# Patient Record
Sex: MALE | Race: ASIAN | HISPANIC OR LATINO | Employment: UNEMPLOYED | URBAN - METROPOLITAN AREA
[De-identification: names, ages, dates, MRNs, and addresses within clinical notes are randomized per-mention and may not be internally consistent; named-entity substitution may affect disease eponyms.]

---

## 2024-01-01 ENCOUNTER — HOSPITAL ENCOUNTER (INPATIENT)
Facility: HOSPITAL | Age: 49
LOS: 24 days | Discharge: LEFT BEFORE DISCHARGE | DRG: 853 | End: 2024-01-25
Attending: EMERGENCY MEDICINE | Admitting: INTERNAL MEDICINE

## 2024-01-01 DIAGNOSIS — D68.9 COAGULOPATHY: ICD-10-CM

## 2024-01-01 DIAGNOSIS — R09.02 HYPOXEMIA: ICD-10-CM

## 2024-01-01 DIAGNOSIS — R53.1 WEAKNESS: ICD-10-CM

## 2024-01-01 DIAGNOSIS — F10.931 ALCOHOL WITHDRAWAL SYNDROME, WITH DELIRIUM: Primary | ICD-10-CM

## 2024-01-01 DIAGNOSIS — E87.20 METABOLIC ACIDOSIS: ICD-10-CM

## 2024-01-01 DIAGNOSIS — J10.1 INFLUENZA A: ICD-10-CM

## 2024-01-01 DIAGNOSIS — E16.2 HYPOGLYCEMIA: ICD-10-CM

## 2024-01-01 DIAGNOSIS — R78.81 BACTEREMIA: ICD-10-CM

## 2024-01-01 DIAGNOSIS — F10.931 DELIRIUM TREMENS: ICD-10-CM

## 2024-01-01 DIAGNOSIS — K70.30 ALCOHOLIC CIRRHOSIS, UNSPECIFIED WHETHER ASCITES PRESENT: ICD-10-CM

## 2024-01-01 DIAGNOSIS — D64.9 ANEMIA, UNSPECIFIED TYPE: ICD-10-CM

## 2024-01-01 DIAGNOSIS — R05.9 COUGH: ICD-10-CM

## 2024-01-01 DIAGNOSIS — D69.6 THROMBOCYTOPENIA: ICD-10-CM

## 2024-01-01 DIAGNOSIS — C22.0 HCC (HEPATOCELLULAR CARCINOMA): ICD-10-CM

## 2024-01-01 LAB
A-ADO2 BLOOD GAS (OHS): 244 MMHG
A-ADO2 BLOOD GAS (OHS): 53 MMHG
ABS NEUT CALC (OHS): 12.28 X10(3)/MCL (ref 2.1–9.2)
ALBUMIN SERPL-MCNC: 2 G/DL (ref 3.5–5)
ALBUMIN/GLOB SERPL: 0.3 RATIO (ref 1.1–2)
ALLENS TEST BLOOD GAS (OHS): YES
ALLENS TEST BLOOD GAS (OHS): YES
ALP SERPL-CCNC: 154 UNIT/L (ref 40–150)
ALT SERPL-CCNC: 65 UNIT/L (ref 0–55)
AMMONIA PLAS-MSCNC: 56.3 UMOL/L (ref 18–72)
AMPHET UR QL SCN: NEGATIVE
ANION GAP SERPL CALC-SCNC: 11 MEQ/L
ANION GAP SERPL CALC-SCNC: 9 MEQ/L
ANISOCYTOSIS BLD QL SMEAR: ABNORMAL
APPEARANCE UR: ABNORMAL
APTT PPP: 38.6 SECONDS (ref 23.2–33.7)
AST SERPL-CCNC: 199 UNIT/L (ref 5–34)
BACTERIA #/AREA URNS AUTO: ABNORMAL /HPF
BARBITURATE SCN PRESENT UR: NEGATIVE
BASE EXCESS BLD CALC-SCNC: -7.7 MMOL/L (ref -2–2)
BASE EXCESS BLD CALC-SCNC: -8.4 MMOL/L (ref -2–2)
BENZODIAZ UR QL SCN: NEGATIVE
BILIRUB SERPL-MCNC: 9.2 MG/DL
BILIRUB UR QL STRIP.AUTO: ABNORMAL
BILIRUBIN DIRECT+TOT PNL SERPL-MCNC: 6.5 MG/DL (ref 0–?)
BLOOD GAS SAMPLE TYPE (OHS): ABNORMAL
BLOOD GAS SAMPLE TYPE (OHS): ABNORMAL
BNP BLD-MCNC: 89.3 PG/ML
BUN SERPL-MCNC: 9.1 MG/DL (ref 8.9–20.6)
BUN SERPL-MCNC: 9.1 MG/DL (ref 8.9–20.6)
BUN SERPL-MCNC: 9.6 MG/DL (ref 8.9–20.6)
BURR CELLS (OLG): ABNORMAL
CALCIUM SERPL-MCNC: 6.8 MG/DL (ref 8.4–10.2)
CALCIUM SERPL-MCNC: 6.8 MG/DL (ref 8.4–10.2)
CALCIUM SERPL-MCNC: 7.9 MG/DL (ref 8.4–10.2)
CANNABINOIDS UR QL SCN: NEGATIVE
CHLORIDE SERPL-SCNC: 101 MMOL/L (ref 98–107)
CHLORIDE SERPL-SCNC: 105 MMOL/L (ref 98–107)
CHLORIDE SERPL-SCNC: 106 MMOL/L (ref 98–107)
CK MB SERPL-MCNC: 2.9 NG/ML
CK SERPL-CCNC: 840 U/L (ref 30–200)
CO2 BLDA-SCNC: 15.3 MMOL/L (ref 22–26)
CO2 BLDA-SCNC: 18.3 MMOL/L (ref 22–26)
CO2 SERPL-SCNC: 12 MMOL/L (ref 22–29)
CO2 SERPL-SCNC: 17 MMOL/L (ref 22–29)
CO2 SERPL-SCNC: 19 MMOL/L (ref 22–29)
COCAINE UR QL SCN: NEGATIVE
COHGB MFR BLDA: 2.8 % (ref 0.5–1.5)
COHGB MFR BLDA: 3 % (ref 0.5–1.5)
COLOR UR AUTO: ABNORMAL
CREAT SERPL-MCNC: 0.79 MG/DL (ref 0.73–1.18)
CREAT SERPL-MCNC: 0.84 MG/DL (ref 0.73–1.18)
CREAT SERPL-MCNC: 0.92 MG/DL (ref 0.73–1.18)
CREAT/UREA NIT SERPL: 11
CREAT/UREA NIT SERPL: 12
DRAWN BY BLOOD GAS (OHS): ABNORMAL
DRAWN BY BLOOD GAS (OHS): ABNORMAL
ERYTHROCYTE [DISTWIDTH] IN BLOOD BY AUTOMATED COUNT: 20.4 % (ref 11.5–17)
ETHANOL SERPL-MCNC: 59 MG/DL
FENTANYL UR QL SCN: NEGATIVE
FERRITIN SERPL-MCNC: 116 NG/ML (ref 21.81–274.66)
FLUAV AG UPPER RESP QL IA.RAPID: DETECTED
FLUBV AG UPPER RESP QL IA.RAPID: NOT DETECTED
FOLATE SERPL-MCNC: 18.3 NG/ML (ref 7–31.4)
GAS PNL BLD: 122 MMHG
GAS PNL BLD: 317 MMHG
GFR SERPLBLD CREATININE-BSD FMLA CKD-EPI: >60 MLS/MIN/1.73/M2
GLOBULIN SER-MCNC: 6.7 GM/DL (ref 2.4–3.5)
GLUCOSE SERPL-MCNC: 117 MG/DL (ref 74–100)
GLUCOSE SERPL-MCNC: 141 MG/DL (ref 74–100)
GLUCOSE SERPL-MCNC: 66 MG/DL (ref 74–100)
GLUCOSE UR QL STRIP.AUTO: NORMAL
HAV IGM SERPL QL IA: NONREACTIVE
HBV CORE IGM SERPL QL IA: NONREACTIVE
HBV SURFACE AG SERPL QL IA: NONREACTIVE
HCO3 BLDA-SCNC: 14.6 MMOL/L (ref 22–26)
HCO3 BLDA-SCNC: 17.3 MMOL/L (ref 22–26)
HCT VFR BLD AUTO: 27.3 % (ref 42–52)
HCV AB SERPL QL IA: REACTIVE
HGB BLD-MCNC: 8.5 G/DL (ref 14–18)
HOLD SPECIMEN: NORMAL
HYALINE CASTS #/AREA URNS LPF: ABNORMAL /LPF
HYPOCHROMIA BLD QL SMEAR: ABNORMAL
INHALED O2 CONCENTRATION: 21 %
INHALED O2 CONCENTRATION: 50 %
INR PPP: 2.6
IRON SATN MFR SERPL: 19 % (ref 20–50)
IRON SERPL-MCNC: 39 UG/DL (ref 65–175)
KETONES UR QL STRIP.AUTO: ABNORMAL
LACTATE SERPL-SCNC: 5.4 MMOL/L (ref 0.5–2.2)
LACTATE SERPL-SCNC: 7.5 MMOL/L (ref 0.5–2.2)
LEUKOCYTE ESTERASE UR QL STRIP.AUTO: NEGATIVE
LIPASE SERPL-CCNC: 56 U/L
LYMPHOCYTES NFR BLD MANUAL: 13 % (ref 13–40)
LYMPHOCYTES NFR BLD MANUAL: 2.02 X10(3)/MCL
MAGNESIUM SERPL-MCNC: 1.8 MG/DL (ref 1.6–2.6)
MAGNESIUM SERPL-MCNC: 2 MG/DL (ref 1.6–2.6)
MAGNESIUM SERPL-MCNC: 2.2 MG/DL (ref 1.6–2.6)
MCH RBC QN AUTO: 29.7 PG (ref 27–31)
MCHC RBC AUTO-ENTMCNC: 31.1 G/DL (ref 33–36)
MCV RBC AUTO: 95.5 FL (ref 80–94)
MDMA UR QL SCN: NEGATIVE
METAMYELOCYTES NFR BLD MANUAL: 2 %
METHGB MFR BLDA: 0.3 % (ref 0–1.5)
METHGB MFR BLDA: 0.7 % (ref 0–1.5)
MONOCYTES NFR BLD MANUAL: 0.93 X10(3)/MCL (ref 0.1–1.3)
MONOCYTES NFR BLD MANUAL: 6 % (ref 2–11)
NEUTROPHILS NFR BLD MANUAL: 70 % (ref 47–80)
NEUTS BAND NFR BLD MANUAL: 9 % (ref 0–11)
NITRITE UR QL STRIP.AUTO: NEGATIVE
NRBC BLD AUTO-RTO: 3.8 %
NRBC BLD MANUAL-RTO: 2 %
O2 HB BLOOD GAS (OHS): 92.8 % (ref 94–100)
O2 HB BLOOD GAS (OHS): 93.2 % (ref 94–100)
OPIATES UR QL SCN: NEGATIVE
OXYHGB MFR BLDA: 7.5 G/DL (ref 12–18)
OXYHGB MFR BLDA: 8.6 G/DL (ref 12–18)
PCO2 BLDA: 22 MMHG (ref 35–45)
PCO2 BLDA: 32 MMHG (ref 35–45)
PCP UR QL: NEGATIVE
PH BLDA: 7.34 [PH] (ref 7.35–7.45)
PH BLDA: 7.43 [PH] (ref 7.35–7.45)
PH UR STRIP.AUTO: 6 [PH]
PH UR: 6 [PH] (ref 3–11)
PHOSPHATE SERPL-MCNC: 2.1 MG/DL (ref 2.3–4.7)
PHOSPHATE SERPL-MCNC: 2.5 MG/DL (ref 2.3–4.7)
PHOSPHATE SERPL-MCNC: 2.8 MG/DL (ref 2.3–4.7)
PLATELET # BLD AUTO: 118 X10(3)/MCL (ref 130–400)
PLATELET # BLD EST: ABNORMAL 10*3/UL
PLATELETS.RETICULATED NFR BLD AUTO: 8 % (ref 0.9–11.2)
PMV BLD AUTO: 11.5 FL (ref 7.4–10.4)
PO2 BLDA: 69 MMHG (ref 75–100)
PO2 BLDA: 73 MMHG (ref 75–100)
POCT GLUCOSE: 123 MG/DL (ref 70–110)
POCT GLUCOSE: 65 MG/DL (ref 70–110)
POLYCHROMASIA BLD QL SMEAR: ABNORMAL
POTASSIUM SERPL-SCNC: 3.4 MMOL/L (ref 3.5–5.1)
POTASSIUM SERPL-SCNC: 3.5 MMOL/L (ref 3.5–5.1)
POTASSIUM SERPL-SCNC: 4 MMOL/L (ref 3.5–5.1)
PROT SERPL-MCNC: 8.7 GM/DL (ref 6.4–8.3)
PROT UR QL STRIP.AUTO: ABNORMAL
PROTHROMBIN TIME: 26.9 SECONDS (ref 11.4–14)
RBC # BLD AUTO: 2.86 X10(6)/MCL (ref 4.7–6.1)
RBC #/AREA URNS AUTO: ABNORMAL /HPF
RBC MORPH BLD: ABNORMAL
RBC UR QL AUTO: NEGATIVE
RSV A 5' UTR RNA NPH QL NAA+PROBE: NOT DETECTED
SAMPLE SITE BLOOD GAS (OHS): ABNORMAL
SAMPLE SITE BLOOD GAS (OHS): ABNORMAL
SAO2 % BLDA: 96.2 %
SAO2 % BLDA: 96.4 %
SARS-COV-2 RNA RESP QL NAA+PROBE: NOT DETECTED
SODIUM SERPL-SCNC: 132 MMOL/L (ref 136–145)
SODIUM SERPL-SCNC: 133 MMOL/L (ref 136–145)
SODIUM SERPL-SCNC: 134 MMOL/L (ref 136–145)
SP GR UR STRIP.AUTO: 1.02 (ref 1–1.03)
SPECIFIC GRAVITY, URINE AUTO (.000) (OHS): 1.02 (ref 1–1.03)
TARGETS BLD QL SMEAR: ABNORMAL
TIBC SERPL-MCNC: 164 UG/DL (ref 69–240)
TIBC SERPL-MCNC: 203 UG/DL (ref 250–450)
TRANSFERRIN SERPL-MCNC: 179 MG/DL (ref 174–364)
TROPONIN I SERPL-MCNC: 0.03 NG/ML (ref 0–0.04)
UROBILINOGEN UR STRIP-ACNC: ABNORMAL
VIT B12 SERPL-MCNC: >2000 PG/ML (ref 213–816)
WBC # SPEC AUTO: 15.54 X10(3)/MCL (ref 4.5–11.5)
WBC #/AREA URNS AUTO: ABNORMAL /HPF

## 2024-01-01 PROCEDURE — 93005 ELECTROCARDIOGRAM TRACING: CPT

## 2024-01-01 PROCEDURE — 82248 BILIRUBIN DIRECT: CPT | Performed by: PHYSICIAN ASSISTANT

## 2024-01-01 PROCEDURE — 5A09457 ASSISTANCE WITH RESPIRATORY VENTILATION, 24-96 CONSECUTIVE HOURS, CONTINUOUS POSITIVE AIRWAY PRESSURE: ICD-10-PCS | Performed by: INTERNAL MEDICINE

## 2024-01-01 PROCEDURE — 96374 THER/PROPH/DIAG INJ IV PUSH: CPT

## 2024-01-01 PROCEDURE — 84484 ASSAY OF TROPONIN QUANT: CPT | Performed by: PHYSICIAN ASSISTANT

## 2024-01-01 PROCEDURE — 82803 BLOOD GASES ANY COMBINATION: CPT

## 2024-01-01 PROCEDURE — 36600 WITHDRAWAL OF ARTERIAL BLOOD: CPT

## 2024-01-01 PROCEDURE — 82077 ASSAY SPEC XCP UR&BREATH IA: CPT | Performed by: PHYSICIAN ASSISTANT

## 2024-01-01 PROCEDURE — 25000003 PHARM REV CODE 250: Performed by: EMERGENCY MEDICINE

## 2024-01-01 PROCEDURE — 87154 CUL TYP ID BLD PTHGN 6+ TRGT: CPT | Performed by: STUDENT IN AN ORGANIZED HEALTH CARE EDUCATION/TRAINING PROGRAM

## 2024-01-01 PROCEDURE — 80074 ACUTE HEPATITIS PANEL: CPT | Performed by: PHYSICIAN ASSISTANT

## 2024-01-01 PROCEDURE — 85027 COMPLETE CBC AUTOMATED: CPT | Performed by: PHYSICIAN ASSISTANT

## 2024-01-01 PROCEDURE — 87077 CULTURE AEROBIC IDENTIFY: CPT | Performed by: STUDENT IN AN ORGANIZED HEALTH CARE EDUCATION/TRAINING PROGRAM

## 2024-01-01 PROCEDURE — 83880 ASSAY OF NATRIURETIC PEPTIDE: CPT | Performed by: PHYSICIAN ASSISTANT

## 2024-01-01 PROCEDURE — 96375 TX/PRO/DX INJ NEW DRUG ADDON: CPT

## 2024-01-01 PROCEDURE — 99900035 HC TECH TIME PER 15 MIN (STAT)

## 2024-01-01 PROCEDURE — 85730 THROMBOPLASTIN TIME PARTIAL: CPT | Performed by: PHYSICIAN ASSISTANT

## 2024-01-01 PROCEDURE — 25000003 PHARM REV CODE 250: Performed by: STUDENT IN AN ORGANIZED HEALTH CARE EDUCATION/TRAINING PROGRAM

## 2024-01-01 PROCEDURE — 87522 HEPATITIS C REVRS TRNSCRPJ: CPT | Performed by: STUDENT IN AN ORGANIZED HEALTH CARE EDUCATION/TRAINING PROGRAM

## 2024-01-01 PROCEDURE — S5010 5% DEXTROSE AND 0.45% SALINE: HCPCS | Performed by: STUDENT IN AN ORGANIZED HEALTH CARE EDUCATION/TRAINING PROGRAM

## 2024-01-01 PROCEDURE — 85610 PROTHROMBIN TIME: CPT | Performed by: PHYSICIAN ASSISTANT

## 2024-01-01 PROCEDURE — 27000190 HC CPAP FULL FACE MASK W/VALVE

## 2024-01-01 PROCEDURE — 83735 ASSAY OF MAGNESIUM: CPT | Performed by: STUDENT IN AN ORGANIZED HEALTH CARE EDUCATION/TRAINING PROGRAM

## 2024-01-01 PROCEDURE — 63600175 PHARM REV CODE 636 W HCPCS: Performed by: EMERGENCY MEDICINE

## 2024-01-01 PROCEDURE — 93005 ELECTROCARDIOGRAM TRACING: CPT | Performed by: INTERNAL MEDICINE

## 2024-01-01 PROCEDURE — 80307 DRUG TEST PRSMV CHEM ANLYZR: CPT | Performed by: STUDENT IN AN ORGANIZED HEALTH CARE EDUCATION/TRAINING PROGRAM

## 2024-01-01 PROCEDURE — 20000000 HC ICU ROOM

## 2024-01-01 PROCEDURE — 63600175 PHARM REV CODE 636 W HCPCS: Performed by: PHYSICIAN ASSISTANT

## 2024-01-01 PROCEDURE — 82607 VITAMIN B-12: CPT | Performed by: STUDENT IN AN ORGANIZED HEALTH CARE EDUCATION/TRAINING PROGRAM

## 2024-01-01 PROCEDURE — 83605 ASSAY OF LACTIC ACID: CPT | Performed by: STUDENT IN AN ORGANIZED HEALTH CARE EDUCATION/TRAINING PROGRAM

## 2024-01-01 PROCEDURE — 82140 ASSAY OF AMMONIA: CPT | Performed by: PHYSICIAN ASSISTANT

## 2024-01-01 PROCEDURE — 82728 ASSAY OF FERRITIN: CPT | Performed by: STUDENT IN AN ORGANIZED HEALTH CARE EDUCATION/TRAINING PROGRAM

## 2024-01-01 PROCEDURE — 84100 ASSAY OF PHOSPHORUS: CPT | Performed by: STUDENT IN AN ORGANIZED HEALTH CARE EDUCATION/TRAINING PROGRAM

## 2024-01-01 PROCEDURE — 94660 CPAP INITIATION&MGMT: CPT

## 2024-01-01 PROCEDURE — 96361 HYDRATE IV INFUSION ADD-ON: CPT

## 2024-01-01 PROCEDURE — 82553 CREATINE MB FRACTION: CPT | Performed by: PHYSICIAN ASSISTANT

## 2024-01-01 PROCEDURE — 0241U COVID/RSV/FLU A&B PCR: CPT | Performed by: PHYSICIAN ASSISTANT

## 2024-01-01 PROCEDURE — C9113 INJ PANTOPRAZOLE SODIUM, VIA: HCPCS | Performed by: STUDENT IN AN ORGANIZED HEALTH CARE EDUCATION/TRAINING PROGRAM

## 2024-01-01 PROCEDURE — 82550 ASSAY OF CK (CPK): CPT | Performed by: PHYSICIAN ASSISTANT

## 2024-01-01 PROCEDURE — 83735 ASSAY OF MAGNESIUM: CPT | Performed by: PHYSICIAN ASSISTANT

## 2024-01-01 PROCEDURE — 82962 GLUCOSE BLOOD TEST: CPT

## 2024-01-01 PROCEDURE — 82746 ASSAY OF FOLIC ACID SERUM: CPT | Performed by: STUDENT IN AN ORGANIZED HEALTH CARE EDUCATION/TRAINING PROGRAM

## 2024-01-01 PROCEDURE — 63600175 PHARM REV CODE 636 W HCPCS: Performed by: STUDENT IN AN ORGANIZED HEALTH CARE EDUCATION/TRAINING PROGRAM

## 2024-01-01 PROCEDURE — 83540 ASSAY OF IRON: CPT | Performed by: STUDENT IN AN ORGANIZED HEALTH CARE EDUCATION/TRAINING PROGRAM

## 2024-01-01 PROCEDURE — 81001 URINALYSIS AUTO W/SCOPE: CPT | Performed by: STUDENT IN AN ORGANIZED HEALTH CARE EDUCATION/TRAINING PROGRAM

## 2024-01-01 PROCEDURE — 87040 BLOOD CULTURE FOR BACTERIA: CPT | Performed by: STUDENT IN AN ORGANIZED HEALTH CARE EDUCATION/TRAINING PROGRAM

## 2024-01-01 PROCEDURE — 83690 ASSAY OF LIPASE: CPT | Performed by: PHYSICIAN ASSISTANT

## 2024-01-01 PROCEDURE — 80053 COMPREHEN METABOLIC PANEL: CPT | Performed by: PHYSICIAN ASSISTANT

## 2024-01-01 PROCEDURE — 99285 EMERGENCY DEPT VISIT HI MDM: CPT | Mod: 25

## 2024-01-01 RX ORDER — MAGNESIUM SULFATE HEPTAHYDRATE 40 MG/ML
2 INJECTION, SOLUTION INTRAVENOUS ONCE
Status: COMPLETED | OUTPATIENT
Start: 2024-01-01 | End: 2024-01-01

## 2024-01-01 RX ORDER — CALCIUM GLUCONATE 20 MG/ML
1 INJECTION, SOLUTION INTRAVENOUS ONCE
Status: COMPLETED | OUTPATIENT
Start: 2024-01-02 | End: 2024-01-02

## 2024-01-01 RX ORDER — LORAZEPAM 2 MG/ML
1 INJECTION INTRAMUSCULAR
Status: COMPLETED | OUTPATIENT
Start: 2024-01-01 | End: 2024-01-01

## 2024-01-01 RX ORDER — ENOXAPARIN SODIUM 100 MG/ML
40 INJECTION SUBCUTANEOUS EVERY 24 HOURS
Status: DISCONTINUED | OUTPATIENT
Start: 2024-01-01 | End: 2024-01-01

## 2024-01-01 RX ORDER — GLUCAGON 1 MG
1 KIT INJECTION
Status: DISCONTINUED | OUTPATIENT
Start: 2024-01-01 | End: 2024-01-25 | Stop reason: HOSPADM

## 2024-01-01 RX ORDER — IBUPROFEN 200 MG
24 TABLET ORAL
Status: DISCONTINUED | OUTPATIENT
Start: 2024-01-01 | End: 2024-01-25 | Stop reason: HOSPADM

## 2024-01-01 RX ORDER — IBUPROFEN 200 MG
16 TABLET ORAL
Status: DISCONTINUED | OUTPATIENT
Start: 2024-01-01 | End: 2024-01-25 | Stop reason: HOSPADM

## 2024-01-01 RX ORDER — SODIUM CHLORIDE 9 MG/ML
INJECTION, SOLUTION INTRAVENOUS CONTINUOUS
Status: DISCONTINUED | OUTPATIENT
Start: 2024-01-01 | End: 2024-01-02

## 2024-01-01 RX ORDER — SODIUM CHLORIDE 0.9 % (FLUSH) 0.9 %
10 SYRINGE (ML) INJECTION
Status: DISCONTINUED | OUTPATIENT
Start: 2024-01-01 | End: 2024-01-25 | Stop reason: HOSPADM

## 2024-01-01 RX ORDER — LORAZEPAM 2 MG/ML
2 INJECTION INTRAMUSCULAR
Status: DISCONTINUED | OUTPATIENT
Start: 2024-01-01 | End: 2024-01-09

## 2024-01-01 RX ORDER — OSELTAMIVIR PHOSPHATE 75 MG/1
75 CAPSULE ORAL
Status: ACTIVE | OUTPATIENT
Start: 2024-01-01 | End: 2024-01-02

## 2024-01-01 RX ORDER — LORAZEPAM 1 MG/1
2 TABLET ORAL EVERY 4 HOURS
Status: DISCONTINUED | OUTPATIENT
Start: 2024-01-01 | End: 2024-01-01

## 2024-01-01 RX ORDER — DEXTROSE MONOHYDRATE AND SODIUM CHLORIDE 5; .45 G/100ML; G/100ML
INJECTION, SOLUTION INTRAVENOUS CONTINUOUS
Status: DISCONTINUED | OUTPATIENT
Start: 2024-01-01 | End: 2024-01-04

## 2024-01-01 RX ORDER — SODIUM BICARBONATE 1 MEQ/ML
50 SYRINGE (ML) INTRAVENOUS ONCE
Status: COMPLETED | OUTPATIENT
Start: 2024-01-01 | End: 2024-01-01

## 2024-01-01 RX ORDER — PANTOPRAZOLE SODIUM 40 MG/10ML
40 INJECTION, POWDER, LYOPHILIZED, FOR SOLUTION INTRAVENOUS DAILY
Status: DISCONTINUED | OUTPATIENT
Start: 2024-01-01 | End: 2024-01-10

## 2024-01-01 RX ADMIN — POTASSIUM PHOSPHATE, MONOBASIC POTASSIUM PHOSPHATE, DIBASIC 20 MMOL: 224; 236 INJECTION, SOLUTION, CONCENTRATE INTRAVENOUS at 11:01

## 2024-01-01 RX ADMIN — DEXTROSE AND SODIUM CHLORIDE: 5; 450 INJECTION, SOLUTION INTRAVENOUS at 05:01

## 2024-01-01 RX ADMIN — LORAZEPAM 1 MG: 2 INJECTION INTRAMUSCULAR; INTRAVENOUS at 01:01

## 2024-01-01 RX ADMIN — LORAZEPAM 2 MG: 2 INJECTION INTRAMUSCULAR; INTRAVENOUS at 05:01

## 2024-01-01 RX ADMIN — PIPERACILLIN AND TAZOBACTAM 4.5 G: 4; .5 INJECTION, POWDER, LYOPHILIZED, FOR SOLUTION INTRAVENOUS; PARENTERAL at 04:01

## 2024-01-01 RX ADMIN — ACETAMINOPHEN 325 MG: 325 SUPPOSITORY RECTAL at 03:01

## 2024-01-01 RX ADMIN — CALCIUM GLUCONATE 1 G: 20 INJECTION, SOLUTION INTRAVENOUS at 11:01

## 2024-01-01 RX ADMIN — SODIUM CHLORIDE: 9 INJECTION, SOLUTION INTRAVENOUS at 11:01

## 2024-01-01 RX ADMIN — AZITHROMYCIN MONOHYDRATE 500 MG: 500 INJECTION, POWDER, LYOPHILIZED, FOR SOLUTION INTRAVENOUS at 03:01

## 2024-01-01 RX ADMIN — MAGNESIUM SULFATE HEPTAHYDRATE 2 G: 40 INJECTION, SOLUTION INTRAVENOUS at 04:01

## 2024-01-01 RX ADMIN — PANTOPRAZOLE SODIUM 40 MG: 40 INJECTION, POWDER, FOR SOLUTION INTRAVENOUS at 04:01

## 2024-01-01 RX ADMIN — DEXTROSE AND SODIUM CHLORIDE: 5; 450 INJECTION, SOLUTION INTRAVENOUS at 10:01

## 2024-01-01 RX ADMIN — VANCOMYCIN HYDROCHLORIDE 1000 MG: 1 INJECTION, POWDER, LYOPHILIZED, FOR SOLUTION INTRAVENOUS at 04:01

## 2024-01-01 RX ADMIN — SODIUM CHLORIDE, POTASSIUM CHLORIDE, SODIUM LACTATE AND CALCIUM CHLORIDE 500 ML: 600; 310; 30; 20 INJECTION, SOLUTION INTRAVENOUS at 01:01

## 2024-01-01 RX ADMIN — ASCORBIC ACID, VITAMIN A PALMITATE, CHOLECALCIFEROL, THIAMINE HYDROCHLORIDE, RIBOFLAVIN-5 PHOSPHATE SODIUM, PYRIDOXINE HYDROCHLORIDE, NIACINAMIDE, DEXPANTHENOL, ALPHA-TOCOPHEROL ACETATE, VITAMIN K1, FOLIC ACID, BIOTIN, CYANOCOBALAMIN: 200; 3300; 200; 6; 3.6; 6; 40; 15; 10; 150; 600; 60; 5 INJECTION, SOLUTION INTRAVENOUS at 02:01

## 2024-01-01 RX ADMIN — SODIUM BICARBONATE 50 MEQ: 84 INJECTION INTRAVENOUS at 05:01

## 2024-01-01 RX ADMIN — SODIUM CHLORIDE 1632 ML: 9 INJECTION, SOLUTION INTRAVENOUS at 04:01

## 2024-01-01 NOTE — Clinical Note
Attempted to contact patient again and still unable to leave voice message. Will retry again tomorrow.    The catheter was inserted into the  ostial right common femoral artery.

## 2024-01-01 NOTE — H&P
[facility] - Emergency Dept  Pulmonary Critical Care Note    Patient Name: Franco Hein  MRN: [id number]  Admission Date: 1/1/2024  Hospital Length of Stay: 0 days  Code Status: Full Code  Attending Provider: MARIAH Nieto MD  Primary Care Provider: Caitie, Primary Doctor     Subjective:     HPI:   Franco Hein is  48 y.o. male with an unknown past medical history who presented to [facility] ED on 1/1/2024 with complaint of jaundice and weakness for 3 weeks. Patient was brought in by a friend who states patient is a daily drinker who has not been able to go to a store for the last 2 days, but patient is noted to have a positive ethanol on admission. Patient unable to provide history given his mental status and was only able to say his name and confirm he drinks alcohol but does not do drugs. Otherwise, no additional information obtained. Patient was tachypneic, tachycardic, and tremulous, on presentation with jaundice. Initial workup significant for AGMA with respiratory compensation, positive ethanol, influenza A positive, leukocytosis, and macrocytic anemia. No signs of bleeding on examination. Internal Medicine consulted for delirium tremens. Admitted to ICU given altered mental status and concern for decompensation in the setting of alcohol withdrawal and influenza.    Hospital Course/Significant events:  1/1/2024: Admitted to ICU, on 2L NC    24 Hour Interval History:      History reviewed. No pertinent past medical history.    No past surgical history on file.    Social History     Socioeconomic History    Marital status: Single           No current outpatient medications    Current Inpatient Medications   [START ON 1/2/2024] folic acid (FOLVITE) IVPB  1 mg Intravenous Daily    oseltamivir  75 mg Oral ED 1 Time    [START ON 1/2/2024] thiamine (B-1) 100 mg in dextrose 5 % (D5W) 100 mL IVPB  100 mg Intravenous Daily       Current Intravenous Infusions   dextrose 5 % and 0.45 % NaCl            Review of Systems   Unable to perform ROS: Mental acuity          Objective:     No intake or output data in the 24 hours ending 01/01/24 1511      Vital Signs (Most Recent):  Temp: 97.7 °F (36.5 °C) (01/01/24 1255)  Pulse: (!) 151 (01/01/24 1400)  Resp: (!) 35 (01/01/24 1400)  BP: (!) 153/81 (01/01/24 1400)  SpO2: 95 % (01/01/24 1400)  Body mass index is 20.6 kg/m².  Weight: 54.4 kg (120 lb) Vital Signs (24h Range):  Temp:  [97.7 °F (36.5 °C)] 97.7 °F (36.5 °C)  Pulse:  [144-151] 151  Resp:  [20-35] 35  SpO2:  [93 %-99 %] 95 %  BP: (130-153)/(81-93) 153/81     Physical Exam  Constitutional:       Appearance: He is toxic-appearing and diaphoretic.   HENT:      Head: Normocephalic and atraumatic.   Eyes:      General: Scleral icterus present.         Right eye: No discharge.         Left eye: No discharge.   Cardiovascular:      Rate and Rhythm: Tachycardia present.      Heart sounds: No murmur heard.  Pulmonary:      Effort: Respiratory distress present.      Breath sounds: Normal breath sounds.      Comments: Tachypneic  Abdominal:      General: Bowel sounds are normal. There is no distension.      Palpations: Abdomen is soft.      Tenderness: There is no abdominal tenderness.   Musculoskeletal:      Right lower leg: No edema.      Left lower leg: No edema.   Skin:     General: Skin is warm.      Coloration: Skin is jaundiced.           Lines/Drains/Airways       Peripheral Intravenous Line  Duration                  Peripheral IV - Single Lumen 01/01/24 1310 20 G Right Antecubital <1 day                    Significant Labs:    Lab Results   Component Value Date    WBC 15.54 (H) 01/01/2024    HGB 8.5 (L) 01/01/2024    HCT 27.3 (L) 01/01/2024    MCV 95.5 (H) 01/01/2024     (L) 01/01/2024           BMP  Lab Results   Component Value Date     (L) 01/01/2024    K 4.0 01/01/2024    CHLORIDE 101 01/01/2024    CO2 12 (L) 01/01/2024    BUN 9.6 01/01/2024    CREATININE 0.92 01/01/2024    CALCIUM 7.9 (L)  01/01/2024         ABG  Recent Labs   Lab 01/01/24  1338   PH 7.430   PO2 69.0*   PCO2 22.0*   HCO3 14.6*   POCBASEDEF -8.40*       Mechanical Ventilation Support:         Significant Imaging:  X-Ray Chest 1 View    Result Date: 1/1/2024  Bilateral patchy airspace opacities greatest on the left.  Findings concerning for atypical infectious process. Electronically signed by: Josef Sow Date:    01/01/2024 Time:    14:23          Assessment/Plan:     Assessment  Alcohol withdrawal, delirium tremens  Sepsis, SIRS 3/4, lower respiratory infection  AGMA  Lactic acidosis  Influenza A  Atypical pneumonia  Possible new cirrhosis diagnosis  Alcohol abuse  Positive hepatitis C antibody  Hyperbilirubinemia  Elevated transaminases  Elevated INR  Macrocytic anemia      Plan  Admitted to ICU for close monitoring  Initiate sepsis workup: NS 30 cc/hr, blood cultures, UA, XR Chest, respiratory culture with gram stain, trend lactate  Start Vanc, Zosyn, Azithro  NPO for now given AMS  Tamiflu when able to tolerate PO  CT Chest Abdomen Pelvis to assess for biliary obstruction and lower respiratory infectious process  Order B12, folate  CIWA protocol with lorazepam prn, thiamine IV, folic acid IV  Monitor for refeeding syndrome with BMP, Mg, Ph q4h    DVT Prophylaxis: Lovenox  GI Prophylaxis: PPI     32 minutes of critical care was time spent personally by me on the following activities: development of treatment plan with patient or surrogate and bedside caregivers, discussions with consultants, evaluation of patient's response to treatment, examination of patient, ordering and performing treatments and interventions, ordering and review of laboratory studies, ordering and review of radiographic studies, pulse oximetry, re-evaluation of patient's condition.  This critical care time did not overlap with that of any other provider or involve time for any procedures.     Bridgette Tanner MD  Pulmonary Critical Care Medicine  [facility] - Emergency Dept  DOS: 01/01/2024

## 2024-01-01 NOTE — PROGRESS NOTES
"Pharmacokinetic Initial Assessment: IV Vancomycin    Assessment/Plan:    Initiate intravenous vancomycin with loading dose of 1000 mg once followed by a maintenance dose of vancomycin 750mg IV every 12 hours  Desired empiric serum trough concentration is 10 to 20 mcg/mL  Draw vancomycin trough level 60 min prior to fourth dose on 1/3/24 at approximately 0400  Pharmacy will continue to follow and monitor vancomycin.      Please contact pharmacy at extension [phone] with any questions regarding this assessment.     Thank you for the consult,   Jonna Yoder       Patient brief summary:  Franco Hein is a 48 y.o. male initiated on antimicrobial therapy with IV Vancomycin for treatment of suspected lower respiratory infection    Drug Allergies:   Review of patient's allergies indicates:  No Known Allergies    Actual Body Weight:   54.4kg    Renal Function:   Estimated Creatinine Clearance: 76.4 mL/min (based on SCr of 0.92 mg/dL).,     CBC (last 72 hours):  Recent Labs   Lab Result Units 01/01/24  1307   WBC x10(3)/mcL 15.54*   Hgb g/dL 8.5*   Hct % 27.3*   Platelet x10(3)/mcL 118*   Monocytes % % 6       Metabolic Panel (last 72 hours):  Recent Labs   Lab Result Units 01/01/24  1307   Sodium Level mmol/L 132*   Potassium Level mmol/L 4.0   Chloride mmol/L 101   Carbon Dioxide mmol/L 12*   Glucose Level mg/dL 66*   Blood Urea Nitrogen mg/dL 9.6   Creatinine mg/dL 0.92   Albumin Level g/dL 2.0*   Bilirubin Total mg/dL 9.2*   Alkaline Phosphatase unit/L 154*   Aspartate Aminotransferase unit/L 199*   Alanine Aminotransferase unit/L 65*   Magnesium Level mg/dL 1.80       Drug levels (last 3 results):  No results for input(s): "VANCOMYCINRA", "VANCORANDOM", "VANCOMYCINPE", "VANCOPEAK", "VANCOMYCINTR", "VANCOTROUGH" in the last 72 hours.    Microbiologic Results:  Microbiology Results (last 7 days)       Procedure Component Value Units Date/Time    Blood Culture [[id number]]     Order Status: Sent Specimen: Blood  "    Blood Culture [[id number]]     Order Status: Sent Specimen: Blood     Respiratory Culture [[id number]]     Order Status: Sent Specimen: Sputum     Gram Stain [[id number]]     Order Status: Sent Specimen: Sputum

## 2024-01-01 NOTE — ED PROVIDER NOTES
Encounter Date: 1/1/2024       History     Chief Complaint   Patient presents with    Delirium Tremens (DTS)     C/o tremors, juandice and weakness x3 weeks. Friend reports pt is a daily drinker. .       Little history available.  Predominantly Arabic speaking, does speak some English.  Brought to the ER by a friend, known daily drinker, reportedly has not been able to get out to the store in the last 2 days or so which has interfered with his drinking.  He presents jaundiced, tremulous, tachypneic, tachycardic, with obvious signs of liver failure and alcohol withdrawal, probable delirium tremens.  Even with Arabic language video , unable to get much additional history from him.  He is alert but somnolent at times, answer some questions in simple one-word answers but not with reliable accuracy.  No apparent pain.    The history is provided by the patient and a friend. No  was used.     Review of patient's allergies indicates:  No Known Allergies  History reviewed. No pertinent past medical history.  No past surgical history on file.  History reviewed. No pertinent family history.     Review of Systems   Unable to perform ROS: Mental status change       Physical Exam     Initial Vitals [01/01/24 1255]   BP Pulse Resp Temp SpO2   (!) 144/93 (!) 144 20 97.7 °F (36.5 °C) 99 %      MAP       --         Physical Exam    Nursing note and vitals reviewed.  Constitutional: He appears well-developed. He is not diaphoretic. He appears distressed.   HENT:   Head: Normocephalic and atraumatic.   Mouth/Throat: No oropharyngeal exudate.    Dry oropharynx.  No traumatic findings.   Eyes: Conjunctivae and EOM are normal. Pupils are equal, round, and reactive to light. Right eye exhibits no discharge. Left eye exhibits no discharge. Scleral icterus is present.   Neck: Neck supple. No thyromegaly present. No tracheal deviation present. No JVD present.   Normal range of motion.  Cardiovascular:   Regular rhythm and normal heart sounds.     Exam reveals no gallop and no friction rub.       No murmur heard.    Sinus tachycardia in the 140s to 150s   Pulmonary/Chest: Breath sounds normal. He is in respiratory distress. He has no wheezes. He has no rhonchi. He has no rales. He exhibits no tenderness.    Deep regular tachypnea suggestive of metabolic acidosis   Abdominal: Abdomen is soft. Bowel sounds are normal. He exhibits no distension and no mass. There is no abdominal tenderness. There is no rebound and no guarding.   Musculoskeletal:         General: No tenderness or edema. Normal range of motion.      Cervical back: Normal range of motion and neck supple.     Lymphadenopathy:     He has no cervical adenopathy.   Neurological: He is alert. No cranial nerve deficit.    Tremulous, appears delirious, unable to give answers to most questions   Skin: Skin is warm and dry. No rash noted. No erythema.    Jaundiced, multiple telangiectasias         ED Course   Critical Care    Date/Time: 1/1/2024 1:21 PM    Performed by: Aamir Mittal MD  Authorized by: Aamir Mittal MD  Direct patient critical care time: 10 minutes  Additional history critical care time: 10 minutes  Ordering / reviewing critical care time: 10 minutes  Documentation critical care time: 10 minutes  Consulting other physicians critical care time: 10 minutes  Total critical care time (exclusive of procedural time) : 50 minutes  Critical care time was exclusive of separately billable procedures and treating other patients and teaching time.  Critical care was necessary to treat or prevent imminent or life-threatening deterioration of the following conditions: hepatic failure.  Critical care was time spent personally by me on the following activities: development of treatment plan with patient or surrogate, evaluation of patient's response to treatment, examination of patient, obtaining history from patient or surrogate, ordering and  performing treatments and interventions, ordering and review of laboratory studies, ordering and review of radiographic studies, pulse oximetry and re-evaluation of patient's condition.        Labs Reviewed   COMPREHENSIVE METABOLIC PANEL - Abnormal; Notable for the following components:       Result Value    Sodium Level 132 (*)     Carbon Dioxide 12 (*)     Glucose Level 66 (*)     Calcium Level Total 7.9 (*)     Protein Total 8.7 (*)     Albumin Level 2.0 (*)     Globulin 6.7 (*)     Albumin/Globulin Ratio 0.3 (*)     Bilirubin Total 9.2 (*)     Alkaline Phosphatase 154 (*)     Alanine Aminotransferase 65 (*)     Aspartate Aminotransferase 199 (*)     All other components within normal limits   COVID/RSV/FLU A&B PCR - Abnormal; Notable for the following components:    Influenza A PCR Detected (*)     All other components within normal limits    Narrative:     The Xpert Xpress SARS-CoV-2/FLU/RSV plus is a rapid, multiplexed real-time PCR test intended for the simultaneous qualitative detection and differentiation of SARS-CoV-2, Influenza A, Influenza B, and respiratory syncytial virus (RSV) viral RNA in either nasopharyngeal swab or nasal swab specimens.         CK - Abnormal; Notable for the following components:    Creatine Kinase 840 (*)     All other components within normal limits   ALCOHOL,MEDICAL (ETHANOL) - Abnormal; Notable for the following components:    Ethanol Level 59.0 (*)     All other components within normal limits   CBC WITH DIFFERENTIAL - Abnormal; Notable for the following components:    WBC 15.54 (*)     RBC 2.86 (*)     Hgb 8.5 (*)     Hct 27.3 (*)     MCV 95.5 (*)     MCHC 31.1 (*)     RDW 20.4 (*)     Platelet 118 (*)     MPV 11.5 (*)     All other components within normal limits   APTT - Abnormal; Notable for the following components:    PTT 38.6 (*)     All other components within normal limits   PROTIME-INR - Abnormal; Notable for the following components:    PT 26.9 (*)     INR 2.6 (*)      All other components within normal limits   BLOOD GAS - Abnormal; Notable for the following components:    pCO2, Blood gas 22.0 (*)     pO2, Blood gas 69.0 (*)     TOC2, Blood gas 15.3 (*)     Base Excess, Blood gas -8.40 (*)     HCO3, Blood gas 14.6 (*)     THb, Blood gas 8.6 (*)     O2 Hb, Blood Gas 92.8 (*)     CO Hgb 2.8 (*)     All other components within normal limits   MANUAL DIFFERENTIAL - Abnormal; Notable for the following components:    Neutrophils Abs Calc 12.2766 (*)     Platelets Decreased (*)     RBC Morph Abnormal (*)     Anisocytosis 3+ (*)     Polychromasia 2+ (*)     Hypochromasia 2+ (*)     Target Cells 1+ (*)     Howard Cells 2+ (*)     All other components within normal limits   POCT GLUCOSE - Abnormal; Notable for the following components:    POCT Glucose 65 (*)     All other components within normal limits   LIPASE - Normal   B-TYPE NATRIURETIC PEPTIDE - Normal   TROPONIN I - Normal   MAGNESIUM - Normal   AMMONIA - Normal   CBC W/ AUTO DIFFERENTIAL    Narrative:     The following orders were created for panel order CBC Auto Differential.  Procedure                               Abnormality         Status                     ---------                               -----------         ------                     CBC with Differential[[id number]]       Abnormal            Final result               Manual Differential[[id number]]         Abnormal            Final result                 Please view results for these tests on the individual orders.   CK-MB   DRUG SCREEN, URINE (BEAKER)   HEPATITIS PANEL, ACUTE   URINALYSIS, REFLEX TO URINE CULTURE   EXTRA TUBES    Narrative:     The following orders were created for panel order EXTRA TUBES.  Procedure                               Abnormality         Status                     ---------                               -----------         ------                     Gold Top Hold[[id number]]                                   In process                  Pink Top Hold[[id number]]                                   In process                   Please view results for these tests on the individual orders.   GOLD TOP HOLD   PINK TOP HOLD   BILIRUBIN, DIRECT   OCCULT BLOOD,STOOL,SCREEN (1-3)    Narrative:     The following orders were created for panel order Occult Blood, Stool Screening (1 -3).  Procedure                               Abnormality         Status                     ---------                               -----------         ------                     Occult Blood Stool, CA ...[[id number]]                                                   Please view results for these tests on the individual orders.   OCCULT BLOOD STOOL, CA SCREEN   POCT GLUCOSE, HAND-HELD DEVICE   POCT GLUCOSE, HAND-HELD DEVICE   POCT GLUCOSE, HAND-HELD DEVICE   POCT GLUCOSE, HAND-HELD DEVICE     EKG Readings: (Independently Interpreted)   Initial Reading: No STEMI. Rhythm: Sinus Tachycardia. Heart Rate: 149. Ectopy: No Ectopy. ST Segments: Normal ST Segments. T Waves: Normal. Axis: Normal.       Imaging Results    None          Medications   LORazepam tablet 2 mg (has no administration in time range)   lactated ringers bolus 500 mL (500 mLs Intravenous New Bag 1/1/24 1317)   LORazepam injection 1 mg (1 mg Intravenous Given 1/1/24 1310)   LORazepam injection 1 mg (1 mg Intravenous Given 1/1/24 1312)   sodium chloride 0.9% 1,000 mL with mvi, (ADULT) no.4 with vit K 3,300 unit- 150 mcg/10 mL 10 mL, thiamine 100 mg, folic acid 1 mg infusion ( Intravenous New Bag 1/1/24 1400)         2:08 PM   Multiple re-evaluations.  Little change.  Noted to have mild hypoxemia, influenza a, multiple abnormalities and sequelae alcoholic liver disease.  Consult Internal Medicine.    Medical Decision Making  Problems Addressed:  Alcohol withdrawal syndrome, with delirium: acute illness or injury    Amount and/or Complexity of Data Reviewed  Labs: ordered. Decision-making details documented in ED  Course.  Radiology: ordered. Decision-making details documented in ED Course.  ECG/medicine tests: ordered and independent interpretation performed. Decision-making details documented in ED Course.    Risk  Prescription drug management.  Decision regarding hospitalization.      Additional MDM:   Differential Diagnosis:     Alcoholic cirrhosis, alcoholic hepatitis, delirium tremens, alcohol withdrawal syndrome, pneumonia, influenza, multiple sequelae of alcoholic liver disease.                                    Clinical Impression:  Final diagnoses:  [R53.1] Weakness  [R05.9] Cough  [F10.931] Alcohol withdrawal syndrome, with delirium (Primary)  [K70.30] Alcoholic cirrhosis, unspecified whether ascites present  [F10.931] Delirium tremens  [D64.9] Anemia, unspecified type  [E87.20] Metabolic acidosis  [E16.2] Hypoglycemia  [D69.6] Thrombocytopenia  [D68.9] Coagulopathy  [J10.1] Influenza A  [R09.02] Hypoxemia          ED Disposition Condition    Admit Stable                Aamir Mittal MD  01/01/24 1410       Aamir Mittal MD  01/01/24 1410

## 2024-01-02 LAB
A-ADO2 BLOOD GAS (OHS): 159 MMHG
ABO + RH BLD: NORMAL
ABO + RH BLD: NORMAL
ABORH RETYPE: NORMAL
ACINETOBACTER CALCOACETICUS-BAUMANNII COMPLEX (OHS): NOT DETECTED
ALBUMIN SERPL-MCNC: 1.5 G/DL (ref 3.5–5)
ALBUMIN/GLOB SERPL: 0.3 RATIO (ref 1.1–2)
ALLENS TEST BLOOD GAS (OHS): YES
ALP SERPL-CCNC: 111 UNIT/L (ref 40–150)
ALT SERPL-CCNC: 54 UNIT/L (ref 0–55)
ANION GAP SERPL CALC-SCNC: 2 MEQ/L
ANION GAP SERPL CALC-SCNC: 4 MEQ/L
ANION GAP SERPL CALC-SCNC: 8 MEQ/L
AST SERPL-CCNC: 149 UNIT/L (ref 5–34)
B PERT.PT PRMT NPH QL NAA+NON-PROBE: NOT DETECTED
BACTEROIDES FRAGILIS (OHS): NOT DETECTED
BASE EXCESS BLD CALC-SCNC: 0.9 MMOL/L (ref -2–2)
BASOPHILS # BLD AUTO: 0.11 X10(3)/MCL
BASOPHILS NFR BLD AUTO: 0.5 %
BILIRUB SERPL-MCNC: 8.5 MG/DL
BIPAP(E) BLOOD GAS (OHS): 5 CM H2O
BIPAP(I) BLOOD GAS (OHS): 10 CM H2O
BLD PROD TYP BPU: NORMAL
BLD PROD TYP BPU: NORMAL
BLOOD GAS SAMPLE TYPE (OHS): ABNORMAL
BLOOD UNIT EXPIRATION DATE: NORMAL
BLOOD UNIT EXPIRATION DATE: NORMAL
BLOOD UNIT TYPE CODE: 5100
BLOOD UNIT TYPE CODE: 5100
BUN SERPL-MCNC: 6.4 MG/DL (ref 8.9–20.6)
BUN SERPL-MCNC: 7.2 MG/DL (ref 8.9–20.6)
BUN SERPL-MCNC: 7.8 MG/DL (ref 8.9–20.6)
BUN SERPL-MCNC: 8.8 MG/DL (ref 8.9–20.6)
C AURIS DNA BLD POS QL NAA+NON-PROBE: NOT DETECTED
C GATTII+NEOFOR DNA CSF QL NAA+NON-PROBE: NOT DETECTED
C PNEUM DNA NPH QL NAA+NON-PROBE: NOT DETECTED
CALCIUM SERPL-MCNC: 6.7 MG/DL (ref 8.4–10.2)
CALCIUM SERPL-MCNC: 6.9 MG/DL (ref 8.4–10.2)
CALCIUM SERPL-MCNC: 7 MG/DL (ref 8.4–10.2)
CALCIUM SERPL-MCNC: 7.1 MG/DL (ref 8.4–10.2)
CANDIDA ALBICANS (OHS): NOT DETECTED
CANDIDA GLABRATA (OHS): NOT DETECTED
CANDIDA KRUSEI (OHS): NOT DETECTED
CANDIDA PARAPSILOSIS (OHS): NOT DETECTED
CANDIDA TROPICALIS (OHS): NOT DETECTED
CHLORIDE SERPL-SCNC: 104 MMOL/L (ref 98–107)
CHLORIDE SERPL-SCNC: 106 MMOL/L (ref 98–107)
CHLORIDE SERPL-SCNC: 106 MMOL/L (ref 98–107)
CHLORIDE SERPL-SCNC: 107 MMOL/L (ref 98–107)
CO2 BLDA-SCNC: 26.2 MMOL/L (ref 22–26)
CO2 SERPL-SCNC: 20 MMOL/L (ref 22–29)
CO2 SERPL-SCNC: 21 MMOL/L (ref 22–29)
CO2 SERPL-SCNC: 23 MMOL/L (ref 22–29)
CO2 SERPL-SCNC: 24 MMOL/L (ref 22–29)
COHGB MFR BLDA: 3.1 % (ref 0.5–1.5)
CREAT SERPL-MCNC: 0.62 MG/DL (ref 0.73–1.18)
CREAT SERPL-MCNC: 0.65 MG/DL (ref 0.73–1.18)
CREAT SERPL-MCNC: 0.66 MG/DL (ref 0.73–1.18)
CREAT SERPL-MCNC: 0.75 MG/DL (ref 0.73–1.18)
CREAT/UREA NIT SERPL: 10
CREAT/UREA NIT SERPL: 11
CREAT/UREA NIT SERPL: 12
CROSSMATCH INTERPRETATION: NORMAL
CROSSMATCH INTERPRETATION: NORMAL
CTX-M (OHS): ABNORMAL
DISPENSE STATUS: NORMAL
DISPENSE STATUS: NORMAL
DRAWN BY BLOOD GAS (OHS): ABNORMAL
ENTEROBACTER CLOACAE COMPLEX (OHS): NOT DETECTED
ENTEROBACTERALES (OHS): NOT DETECTED
ENTEROCOCCUS FAECALIS (OHS): NOT DETECTED
ENTEROCOCCUS FAECIUM (OHS): NOT DETECTED
EOSINOPHIL # BLD AUTO: 0.01 X10(3)/MCL (ref 0–0.9)
EOSINOPHIL NFR BLD AUTO: 0 %
ERYTHROCYTE [DISTWIDTH] IN BLOOD BY AUTOMATED COUNT: 20 % (ref 11.5–17)
ESCHERICHIA COLI (OHS): NOT DETECTED
GAS PNL BLD: 239 MMHG
GFR SERPLBLD CREATININE-BSD FMLA CKD-EPI: >60 MLS/MIN/1.73/M2
GLOBULIN SER-MCNC: 5.1 GM/DL (ref 2.4–3.5)
GLUCOSE SERPL-MCNC: 130 MG/DL (ref 74–100)
GLUCOSE SERPL-MCNC: 153 MG/DL (ref 74–100)
GLUCOSE SERPL-MCNC: 156 MG/DL (ref 74–100)
GLUCOSE SERPL-MCNC: 158 MG/DL (ref 74–100)
GP B STREP DNA CSF QL NAA+NON-PROBE: NOT DETECTED
GROUP & RH: NORMAL
HADV DNA NPH QL NAA+NON-PROBE: NOT DETECTED
HAEM INFLU DNA CSF QL NAA+NON-PROBE: NOT DETECTED
HCO3 BLDA-SCNC: 25.1 MMOL/L (ref 22–26)
HCOV 229E RNA NPH QL NAA+NON-PROBE: NOT DETECTED
HCOV HKU1 RNA NPH QL NAA+NON-PROBE: NOT DETECTED
HCOV NL63 RNA NPH QL NAA+NON-PROBE: NOT DETECTED
HCOV OC43 RNA NPH QL NAA+NON-PROBE: NOT DETECTED
HCT VFR BLD AUTO: 22.7 % (ref 42–52)
HGB BLD-MCNC: 7 G/DL (ref 14–18)
HMPV RNA NPH QL NAA+NON-PROBE: NOT DETECTED
HOLD SPECIMEN: NORMAL
HPIV1 RNA NPH QL NAA+NON-PROBE: NOT DETECTED
HPIV2 RNA NPH QL NAA+NON-PROBE: NOT DETECTED
HPIV3 RNA NPH QL NAA+NON-PROBE: NOT DETECTED
HPIV4 RNA NPH QL NAA+NON-PROBE: NOT DETECTED
IMM GRANULOCYTES # BLD AUTO: 0.16 X10(3)/MCL (ref 0–0.04)
IMM GRANULOCYTES NFR BLD AUTO: 0.8 %
IMP (OHS): ABNORMAL
INDIRECT COOMBS: NORMAL
INHALED O2 CONCENTRATION: 40 %
INR PPP: 3.4
KLEBSIELLA AEROGENES (OHS): NOT DETECTED
KLEBSIELLA OXYTOCA (OHS): NOT DETECTED
KLEBSIELLA PNEUMONIAE GROUP (OHS): NOT DETECTED
KPC (OHS): ABNORMAL
L MONOCYTOG DNA CSF QL NAA+NON-PROBE: NOT DETECTED
LACTATE SERPL-SCNC: 1.5 MMOL/L (ref 0.5–2.2)
LACTATE SERPL-SCNC: 1.9 MMOL/L (ref 0.5–2.2)
LACTATE SERPL-SCNC: 3 MMOL/L (ref 0.5–2.2)
LACTATE SERPL-SCNC: 3.1 MMOL/L (ref 0.5–2.2)
LYMPHOCYTES # BLD AUTO: 1.37 X10(3)/MCL (ref 0.6–4.6)
LYMPHOCYTES NFR BLD AUTO: 6.8 %
M PNEUMO DNA NPH QL NAA+NON-PROBE: NOT DETECTED
MAGNESIUM SERPL-MCNC: 1.8 MG/DL (ref 1.6–2.6)
MAGNESIUM SERPL-MCNC: 2 MG/DL (ref 1.6–2.6)
MAGNESIUM SERPL-MCNC: 2.1 MG/DL (ref 1.6–2.6)
MAGNESIUM SERPL-MCNC: 2.1 MG/DL (ref 1.6–2.6)
MCH RBC QN AUTO: 29.2 PG (ref 27–31)
MCHC RBC AUTO-ENTMCNC: 30.8 G/DL (ref 33–36)
MCR-1 (OHS): ABNORMAL
MCV RBC AUTO: 94.6 FL (ref 80–94)
MECA/C (OHS): ABNORMAL
MECA/C AND MREJ (MRSA)(OHS): ABNORMAL
METHGB MFR BLDA: 1.1 % (ref 0–1.5)
MODE (OHS): ABNORMAL
MONOCYTES # BLD AUTO: 1.29 X10(3)/MCL (ref 0.1–1.3)
MONOCYTES NFR BLD AUTO: 6.4 %
MRSA PCR SCRN (OHS): NOT DETECTED
N MEN DNA CSF QL NAA+NON-PROBE: NOT DETECTED
NDM (OHS): ABNORMAL
NEUTROPHILS # BLD AUTO: 17.31 X10(3)/MCL (ref 2.1–9.2)
NEUTROPHILS NFR BLD AUTO: 85.5 %
NRBC BLD AUTO-RTO: 1.3 %
O2 HB BLOOD GAS (OHS): 95.5 % (ref 94–100)
OXA-48-LIKE (OHS): ABNORMAL
OXYHGB MFR BLDA: 7.1 G/DL (ref 12–18)
PCO2 BLDA: 37 MMHG (ref 35–45)
PH BLDA: 7.44 [PH] (ref 7.35–7.45)
PHOSPHATE SERPL-MCNC: 1.4 MG/DL (ref 2.3–4.7)
PHOSPHATE SERPL-MCNC: 1.6 MG/DL (ref 2.3–4.7)
PHOSPHATE SERPL-MCNC: 1.8 MG/DL (ref 2.3–4.7)
PHOSPHATE SERPL-MCNC: 2 MG/DL (ref 2.3–4.7)
PLATELET # BLD AUTO: 92 X10(3)/MCL (ref 130–400)
PLATELETS.RETICULATED NFR BLD AUTO: 8.2 % (ref 0.9–11.2)
PMV BLD AUTO: 12.3 FL (ref 7.4–10.4)
PO2 BLDA: 80 MMHG (ref 75–100)
POTASSIUM SERPL-SCNC: 3.5 MMOL/L (ref 3.5–5.1)
POTASSIUM SERPL-SCNC: 3.6 MMOL/L (ref 3.5–5.1)
POTASSIUM SERPL-SCNC: 3.7 MMOL/L (ref 3.5–5.1)
POTASSIUM SERPL-SCNC: 3.8 MMOL/L (ref 3.5–5.1)
PROT SERPL-MCNC: 6.6 GM/DL (ref 6.4–8.3)
PROTEUS SPP. (OHS): NOT DETECTED
PROTHROMBIN TIME: 33.3 SECONDS (ref 11.4–14)
PSEUDOMONAS AERUGINOSA (OHS): NOT DETECTED
RBC # BLD AUTO: 2.4 X10(6)/MCL (ref 4.7–6.1)
RSV RNA NPH QL NAA+NON-PROBE: NOT DETECTED
RV+EV RNA NPH QL NAA+NON-PROBE: NOT DETECTED
S ENT+BONG DNA STL QL NAA+NON-PROBE: NOT DETECTED
S PNEUM DNA CSF QL NAA+NON-PROBE: DETECTED
SAMPLE SITE BLOOD GAS (OHS): ABNORMAL
SAO2 % BLDA: 99.6 %
SERRATIA MARCESCENS (OHS): NOT DETECTED
SODIUM SERPL-SCNC: 131 MMOL/L (ref 136–145)
SODIUM SERPL-SCNC: 132 MMOL/L (ref 136–145)
SODIUM SERPL-SCNC: 133 MMOL/L (ref 136–145)
SODIUM SERPL-SCNC: 135 MMOL/L (ref 136–145)
SPECIMEN OUTDATE: NORMAL
STAPHYLOCOCCUS AUREUS (OHS): NOT DETECTED
STAPHYLOCOCCUS EPIDERMIDIS (OHS): NOT DETECTED
STAPHYLOCOCCUS LUGDUNENSIS (OHS): NOT DETECTED
STAPHYLOCOCCUS SPP. (OHS): NOT DETECTED
STENOTROPHOMONAS MALTOPHILIA (OHS): NOT DETECTED
STREPTOCOCCUS PYOGENES (GROUP A)(OHS): NOT DETECTED
STREPTOCOCCUS SPP. (OHS): DETECTED
UNIT NUMBER: NORMAL
UNIT NUMBER: NORMAL
VANA/B (OHS): ABNORMAL
VIM (OHS): ABNORMAL
WBC # SPEC AUTO: 20.25 X10(3)/MCL (ref 4.5–11.5)

## 2024-01-02 PROCEDURE — 84100 ASSAY OF PHOSPHORUS: CPT | Performed by: STUDENT IN AN ORGANIZED HEALTH CARE EDUCATION/TRAINING PROGRAM

## 2024-01-02 PROCEDURE — 85610 PROTHROMBIN TIME: CPT | Performed by: STUDENT IN AN ORGANIZED HEALTH CARE EDUCATION/TRAINING PROGRAM

## 2024-01-02 PROCEDURE — 63600175 PHARM REV CODE 636 W HCPCS: Performed by: STUDENT IN AN ORGANIZED HEALTH CARE EDUCATION/TRAINING PROGRAM

## 2024-01-02 PROCEDURE — 87641 MR-STAPH DNA AMP PROBE: CPT | Performed by: STUDENT IN AN ORGANIZED HEALTH CARE EDUCATION/TRAINING PROGRAM

## 2024-01-02 PROCEDURE — 27100171 HC OXYGEN HIGH FLOW UP TO 24 HOURS

## 2024-01-02 PROCEDURE — 36430 TRANSFUSION BLD/BLD COMPNT: CPT

## 2024-01-02 PROCEDURE — 87522 HEPATITIS C REVRS TRNSCRPJ: CPT | Performed by: STUDENT IN AN ORGANIZED HEALTH CARE EDUCATION/TRAINING PROGRAM

## 2024-01-02 PROCEDURE — 20000000 HC ICU ROOM

## 2024-01-02 PROCEDURE — 25000003 PHARM REV CODE 250: Performed by: STUDENT IN AN ORGANIZED HEALTH CARE EDUCATION/TRAINING PROGRAM

## 2024-01-02 PROCEDURE — 83735 ASSAY OF MAGNESIUM: CPT | Performed by: STUDENT IN AN ORGANIZED HEALTH CARE EDUCATION/TRAINING PROGRAM

## 2024-01-02 PROCEDURE — 83605 ASSAY OF LACTIC ACID: CPT | Performed by: STUDENT IN AN ORGANIZED HEALTH CARE EDUCATION/TRAINING PROGRAM

## 2024-01-02 PROCEDURE — 51702 INSERT TEMP BLADDER CATH: CPT

## 2024-01-02 PROCEDURE — 25000003 PHARM REV CODE 250: Mod: JZ,JG | Performed by: STUDENT IN AN ORGANIZED HEALTH CARE EDUCATION/TRAINING PROGRAM

## 2024-01-02 PROCEDURE — 86923 COMPATIBILITY TEST ELECTRIC: CPT | Performed by: STUDENT IN AN ORGANIZED HEALTH CARE EDUCATION/TRAINING PROGRAM

## 2024-01-02 PROCEDURE — P9016 RBC LEUKOCYTES REDUCED: HCPCS | Performed by: STUDENT IN AN ORGANIZED HEALTH CARE EDUCATION/TRAINING PROGRAM

## 2024-01-02 PROCEDURE — 30233N1 TRANSFUSION OF NONAUTOLOGOUS RED BLOOD CELLS INTO PERIPHERAL VEIN, PERCUTANEOUS APPROACH: ICD-10-PCS | Performed by: INTERNAL MEDICINE

## 2024-01-02 PROCEDURE — C9113 INJ PANTOPRAZOLE SODIUM, VIA: HCPCS | Performed by: STUDENT IN AN ORGANIZED HEALTH CARE EDUCATION/TRAINING PROGRAM

## 2024-01-02 PROCEDURE — 94660 CPAP INITIATION&MGMT: CPT

## 2024-01-02 PROCEDURE — 27000207 HC ISOLATION

## 2024-01-02 PROCEDURE — 80048 BASIC METABOLIC PNL TOTAL CA: CPT | Performed by: STUDENT IN AN ORGANIZED HEALTH CARE EDUCATION/TRAINING PROGRAM

## 2024-01-02 PROCEDURE — 99291 CRITICAL CARE FIRST HOUR: CPT | Mod: ,,, | Performed by: INTERNAL MEDICINE

## 2024-01-02 PROCEDURE — 99900035 HC TECH TIME PER 15 MIN (STAT)

## 2024-01-02 PROCEDURE — 82803 BLOOD GASES ANY COMBINATION: CPT

## 2024-01-02 PROCEDURE — 85025 COMPLETE CBC W/AUTO DIFF WBC: CPT | Performed by: STUDENT IN AN ORGANIZED HEALTH CARE EDUCATION/TRAINING PROGRAM

## 2024-01-02 PROCEDURE — 25000003 PHARM REV CODE 250: Performed by: INTERNAL MEDICINE

## 2024-01-02 PROCEDURE — S5010 5% DEXTROSE AND 0.45% SALINE: HCPCS | Performed by: STUDENT IN AN ORGANIZED HEALTH CARE EDUCATION/TRAINING PROGRAM

## 2024-01-02 PROCEDURE — 87633 RESP VIRUS 12-25 TARGETS: CPT | Performed by: STUDENT IN AN ORGANIZED HEALTH CARE EDUCATION/TRAINING PROGRAM

## 2024-01-02 PROCEDURE — 86901 BLOOD TYPING SEROLOGIC RH(D): CPT | Performed by: STUDENT IN AN ORGANIZED HEALTH CARE EDUCATION/TRAINING PROGRAM

## 2024-01-02 PROCEDURE — 36600 WITHDRAWAL OF ARTERIAL BLOOD: CPT

## 2024-01-02 PROCEDURE — 80053 COMPREHEN METABOLIC PANEL: CPT | Performed by: STUDENT IN AN ORGANIZED HEALTH CARE EDUCATION/TRAINING PROGRAM

## 2024-01-02 RX ORDER — MUPIROCIN 20 MG/G
OINTMENT TOPICAL 2 TIMES DAILY
Status: COMPLETED | OUTPATIENT
Start: 2024-01-02 | End: 2024-01-07

## 2024-01-02 RX ORDER — HYDROCODONE BITARTRATE AND ACETAMINOPHEN 500; 5 MG/1; MG/1
TABLET ORAL
Status: DISCONTINUED | OUTPATIENT
Start: 2024-01-02 | End: 2024-01-25 | Stop reason: HOSPADM

## 2024-01-02 RX ORDER — FUROSEMIDE 10 MG/ML
40 INJECTION INTRAMUSCULAR; INTRAVENOUS ONCE
Status: DISCONTINUED | OUTPATIENT
Start: 2024-01-02 | End: 2024-01-02

## 2024-01-02 RX ORDER — FUROSEMIDE 10 MG/ML
20 INJECTION INTRAMUSCULAR; INTRAVENOUS ONCE
Status: COMPLETED | OUTPATIENT
Start: 2024-01-02 | End: 2024-01-02

## 2024-01-02 RX ORDER — PREDNISONE 20 MG/1
40 TABLET ORAL DAILY
Status: DISCONTINUED | OUTPATIENT
Start: 2024-01-02 | End: 2024-01-02

## 2024-01-02 RX ADMIN — METHYLPREDNISOLONE SODIUM SUCCINATE 40 MG: 40 INJECTION, POWDER, FOR SOLUTION INTRAMUSCULAR; INTRAVENOUS at 10:01

## 2024-01-02 RX ADMIN — POTASSIUM PHOSPHATE, MONOBASIC POTASSIUM PHOSPHATE, DIBASIC 20 MMOL: 224; 236 INJECTION, SOLUTION, CONCENTRATE INTRAVENOUS at 08:01

## 2024-01-02 RX ADMIN — LORAZEPAM 2 MG: 2 INJECTION INTRAMUSCULAR; INTRAVENOUS at 12:01

## 2024-01-02 RX ADMIN — FOLIC ACID 1 MG: 5 INJECTION, SOLUTION INTRAMUSCULAR; INTRAVENOUS; SUBCUTANEOUS at 08:01

## 2024-01-02 RX ADMIN — PIPERACILLIN AND TAZOBACTAM 4.5 G: 4; .5 INJECTION, POWDER, LYOPHILIZED, FOR SOLUTION INTRAVENOUS; PARENTERAL at 09:01

## 2024-01-02 RX ADMIN — LORAZEPAM 2 MG: 2 INJECTION INTRAMUSCULAR; INTRAVENOUS at 03:01

## 2024-01-02 RX ADMIN — VANCOMYCIN HYDROCHLORIDE 750 MG: 750 INJECTION, POWDER, LYOPHILIZED, FOR SOLUTION INTRAVENOUS at 05:01

## 2024-01-02 RX ADMIN — MUPIROCIN: 20 OINTMENT TOPICAL at 09:01

## 2024-01-02 RX ADMIN — AZITHROMYCIN MONOHYDRATE 500 MG: 500 INJECTION, POWDER, LYOPHILIZED, FOR SOLUTION INTRAVENOUS at 04:01

## 2024-01-02 RX ADMIN — DEXTROSE AND SODIUM CHLORIDE: 5; 450 INJECTION, SOLUTION INTRAVENOUS at 06:01

## 2024-01-02 RX ADMIN — LORAZEPAM 2 MG: 2 INJECTION INTRAMUSCULAR; INTRAVENOUS at 07:01

## 2024-01-02 RX ADMIN — THIAMINE HYDROCHLORIDE 100 MG: 100 INJECTION, SOLUTION INTRAMUSCULAR; INTRAVENOUS at 09:01

## 2024-01-02 RX ADMIN — PIPERACILLIN AND TAZOBACTAM 4.5 G: 4; .5 INJECTION, POWDER, LYOPHILIZED, FOR SOLUTION INTRAVENOUS; PARENTERAL at 12:01

## 2024-01-02 RX ADMIN — DEXTROSE AND SODIUM CHLORIDE: 5; 450 INJECTION, SOLUTION INTRAVENOUS at 08:01

## 2024-01-02 RX ADMIN — PANTOPRAZOLE SODIUM 40 MG: 40 INJECTION, POWDER, FOR SOLUTION INTRAVENOUS at 09:01

## 2024-01-02 RX ADMIN — FUROSEMIDE 20 MG: 10 INJECTION, SOLUTION INTRAMUSCULAR; INTRAVENOUS at 09:01

## 2024-01-02 RX ADMIN — LORAZEPAM 2 MG: 2 INJECTION INTRAMUSCULAR; INTRAVENOUS at 11:01

## 2024-01-02 RX ADMIN — PIPERACILLIN AND TAZOBACTAM 4.5 G: 4; .5 INJECTION, POWDER, LYOPHILIZED, FOR SOLUTION INTRAVENOUS; PARENTERAL at 04:01

## 2024-01-02 NOTE — PROGRESS NOTES
Inpatient Nutrition Assessment    Admit Date: 1/1/2024   Total duration of encounter: 1 day   Patient Age: 48 y.o.    Nutrition Recommendation/Prescription     When pt alert--suggest ST swallow evaluation--for diet appropriateness--ADAT to regular with boost tid; Boost (provides 240 kcal, 10 g protein per serving)   If pt remains NPO--> 1-2 days --suggest supplemental PPN --Clinimix 4.25/5 @ 70ml/hr with lipids 20% 250 ml daily to provide 1071 calories, 71 gm protein.   Suggest MVI, continue folic acid/thiamine 2 ETOH abuse.   Biweekly wt  Will monitor nutrition status     Communication of Recommendations: reviewed with nurse    Nutrition Assessment     Malnutrition Assessment/Nutrition-Focused Physical Exam    Malnutrition Context: acute illness or injury (01/02/24 1052)  Malnutrition Level: other (see comments) (unable to obtain diet/wt hx at this time; defer malnutrition assessment) (01/02/24 1052)                                                        A minimum of two characteristics is recommended for diagnosis of either severe or non-severe malnutrition.    Chart Review    Reason Seen: continuous nutrition monitoring and malnutrition screening tool (MST)    Malnutrition Screening Tool Results   Have you recently lost weight without trying?: Unsure  Have you been eating poorly because of a decreased appetite?: Yes   MST Score: 3   Diagnosis:  ETOH WD, DT, sepsis, AGMA, influenza A, PNA, cirrhosis, Hep C+, anemia     Relevant Medical History: ETOH misuse     Nutrition-Related Medications: IVF @ 100ml/hr, azithromycin, folic acid, pantoprazole, zosyn, prednisone, thiamine, vancomycin   Calorie Containing IV Medications: no significant kcals from medications at this time    Nutrition-Related Labs:  (1-2) H/H 7.0/22.7(L) Gluc 130 Bun 7.8 Cr 0.6 K 3.6 Na 133(L) P 1.6(L) Tbili 8.5(H)   (1-1) NH4 56.3(H)     Nutrition Orders:   Diet NPO      Appetite/Oral Intake: NPO/NPO    Factors Affecting Nutritional Intake:  "impaired cognitive status/motor control and NPO    Food/Temple/Cultural Preferences: unable to obtain    Food Allergies: unable to obtain    Wound(s):   none reported     Last Bowel Movement: 23    Comments    (/2) Received MST for unsure wt loss/ appetite. Pt not alert during rounds; AMS; unable to obtain diet/wt hx at this time; PPN and diet recs provided. Suggest ST swallow eval prior to diet progression. Abnormal labs noted: Tbili (H)--hx ETOH abuse. Suggest continue folic acid/thiamine tx.     Anthropometrics    Height: 5' 4" (162.6 cm) Height Method: Stated  Last Weight: 54.4 kg (120 lb) (24 0156) Weight Method: Bed Scale  BMI (Calculated): 20.6  BMI Classification: normal (BMI 18.5-24.9)        Ideal Body Weight (IBW), Male: 130 lb     % Ideal Body Weight, Male (lb): 92.31 %                 Usual Body Weight (UBW), kg:  (pt unable to give UBW; no wt hx EMR)        Usual Weight Provided By: EMR weight history    Wt Readings from Last 5 Encounters:   24 54.4 kg (120 lb)     Weight Change(s) Since Admission: no wt hx   Wt Readings from Last 1 Encounters:   24 0156 54.4 kg (120 lb)   24 1255 54.4 kg (120 lb)   Admit Weight: 54.4 kg (120 lb) (24 1255), Weight Method: Bed Scale    Estimated Needs    Weight Used For Calorie Calculations: 54.4 kg (119 lb 14.9 oz)  Energy Calorie Requirements (kcal): 1904 kcal/d; 35 federico/kg /wt gain  Energy Need Method: Kcal/kg  Weight Used For Protein Calculations: 54.4 kg (119 lb 14.9 oz)  Protein Requirements: 70 gm protein/d; 1.3 gm/kg  Fluid Requirements (mL): 1904 ml/d; 1ml/federico  Temp (24hrs), Av.9 °F (37.2 °C), Min:97.3 °F (36.3 °C), Max:102.1 °F (38.9 °C)       Enteral Nutrition    Patient not receiving enteral nutrition at this time.    Parenteral Nutrition    Patient not receiving parenteral nutrition support at this time.    Evaluation of Received Nutrient Intake    Calories: not meeting estimated needs  Protein: not meeting " estimated needs    Patient Education    Not applicable.    Nutrition Diagnosis     PES: Inadequate oral intake related to acute illness as evidenced by npo. (new)    Interventions/Goals     Intervention(s): general/healthful diet, modified composition of parenteral nutrition, modified rate of parenteral nutrition, commercial beverage, multivitamin/mineral supplement therapy, and collaboration with other providers    Goal: Meet greater than 80% of nutritional needs by follow-up. (new)    Monitoring & Evaluation     Dietitian will monitor food and beverage intake, parenteral nutrition intake, weight, and glucose/endocrine profile.    Nutrition Risk/Follow-Up: moderate (follow-up in 3-5 days)   Please consult if re-assessment needed sooner.

## 2024-01-02 NOTE — CARE UPDATE
Patient desatted to 91% on max ventimask in ED and was placed on BIPAP. Given one dose bicarb 25 mEq. Lactic acidosis improving. Tachycardia and tachypnea significantly improved. Repleted calcium and phosphorus overnight.     Would recommend GI consult in the morning.    Bridgette Tanner MD  \[facility] [place] [facility]\"" Internal Medicine, -2  1/2/2024

## 2024-01-02 NOTE — PROGRESS NOTES
[facility] - ICU  Pulmonary Critical Care Note    Patient Name: Franco Hein  MRN: [id number]  Admission Date: 1/1/2024  Hospital Length of Stay: 1 days  Code Status: Full Code  Attending Provider: MARIAH Nieto MD  Primary Care Provider: Caitie, Primary Doctor     Subjective:     HPI:   Franco Hein is  48 y.o. male with a PMH of ETOH misuse disorder who presented to [facility] ED on 1/1/2024 with complaint of jaundice, weakness for 3 weeks, and AMS. Patient was brought in by a friend who states patient is a daily drinker who has not been able to go to a store for the last 2 days, but patient is noted to have a positive ethanol on admission. Patient unable to provide history given his mental status and was only able to say his name and confirm he drinks alcohol but does not do drugs. Patient was tachypneic, tachycardic, and tremulous, on presentation with jaundice. Initial workup significant for lactic acidosis 7.5, AGMA with respiratory compensation, positive ethanol, influenza A positive, leukocytosis, and macrocytic anemia. Admitted to ICU given altered mental status and concern for decompensation in the setting of alcohol withdrawal and influenza A, also HCV Ab+.    Hospital Course/Significant events:  1/1/2024: Admitted to ICU, on 2L NC    24 Hour Interval History:  Patient desatted overnight, placed on BIPAP 60% 10/5, weaned to 40% tolerating well sats 98% RR 18. Unable to answer questions this morning with  due to incoherent speech, appears to be attempting to follow commands but very weak. H&H down to 7/22.7 from 8.5/27.3, likely hemodilutional after 2.9L positive on I&O's; leukocytosis worsening 15.5-> 20.3, platelets 92.  Lactic acid 3 this morning, gap now 21. AST/ALT improving as well as T. Bili now 8.5, still slightly hyponatremic at 133. Required 2 doses in last 24h of PRN lorazepam for CIWA.    History reviewed. No pertinent past medical history.    No past surgical  history on file.    Social History     Socioeconomic History    Marital status: Single           No current outpatient medications    Current Inpatient Medications   azithromycin  500 mg Intravenous Q24H    folic acid (FOLVITE) IVPB  1 mg Intravenous Daily    pantoprazole  40 mg Intravenous Daily    piperacillin-tazobactam (Zosyn) IV (PEDS and ADULTS) (extended infusion is not appropriate)  4.5 g Intravenous Q8H    thiamine (B-1) 100 mg in dextrose 5 % (D5W) 100 mL IVPB  100 mg Intravenous Daily    vancomycin (VANCOCIN) IV (PEDS and ADULTS)  750 mg Intravenous Q12H       Current Intravenous Infusions   dextrose 5 % and 0.45 % NaCl 100 mL/hr at 01/02/24 0623         Review of Systems   Unable to perform ROS: Mental acuity          Objective:       Intake/Output Summary (Last 24 hours) at 1/2/2024 0720  Last data filed at 1/2/2024 0623  Gross per 24 hour   Intake 3514.16 ml   Output 625 ml   Net 2889.16 ml         Vital Signs (Most Recent):  Temp: 97.4 °F (36.3 °C) (01/02/24 0715)  Pulse: 94 (01/02/24 0708)  Resp: (!) 28 (01/02/24 0708)  BP: 127/76 (01/02/24 0708)  SpO2: 100 % (01/02/24 0708)  Body mass index is 20.6 kg/m².  Weight: 54.4 kg (120 lb) Vital Signs (24h Range):  Temp:  [97.3 °F (36.3 °C)-102.1 °F (38.9 °C)] 97.4 °F (36.3 °C)  Pulse:  [] 94  Resp:  [20-41] 28  SpO2:  [92 %-100 %] 100 %  BP: ()/(59-93) 127/76     Physical Exam  Constitutional:       Appearance: He is normal weight. He is ill-appearing. He is not toxic-appearing or diaphoretic.   HENT:      Head: Normocephalic and atraumatic.      Mouth/Throat:      Mouth: Mucous membranes are dry.   Eyes:      General: Scleral icterus present.         Right eye: No discharge.         Left eye: No discharge.      Extraocular Movements: Extraocular movements intact.      Pupils: Pupils are equal, round, and reactive to light.   Cardiovascular:      Rate and Rhythm: Regular rhythm. Tachycardia present.      Pulses: Normal pulses.      Heart sounds:  Normal heart sounds. No murmur heard.  Pulmonary:      Effort: No respiratory distress.      Breath sounds: Wheezing, rhonchi and rales present.      Comments: On BIPAP  Abdominal:      General: Bowel sounds are normal. There is no distension.      Palpations: Abdomen is soft.      Tenderness: There is no abdominal tenderness.   Genitourinary:     Comments: Hillman catheter in place  Dark yellow urine noted with adequate output  Musculoskeletal:      Right lower leg: No edema.      Left lower leg: No edema.   Skin:     General: Skin is warm.      Capillary Refill: Capillary refill takes 2 to 3 seconds.      Coloration: Skin is jaundiced.   Neurological:      Comments: Pt awake, responds to verbal stimuili  Incoherent speech, appears to be attempting to follow commands  Moving all extremities           Lines/Drains/Airways       Drain  Duration                  Urethral Catheter 01/02/24 0413 Non-latex 16 Fr. <1 day              Peripheral Intravenous Line  Duration                  Peripheral IV - Single Lumen 01/01/24 20 G Left Antecubital 1 day         Peripheral IV - Single Lumen 01/01/24 1310 20 G Right Antecubital <1 day         Peripheral IV - Single Lumen 01/01/24 1551 18 G Anterior;Distal;Right Forearm <1 day                    Significant Labs:    Lab Results   Component Value Date    WBC 20.25 (H) 01/02/2024    HGB 7.0 (L) 01/02/2024    HCT 22.7 (L) 01/02/2024    MCV 94.6 (H) 01/02/2024    PLT 92 (L) 01/02/2024           BMP  Lab Results   Component Value Date     (L) 01/02/2024    K 3.6 01/02/2024    CHLORIDE 106 01/02/2024    CO2 21 (L) 01/02/2024    BUN 7.8 (L) 01/02/2024    CREATININE 0.66 (L) 01/02/2024    CALCIUM 7.1 (L) 01/02/2024    AGAP 8.0 01/02/2024         ABG  Recent Labs   Lab 01/02/24  0512   PH 7.440   PO2 80.0   PCO2 37.0   HCO3 25.1   POCBASEDEF 0.90         Mechanical Ventilation Support:  Oxygen Concentration (%): 40 (01/02/24 0600)      Significant Imaging:  X-Ray Chest 1  View    Result Date: 1/1/2024  Bilateral patchy airspace opacities greatest on the left.  Findings concerning for atypical infectious process. Electronically signed by: Josef Sow Date:    01/01/2024 Time:    14:23          Assessment/Plan:     Assessment  Alcohol withdrawal, delirium tremens  Sepsis, SIRS 3/4, lower respiratory infection  AGMA-resolved  Lactic acidosis-improving  Influenza A  Atypical pneumonia  Possible new cirrhosis diagnosis  Alcohol abuse  Positive hepatitis C antibody  Hyperbilirubinemia  Elevated transaminases  Elevated INR  MELD-Na: 28  Child Hoang: 13, class C  Maddrey's: 204.7  Macrocytic anemia, thrombocytopenia      Plan  Admitted to ICU for close monitoring  Pt started on sepsis protocol, received 30 cc/kg bolus and mIVF; stopped mIVF this morning as pt appears mildly hypervolemic, good urine output, rales on lung auscultation; repeat CXR pending as well  CT Chest Abdomen Pelvis showed multilobar PNA, cirrhotic appearing liver  Blood cultures pending x2, resp culture pending  Continue Vanc, Zosyn, Azithro for now; MRSA PCR pending will d/c vanc if negative  Anion gap resolved now 6; CO2 now 21. Lactic acid 3, stable. Will stop lactic acid draws   NPO for now given AMS; will attempt swallow study when appropriate  Will attempt oseltamivir when able to tolerate PO, peramivir not on formulary  HCV Ab+, VL pending  H&H 7/22.7 today, likely dilutional given + 2.8L since admission; will discuss transfusion with staff given poor oxygenation otherwise will transfuse for Hgb < 7  Folate 18.3; B12 > 2000; continue folate supplementation  Given Sandra 204, will start prednisone 40 mg daily once able to tolerate PO  CIWA protocol with lorazepam prn, thiamine IV, folic acid IV  Monitor for refeeding syndrome with BMP, Mg, Ph q4h; replete electrolytes as needed    DVT Prophylaxis: SCDs  GI Prophylaxis: PPI     45 minutes of critical care was time spent personally by me on the following  activities: development of treatment plan with patient or surrogate and bedside caregivers, discussions with consultants, evaluation of patient's response to treatment, examination of patient, ordering and performing treatments and interventions, ordering and review of laboratory studies, ordering and review of radiographic studies, pulse oximetry, re-evaluation of patient's condition.  This critical care time did not overlap with that of any other provider or involve time for any procedures.     Sagrario Rosenthal MD  [employer] IM PGY III  Pulmonary Critical Care Medicine  [facility] - ICU  DOS: 01/02/2024

## 2024-01-03 LAB
A-ADO2 BLOOD GAS (OHS): 169 MMHG
ABS NEUT CALC (OHS): 10.55 X10(3)/MCL (ref 2.1–9.2)
ALBUMIN SERPL-MCNC: 1.4 G/DL (ref 3.5–5)
ALBUMIN/GLOB SERPL: 0.3 RATIO (ref 1.1–2)
ALLENS TEST BLOOD GAS (OHS): YES
ALP SERPL-CCNC: 139 UNIT/L (ref 40–150)
ALT SERPL-CCNC: 53 UNIT/L (ref 0–55)
ANION GAP SERPL CALC-SCNC: 4 MEQ/L
ANION GAP SERPL CALC-SCNC: 5 MEQ/L
ANION GAP SERPL CALC-SCNC: 6 MEQ/L
ANISOCYTOSIS BLD QL SMEAR: ABNORMAL
AST SERPL-CCNC: 125 UNIT/L (ref 5–34)
BASE EXCESS BLD CALC-SCNC: 2.9 MMOL/L (ref -2–2)
BASOPHILS NFR BLD MANUAL: 0.11 X10(3)/MCL (ref 0–0.2)
BASOPHILS NFR BLD MANUAL: 1 % (ref 0–2)
BILIRUB SERPL-MCNC: 7.8 MG/DL
BLOOD GAS SAMPLE TYPE (OHS): ABNORMAL
BUN SERPL-MCNC: 10.4 MG/DL (ref 8.9–20.6)
BUN SERPL-MCNC: 7 MG/DL (ref 8.9–20.6)
BUN SERPL-MCNC: 9.2 MG/DL (ref 8.9–20.6)
BUN SERPL-MCNC: 9.6 MG/DL (ref 8.9–20.6)
CALCIUM SERPL-MCNC: 6.9 MG/DL (ref 8.4–10.2)
CALCIUM SERPL-MCNC: 7 MG/DL (ref 8.4–10.2)
CHLORIDE SERPL-SCNC: 103 MMOL/L (ref 98–107)
CHLORIDE SERPL-SCNC: 103 MMOL/L (ref 98–107)
CHLORIDE SERPL-SCNC: 104 MMOL/L (ref 98–107)
CHLORIDE SERPL-SCNC: 105 MMOL/L (ref 98–107)
CO2 BLDA-SCNC: 28.3 MMOL/L (ref 22–26)
CO2 SERPL-SCNC: 24 MMOL/L (ref 22–29)
CO2 SERPL-SCNC: 25 MMOL/L (ref 22–29)
COHGB MFR BLDA: 2.7 % (ref 0.5–1.5)
CREAT SERPL-MCNC: 0.6 MG/DL (ref 0.73–1.18)
CREAT SERPL-MCNC: 0.62 MG/DL (ref 0.73–1.18)
CREAT SERPL-MCNC: 0.64 MG/DL (ref 0.73–1.18)
CREAT SERPL-MCNC: 0.66 MG/DL (ref 0.73–1.18)
CREAT/UREA NIT SERPL: 11
CREAT/UREA NIT SERPL: 15
CREAT/UREA NIT SERPL: 17
DRAWN BY BLOOD GAS (OHS): ABNORMAL
ERYTHROCYTE [DISTWIDTH] IN BLOOD BY AUTOMATED COUNT: 19.1 % (ref 11.5–17)
GAS PNL BLD: 236 MMHG
GFR SERPLBLD CREATININE-BSD FMLA CKD-EPI: >60 MLS/MIN/1.73/M2
GLOBULIN SER-MCNC: 5.3 GM/DL (ref 2.4–3.5)
GLUCOSE SERPL-MCNC: 131 MG/DL (ref 74–100)
GLUCOSE SERPL-MCNC: 132 MG/DL (ref 74–100)
GLUCOSE SERPL-MCNC: 147 MG/DL (ref 74–100)
GLUCOSE SERPL-MCNC: 174 MG/DL (ref 74–100)
HCO3 BLDA-SCNC: 27.1 MMOL/L (ref 22–26)
HCT VFR BLD AUTO: 31.1 % (ref 42–52)
HCV RNA SERPL NAA+PROBE-ACNC: NORMAL IU/ML
HCV RNA SERPL NAA+PROBE-ACNC: NORMAL IU/ML
HGB BLD-MCNC: 9.9 G/DL (ref 14–18)
HOLD SPECIMEN: NORMAL
INHALED O2 CONCENTRATION: 40 %
MAGNESIUM SERPL-MCNC: 1.8 MG/DL (ref 1.6–2.6)
MAGNESIUM SERPL-MCNC: 1.8 MG/DL (ref 1.6–2.6)
MAGNESIUM SERPL-MCNC: 1.9 MG/DL (ref 1.6–2.6)
MAGNESIUM SERPL-MCNC: 1.9 MG/DL (ref 1.6–2.6)
MCH RBC QN AUTO: 28.7 PG (ref 27–31)
MCHC RBC AUTO-ENTMCNC: 31.8 G/DL (ref 33–36)
MCV RBC AUTO: 90.1 FL (ref 80–94)
METHGB MFR BLDA: 1 % (ref 0–1.5)
MONOCYTES NFR BLD MANUAL: 0.33 X10(3)/MCL (ref 0.1–1.3)
MONOCYTES NFR BLD MANUAL: 3 % (ref 2–11)
NEUTROPHILS NFR BLD MANUAL: 94 % (ref 47–80)
NEUTS BAND NFR BLD MANUAL: 2 % (ref 0–11)
NRBC BLD AUTO-RTO: 2.1 %
NRBC BLD MANUAL-RTO: 4 %
O2 HB BLOOD GAS (OHS): 93.2 % (ref 94–100)
OXYGEN DEVICE BLOOD GAS (OHS): ABNORMAL
OXYHGB MFR BLDA: 10.2 G/DL (ref 12–18)
PCO2 BLDA: 39 MMHG (ref 35–45)
PH BLDA: 7.45 [PH] (ref 7.35–7.45)
PHOSPHATE SERPL-MCNC: 1.4 MG/DL (ref 2.3–4.7)
PHOSPHATE SERPL-MCNC: 1.7 MG/DL (ref 2.3–4.7)
PHOSPHATE SERPL-MCNC: 1.9 MG/DL (ref 2.3–4.7)
PHOSPHATE SERPL-MCNC: 2.9 MG/DL (ref 2.3–4.7)
PLATELET # BLD AUTO: 92 X10(3)/MCL (ref 130–400)
PLATELET # BLD EST: ABNORMAL 10*3/UL
PLATELETS.RETICULATED NFR BLD AUTO: 8.9 % (ref 0.9–11.2)
PMV BLD AUTO: 12 FL (ref 7.4–10.4)
PO2 BLDA: 67 MMHG (ref 75–100)
POLYCHROMASIA BLD QL SMEAR: ABNORMAL
POTASSIUM SERPL-SCNC: 3.6 MMOL/L (ref 3.5–5.1)
POTASSIUM SERPL-SCNC: 3.7 MMOL/L (ref 3.5–5.1)
POTASSIUM SERPL-SCNC: 3.9 MMOL/L (ref 3.5–5.1)
POTASSIUM SERPL-SCNC: 4 MMOL/L (ref 3.5–5.1)
PROT SERPL-MCNC: 6.7 GM/DL (ref 6.4–8.3)
RBC # BLD AUTO: 3.45 X10(6)/MCL (ref 4.7–6.1)
RBC MORPH BLD: ABNORMAL
SAMPLE SITE BLOOD GAS (OHS): ABNORMAL
SAO2 % BLDA: 96.8 %
SODIUM SERPL-SCNC: 132 MMOL/L (ref 136–145)
SODIUM SERPL-SCNC: 132 MMOL/L (ref 136–145)
SODIUM SERPL-SCNC: 133 MMOL/L (ref 136–145)
SODIUM SERPL-SCNC: 134 MMOL/L (ref 136–145)
VANCOMYCIN TROUGH SERPL-MCNC: 6.1 UG/ML (ref 15–20)
WBC # SPEC AUTO: 10.99 X10(3)/MCL (ref 4.5–11.5)

## 2024-01-03 PROCEDURE — 20000000 HC ICU ROOM

## 2024-01-03 PROCEDURE — 82803 BLOOD GASES ANY COMBINATION: CPT

## 2024-01-03 PROCEDURE — 25000003 PHARM REV CODE 250: Performed by: INTERNAL MEDICINE

## 2024-01-03 PROCEDURE — 25000242 PHARM REV CODE 250 ALT 637 W/ HCPCS

## 2024-01-03 PROCEDURE — 27100171 HC OXYGEN HIGH FLOW UP TO 24 HOURS

## 2024-01-03 PROCEDURE — 94640 AIRWAY INHALATION TREATMENT: CPT

## 2024-01-03 PROCEDURE — 84100 ASSAY OF PHOSPHORUS: CPT | Performed by: STUDENT IN AN ORGANIZED HEALTH CARE EDUCATION/TRAINING PROGRAM

## 2024-01-03 PROCEDURE — 92610 EVALUATE SWALLOWING FUNCTION: CPT

## 2024-01-03 PROCEDURE — 63600175 PHARM REV CODE 636 W HCPCS: Performed by: STUDENT IN AN ORGANIZED HEALTH CARE EDUCATION/TRAINING PROGRAM

## 2024-01-03 PROCEDURE — 99900035 HC TECH TIME PER 15 MIN (STAT)

## 2024-01-03 PROCEDURE — 83735 ASSAY OF MAGNESIUM: CPT | Performed by: STUDENT IN AN ORGANIZED HEALTH CARE EDUCATION/TRAINING PROGRAM

## 2024-01-03 PROCEDURE — 85027 COMPLETE CBC AUTOMATED: CPT | Performed by: STUDENT IN AN ORGANIZED HEALTH CARE EDUCATION/TRAINING PROGRAM

## 2024-01-03 PROCEDURE — 80202 ASSAY OF VANCOMYCIN: CPT | Performed by: STUDENT IN AN ORGANIZED HEALTH CARE EDUCATION/TRAINING PROGRAM

## 2024-01-03 PROCEDURE — 25000003 PHARM REV CODE 250

## 2024-01-03 PROCEDURE — 27000207 HC ISOLATION

## 2024-01-03 PROCEDURE — 25000003 PHARM REV CODE 250: Performed by: STUDENT IN AN ORGANIZED HEALTH CARE EDUCATION/TRAINING PROGRAM

## 2024-01-03 PROCEDURE — 99291 CRITICAL CARE FIRST HOUR: CPT | Mod: ,,, | Performed by: INTERNAL MEDICINE

## 2024-01-03 PROCEDURE — 80048 BASIC METABOLIC PNL TOTAL CA: CPT | Performed by: STUDENT IN AN ORGANIZED HEALTH CARE EDUCATION/TRAINING PROGRAM

## 2024-01-03 PROCEDURE — 27000221 HC OXYGEN, UP TO 24 HOURS

## 2024-01-03 PROCEDURE — 80053 COMPREHEN METABOLIC PANEL: CPT | Performed by: STUDENT IN AN ORGANIZED HEALTH CARE EDUCATION/TRAINING PROGRAM

## 2024-01-03 PROCEDURE — C9113 INJ PANTOPRAZOLE SODIUM, VIA: HCPCS | Performed by: STUDENT IN AN ORGANIZED HEALTH CARE EDUCATION/TRAINING PROGRAM

## 2024-01-03 PROCEDURE — 36600 WITHDRAWAL OF ARTERIAL BLOOD: CPT

## 2024-01-03 PROCEDURE — 94761 N-INVAS EAR/PLS OXIMETRY MLT: CPT | Mod: XB

## 2024-01-03 RX ORDER — FUROSEMIDE 10 MG/ML
20 INJECTION INTRAMUSCULAR; INTRAVENOUS ONCE
Status: COMPLETED | OUTPATIENT
Start: 2024-01-03 | End: 2024-01-03

## 2024-01-03 RX ORDER — BISACODYL 10 MG/1
10 SUPPOSITORY RECTAL ONCE
Status: COMPLETED | OUTPATIENT
Start: 2024-01-03 | End: 2024-01-03

## 2024-01-03 RX ORDER — IPRATROPIUM BROMIDE AND ALBUTEROL SULFATE 2.5; .5 MG/3ML; MG/3ML
3 SOLUTION RESPIRATORY (INHALATION) EVERY 4 HOURS
Status: COMPLETED | OUTPATIENT
Start: 2024-01-03 | End: 2024-01-03

## 2024-01-03 RX ADMIN — THIAMINE HYDROCHLORIDE 100 MG: 100 INJECTION, SOLUTION INTRAMUSCULAR; INTRAVENOUS at 09:01

## 2024-01-03 RX ADMIN — IPRATROPIUM BROMIDE AND ALBUTEROL SULFATE 3 ML: .5; 3 SOLUTION RESPIRATORY (INHALATION) at 02:01

## 2024-01-03 RX ADMIN — LORAZEPAM 2 MG: 2 INJECTION INTRAMUSCULAR; INTRAVENOUS at 05:01

## 2024-01-03 RX ADMIN — PANTOPRAZOLE SODIUM 40 MG: 40 INJECTION, POWDER, FOR SOLUTION INTRAVENOUS at 08:01

## 2024-01-03 RX ADMIN — BISACODYL 10 MG: 10 SUPPOSITORY RECTAL at 09:01

## 2024-01-03 RX ADMIN — FOLIC ACID 1 MG: 5 INJECTION, SOLUTION INTRAMUSCULAR; INTRAVENOUS; SUBCUTANEOUS at 09:01

## 2024-01-03 RX ADMIN — LORAZEPAM 2 MG: 2 INJECTION INTRAMUSCULAR; INTRAVENOUS at 12:01

## 2024-01-03 RX ADMIN — LORAZEPAM 2 MG: 2 INJECTION INTRAMUSCULAR; INTRAVENOUS at 03:01

## 2024-01-03 RX ADMIN — PIPERACILLIN AND TAZOBACTAM 4.5 G: 4; .5 INJECTION, POWDER, LYOPHILIZED, FOR SOLUTION INTRAVENOUS; PARENTERAL at 08:01

## 2024-01-03 RX ADMIN — LORAZEPAM 2 MG: 2 INJECTION INTRAMUSCULAR; INTRAVENOUS at 09:01

## 2024-01-03 RX ADMIN — MUPIROCIN: 20 OINTMENT TOPICAL at 08:01

## 2024-01-03 RX ADMIN — IPRATROPIUM BROMIDE AND ALBUTEROL SULFATE 3 ML: .5; 3 SOLUTION RESPIRATORY (INHALATION) at 11:01

## 2024-01-03 RX ADMIN — PIPERACILLIN AND TAZOBACTAM 4.5 G: 4; .5 INJECTION, POWDER, LYOPHILIZED, FOR SOLUTION INTRAVENOUS; PARENTERAL at 04:01

## 2024-01-03 RX ADMIN — IPRATROPIUM BROMIDE AND ALBUTEROL SULFATE 3 ML: .5; 3 SOLUTION RESPIRATORY (INHALATION) at 08:01

## 2024-01-03 RX ADMIN — AZITHROMYCIN MONOHYDRATE 500 MG: 500 INJECTION, POWDER, LYOPHILIZED, FOR SOLUTION INTRAVENOUS at 04:01

## 2024-01-03 RX ADMIN — PIPERACILLIN AND TAZOBACTAM 4.5 G: 4; .5 INJECTION, POWDER, LYOPHILIZED, FOR SOLUTION INTRAVENOUS; PARENTERAL at 12:01

## 2024-01-03 RX ADMIN — METHYLPREDNISOLONE SODIUM SUCCINATE 40 MG: 40 INJECTION, POWDER, FOR SOLUTION INTRAMUSCULAR; INTRAVENOUS at 08:01

## 2024-01-03 RX ADMIN — FUROSEMIDE 20 MG: 10 INJECTION, SOLUTION INTRAMUSCULAR; INTRAVENOUS at 05:01

## 2024-01-03 RX ADMIN — LORAZEPAM 2 MG: 2 INJECTION INTRAMUSCULAR; INTRAVENOUS at 02:01

## 2024-01-03 RX ADMIN — VANCOMYCIN HYDROCHLORIDE 750 MG: 750 INJECTION, POWDER, LYOPHILIZED, FOR SOLUTION INTRAVENOUS at 07:01

## 2024-01-03 RX ADMIN — SODIUM PHOSPHATE, MONOBASIC, MONOHYDRATE AND SODIUM PHOSPHATE, DIBASIC, ANHYDROUS 39.99 MMOL: 142; 276 INJECTION, SOLUTION INTRAVENOUS at 08:01

## 2024-01-03 NOTE — PROGRESS NOTES
Pharmacokinetic Assessment Follow Up: IV Vancomycin    Vancomycin serum concentration assessment(s):    The trough level was drawn correctly and can be used to guide therapy at this time. The measurement is below the desired definitive target range of 15 to 20 mcg/mL.    Vancomycin Regimen Plan:    Change regimen to Vancomycin 750 mg IV every q8h hours with next serum trough concentration measured at 2200 prior to 2300 dose on 01/03/24    Drug levels (last 3 results):  Recent Labs   Lab Result Units 01/03/24  0418   Vancomycin Trough ug/ml 6.1*       Pharmacy will continue to follow and monitor vancomycin.    Please contact pharmacy at extension [phone] for questions regarding this assessment.    Thank you for the consult,   Magdalena Bates       Patient brief summary:  Franco Hein is a 48 y.o. male initiated on antimicrobial therapy with IV Vancomycin for treatment of  Pneumonia        Drug Allergies:   Review of patient's allergies indicates:  No Known Allergies    Actual Body Weight:   54.4 kg    Renal Function:   Estimated Creatinine Clearance: 113.3 mL/min (A) (based on SCr of 0.62 mg/dL (L)).,     Dialysis Method (if applicable):  N/A    CBC (last 72 hours):  Recent Labs   Lab Result Units 01/01/24  1307 01/02/24  0432   WBC x10(3)/mcL 15.54* 20.25*   Hgb g/dL 8.5* 7.0*   Hct % 27.3* 22.7*   Platelet x10(3)/mcL 118* 92*   Mono % %  --  6.4   Monocytes % % 6  --    Eos % %  --  0.0   Basophil % %  --  0.5       Metabolic Panel (last 72 hours):  Recent Labs   Lab Result Units 01/01/24  1307 01/01/24  1514 01/01/24  1818 01/01/24  2048 01/02/24  0007 01/02/24  0432 01/02/24  0859 01/02/24  1245 01/03/24  0009 01/03/24  0418   Sodium Level mmol/L 132*  --  133* 134* 135* 133* 132* 131* 132* 132*   Potassium Level mmol/L 4.0  --  3.4* 3.5 3.8 3.6 3.5 3.7 3.9 4.0   Chloride mmol/L 101  --  105 106 107 106 106 104 103 104   Carbon Dioxide mmol/L 12*  --  17* 19* 20* 21* 24 23 24 24   Glucose Level  mg/dL 66*  --  117* 141* 156* 130* 153* 158* 131* 132*   Glucose, UA   --  Normal  --   --   --   --   --   --   --   --    Blood Urea Nitrogen mg/dL 9.6  --  9.1 9.1 8.8* 7.8* 7.2* 6.4* 7.0* 9.2   Creatinine mg/dL 0.92  --  0.79 0.84 0.75 0.66* 0.65* 0.62* 0.64* 0.62*   Albumin Level g/dL 2.0*  --   --   --   --  1.5*  --   --   --  1.4*   Bilirubin Total mg/dL 9.2*  --   --   --   --  8.5*  --   --   --  7.8*   Alkaline Phosphatase unit/L 154*  --   --   --   --  111  --   --   --  139   Aspartate Aminotransferase unit/L 199*  --   --   --   --  149*  --   --   --  125*   Alanine Aminotransferase unit/L 65*  --   --   --   --  54  --   --   --  53   Magnesium Level mg/dL 1.80  --  2.00 2.20 2.10 2.10 2.00 1.80 1.80 1.90   Phosphorus Level mg/dL 2.8  --  2.5 2.1* 2.0* 1.6* 1.4* 1.8* 1.4* 1.7*       Vancomycin Administrations:  vancomycin given in the last 96 hours                     vancomycin 750 mg in dextrose 5 % (D5W) 250 mL IVPB (mg) 750 mg New Bag 01/02/24 1739     750 mg New Bag  0535    vancomycin (VANCOCIN) 1,000 mg in dextrose 5 % (D5W) 250 mL IVPB (mg) 1,000 mg New Bag 01/01/24 1602                    Microbiologic Results:  Microbiology Results (last 7 days)       Procedure Component Value Units Date/Time    Blood Culture [[id number]]  (Abnormal) Collected: 01/01/24 1550    Order Status: Completed Specimen: Blood from Hand, Left Updated: 01/03/24 0642     CULTURE, BLOOD ([facility]) Identification and Susceptibility To Follow      Gram-positive coccus probable strep     GRAM STAIN Gram Positive Cocci, probable Streptococcus      Seen in gram stain of broth only      1 of 1 Aerobic bottle positive    Blood Culture [[id number]]  (Normal) Collected: 01/01/24 1550    Order Status: Completed Specimen: Blood from Hand, Right Updated: 01/02/24 1800     CULTURE, BLOOD ([facility]) No Growth At 24 Hours    BCID2 Panel [[id number]]  (Abnormal) Collected: 01/01/24 1550    Order Status: Completed Specimen: Blood from Hand, Left  Updated: 01/02/24 0923     CTX-M (ESBL ) N/A     IMP (Cabapenemase ) N/A     KPC resistance gene (Carbapenemase ) N/A     mcr-1 N/A     mecA ID N/A     Comment: Note: Antimicrobial resistance can occur via multiple mechanisms. A Not Detected result for antimicrobial resistance gene(s) does not indicate antimicrobial susceptibility. Subculturing is required for species identification and susceptibility testing of   isolates.        mecA/C and MREJ (MRSA) gene N/A     NDM (Carbapenemase ) N/A     OXA-48-like (Carbapenemase ) N/A     Roseanna/B (VRE gene) N/A     VIM (Carbapenemase ) N/A     Enterococcus faecalis Not Detected     Enterococcus faecium Not Detected     Listeria monocytogenes Not Detected     Staphylococcus spp. Not Detected     Staphylococcus aureus Not Detected     Staphylococcus epidermidis Not Detected     Staphylococcus lugdunensis Not Detected     Streptococcus spp. Detected     Streptococcus agalactiae (Group B) Not Detected     Streptococcus pneumoniae Detected     Streptococcus pyogenes (Group A) Not Detected     Acinetobacter calcoaceticus/baumannii complex Not Detected     Bacteroides fragilis Not Detected     Enterobacterales Not Detected     Enterobacter cloacae complex Not Detected     Escherichia coli Not Detected     Klebsiella aerogenes Not Detected     Klebsiella oxytoca Not Detected     Klebsiella pneumoniae group Not Detected     Proteus spp. Not Detected     Salmonella spp. Not Detected     Serratia marcescens Not Detected     Haemophilus influenzae Not Detected     Neisseria meningitidis Not Detected     Pseudomonas aeruginosa Not Detected     Stenotrophomonas maltophilia Not Detected     Candida albicans Not Detected     Candida auris Not Detected     Candida glabrata Not Detected     Candida krusei Not Detected     Candida parapsilosis Not Detected     Candida tropicalis Not Detected     Cryptococcus neoformans/gattii Not Detected     Narrative:      The [employer] BCID2 Panel is a multiplexed nucleic acid test intended for the use with RidePal® 2.0 or RidePal® [employer] for the simultaneous qualitative detection and identification of multiple bacterial and yeast nucleic acids and select genetic determinants associated with antimicrobial resistance.  The [employer] BCID2 Panel test is performed directly on blood culture samples identified as positive by a continuous monitoring blood culture system.  Results are intended to be interpreted in conjunction with Gram stain results.    Respiratory Culture [[id number]]     Order Status: Sent Specimen: Sputum     Gram Stain [[id number]]     Order Status: Sent Specimen: Sputum

## 2024-01-03 NOTE — PT/OT/SLP EVAL
"[facility] AND [facility]  Cranial Nerve Exam and Clinical Swallow Exam  Initial          Name: Franco Hein   MRN: [id number]    Therapy Diagnosis: suspected oropharyngeal dysphagia 2/2 AMS and lethargy    Physician: Nirmal Licea MD     Date of Evaluation:  01/03/2024    Speech Start Time:  1030  Speech Stop Time:  1045     Speech Total Time (min):  15 min    Billable Minutes: Eval Swallow and Oral Function 15    Procedure Min.   Swallow and Oral Function Evaluation   15     Recommendations:     Consistency Recommendations: Continue NPO at this time.  Precautions:frequent oral care  Risk Management: use good oral hygiene  and increase physical mobility as tolerated  Specialist Referrals:   Ancillary Tests:   Therapy: Dysphagia therapy is recommended including TBD.  Frequency:  2 days a week  Follow-up exam: Monitor for objective assessment if warranted (MBSS)  Discharge disposition: TBD    Subjective:       Chief Complaint: "Delirium tremens"    Active Ambulatory Problems     Diagnosis Date Noted    No Active Ambulatory Problems     Resolved Ambulatory Problems     Diagnosis Date Noted    No Resolved Ambulatory Problems     No Additional Past Medical History          HISTORY & PHYSICAL:  Per medical record: "Franco Hein is  48 y.o. male with a PMH of ETOH misuse disorder who presented to [facility] ED on 1/1/2024 with complaint of jaundice, weakness for 3 weeks, and AMS. Patient was brought in by a friend who states patient is a daily drinker who has not been able to go to a store for the last 2 days, but patient is noted to have a positive ethanol on admission. Patient unable to provide history given his mental status and was only able to say his name and confirm he drinks alcohol but does not do drugs. Patient was tachypneic, tachycardic, and tremulous, on presentation with jaundice. Initial workup significant for lactic acidosis 7.5, AGMA with respiratory compensation, positive " "ethanol, influenza A positive, leukocytosis, and macrocytic anemia. Admitted to ICU given altered mental status and concern for decompensation in the setting of alcohol withdrawal and influenza A, also HCV Ab+." Speech pathology consulted to assess swallow via clinical swallow evaluation.      Imaging:  CXR: 1/3/24     Impression:     No adverse interval change.  Improved aeration of the left upper lobe.        PREVIOUS SPEECH THERAPY:    N/a      General Information:  Affect: Confused  Lethargic  Oxygen: Venti mask-  % FiO2  Hearing: Impaired  Orientation:Ox1        Objective:       Cranial Nerve 5: Trigeminal Nerve    Cranial Nerve 7: Facial Nerve        Other information:  Volitional Swallow: Able to palpate laryngeal rise  Mucosal Quality: Xerostomia  Secretion Management: no drooling appreciated  Dentition: Scattered Dentition     Predisposing dysphagia risk factors: lethargy  Clinical signs of possible chronic dysphagia: intermittent coughing, lethargy  Precipitating dysphagia risk factors: mentation    Pain:   0/10  Pain Location / Description: no pain reported    Assessment :     PO Trials:   Patient presented with:   ICE CHIPS: SLP fed  THIN:  DNT  PUREE: DNT  MINCED AND MOIST: DNT  SOFT AND BITE SIZED: DNT  SOLID: DNT        Limitations: poor endurance    IMPRESSION:   Patient presents with suspected oropharyngeal dysphagia 2/2 AMS and lethargy. Pt currently on venti-mask for this assessment. Oral phase remarkable for reduced bolus acceptance from utensils. Patient required max verbal and tactile cues to accept bolus. Oral hold with ice chips appreciated. Pharyngeal phase characterized by suspected delay with swallow timing. Cough post swallow appreciated. Suspect airway invasion. Given patient's lethargy and AMS, he appears at risk for aspiration at this time. SLP to follow for dysphagia management.       Goals:     LONG TERM GOAL    1.Franco Hein will tolerate ice chips without overt s/sx of " aspiration to maintain appropriate nutrition and hydration.  Initial   2. Franco Hein will participate in and MBSS to assess swallow effectiveness, ID/rule out penetration and aspiration, make appropriate recommendations regarding safest diet consistency, effective compensatory strategies and safe eating environment. Initial     SHORT TERM GOAL    Franco Hein  will participate in dysphagia program to restore and maintain current labial, lingual and laryngeal strength and ROM.  Initial   2. Franco Hein will consume ice chips to maintain/improve adequate swallow  function.   Initial   3. Franco Hein and/or family will be educated on the recommended aspiration precautions and swallowing strategies.  Initial         The Functional Oral Intake Scale (FOIS) is an ordinal scale that is used to assess the current status and meaningful change in the oral intake. FOIS levels include:        TUBE DEPENDENT   (Levels 1-3) 1. No oral intake    2. Tube dependent with minimal/inconsistent oral intake    3. Tube supplements with consistent oral intake          TOTAL ORAL INTAKE (Levels 4-7) 4. Total oral intake of a single consistency    5. Total oral intake of multiple consistencies requiring special preparation    6. Total oral intake with no special preparation, but must avoid specific foods or liquid items    7. Total oral intake with no restrictions   Patient is currently judged to be at FOIS Level 1     Education:  Aspiration precautions and Recommendations     Learners: Patient/ Nurse (Zuleyma), were educated on the results and recommendations of this evaluation. All expressed understanding. Patient will benefit from ongoing education.    Barriers to Learning: participation level    Teaching Method: Verbal    *If this is the last documented evaluation/treatment note, then it will signify discharge from acute care prior to discharge from speech services and will serve as the discharge summary.*    Reference:    American Speech-Language-Hearing Association. (n.d.b). Adult dysphagia. (Practice Portal). https://www.genny.org/practice-portal/clinical-topics/adult-dysphagia/  BERKLEY Denton (2018). Use of modified diets to prevent aspiration in oropharyngeal dysphagia: Is current practice justified?. BMC Geriatrics, 18(1), 1-10.  BERKLEY Orona., LEONIDES Hall, RAAD Staley, & JOVANI Live (2002). Predictors of aspiration pneumonia in nursing home residents.  Dysphagia, 17(4), 298-307.  LEONIDES Weiss (2016). Best practices for dehydration prevention. Perspectives of the GENNY Special Interest Groups - SIG 13, 1(2), 72- 80.    [facility] Swallow Protocol Validation Information   1. Three-ounce water swallow test validation first reported on 44 stroke patients by Jack et al. (1992). Failure required referral for objective (VFSS) dysphagia test.   2. A revised 3-ounce water swallow challenge administered to 3,000 hospitalized patients with 14 distinct diagnoses and referenced with FEES as the standard correctly predicted aspiration 96.5% of the time, with a negative predictive value of 97.9%, and a false negative rate of ?2.0%. (RONAN Martinez. & Tammy S.GERMAIN. [2008]. Clinical utility of the 3-ounce water swallow test. Dysphagia, 23, 244-250.)   3. Validation study of [facility]ner Swallow Protocol was reported using 25 subjects with categorical diagnoses of esophageal surgery, head & neck cancer, neurosurgery, medical issues, or neurological (CAV, MS, TBI) and using VFSS as the standard reference. Seven participants passed and 18 failed the 3-ounce swallow challenge. Of the 18 who failed, 14 aspirated on VFSS (true positives) and 4 did not aspirate on VFSS (false positives). Sensitivity for the protocol = 100%, specificity = 64%, positive predictive value = 78%, and negative predictive value = 100%. All participants who passed the protocol, i.e., deemed to have no aspiration risk, also did not aspirate during VFSS. (ROBERTO Martinez, DALTON Fry, & Tammy S.GERMAIN.  [2014). Validation of the [place] Swallow Protocol: A prospective double-blinded videofluoroscopic study. Dysphagia, 29, 199-203.          Kain Barlow M.S. [facility]-SLP  [facility]

## 2024-01-03 NOTE — PT/OT/SLP PROGRESS
[facility]  SPEECH LANGUAGE PATHOLOGY  MISSED VISIT NOTE      PATIENT:  Franco Hein    : 1975    MRN: [id number]    DATE: 2024      HISTORY & PHYSICAL:    Active Ambulatory Problems     Diagnosis Date Noted    No Active Ambulatory Problems     Resolved Ambulatory Problems     Diagnosis Date Noted    No Resolved Ambulatory Problems     No Additional Past Medical History       SLP attempted swallow follow-up. Unable to complete as patient is currently on bi-pap. Will attempt at next opportunity.           Kain Barlow M.S. [facility]-SLP  [facility]

## 2024-01-04 LAB
A-ADO2 BLOOD GAS (OHS): 234 MMHG
ALBUMIN SERPL-MCNC: 1.4 G/DL (ref 3.5–5)
ALBUMIN/GLOB SERPL: 0.3 RATIO (ref 1.1–2)
ALLENS TEST BLOOD GAS (OHS): YES
ALP SERPL-CCNC: 121 UNIT/L (ref 40–150)
ALT SERPL-CCNC: 49 UNIT/L (ref 0–55)
AMMONIA PLAS-MSCNC: 90.1 UMOL/L (ref 18–72)
ANISOCYTOSIS BLD QL SMEAR: ABNORMAL
AST SERPL-CCNC: 99 UNIT/L (ref 5–34)
AV INDEX (PROSTH): 1
AV MEAN GRADIENT: 3 MMHG
AV PEAK GRADIENT: 6 MMHG
AV VELOCITY RATIO: 0.84
BACTERIA BLD CULT: ABNORMAL
BASE EXCESS BLD CALC-SCNC: 7.1 MMOL/L (ref -2–2)
BASOPHILS # BLD AUTO: 0.03 X10(3)/MCL
BASOPHILS NFR BLD AUTO: 0.3 %
BILIRUB SERPL-MCNC: 6.9 MG/DL
BIPAP(E) BLOOD GAS (OHS): 5 CM H2O
BIPAP(I) BLOOD GAS (OHS): 10 CM H2O
BLOOD GAS SAMPLE TYPE (OHS): ABNORMAL
BSA FOR ECHO PROCEDURE: 1.57 M2
BUN SERPL-MCNC: 9.9 MG/DL (ref 8.9–20.6)
CALCIUM SERPL-MCNC: 7.2 MG/DL (ref 8.4–10.2)
CHLORIDE SERPL-SCNC: 104 MMOL/L (ref 98–107)
CO2 BLDA-SCNC: 33.4 MMOL/L (ref 22–26)
CO2 SERPL-SCNC: 28 MMOL/L (ref 22–29)
COHGB MFR BLDA: 2.6 % (ref 0.5–1.5)
CREAT SERPL-MCNC: 0.64 MG/DL (ref 0.73–1.18)
CV ECHO LV RWT: 0.61 CM
DOP CALC AO PEAK VEL: 1.27 M/S
DOP CALC AO VTI: 24.3 CM
DOP CALC LVOT PEAK VEL: 1.07 M/S
DOP CALC MV VTI: 20.5 CM
DOP CALCLVOT PEAK VEL VTI: 24.4 CM
DRAWN BY BLOOD GAS (OHS): ABNORMAL
E WAVE DECELERATION TIME: 192.18 MSEC
E/A RATIO: 0.86
E/E' RATIO: 9.5 M/S
ECHO LV POSTERIOR WALL: 1.07 CM (ref 0.6–1.1)
EOSINOPHIL # BLD AUTO: 0 X10(3)/MCL (ref 0–0.9)
EOSINOPHIL NFR BLD AUTO: 0 %
ERYTHROCYTE [DISTWIDTH] IN BLOOD BY AUTOMATED COUNT: 18.6 % (ref 11.5–17)
FRACTIONAL SHORTENING: 30 % (ref 28–44)
GAS PNL BLD: 299 MMHG
GFR SERPLBLD CREATININE-BSD FMLA CKD-EPI: >60 MLS/MIN/1.73/M2
GLOBULIN SER-MCNC: 5.4 GM/DL (ref 2.4–3.5)
GLUCOSE SERPL-MCNC: 134 MG/DL (ref 74–100)
GRAM STN SPEC: ABNORMAL
HCO3 BLDA-SCNC: 32 MMOL/L (ref 22–26)
HCT VFR BLD AUTO: 30.8 % (ref 42–52)
HGB BLD-MCNC: 10 G/DL (ref 14–18)
HYPOCHROMIA BLD QL SMEAR: ABNORMAL
IMM GRANULOCYTES # BLD AUTO: 0.28 X10(3)/MCL (ref 0–0.04)
IMM GRANULOCYTES NFR BLD AUTO: 2.9 %
INHALED O2 CONCENTRATION: 50 %
INR PPP: 2.8
INTERVENTRICULAR SEPTUM: 1.15 CM (ref 0.6–1.1)
LEFT ATRIUM SIZE: 3.49 CM
LEFT ATRIUM VOLUME INDEX MOD: 39.5 ML/M2
LEFT ATRIUM VOLUME MOD: 62 CM3
LEFT INTERNAL DIMENSION IN SYSTOLE: 2.47 CM (ref 2.1–4)
LEFT VENTRICLE DIASTOLIC VOLUME INDEX: 32.78 ML/M2
LEFT VENTRICLE DIASTOLIC VOLUME: 51.46 ML
LEFT VENTRICLE MASS INDEX: 77 G/M2
LEFT VENTRICLE SYSTOLIC VOLUME INDEX: 13.9 ML/M2
LEFT VENTRICLE SYSTOLIC VOLUME: 21.76 ML
LEFT VENTRICULAR INTERNAL DIMENSION IN DIASTOLE: 3.52 CM (ref 3.5–6)
LEFT VENTRICULAR MASS: 121.66 G
LV LATERAL E/E' RATIO: 6.91 M/S
LV SEPTAL E/E' RATIO: 15.2 M/S
LVOT MG: 2.38 MMHG
LVOT MV: 0.71 CM/S
LYMPHOCYTES # BLD AUTO: 0.79 X10(3)/MCL (ref 0.6–4.6)
LYMPHOCYTES NFR BLD AUTO: 8.1 %
MAGNESIUM SERPL-MCNC: 2 MG/DL (ref 1.6–2.6)
MCH RBC QN AUTO: 28.7 PG (ref 27–31)
MCHC RBC AUTO-ENTMCNC: 32.5 G/DL (ref 33–36)
MCV RBC AUTO: 88.3 FL (ref 80–94)
METHGB MFR BLDA: 1 % (ref 0–1.5)
MODE (OHS): ABNORMAL
MONOCYTES # BLD AUTO: 1.15 X10(3)/MCL (ref 0.1–1.3)
MONOCYTES NFR BLD AUTO: 11.8 %
MV MEAN GRADIENT: 1 MMHG
MV PEAK A VEL: 0.88 M/S
MV PEAK E VEL: 0.76 M/S
MV PEAK GRADIENT: 2 MMHG
MV STENOSIS PRESSURE HALF TIME: 55.73 MS
MV VALVE AREA P 1/2 METHOD: 3.95 CM2
NEUTROPHILS # BLD AUTO: 7.5 X10(3)/MCL (ref 2.1–9.2)
NEUTROPHILS NFR BLD AUTO: 76.9 %
NRBC BLD AUTO-RTO: 1.2 %
O2 HB BLOOD GAS (OHS): 90.2 % (ref 94–100)
OHS LV EJECTION FRACTION SIMPSONS BIPLANE MOD: 62 %
OXYHGB MFR BLDA: 10 G/DL (ref 12–18)
PCO2 BLDA: 46 MMHG (ref 35–45)
PH BLDA: 7.45 [PH] (ref 7.35–7.45)
PHOSPHATE SERPL-MCNC: 2.1 MG/DL (ref 2.3–4.7)
PLATELET # BLD AUTO: 87 X10(3)/MCL (ref 130–400)
PLATELET # BLD EST: ABNORMAL 10*3/UL
PLATELETS.RETICULATED NFR BLD AUTO: 7.4 % (ref 0.9–11.2)
PMV BLD AUTO: 10.9 FL (ref 7.4–10.4)
PO2 BLDA: 65 MMHG (ref 75–100)
POIKILOCYTOSIS BLD QL SMEAR: ABNORMAL
POTASSIUM SERPL-SCNC: 3.4 MMOL/L (ref 3.5–5.1)
PROT SERPL-MCNC: 6.8 GM/DL (ref 6.4–8.3)
PROTHROMBIN TIME: 28.8 SECONDS (ref 11.4–14)
RA PRESSURE ESTIMATED: 8 MMHG
RBC # BLD AUTO: 3.49 X10(6)/MCL (ref 4.7–6.1)
RBC MORPH BLD: ABNORMAL
SAMPLE SITE BLOOD GAS (OHS): ABNORMAL
SAO2 % BLDA: 93.6 %
SODIUM SERPL-SCNC: 136 MMOL/L (ref 136–145)
TDI LATERAL: 0.11 M/S
TDI SEPTAL: 0.05 M/S
TDI: 0.08 M/S
WBC # SPEC AUTO: 9.75 X10(3)/MCL (ref 4.5–11.5)
Z-SCORE OF LEFT VENTRICULAR DIMENSION IN END DIASTOLE: -2.48
Z-SCORE OF LEFT VENTRICULAR DIMENSION IN END SYSTOLE: -0.99

## 2024-01-04 PROCEDURE — 82140 ASSAY OF AMMONIA: CPT

## 2024-01-04 PROCEDURE — 25000003 PHARM REV CODE 250: Performed by: STUDENT IN AN ORGANIZED HEALTH CARE EDUCATION/TRAINING PROGRAM

## 2024-01-04 PROCEDURE — 27000207 HC ISOLATION

## 2024-01-04 PROCEDURE — 99233 SBSQ HOSP IP/OBS HIGH 50: CPT | Mod: ,,, | Performed by: INTERNAL MEDICINE

## 2024-01-04 PROCEDURE — 63600175 PHARM REV CODE 636 W HCPCS: Performed by: STUDENT IN AN ORGANIZED HEALTH CARE EDUCATION/TRAINING PROGRAM

## 2024-01-04 PROCEDURE — 94660 CPAP INITIATION&MGMT: CPT

## 2024-01-04 PROCEDURE — 82803 BLOOD GASES ANY COMBINATION: CPT

## 2024-01-04 PROCEDURE — 99900035 HC TECH TIME PER 15 MIN (STAT)

## 2024-01-04 PROCEDURE — C9113 INJ PANTOPRAZOLE SODIUM, VIA: HCPCS | Performed by: STUDENT IN AN ORGANIZED HEALTH CARE EDUCATION/TRAINING PROGRAM

## 2024-01-04 PROCEDURE — 36600 WITHDRAWAL OF ARTERIAL BLOOD: CPT

## 2024-01-04 PROCEDURE — 20000000 HC ICU ROOM

## 2024-01-04 PROCEDURE — 83735 ASSAY OF MAGNESIUM: CPT | Performed by: STUDENT IN AN ORGANIZED HEALTH CARE EDUCATION/TRAINING PROGRAM

## 2024-01-04 PROCEDURE — 84100 ASSAY OF PHOSPHORUS: CPT | Performed by: STUDENT IN AN ORGANIZED HEALTH CARE EDUCATION/TRAINING PROGRAM

## 2024-01-04 PROCEDURE — 80053 COMPREHEN METABOLIC PANEL: CPT | Performed by: STUDENT IN AN ORGANIZED HEALTH CARE EDUCATION/TRAINING PROGRAM

## 2024-01-04 PROCEDURE — 27000221 HC OXYGEN, UP TO 24 HOURS

## 2024-01-04 PROCEDURE — 94761 N-INVAS EAR/PLS OXIMETRY MLT: CPT | Mod: XB

## 2024-01-04 PROCEDURE — 87040 BLOOD CULTURE FOR BACTERIA: CPT | Performed by: STUDENT IN AN ORGANIZED HEALTH CARE EDUCATION/TRAINING PROGRAM

## 2024-01-04 PROCEDURE — 85610 PROTHROMBIN TIME: CPT

## 2024-01-04 PROCEDURE — 85025 COMPLETE CBC W/AUTO DIFF WBC: CPT | Performed by: STUDENT IN AN ORGANIZED HEALTH CARE EDUCATION/TRAINING PROGRAM

## 2024-01-04 RX ORDER — FUROSEMIDE 10 MG/ML
40 INJECTION INTRAMUSCULAR; INTRAVENOUS ONCE
Status: COMPLETED | OUTPATIENT
Start: 2024-01-04 | End: 2024-01-04

## 2024-01-04 RX ORDER — PREDNISONE 20 MG/1
40 TABLET ORAL DAILY
Status: DISCONTINUED | OUTPATIENT
Start: 2024-01-05 | End: 2024-01-09

## 2024-01-04 RX ORDER — OSELTAMIVIR PHOSPHATE 75 MG/1
75 CAPSULE ORAL 2 TIMES DAILY
Status: DISCONTINUED | OUTPATIENT
Start: 2024-01-04 | End: 2024-01-09

## 2024-01-04 RX ADMIN — PIPERACILLIN AND TAZOBACTAM 4.5 G: 4; .5 INJECTION, POWDER, LYOPHILIZED, FOR SOLUTION INTRAVENOUS; PARENTERAL at 08:01

## 2024-01-04 RX ADMIN — LORAZEPAM 2 MG: 2 INJECTION INTRAMUSCULAR; INTRAVENOUS at 03:01

## 2024-01-04 RX ADMIN — PANTOPRAZOLE SODIUM 40 MG: 40 INJECTION, POWDER, FOR SOLUTION INTRAVENOUS at 08:01

## 2024-01-04 RX ADMIN — POTASSIUM PHOSPHATE, MONOBASIC POTASSIUM PHOSPHATE, DIBASIC 15 MMOL: 224; 236 INJECTION, SOLUTION, CONCENTRATE INTRAVENOUS at 07:01

## 2024-01-04 RX ADMIN — FUROSEMIDE 40 MG: 10 INJECTION, SOLUTION INTRAMUSCULAR; INTRAVENOUS at 08:01

## 2024-01-04 RX ADMIN — THIAMINE HYDROCHLORIDE 100 MG: 100 INJECTION, SOLUTION INTRAMUSCULAR; INTRAVENOUS at 08:01

## 2024-01-04 RX ADMIN — CEFTRIAXONE SODIUM 2 G: 2 INJECTION, POWDER, FOR SOLUTION INTRAMUSCULAR; INTRAVENOUS at 08:01

## 2024-01-04 RX ADMIN — LORAZEPAM 2 MG: 2 INJECTION INTRAMUSCULAR; INTRAVENOUS at 10:01

## 2024-01-04 RX ADMIN — METHYLPREDNISOLONE SODIUM SUCCINATE 40 MG: 40 INJECTION, POWDER, FOR SOLUTION INTRAMUSCULAR; INTRAVENOUS at 08:01

## 2024-01-04 RX ADMIN — LORAZEPAM 2 MG: 2 INJECTION INTRAMUSCULAR; INTRAVENOUS at 09:01

## 2024-01-04 RX ADMIN — OSELTAMIVIR PHOSPHATE 75 MG: 75 CAPSULE ORAL at 09:01

## 2024-01-04 RX ADMIN — MUPIROCIN: 20 OINTMENT TOPICAL at 08:01

## 2024-01-04 RX ADMIN — LORAZEPAM 2 MG: 2 INJECTION INTRAMUSCULAR; INTRAVENOUS at 01:01

## 2024-01-04 RX ADMIN — FOLIC ACID 1 MG: 5 INJECTION, SOLUTION INTRAMUSCULAR; INTRAVENOUS; SUBCUTANEOUS at 09:01

## 2024-01-04 RX ADMIN — PIPERACILLIN AND TAZOBACTAM 4.5 G: 4; .5 INJECTION, POWDER, LYOPHILIZED, FOR SOLUTION INTRAVENOUS; PARENTERAL at 12:01

## 2024-01-04 RX ADMIN — MUPIROCIN: 20 OINTMENT TOPICAL at 09:01

## 2024-01-04 NOTE — PROGRESS NOTES
[facility] - ICU  Pulmonary Critical Care Note    Patient Name: Franco Hein  MRN: [id number]  Admission Date: 1/1/2024  Hospital Length of Stay: 3 days  Code Status: Full Code  Attending Provider: MARIAH Nieto MD  Primary Care Provider: Caitie, Primary Doctor     Subjective:     HPI:   Franco Hein is  48 y.o. male with a PMH of ETOH misuse disorder who presented to [facility] ED on 1/1/2024 with complaint of jaundice, weakness for 3 weeks, and AMS. Patient was brought in by a friend who states patient is a daily drinker who has not been able to go to a store for the last 2 days, but patient is noted to have a positive ethanol on admission. Patient unable to provide history given his mental status and was only able to say his name and confirm he drinks alcohol but does not do drugs. Patient was tachypneic, tachycardic, and tremulous, on presentation with jaundice. Admitted to ICU given altered mental status and concern for decompensation in the setting of alcohol withdrawal and influenza A, also found to have strep pneumonia bacteremia, on appropriate IV antibiotics.     Hospital Course/Significant events:  1/1/2024: Admitted to ICU, on 2L NC  1/2/23: Transfused 2 units pRBC for Hgb 7    24 Hour Interval History:  NAEO. Pt was more awake last night and followed simple commands with the nursing staff, able to answer yes/no questions, tolerated BIPAP well until earlier this morning started becoming more tachypneic with RR 30-40 on BIPAP but oxygenating well 97%, not tachycardic and hemodynamically stable. Pt given 1 dose ativan this morning, able to arouse briefly but unable to communicate with me this morning. Labs continue to improve, INR back down to 2.8, H&H stable, mild hyponatremia and hypophosphatemia repleted this morning. CXR this morning appears to have worsening bilateral consolidations in L > R, likely infiltrates; consistent with lung exam. Net -269 ml last 24h despite diuresis with  20 mg lasix. Patients remains critically ill.       History reviewed. No pertinent past medical history.    No past surgical history on file.    Social History     Socioeconomic History    Marital status: Single           No current outpatient medications    Current Inpatient Medications   azithromycin  500 mg Intravenous Q24H    folic acid (FOLVITE) IVPB  1 mg Intravenous Daily    methylPREDNISolone sodium succinate injection  40 mg Intravenous Daily    mupirocin   Nasal BID    pantoprazole  40 mg Intravenous Daily    piperacillin-tazobactam (Zosyn) IV (PEDS and ADULTS) (extended infusion is not appropriate)  4.5 g Intravenous Q8H    thiamine (B-1) 100 mg in dextrose 5 % (D5W) 100 mL IVPB  100 mg Intravenous Daily       Current Intravenous Infusions   dextrose 5 % and 0.45 % NaCl Stopped (01/02/24 1930)         Review of Systems   Unable to perform ROS: Mental acuity          Objective:       Intake/Output Summary (Last 24 hours) at 1/4/2024 0604  Last data filed at 1/4/2024 0532  Gross per 24 hour   Intake 3641.55 ml   Output 1475 ml   Net 2166.55 ml           Vital Signs (Most Recent):  Temp: 96.7 °F (35.9 °C) (01/04/24 0301)  Pulse: 88 (01/04/24 0515)  Resp: (!) 37 (01/04/24 0515)  BP: 117/82 (01/04/24 0500)  SpO2: 97 % (01/04/24 0515)  Body mass index is 20.6 kg/m².  Weight: 54.4 kg (120 lb) Vital Signs (24h Range):  Temp:  [96.4 °F (35.8 °C)-97.5 °F (36.4 °C)] 96.7 °F (35.9 °C)  Pulse:  [75-92] 88  Resp:  [23-44] 37  SpO2:  [94 %-100 %] 97 %  BP: (101-124)/(62-90) 117/82     Physical Exam  Constitutional:       Appearance: He is normal weight. He is ill-appearing. He is not toxic-appearing or diaphoretic.   HENT:      Head: Normocephalic and atraumatic.      Mouth/Throat:      Mouth: Mucous membranes are moist.   Eyes:      General: Scleral icterus present.         Right eye: No discharge.         Left eye: No discharge.      Extraocular Movements: Extraocular movements intact.      Pupils: Pupils are equal,  round, and reactive to light.   Cardiovascular:      Rate and Rhythm: Regular rhythm. Tachycardia present.      Pulses: Normal pulses.      Heart sounds: Normal heart sounds. No murmur heard.  Pulmonary:      Effort: No respiratory distress.      Breath sounds: Wheezing, rhonchi and rales present.      Comments: On BIPAP  Abdominal:      General: Bowel sounds are normal. There is no distension.      Palpations: Abdomen is soft.      Tenderness: There is no abdominal tenderness.   Genitourinary:     Comments: Hillman catheter in place  Dark yellow urine noted with adequate output  Musculoskeletal:      Right lower leg: No edema.      Left lower leg: No edema.   Skin:     General: Skin is warm.      Capillary Refill: Capillary refill takes 2 to 3 seconds.      Coloration: Skin is jaundiced.   Neurological:      Comments: Pt awake, responds to verbal stimuili  Incoherent speech, appears to be attempting to follow commands  Moving all extremities           Lines/Drains/Airways       Drain  Duration                  Urethral Catheter 01/02/24 0413 Non-latex 16 Fr. 2 days              Peripheral Intravenous Line  Duration                  Peripheral IV - Single Lumen 01/01/24 20 G Left Antecubital 3 days         Peripheral IV - Single Lumen 01/01/24 1310 20 G Right Antecubital 2 days         Peripheral IV - Single Lumen 01/01/24 1551 18 G Anterior;Distal;Right Forearm 2 days                    Significant Labs:    Lab Results   Component Value Date    WBC 9.75 01/04/2024    HGB 10.0 (L) 01/04/2024    HCT 30.8 (L) 01/04/2024    MCV 88.3 01/04/2024    PLT 87 (L) 01/04/2024           BMP  Lab Results   Component Value Date     01/04/2024    K 3.4 (L) 01/04/2024    CHLORIDE 104 01/04/2024    CO2 28 01/04/2024    BUN 9.9 01/04/2024    CREATININE 0.64 (L) 01/04/2024    CALCIUM 7.2 (L) 01/04/2024    AGAP 6.0 01/03/2024         ABG  Recent Labs   Lab 01/03/24  0018   PH 7.450   PO2 67.0*   PCO2 39.0   HCO3 27.1*   POCBASEDEF  2.90*         Mechanical Ventilation Support:  Oxygen Concentration (%): 50 (01/04/24 0350)  BiPAP 10/5, 50%    Significant Imaging:  X-Ray Chest 1 View    Result Date: 1/1/2024  Bilateral patchy airspace opacities greatest on the left.  Findings concerning for atypical infectious process. Electronically signed by: Josef Sow Date:    01/01/2024 Time:    14:23      CXR this morning (1/4/24) shows worsening consolidations in bilateral lung fields, more so in left than right likely representing infiltrates as patient has been tolerating BIPAP overnight and lungs appear to be reexpanded from previous films. No evidence of pleural effusions or pneumothorax.       Assessment/Plan:     Assessment  Alcohol withdrawal, delirium tremens  Strep pneumonia bacteremia/SEPSIS, SIRS 3/4, lower respiratory infection  AGMA-resolved  Lactic acidosis-resolved  Influenza A  Atypical pneumonia  Possible new cirrhosis diagnosis  Alcohol abuse  Positive hepatitis C antibody; VL negative, no active infection  Hyperbilirubinemia  Elevated transaminases  Elevated INR  MELD-Na: 28->25 today  Child Hoang: 13, class C-> 13  Maddrey's: 204.7  Macrocytic anemia, thrombocytopenia      Plan  Admitted to ICU for close monitoring  I&O cumulative negative 269 mL; 1475 cc urine output last 24h. Net +6.25L since admission; given worsening consolidations on CXR and tachypnea, net+ 6 L will give dose of lasix 40 mg this morning  CT Chest Abdomen Pelvis showed multilobar PNA, cirrhotic appearing liver  Blood culture positive x1/2 Streptococcus; BioFire confirmation strep pneumonia bacteremia, resp culture /Gram stain collection pending; sensitivity pending. Will repeat blood cultures today and obtain TTE to rule out endocarditis given strep bacteremia.   Zosyn and Zithromax both D4/5,; MRSA PCR negative, discontinued vancomycin after D3. Will deescalate to Rocephin 2g daily, consider consult to ID for   CXR this morning shows worsening consolidations,  no evidence of pleural effusions. ABG this morning shows compensated respiratory acidosis, mild hypercapnea and moderate hypoxia; will discuss increasing BIPAP from 10/5 to 15/5 and keeping FIO2 at 50% with staff to improve ventilation  NPO for now given AMS; SLPT consulted for swallow eval, unable to perform yesterday will try again today. NGT with enteral nutrition if unable to be cleared  Will attempt oseltamivir when able to tolerate PO or via NGT if placed today, peramivir not on formulary  HCV Ab+, RCA quant undetected, no active infection  H&H 7/22.7 on admission --> 10/30.8 s/p 2 units pRBC, stable x2 days  Folate 18.3; B12 > 2000; continue folate supplementation  Given Maddrey 204, continue solumedrol 40 mg daily once able to tolerate PO can switch to prednisone 40 mg daily  CIWA protocol with lorazepam prn, thiamine IV, folic acid IV  Monitor for refeeding syndrome with CMP, Mg, Ph daily; replete electrolytes as needed    DVT Prophylaxis: SCDs  GI Prophylaxis: PPI    Will attempt to get in touch with family/friend for more information and to give update later this morning.      45 minutes of critical care was time spent personally by me on the following activities: development of treatment plan with patient or surrogate and bedside caregivers, discussions with consultants, evaluation of patient's response to treatment, examination of patient, ordering and performing treatments and interventions, ordering and review of laboratory studies, ordering and review of radiographic studies, pulse oximetry, re-evaluation of patient's condition.  This critical care time did not overlap with that of any other provider or involve time for any procedures.     Sagrario Rosenthal MD  [employer] IM PGY III  Pulmonary Critical Care Medicine  [facility]  DOS: 01/04/2024

## 2024-01-04 NOTE — PT/OT/SLP PROGRESS
[facility]  SPEECH LANGUAGE PATHOLOGY  MISSED VISIT NOTE      PATIENT:  Franco Hein    : 1975    MRN: [id number]    DATE: 2024          HISTORY & PHYSICAL:    Active Ambulatory Problems     Diagnosis Date Noted    No Active Ambulatory Problems     Resolved Ambulatory Problems     Diagnosis Date Noted    No Resolved Ambulatory Problems     No Additional Past Medical History         SLP attempted swallow follow-up. Unable to complete as patient is currently on bi-pap. Will attempt at next opportunity.              Kain Barlow M.S. [facility]-SLP  [facility]

## 2024-01-04 NOTE — PROGRESS NOTES
Inpatient Nutrition Assessment    Admit Date: 1/1/2024   Total duration of encounter: 3 days   Patient Age: 48 y.o.    Nutrition Recommendation/Prescription     When pt alert--suggest ST swallow evaluation--for diet appropriateness--ADAT to 60 gm protein /low Na diet (adjust protein level as NH level decreases) with suplena 1 can tid; Suplena (provides 420 kcal, 11 g protein per serving)   If pt remains NPO--> suggest supplemental PPN --Clinimix 4.25/5 @ 60ml/hr with lipids 20% 250 ml daily to provide 990calories, 61 gm protein.    ---if able to place NGT/consider TF--Suplena @ 20ml/hr; increase as tolerated to goal rte 45 ml/hr (23 hr cycle) to provide 2070 calories, 47 gm protein, 763 ml free water. Flush tube with 125 ml water every 4hrs.     Suggest MVI, continue folic acid/thiamine 2 ETOH abuse.   Biweekly wt  Will monitor nutrition status     Communication of Recommendations: reviewed with nurse    Nutrition Assessment     Malnutrition Assessment/Nutrition-Focused Physical Exam    Malnutrition Context: acute illness or injury (01/04/24 1119)  Malnutrition Level: moderate (01/04/24 1119)  Energy Intake (Malnutrition): other (see comments) (NPO x3 day; unsure oral intake PTA) (01/04/24 1119)  Weight Loss (Malnutrition): other (see comments) (unable to obtain wt hx; pt with AMS) (01/04/24 1119)  Subcutaneous Fat (Malnutrition): mild depletion (01/04/24 1119)  Orbital Region (Subcutaneous Fat Loss): mild depletion  Upper Arm Region (Subcutaneous Fat Loss): mild depletion     Muscle Mass (Malnutrition): mild depletion (01/04/24 1119)     Clavicle Bone Region (Muscle Loss): mild depletion         A minimum of two characteristics is recommended for diagnosis of either severe or non-severe malnutrition.    Chart Review    Reason Seen: continuous nutrition monitoring, malnutrition screening tool (MST), and follow-up    Malnutrition Screening Tool Results   Have you recently lost weight without trying?: Unsure  Have you  "been eating poorly because of a decreased appetite?: Yes   MST Score: 3   Diagnosis:  ETOH WD, DT, sepsis, AGMA, influenza A, PNA, cirrhosis, Hep C+, anemia     Relevant Medical History: ETOH misuse     Nutrition-Related Medications: azithromycin, folic acid, pantoprazole,  thiamine, rocephin, methylprednisolone   Calorie Containing IV Medications: no significant kcals from medications at this time    Nutrition-Related Labs:  (1-1) NH4 56.3(H)   (1-4) H/H 10.0/30.8(L) gluc 134 Bun 9.9 Cr 0.6 K3.4(L) P 2.1(L) Tbili 6.9(H) NH 90.1(H)   Nutrition Orders:   Diet NPO      Appetite/Oral Intake: NPO/NPO    Factors Affecting Nutritional Intake: impaired cognitive status/motor control and NPO    Food/Temple/Cultural Preferences: unable to obtain    Food Allergies: unable to obtain    Wound(s):   none reported     Last Bowel Movement: 01/04/24    Comments  (1/4) Pt remains NPO x3 days ; AMS/on bipap; on 1/3--ST unable to complete swallow eval 2 pt on Bipap; no new wt. Both TF/PPN recs provided for nutrition support; discussed with RN caring for pt. Noted NH4--elevated to 90; no lactulose ; lowered protein recs until level decreases.     (1/2) Received MST for unsure wt loss/ appetite. Pt not alert during rounds; AMS; unable to obtain diet/wt hx at this time; PPN and diet recs provided. Suggest ST swallow eval prior to diet progression. Abnormal labs noted: Tbili (H)--hx ETOH abuse. Suggest continue folic acid/thiamine tx.     Anthropometrics    Height: 5' 4" (162.6 cm) Height Method: Stated  Last Weight: 54.4 kg (120 lb) (01/04/24 1000) Weight Method: Bed Scale  BMI (Calculated): 20.6  BMI Classification: normal (BMI 18.5-24.9)        Ideal Body Weight (IBW), Male: 130 lb     % Ideal Body Weight, Male (lb): 92.31 %      Usual Body Weight (UBW), kg:  (pt unable to give UBW; no wt hx EMR)        Usual Weight Provided By: EMR weight history    Wt Readings from Last 5 Encounters:   01/04/24 54.4 kg (120 lb)     Weight " Change(s) Since Admission: no wt hx   Wt Readings from Last 1 Encounters:   24 1000 54.4 kg (120 lb)   24 0156 54.4 kg (120 lb)   24 1255 54.4 kg (120 lb)   Admit Weight: 54.4 kg (120 lb) (24 1255), Weight Method: Bed Scale    Estimated Needs    Weight Used For Calorie Calculations: 54.4 kg (119 lb 14.9 oz)  Energy Calorie Requirements (kcal): 1904 kcal/d; 35 federico/kg/wt gain  Energy Need Method: Kcal/kg  Weight Used For Protein Calculations: 54.4 kg (119 lb 14.9 oz)  Protein Requirements: 54 gm protein/d; 1.0 gm /kg --limit protein--NH4 level elevated  Fluid Requirements (mL): 1904 ml/d; 1ml/federico  Temp (24hrs), Av °F (36.1 °C), Min:96.4 °F (35.8 °C), Max:98.6 °F (37 °C)       Enteral Nutrition    Patient not receiving enteral nutrition at this time.    Parenteral Nutrition    Patient not receiving parenteral nutrition support at this time.    Evaluation of Received Nutrient Intake    Calories: not meeting estimated needs  Protein: not meeting estimated needs    Patient Education    Not applicable.    Nutrition Diagnosis     PES: Inadequate oral intake related to acute illness as evidenced by npo. (active)    Interventions/Goals     Intervention(s): general/healthful diet, modified composition of enteral nutrition, modified rate of enteral nutrition, modified composition of parenteral nutrition, modified rate of parenteral nutrition, commercial beverage, multivitamin/mineral supplement therapy, and collaboration with other providers    Goal: Meet greater than 80% of nutritional needs by follow-up. (goal not met)    Monitoring & Evaluation     Dietitian will monitor food and beverage intake, enteral nutrition intake, parenteral nutrition intake, weight, and glucose/endocrine profile.    Nutrition Risk/Follow-Up: moderate (follow-up in 3-5 days)   Please consult if re-assessment needed sooner.

## 2024-01-05 LAB
A-ADO2 BLOOD GAS (OHS): 229 MMHG
ALBUMIN SERPL-MCNC: 1.4 G/DL (ref 3.5–5)
ALBUMIN/GLOB SERPL: 0.2 RATIO (ref 1.1–2)
ALLENS TEST BLOOD GAS (OHS): YES
ALP SERPL-CCNC: 171 UNIT/L (ref 40–150)
ALT SERPL-CCNC: 59 UNIT/L (ref 0–55)
AST SERPL-CCNC: 115 UNIT/L (ref 5–34)
BASE EXCESS BLD CALC-SCNC: 9.7 MMOL/L (ref -2–2)
BASOPHILS # BLD AUTO: 0.03 X10(3)/MCL
BASOPHILS NFR BLD AUTO: 0.3 %
BILIRUB SERPL-MCNC: 6.6 MG/DL
BIPAP(E) BLOOD GAS (OHS): 5 CM H2O
BIPAP(I) BLOOD GAS (OHS): 10 CM H2O
BLOOD GAS SAMPLE TYPE (OHS): ABNORMAL
BUN SERPL-MCNC: 15.1 MG/DL (ref 8.9–20.6)
CALCIUM SERPL-MCNC: 7.5 MG/DL (ref 8.4–10.2)
CHLORIDE SERPL-SCNC: 104 MMOL/L (ref 98–107)
CO2 BLDA-SCNC: 34.8 MMOL/L (ref 22–26)
CO2 SERPL-SCNC: 30 MMOL/L (ref 22–29)
COHGB MFR BLDA: 2.1 % (ref 0.5–1.5)
CREAT SERPL-MCNC: 0.7 MG/DL (ref 0.73–1.18)
DRAWN BY BLOOD GAS (OHS): ABNORMAL
EOSINOPHIL # BLD AUTO: 0 X10(3)/MCL (ref 0–0.9)
EOSINOPHIL NFR BLD AUTO: 0 %
ERYTHROCYTE [DISTWIDTH] IN BLOOD BY AUTOMATED COUNT: 18.6 % (ref 11.5–17)
GAS PNL BLD: 305 MMHG
GFR SERPLBLD CREATININE-BSD FMLA CKD-EPI: >60 MLS/MIN/1.73/M2
GLOBULIN SER-MCNC: 5.8 GM/DL (ref 2.4–3.5)
GLUCOSE SERPL-MCNC: 171 MG/DL (ref 74–100)
HCO3 BLDA-SCNC: 33.5 MMOL/L (ref 22–26)
HCT VFR BLD AUTO: 33.2 % (ref 42–52)
HGB BLD-MCNC: 11 G/DL (ref 14–18)
HOLD SPECIMEN: NORMAL
IMM GRANULOCYTES # BLD AUTO: 0.21 X10(3)/MCL (ref 0–0.04)
IMM GRANULOCYTES NFR BLD AUTO: 1.8 %
INHALED O2 CONCENTRATION: 50 %
INR PPP: 3
LYMPHOCYTES # BLD AUTO: 0.57 X10(3)/MCL (ref 0.6–4.6)
LYMPHOCYTES NFR BLD AUTO: 4.9 %
MAGNESIUM SERPL-MCNC: 2.1 MG/DL (ref 1.6–2.6)
MCH RBC QN AUTO: 29.5 PG (ref 27–31)
MCHC RBC AUTO-ENTMCNC: 33.1 G/DL (ref 33–36)
MCV RBC AUTO: 89 FL (ref 80–94)
METHGB MFR BLDA: 1.1 % (ref 0–1.5)
MODE (OHS): ABNORMAL
MONOCYTES # BLD AUTO: 1.55 X10(3)/MCL (ref 0.1–1.3)
MONOCYTES NFR BLD AUTO: 13.4 %
NEUTROPHILS # BLD AUTO: 9.2 X10(3)/MCL (ref 2.1–9.2)
NEUTROPHILS NFR BLD AUTO: 79.6 %
NRBC BLD AUTO-RTO: 0.8 %
O2 HB BLOOD GAS (OHS): 94.7 % (ref 94–100)
OXYHGB MFR BLDA: 11.3 G/DL (ref 12–18)
PCO2 BLDA: 41 MMHG (ref 35–45)
PH BLDA: 7.52 [PH] (ref 7.35–7.45)
PHOSPHATE SERPL-MCNC: 1.9 MG/DL (ref 2.3–4.7)
PLATELET # BLD AUTO: 86 X10(3)/MCL (ref 130–400)
PLATELETS.RETICULATED NFR BLD AUTO: 7 % (ref 0.9–11.2)
PMV BLD AUTO: 11 FL (ref 7.4–10.4)
PO2 BLDA: 76 MMHG (ref 75–100)
POTASSIUM SERPL-SCNC: 3.5 MMOL/L (ref 3.5–5.1)
PROT SERPL-MCNC: 7.2 GM/DL (ref 6.4–8.3)
PROTHROMBIN TIME: 30.1 SECONDS (ref 11.4–14)
RBC # BLD AUTO: 3.73 X10(6)/MCL (ref 4.7–6.1)
SAMPLE SITE BLOOD GAS (OHS): ABNORMAL
SAO2 % BLDA: 97.8 %
SODIUM SERPL-SCNC: 138 MMOL/L (ref 136–145)
WBC # SPEC AUTO: 11.56 X10(3)/MCL (ref 4.5–11.5)

## 2024-01-05 PROCEDURE — 94660 CPAP INITIATION&MGMT: CPT

## 2024-01-05 PROCEDURE — 27000221 HC OXYGEN, UP TO 24 HOURS

## 2024-01-05 PROCEDURE — 85025 COMPLETE CBC W/AUTO DIFF WBC: CPT | Performed by: STUDENT IN AN ORGANIZED HEALTH CARE EDUCATION/TRAINING PROGRAM

## 2024-01-05 PROCEDURE — 36600 WITHDRAWAL OF ARTERIAL BLOOD: CPT

## 2024-01-05 PROCEDURE — 63600175 PHARM REV CODE 636 W HCPCS: Performed by: STUDENT IN AN ORGANIZED HEALTH CARE EDUCATION/TRAINING PROGRAM

## 2024-01-05 PROCEDURE — 83735 ASSAY OF MAGNESIUM: CPT | Performed by: STUDENT IN AN ORGANIZED HEALTH CARE EDUCATION/TRAINING PROGRAM

## 2024-01-05 PROCEDURE — 85610 PROTHROMBIN TIME: CPT | Performed by: STUDENT IN AN ORGANIZED HEALTH CARE EDUCATION/TRAINING PROGRAM

## 2024-01-05 PROCEDURE — 99900035 HC TECH TIME PER 15 MIN (STAT)

## 2024-01-05 PROCEDURE — C9113 INJ PANTOPRAZOLE SODIUM, VIA: HCPCS | Performed by: STUDENT IN AN ORGANIZED HEALTH CARE EDUCATION/TRAINING PROGRAM

## 2024-01-05 PROCEDURE — 25000003 PHARM REV CODE 250: Performed by: STUDENT IN AN ORGANIZED HEALTH CARE EDUCATION/TRAINING PROGRAM

## 2024-01-05 PROCEDURE — 84100 ASSAY OF PHOSPHORUS: CPT | Performed by: STUDENT IN AN ORGANIZED HEALTH CARE EDUCATION/TRAINING PROGRAM

## 2024-01-05 PROCEDURE — 27100171 HC OXYGEN HIGH FLOW UP TO 24 HOURS

## 2024-01-05 PROCEDURE — 94761 N-INVAS EAR/PLS OXIMETRY MLT: CPT | Mod: XB

## 2024-01-05 PROCEDURE — 27000207 HC ISOLATION

## 2024-01-05 PROCEDURE — 82803 BLOOD GASES ANY COMBINATION: CPT

## 2024-01-05 PROCEDURE — 20000000 HC ICU ROOM

## 2024-01-05 PROCEDURE — 80053 COMPREHEN METABOLIC PANEL: CPT | Performed by: STUDENT IN AN ORGANIZED HEALTH CARE EDUCATION/TRAINING PROGRAM

## 2024-01-05 PROCEDURE — 99291 CRITICAL CARE FIRST HOUR: CPT | Mod: ,,, | Performed by: INTERNAL MEDICINE

## 2024-01-05 RX ORDER — ENOXAPARIN SODIUM 100 MG/ML
40 INJECTION SUBCUTANEOUS EVERY 24 HOURS
Status: DISCONTINUED | OUTPATIENT
Start: 2024-01-05 | End: 2024-01-16

## 2024-01-05 RX ORDER — LACTULOSE 10 G/15ML
30 SOLUTION ORAL 3 TIMES DAILY
Status: DISCONTINUED | OUTPATIENT
Start: 2024-01-05 | End: 2024-01-07

## 2024-01-05 RX ADMIN — ENOXAPARIN SODIUM 40 MG: 40 INJECTION SUBCUTANEOUS at 04:01

## 2024-01-05 RX ADMIN — LACTULOSE 30 G: 20 SOLUTION ORAL at 08:01

## 2024-01-05 RX ADMIN — MUPIROCIN: 20 OINTMENT TOPICAL at 09:01

## 2024-01-05 RX ADMIN — LACTULOSE 30 G: 20 SOLUTION ORAL at 02:01

## 2024-01-05 RX ADMIN — PREDNISONE 40 MG: 20 TABLET ORAL at 08:01

## 2024-01-05 RX ADMIN — MUPIROCIN: 20 OINTMENT TOPICAL at 08:01

## 2024-01-05 RX ADMIN — OSELTAMIVIR PHOSPHATE 75 MG: 75 CAPSULE ORAL at 08:01

## 2024-01-05 RX ADMIN — PANTOPRAZOLE SODIUM 40 MG: 40 INJECTION, POWDER, FOR SOLUTION INTRAVENOUS at 08:01

## 2024-01-05 RX ADMIN — LORAZEPAM 2 MG: 2 INJECTION INTRAMUSCULAR; INTRAVENOUS at 08:01

## 2024-01-05 RX ADMIN — THIAMINE HYDROCHLORIDE 100 MG: 100 INJECTION, SOLUTION INTRAMUSCULAR; INTRAVENOUS at 09:01

## 2024-01-05 RX ADMIN — POTASSIUM PHOSPHATE, MONOBASIC POTASSIUM PHOSPHATE, DIBASIC 15 MMOL: 224; 236 INJECTION, SOLUTION, CONCENTRATE INTRAVENOUS at 08:01

## 2024-01-05 RX ADMIN — CEFTRIAXONE SODIUM 2 G: 2 INJECTION, POWDER, FOR SOLUTION INTRAMUSCULAR; INTRAVENOUS at 08:01

## 2024-01-05 RX ADMIN — LORAZEPAM 2 MG: 2 INJECTION INTRAMUSCULAR; INTRAVENOUS at 04:01

## 2024-01-05 RX ADMIN — FOLIC ACID 1 MG: 5 INJECTION, SOLUTION INTRAMUSCULAR; INTRAVENOUS; SUBCUTANEOUS at 08:01

## 2024-01-05 NOTE — PROGRESS NOTES
[facility] - ICU  Pulmonary Critical Care Note    Patient Name: Franco Hein  MRN: [id number]  Admission Date: 1/1/2024  Hospital Length of Stay: 4 days  Code Status: Full Code  Attending Provider: MARIAH Nieto MD  Primary Care Provider: Caitie, Primary Doctor     Subjective:     HPI:   Franco Hein is  48 y.o. male with a PMH of ETOH misuse disorder who presented to [facility] ED on 1/1/2024 with complaint of jaundice, weakness for 3 weeks, and AMS. Patient was brought in by a friend who states patient is a daily drinker who has not been able to go to a store for the last 2 days, but patient is noted to have a positive ethanol on admission. Patient unable to provide history given his mental status and was only able to say his name and confirm he drinks alcohol but does not do drugs. Patient was tachypneic, tachycardic, and tremulous, on presentation with jaundice. Admitted to ICU given altered mental status and concern for decompensation in the setting of alcohol withdrawal and influenza A, also found to have strep pneumonia bacteremia, on appropriate IV antibiotics.     Hospital Course/Significant events:  1/1/2024: Admitted to ICU, on 2L NC  1/2/23: Transfused 2 units pRBC for Hgb 7  1/4/23: repeat blood cultures ordered; TTE performed, no evidence of endocarditis. NGT obtained and enteral nutrition started    24 Hour Interval History:  NAEO. Pt remains altered today, groans and open eyes to pain, ammonia was elevated yesterday 90; NGT obtained and started on TF overnight, tolerating well with BM x1 but would benefit from lactulose for hepatic encephalopathy. Labs show gradual improvement in bili down to 6.6 today but liver enzymes bumped slightly. H&H stable. Sensitivity back from initial + blood culture pansensitive, remaining on Ceftriaxone 2g daily, TTE did not show evidence of endocarditis and pt seems to be responding well to antibiotic regimen, remains afebrile. CXR, coags, ABG  forthcoming.       History reviewed. No pertinent past medical history.    No past surgical history on file.    Social History     Socioeconomic History    Marital status: Single           No current outpatient medications    Current Inpatient Medications   cefTRIAXone (ROCEPHIN) IVPB  2 g Intravenous Q24H    folic acid (FOLVITE) IVPB  1 mg Intravenous Daily    lactulose  30 g Per NG tube TID    mupirocin   Nasal BID    oseltamivir  75 mg Per NG tube BID    pantoprazole  40 mg Intravenous Daily    potassium phosphate IVPB  15 mmol Intravenous Once    predniSONE  40 mg Per NG tube Daily    thiamine (B-1) 100 mg in dextrose 5 % (D5W) 100 mL IVPB  100 mg Intravenous Daily       Current Intravenous Infusions          Review of Systems   Unable to perform ROS: Mental acuity          Objective:       Intake/Output Summary (Last 24 hours) at 1/5/2024 0735  Last data filed at 1/5/2024 0600  Gross per 24 hour   Intake 1155.77 ml   Output 2201 ml   Net -1045.23 ml           Vital Signs (Most Recent):  Temp: 96.8 °F (36 °C) (01/05/24 0715)  Pulse: 85 (01/05/24 0700)  Resp: (!) 29 (01/05/24 0700)  BP: 123/87 (01/05/24 0700)  SpO2: 96 % (01/05/24 0700)  Body mass index is 20.6 kg/m².  Weight: 54.4 kg (120 lb) Vital Signs (24h Range):  Temp:  [96.8 °F (36 °C)-98.6 °F (37 °C)] 96.8 °F (36 °C)  Pulse:  [69-97] 85  Resp:  [22-36] 29  SpO2:  [95 %-100 %] 96 %  BP: (105-129)/(63-95) 123/87     Physical Exam  Constitutional:       Appearance: He is normal weight. He is ill-appearing. He is not toxic-appearing or diaphoretic.   HENT:      Head: Normocephalic and atraumatic.      Mouth/Throat:      Mouth: Mucous membranes are moist.   Eyes:      General: Scleral icterus present.         Right eye: No discharge.         Left eye: No discharge.      Extraocular Movements: Extraocular movements intact.      Pupils: Pupils are equal, round, and reactive to light.   Cardiovascular:      Rate and Rhythm: Regular rhythm. Tachycardia present.       Pulses: Normal pulses.      Heart sounds: Normal heart sounds. No murmur heard.  Pulmonary:      Effort: No respiratory distress.      Breath sounds: Wheezing and rhonchi present. No rales.      Comments: On BIPAP  Abdominal:      General: Bowel sounds are normal. There is no distension.      Palpations: Abdomen is soft.      Tenderness: There is no abdominal tenderness.   Genitourinary:     Comments: Hillman catheter in place  Dark yellow urine noted with adequate output  Musculoskeletal:      Right lower leg: No edema.      Left lower leg: No edema.   Skin:     General: Skin is warm.      Capillary Refill: Capillary refill takes 2 to 3 seconds.      Coloration: Skin is jaundiced.   Neurological:      Comments: Pt awake, responds to verbal stimuili  Incoherent speech, appears to be attempting to follow commands  Moving all extremities purposefully           Lines/Drains/Airways       Drain  Duration                  Urethral Catheter 01/02/24 0413 Non-latex 16 Fr. 3 days         NG/OG Tube 01/04/24 1405 nasogastric 16 Fr. Right nostril <1 day              Peripheral Intravenous Line  Duration                  Peripheral IV - Single Lumen 01/01/24 20 G Left Antecubital 4 days         Peripheral IV - Single Lumen 01/01/24 1310 20 G Right Antecubital 3 days         Peripheral IV - Single Lumen 01/01/24 1551 18 G Anterior;Distal;Right Forearm 3 days                    Significant Labs:    Lab Results   Component Value Date    WBC 11.56 (H) 01/05/2024    HGB 11.0 (L) 01/05/2024    HCT 33.2 (L) 01/05/2024    MCV 89.0 01/05/2024    PLT 86 (L) 01/05/2024           BMP  Lab Results   Component Value Date     01/05/2024    K 3.5 01/05/2024    CHLORIDE 104 01/05/2024    CO2 30 (H) 01/05/2024    BUN 15.1 01/05/2024    CREATININE 0.70 (L) 01/05/2024    CALCIUM 7.5 (L) 01/05/2024    AGAP 6.0 01/03/2024         ABG  Recent Labs   Lab 01/04/24  0809   PH 7.450   PO2 65.0*   PCO2 46.0*   HCO3 32.0*   POCBASEDEF 7.10*          Mechanical Ventilation Support:  Oxygen Concentration (%): 50 (01/05/24 0730)  BiPAP 10/5, 50%    Significant Imaging:  X-Ray Chest 1 View    Result Date: 1/1/2024  Bilateral patchy airspace opacities greatest on the left.  Findings concerning for atypical infectious process. Electronically signed by: Josef Sow Date:    01/01/2024 Time:    14:23      CXR pending this morning.      Assessment/Plan:     Assessment  Alcohol withdrawal, delirium tremens  Strep pneumonia bacteremia/SEPSIS, SIRS 3/4, lower respiratory infection  AGMA-resolved  Lactic acidosis-resolved  Influenza A  Atypical pneumonia  Possible new cirrhosis diagnosis  Alcohol abuse  Positive hepatitis C antibody; VL negative, no active infection  Hyperbilirubinemia  Elevated transaminases  Elevated INR  MELD-Na: 28->25 today  Child Hoang: 13, class C-> 13  Maddrey's: 204.7  Macrocytic anemia, thrombocytopenia      Plan  Admitted to ICU for close monitoring  I&O cumulative negative -1045 mL; 2200 cc urine output last 24h and BM x1. Net +5.2L since admission; appears euvolemic, awaiting CXR to determine if we will diurese today  CT Chest Abdomen Pelvis showed multilobar PNA, cirrhotic appearing liver. Will discuss possible follow up liver protocol CT with staff this morning.  Blood culture positive x1/2 Streptococcus; Bio[employer] confirmation strep pneumonia bacteremia which is pan-sensitive, resp culture /Gram stain collection pending. Repeat blood cultures 1/4  pending; TTE did not have any findings consistent with endocarditis.  Zosyn and Zithromax x4 days; MRSA PCR negative, discontinued vancomycin after D3.   Continue Rocephin 2g daily (pan-sensitive strep pneumo), consider consult to ID consult today given bacteremia  CXR this morning shows worsening consolidations, no evidence of pleural effusions. ABG pending  NGT obtained yesterday; on Supplena TF at goal per dietary recs. SLPT consulted for swallow eval, unable to perform yesterday will  try again today.   Ammonia elevated yesterday and patient remains encephalopathic; will start lactulose 30 mg TID. Will also discuss possible CT head, though likely can wait if lactulose ineffective.   Continue oseltamivir 75 mg BID for now, D2  HCV Ab+, RCA quant undetected, no active infection  H&H 7/22.7 on admission --> 10/30.8 s/p 2 units pRBC, stable x2 days  Folate 18.3; B12 > 2000; continue folate supplementation  Given Sandra 204, Pt on day 3 of steroids, now on prednisone 40 mg daily  CIWA protocol with lorazepam prn, thiamine IV, folic acid IV  Monitor for refeeding syndrome with CMP, Mg, Ph daily; replete electrolytes as needed    DVT Prophylaxis: SCDs  GI Prophylaxis: PPI    Attempts to contact friends/family unsuccessful thus far, will try again today.     45 minutes of critical care was time spent personally by me on the following activities: development of treatment plan with patient or surrogate and bedside caregivers, discussions with consultants, evaluation of patient's response to treatment, examination of patient, ordering and performing treatments and interventions, ordering and review of laboratory studies, ordering and review of radiographic studies, pulse oximetry, re-evaluation of patient's condition.  This critical care time did not overlap with that of any other provider or involve time for any procedures.     Sagrario Rosenthal MD  [employer] IM PGY III  Pulmonary Critical Care Medicine  [facility] - ICU  DOS: 01/05/2024

## 2024-01-05 NOTE — PT/OT/SLP PROGRESS
[facility]  SPEECH LANGUAGE PATHOLOGY  MISSED VISIT NOTE        PATIENT:  Franco Hein     : 1975     MRN: [id number]     DATE: 2024             HISTORY & PHYSICAL:          Active Ambulatory Problems     Diagnosis Date Noted    No Active Ambulatory Problems           Resolved Ambulatory Problems     Diagnosis Date Noted    No Resolved Ambulatory Problems      No Additional Past Medical History           SLP attempted swallow follow-up. Unable to complete as patient is currently on bi-pap. Will discontinue ST order at this time. Please re-consult when appropriate.                   Kain Barlow M.S. CCC-SLP  [facility]

## 2024-01-05 NOTE — PROGRESS NOTES
Spoke with emergency contact/friend, Mukul Negron ([phone]) who provided contact #s for family: Niece, Rosa ([phone]) and Brother, Kenji ([phone]) who both reside in [place]. Relayed same to Dr. Sagrario Rosenthal, who will reach out to fly.  Friend reported pt has known hx of alcohol abuse and had been working in concrete construction prior to illness.

## 2024-01-05 NOTE — PROGRESS NOTES
Inpatient Nutrition Assessment    Admit Date: 1/1/2024   Total duration of encounter: 4 days   Patient Age: 48 y.o.    Nutrition Recommendation/Prescription     Pt remains NPO; NGT placed yesterday; TF initiated; TF--Suplena @ 40ml/hr; increase as tolerated to goal rte 45 ml/hr (23 hr cycle) to provide 2070 calories, 47 gm protein, 763 ml free water. Flush tube with 125 ml water every 4hrs.   When pt alert --suggest SP swallow evlaution for diet appropriateness--goal diet 60 gm protein/Low Na ( can increase protein when NH4 level WNL) with suplena 1 can tid; Suplena (provides 420 kcal, 11 g protein per serving)     Suggest MVI, continue folic acid/thiamine 2 ETOH abuse.   Biweekly wt  Will monitor nutrition status     Communication of Recommendations: reviewed with nurse    Nutrition Assessment     Malnutrition Assessment/Nutrition-Focused Physical Exam    Malnutrition Context: acute illness or injury (01/04/24 1119)  Malnutrition Level: moderate (01/04/24 1119)  Energy Intake (Malnutrition): other (see comments) (NPO x3 day; unsure oral intake PTA) (01/04/24 1119)  Weight Loss (Malnutrition): other (see comments) (unable to obtain wt hx; pt with AMS) (01/04/24 1119)  Subcutaneous Fat (Malnutrition): mild depletion (01/04/24 1119)  Orbital Region (Subcutaneous Fat Loss): mild depletion  Upper Arm Region (Subcutaneous Fat Loss): mild depletion     Muscle Mass (Malnutrition): mild depletion (01/04/24 1119)     Clavicle Bone Region (Muscle Loss): mild depletion         A minimum of two characteristics is recommended for diagnosis of either severe or non-severe malnutrition.    Chart Review    Reason Seen: continuous nutrition monitoring, malnutrition screening tool (MST), and follow-up    Malnutrition Screening Tool Results   Have you recently lost weight without trying?: Unsure  Have you been eating poorly because of a decreased appetite?: Yes   MST Score: 3   Diagnosis:  ETOH WD, DT, sepsis, AGMA, influenza A, PNA,  "cirrhosis, Hep C+, anemia     Relevant Medical History: ETOH misuse     Nutrition-Related Medications:  folic acid, pantoprazole,  thiamine, rocephin, lactulose   Calorie Containing IV Medications: no significant kcals from medications at this time    Nutrition-Related Labs:  (1-1) NH4 56.3(H)   (1-4) H/H 10.0/30.8(L) gluc 134 Bun 9.9 Cr 0.6 K3.4(L) P 2.1(L) Tbili 6.9(H) NH 90.1(H)   (1-5) H/H  11.0/33.2(L) Gluc 171 Bun 15.1 Cr 0.7 Tbili 6.6(H)   Nutrition Orders:   Diet NPO      Appetite/Oral Intake: NPO/NPO    Factors Affecting Nutritional Intake: impaired cognitive status/motor control and NPO    Food/[employer]/Cultural Preferences: unable to obtain    Food Allergies: unable to obtain    Wound(s):   none reported     Last Bowel Movement: 01/05/24    Comments  (1/5) Pt remains NPO; started on TF yesterday; currently TF infusing @ 40ml/hr; RN reported to achieved goal rate later today; goal rate should meet estimated needs. Labs acknolwedged.     (1/4) Pt remains NPO x3 days ; AMS/on bipap; on 1/3--ST unable to complete swallow eval 2 pt on Bipap; no new wt. Both TF/PPN recs provided for nutrition support; discussed with RN caring for pt. Noted NH4--elevated to 90; no lactulose ; lowered protein recs until level decreases.     (1/2) Received MST for unsure wt loss/ appetite. Pt not alert during rounds; AMS; unable to obtain diet/wt hx at this time; PPN and diet recs provided. Suggest ST swallow eval prior to diet progression. Abnormal labs noted: Tbili (H)--hx ETOH abuse. Suggest continue folic acid/thiamine tx.     Anthropometrics    Height: 5' 4" (162.6 cm) Height Method: Stated  Last Weight: 54.4 kg (120 lb) (01/04/24 1000) Weight Method: Bed Scale  BMI (Calculated): 20.6  BMI Classification: normal (BMI 18.5-24.9)        Ideal Body Weight (IBW), Male: 130 lb     % Ideal Body Weight, Male (lb): 92.31 %      Usual Body Weight (UBW), kg:  (pt unable to give UBW; no wt hx EMR)        Usual Weight Provided By: EMR " weight history    Wt Readings from Last 5 Encounters:   24 54.4 kg (120 lb)     Weight Change(s) Since Admission: no wt hx   Wt Readings from Last 1 Encounters:   24 1000 54.4 kg (120 lb)   24 0156 54.4 kg (120 lb)   24 1255 54.4 kg (120 lb)   Admit Weight: 54.4 kg (120 lb) (24 1255), Weight Method: Bed Scale    Estimated Needs    Weight Used For Calorie Calculations: 54.4 kg (119 lb 14.9 oz)  Energy Calorie Requirements (kcal): 1904 kcal/d; 35 federico/kg/wt gain  Energy Need Method: Kcal/kg  Weight Used For Protein Calculations: 54.4 kg (119 lb 14.9 oz)  Protein Requirements: 54 gm protein/d; 1.0 gm /kg --limit protein--NH4 level elevated  Fluid Requirements (mL): 1904 ml/d; 1ml/federico  Temp (24hrs), Av.6 °F (36.4 °C), Min:96.8 °F (36 °C), Max:98.5 °F (36.9 °C)       Enteral Nutrition    Formula: Suplena  Rate/Volume: goal rate 45ml/hr  Water Flushes: 125 ml every 4 hrs  Additives/Modulars: none at this time  Route: nasogastric tube  Method: continuous  Total Nutrition Provided by Tube Feeding, Additives, and Flushes:  Calories Provided  2070 kcal/d, 108% needs   Protein Provided  47 g/d, 87% needs   Fluid Provided  1513 ml/d, 79% needs   Continuous feeding calculations based on estimated 23 hr/d run time unless otherwise stated.    Parenteral Nutrition    Patient not receiving parenteral nutrition support at this time.    Evaluation of Received Nutrient Intake    Calories: meeting estimated needs  Protein: meeting estimated needs    Patient Education    Not applicable.    Nutrition Diagnosis     PES: Inadequate oral intake related to acute illness as evidenced by npo. (active)    Interventions/Goals     Intervention(s): general/healthful diet, modified composition of enteral nutrition, modified rate of enteral nutrition, commercial beverage, multivitamin/mineral supplement therapy, and collaboration with other providers    Goal: Meet greater than 80% of nutritional needs by follow-up.  (goal progressing)    Monitoring & Evaluation     Dietitian will monitor food and beverage intake, enteral nutrition intake, weight, and glucose/endocrine profile.    Nutrition Risk/Follow-Up: high (follow-up in 1-4 days)   Please consult if re-assessment needed sooner.

## 2024-01-05 NOTE — CARE UPDATE
Spoke with ID faculty about patient's strep bacteremia, recommendations are to continue IV ceftriaxone given sensitivities. Once repeat cultures negative x3 days okay to place PICC for long term IV abx x2 weeks from negative culture as TTE showed no evidence of endocarditis, pt afebrile, VSS, and mild leukocytosis showing clinical improvement.     Sagrario Rosenthal MD  [employer] IM PGY III

## 2024-01-06 LAB
A-ADO2 BLOOD GAS (OHS): 228 MMHG
ALBUMIN SERPL-MCNC: 1.4 G/DL (ref 3.5–5)
ALBUMIN/GLOB SERPL: 0.3 RATIO (ref 1.1–2)
ALLENS TEST BLOOD GAS (OHS): YES
ALP SERPL-CCNC: 302 UNIT/L (ref 40–150)
ALT SERPL-CCNC: 70 UNIT/L (ref 0–55)
AST SERPL-CCNC: 110 UNIT/L (ref 5–34)
BACTERIA BLD CULT: NORMAL
BASE EXCESS BLD CALC-SCNC: 10.7 MMOL/L (ref -2–2)
BASOPHILS # BLD AUTO: 0.02 X10(3)/MCL
BASOPHILS NFR BLD AUTO: 0.2 %
BILIRUB SERPL-MCNC: 6.2 MG/DL
BLOOD GAS SAMPLE TYPE (OHS): ABNORMAL
BUN SERPL-MCNC: 13.7 MG/DL (ref 8.9–20.6)
CALCIUM SERPL-MCNC: 7.7 MG/DL (ref 8.4–10.2)
CHLORIDE SERPL-SCNC: 108 MMOL/L (ref 98–107)
CO2 BLDA-SCNC: 35.4 MMOL/L (ref 22–26)
CO2 SERPL-SCNC: 30 MMOL/L (ref 22–29)
COHGB MFR BLDA: 2.5 % (ref 0.5–1.5)
CREAT SERPL-MCNC: 0.63 MG/DL (ref 0.73–1.18)
DRAWN BY BLOOD GAS (OHS): ABNORMAL
EOSINOPHIL # BLD AUTO: 0.01 X10(3)/MCL (ref 0–0.9)
EOSINOPHIL NFR BLD AUTO: 0.1 %
EPAP (OHS): 5 CMH2O
ERYTHROCYTE [DISTWIDTH] IN BLOOD BY AUTOMATED COUNT: 18.6 % (ref 11.5–17)
GAS PNL BLD: 307 MMHG
GFR SERPLBLD CREATININE-BSD FMLA CKD-EPI: >60 MLS/MIN/1.73/M2
GLOBULIN SER-MCNC: 5.6 GM/DL (ref 2.4–3.5)
GLUCOSE SERPL-MCNC: 138 MG/DL (ref 74–100)
HCO3 BLDA-SCNC: 34.2 MMOL/L (ref 22–26)
HCT VFR BLD AUTO: 33.1 % (ref 42–52)
HGB BLD-MCNC: 10.6 G/DL (ref 14–18)
IMM GRANULOCYTES # BLD AUTO: 0.19 X10(3)/MCL (ref 0–0.04)
IMM GRANULOCYTES NFR BLD AUTO: 1.8 %
INHALED O2 CONCENTRATION: 50 %
INR PPP: 2.9
IPAP (OHS): 10 CMH2O
LYMPHOCYTES # BLD AUTO: 0.64 X10(3)/MCL (ref 0.6–4.6)
LYMPHOCYTES NFR BLD AUTO: 5.9 %
MAGNESIUM SERPL-MCNC: 2.2 MG/DL (ref 1.6–2.6)
MCH RBC QN AUTO: 28.6 PG (ref 27–31)
MCHC RBC AUTO-ENTMCNC: 32 G/DL (ref 33–36)
MCV RBC AUTO: 89.5 FL (ref 80–94)
METHGB MFR BLDA: 1 % (ref 0–1.5)
MODE (OHS): ABNORMAL
MONOCYTES # BLD AUTO: 1.64 X10(3)/MCL (ref 0.1–1.3)
MONOCYTES NFR BLD AUTO: 15.1 %
NEUTROPHILS # BLD AUTO: 8.34 X10(3)/MCL (ref 2.1–9.2)
NEUTROPHILS NFR BLD AUTO: 76.9 %
NRBC BLD AUTO-RTO: 0.3 %
O2 HB BLOOD GAS (OHS): 95.4 % (ref 94–100)
OXYHGB MFR BLDA: 10.5 G/DL (ref 12–18)
PCO2 BLDA: 40 MMHG (ref 35–45)
PH BLDA: 7.54 [PH] (ref 7.35–7.45)
PHOSPHATE SERPL-MCNC: 2.1 MG/DL (ref 2.3–4.7)
PLATELET # BLD AUTO: 72 X10(3)/MCL (ref 130–400)
PLATELETS.RETICULATED NFR BLD AUTO: 7.7 % (ref 0.9–11.2)
PMV BLD AUTO: 10.8 FL (ref 7.4–10.4)
PO2 BLDA: 79 MMHG (ref 75–100)
POTASSIUM SERPL-SCNC: 3.5 MMOL/L (ref 3.5–5.1)
PROT SERPL-MCNC: 7 GM/DL (ref 6.4–8.3)
PROTHROMBIN TIME: 29.8 SECONDS (ref 11.4–14)
RBC # BLD AUTO: 3.7 X10(6)/MCL (ref 4.7–6.1)
SAMPLE SITE BLOOD GAS (OHS): ABNORMAL
SAO2 % BLDA: 98.9 %
SODIUM SERPL-SCNC: 141 MMOL/L (ref 136–145)
WBC # SPEC AUTO: 10.84 X10(3)/MCL (ref 4.5–11.5)

## 2024-01-06 PROCEDURE — 63600175 PHARM REV CODE 636 W HCPCS: Performed by: STUDENT IN AN ORGANIZED HEALTH CARE EDUCATION/TRAINING PROGRAM

## 2024-01-06 PROCEDURE — C9113 INJ PANTOPRAZOLE SODIUM, VIA: HCPCS | Performed by: STUDENT IN AN ORGANIZED HEALTH CARE EDUCATION/TRAINING PROGRAM

## 2024-01-06 PROCEDURE — 27000207 HC ISOLATION

## 2024-01-06 PROCEDURE — 94660 CPAP INITIATION&MGMT: CPT

## 2024-01-06 PROCEDURE — 25000003 PHARM REV CODE 250: Performed by: STUDENT IN AN ORGANIZED HEALTH CARE EDUCATION/TRAINING PROGRAM

## 2024-01-06 PROCEDURE — 27000221 HC OXYGEN, UP TO 24 HOURS

## 2024-01-06 PROCEDURE — 85025 COMPLETE CBC W/AUTO DIFF WBC: CPT | Performed by: STUDENT IN AN ORGANIZED HEALTH CARE EDUCATION/TRAINING PROGRAM

## 2024-01-06 PROCEDURE — 84100 ASSAY OF PHOSPHORUS: CPT | Performed by: STUDENT IN AN ORGANIZED HEALTH CARE EDUCATION/TRAINING PROGRAM

## 2024-01-06 PROCEDURE — 99900035 HC TECH TIME PER 15 MIN (STAT)

## 2024-01-06 PROCEDURE — 25000003 PHARM REV CODE 250: Performed by: INTERNAL MEDICINE

## 2024-01-06 PROCEDURE — 83735 ASSAY OF MAGNESIUM: CPT | Performed by: STUDENT IN AN ORGANIZED HEALTH CARE EDUCATION/TRAINING PROGRAM

## 2024-01-06 PROCEDURE — 80053 COMPREHEN METABOLIC PANEL: CPT | Performed by: STUDENT IN AN ORGANIZED HEALTH CARE EDUCATION/TRAINING PROGRAM

## 2024-01-06 PROCEDURE — 36600 WITHDRAWAL OF ARTERIAL BLOOD: CPT

## 2024-01-06 PROCEDURE — 82803 BLOOD GASES ANY COMBINATION: CPT

## 2024-01-06 PROCEDURE — 20000000 HC ICU ROOM

## 2024-01-06 PROCEDURE — 94761 N-INVAS EAR/PLS OXIMETRY MLT: CPT | Mod: XB

## 2024-01-06 PROCEDURE — 85610 PROTHROMBIN TIME: CPT | Performed by: STUDENT IN AN ORGANIZED HEALTH CARE EDUCATION/TRAINING PROGRAM

## 2024-01-06 PROCEDURE — 27100171 HC OXYGEN HIGH FLOW UP TO 24 HOURS

## 2024-01-06 RX ADMIN — PREDNISONE 40 MG: 20 TABLET ORAL at 09:01

## 2024-01-06 RX ADMIN — FOLIC ACID 1 MG: 5 INJECTION, SOLUTION INTRAMUSCULAR; INTRAVENOUS; SUBCUTANEOUS at 10:01

## 2024-01-06 RX ADMIN — PANTOPRAZOLE SODIUM 40 MG: 40 INJECTION, POWDER, FOR SOLUTION INTRAVENOUS at 09:01

## 2024-01-06 RX ADMIN — LACTULOSE 30 G: 20 SOLUTION ORAL at 04:01

## 2024-01-06 RX ADMIN — OSELTAMIVIR PHOSPHATE 75 MG: 75 CAPSULE ORAL at 09:01

## 2024-01-06 RX ADMIN — LACTULOSE 30 G: 20 SOLUTION ORAL at 08:01

## 2024-01-06 RX ADMIN — MUPIROCIN: 20 OINTMENT TOPICAL at 08:01

## 2024-01-06 RX ADMIN — LORAZEPAM 2 MG: 2 INJECTION INTRAMUSCULAR; INTRAVENOUS at 03:01

## 2024-01-06 RX ADMIN — LACTULOSE 30 G: 20 SOLUTION ORAL at 09:01

## 2024-01-06 RX ADMIN — RIFAXIMIN 550 MG: 550 TABLET ORAL at 09:01

## 2024-01-06 RX ADMIN — CEFTRIAXONE SODIUM 2 G: 2 INJECTION, POWDER, FOR SOLUTION INTRAMUSCULAR; INTRAVENOUS at 09:01

## 2024-01-06 RX ADMIN — ENOXAPARIN SODIUM 40 MG: 40 INJECTION SUBCUTANEOUS at 04:01

## 2024-01-06 RX ADMIN — THIAMINE HYDROCHLORIDE 100 MG: 100 INJECTION, SOLUTION INTRAMUSCULAR; INTRAVENOUS at 09:01

## 2024-01-06 RX ADMIN — OSELTAMIVIR PHOSPHATE 75 MG: 75 CAPSULE ORAL at 08:01

## 2024-01-06 RX ADMIN — MUPIROCIN: 20 OINTMENT TOPICAL at 09:01

## 2024-01-06 RX ADMIN — LORAZEPAM 2 MG: 2 INJECTION INTRAMUSCULAR; INTRAVENOUS at 10:01

## 2024-01-06 RX ADMIN — LORAZEPAM 2 MG: 2 INJECTION INTRAMUSCULAR; INTRAVENOUS at 01:01

## 2024-01-06 NOTE — PROGRESS NOTES
[facility] - ICU  Pulmonary Critical Care Note    Patient Name: Franco Hein  MRN: [id number]  Admission Date: 1/1/2024  Hospital Length of Stay: 5 days  Code Status: Full Code  Attending Provider: MARIAH Nieto MD  Primary Care Provider: Caitie, Primary Doctor     Subjective:     HPI:   Franco Hein is  48 y.o. male with a PMH of ETOH misuse disorder who presented to [facility] ED on 1/1/2024 with complaint of jaundice, weakness for 3 weeks, and AMS. Patient was brought in by a friend who states patient is a daily drinker who has not been able to go to a store for the last 2 days, but patient is noted to have a positive ethanol on admission. Patient unable to provide history given his mental status and was only able to say his name and confirm he drinks alcohol but does not do drugs. Patient was tachypneic, tachycardic, and tremulous, on presentation with jaundice. Admitted to ICU given altered mental status and concern for decompensation in the setting of alcohol withdrawal and influenza A, also found to have strep pneumonia bacteremia, on appropriate IV antibiotics.     Hospital Course/Significant events:  1/1/2024: Admitted to ICU, on 2L NC  1/2/23: Transfused 2 units pRBC for Hgb 7  1/4/23: repeat blood cultures (negative thus far); TTE performed, no evidence of endocarditis. NGT obtained and enteral nutrition started    24 Hour Interval History:  NAEO. Pt more awake now after starting lactulose and TF. Had large, loose BM and noted to have stage II breakdown to left buttocks so rectal tube placed overnight. Remains too weak to communicate, moving all extremities purposefully but not able to follow commands. VS remain stable, BIPAP overnight but will try HFNC this morning and while awake. Labs still slowly improving, repeat blood cultures cultures negative x24h. Phone numbers obtained for family in [place], will try to contact today. Pt remains high risk for decompensation, will remain in  ICU over weekend.       History reviewed. No pertinent past medical history.    No past surgical history on file.    Social History     Socioeconomic History    Marital status: Single           No current outpatient medications    Current Inpatient Medications   cefTRIAXone (ROCEPHIN) IVPB  2 g Intravenous Q24H    enoxparin  40 mg Subcutaneous Q24H (prophylaxis, 1700)    folic acid (FOLVITE) IVPB  1 mg Intravenous Daily    lactulose  30 g Per NG tube TID    mupirocin   Nasal BID    oseltamivir  75 mg Per NG tube BID    pantoprazole  40 mg Intravenous Daily    predniSONE  40 mg Per NG tube Daily    rifAXIMin  550 mg Per NG tube BID    thiamine (B-1) 100 mg in dextrose 5 % (D5W) 100 mL IVPB  100 mg Intravenous Daily       Current Intravenous Infusions          Review of Systems   Unable to perform ROS: Mental acuity          Objective:       Intake/Output Summary (Last 24 hours) at 1/6/2024 0831  Last data filed at 1/6/2024 0705  Gross per 24 hour   Intake 1221.1 ml   Output 1850 ml   Net -628.9 ml           Vital Signs (Most Recent):  Temp: 99.6 °F (37.6 °C) (01/06/24 0715)  Pulse: 95 (01/06/24 0716)  Resp: (!) 93 (01/06/24 0716)  BP: (!) 106/59 (01/06/24 0700)  SpO2: (!) 93 % (01/06/24 0716)  Body mass index is 20.6 kg/m².  Weight: 54.4 kg (120 lb) Vital Signs (24h Range):  Temp:  [96.9 °F (36.1 °C)-99.6 °F (37.6 °C)] 99.6 °F (37.6 °C)  Pulse:  [75-95] 95  Resp:  [23-93] 93  SpO2:  [93 %-99 %] 93 %  BP: (106-137)/(59-99) 106/59     Physical Exam  Constitutional:       Appearance: He is normal weight. He is ill-appearing. He is not toxic-appearing or diaphoretic.   HENT:      Head: Normocephalic and atraumatic.      Mouth/Throat:      Mouth: Mucous membranes are moist.   Eyes:      General: Scleral icterus present.         Right eye: No discharge.         Left eye: No discharge.      Extraocular Movements: Extraocular movements intact.      Pupils: Pupils are equal, round, and reactive to light.   Cardiovascular:       Rate and Rhythm: Regular rhythm. Tachycardia present.      Pulses: Normal pulses.      Heart sounds: Normal heart sounds. No murmur heard.  Pulmonary:      Effort: No respiratory distress.      Breath sounds: Wheezing and rhonchi present. No rales.      Comments: On oxymizer, satting well in NAD  Abdominal:      General: Bowel sounds are normal. There is no distension.      Palpations: Abdomen is soft.      Tenderness: There is no abdominal tenderness.   Genitourinary:     Comments: Hillman catheter in place  Dark yellow urine noted with adequate output  Musculoskeletal:      Right lower leg: No edema.      Left lower leg: No edema.   Skin:     General: Skin is warm.      Capillary Refill: Capillary refill takes 2 to 3 seconds.      Coloration: Skin is jaundiced.   Neurological:      Comments: Pt awake, responds to verbal stimuili  Incoherent speech, appears to be attempting to follow commands  Moving all extremities purposefully           Lines/Drains/Airways       Drain  Duration                  Urethral Catheter 01/02/24 0413 Non-latex 16 Fr. 4 days         NG/OG Tube 01/04/24 1405 nasogastric 16 Fr. Right nostril 1 day         Rectal Tube 01/05/24 2130 rectal tube w/ balloon (indicate number of mLs) <1 day              Peripheral Intravenous Line  Duration                  Peripheral IV - Single Lumen 01/01/24 20 G Left Antecubital 5 days         Peripheral IV - Single Lumen 01/01/24 1310 20 G Right Antecubital 4 days         Peripheral IV - Single Lumen 01/01/24 1551 18 G Anterior;Distal;Right Forearm 4 days                    Significant Labs:    Lab Results   Component Value Date    WBC 10.84 01/06/2024    HGB 10.6 (L) 01/06/2024    HCT 33.1 (L) 01/06/2024    MCV 89.5 01/06/2024    PLT 72 (L) 01/06/2024           BMP  Lab Results   Component Value Date     01/06/2024    K 3.5 01/06/2024    CHLORIDE 108 (H) 01/06/2024    CO2 30 (H) 01/06/2024    BUN 13.7 01/06/2024    CREATININE 0.63 (L) 01/06/2024     CALCIUM 7.7 (L) 01/06/2024    AGAP 6.0 01/03/2024         ABG  Recent Labs   Lab 01/06/24  0419   PH 7.540*   PO2 79.0   PCO2 40.0   HCO3 34.2*   POCBASEDEF 10.70*         Mechanical Ventilation Support:  Oxygen Concentration (%): 50 (01/06/24 0705)  Oxymizer 15L    Significant Imaging:  X-Ray Chest 1 View    Result Date: 1/1/2024  Bilateral patchy airspace opacities greatest on the left.  Findings concerning for atypical infectious process. Electronically signed by: Josef Sow Date:    01/01/2024 Time:    14:23      CXR 1/6/24 shows improved aeration of left lung, improving consolidations bilaterally. Official read pending.      Assessment/Plan:     Assessment  Alcohol withdrawal, delirium tremens  Strep pneumonia bacteremia/SEPSIS, SIRS 3/4, lower respiratory infection  AGMA-resolved  Lactic acidosis-resolved  Influenza A  Atypical pneumonia  Possible new cirrhosis diagnosis  Alcohol abuse  Positive hepatitis C antibody; VL negative, no active infection  Hyperbilirubinemia  Elevated transaminases  Elevated INR  MELD-Na: 28->25   Child Hoang: 13, class C-> 13  Maddrey's: 204.7  Macrocytic anemia, thrombocytopenia      Plan  Admitted to ICU for close monitoring  I&O cumulative negative -1045 mL; 2200 cc urine output last 24h and BM x1. Net +5.2L since admission; appears euvolemic, awaiting CXR to determine if we will diurese today  CT Chest Abdomen Pelvis showed multilobar PNA, cirrhotic appearing liver. Will discuss possible follow up liver protocol CT with staff this morning.  Blood culture positive x1/2 Streptococcus; BioFire confirmation strep pneumonia bacteremia which is pan-sensitive, resp culture /Gram stain collection pending. Repeat blood cultures 1/4/24  NGTD x24h; TTE did not have any findings consistent with endocarditis.  Zosyn and Zithromax x4 days; MRSA PCR negative, discontinued vancomycin after D3.   Continue Rocephin 2g daily (pan-sensitive strep pneumo), will need 14 days from negative repeat  blood cultures; D2/14 currently as repeat blood culture from 1/4/24 NGTD x24h. Can place PICC line after 3D of negative blood cultures.  CXR showing slow improvement, continue to wean BIPAP as tolerated at night and HFNC during the day  NGT obtained 1/4/24; on Supplena TF at goal per dietary recs. SLPT consulted for swallow eval, unable to perform yesterday will try again Monday.   Ammonia elevated 1/4/24 and patient remains encephalopathic; lactulose 30 mg TID will decrease to BID as pt having diarrhea now, showing some improvement in LOC  Continue oseltamivir 75 mg BID for now, D3  HCV Ab+, RCA quant undetected, no active infection  H&H 7/22.7 on admission --> s/p 2 units pRBC, remains stable  Folate 18.3; B12 > 2000; continue folate supplementation  Given Antonidrey 204, Pt on day 4/28 day prednisone taper; Prednisone 40 mg will decrease weekly by half, stop after 5 mg dose x1 week  CIWA protocol with lorazepam prn, thiamine IV, folic acid IV  Monitor for refeeding syndrome with CMP, Mg, Ph daily; replete electrolytes as needed    DVT Prophylaxis: SCDs, started ppx Lovenox 1/5/24  GI Prophylaxis: PPI    Pt's family from [place] called yesterday, will attempt to call via  services for update and obtaining other medical information if available.     45 minutes of critical care was time spent personally by me on the following activities: development of treatment plan with patient or surrogate and bedside caregivers, discussions with consultants, evaluation of patient's response to treatment, examination of patient, ordering and performing treatments and interventions, ordering and review of laboratory studies, ordering and review of radiographic studies, pulse oximetry, re-evaluation of patient's condition.  This critical care time did not overlap with that of any other provider or involve time for any procedures.     Sagrario Rosenthal MD  [employer] IM PGY III  Pulmonary Critical Care Medicine  [facility]  - ICU  DOS: 01/06/2024

## 2024-01-07 LAB
ALBUMIN SERPL-MCNC: 1.5 G/DL (ref 3.5–5)
ALBUMIN/GLOB SERPL: 0.3 RATIO (ref 1.1–2)
ALP SERPL-CCNC: 311 UNIT/L (ref 40–150)
ALT SERPL-CCNC: 71 UNIT/L (ref 0–55)
AST SERPL-CCNC: 104 UNIT/L (ref 5–34)
BASOPHILS # BLD AUTO: 0.02 X10(3)/MCL
BASOPHILS NFR BLD AUTO: 0.2 %
BILIRUB SERPL-MCNC: 6.3 MG/DL
BUN SERPL-MCNC: 15.5 MG/DL (ref 8.9–20.6)
CALCIUM SERPL-MCNC: 7.9 MG/DL (ref 8.4–10.2)
CHLORIDE SERPL-SCNC: 112 MMOL/L (ref 98–107)
CO2 SERPL-SCNC: 30 MMOL/L (ref 22–29)
CREAT SERPL-MCNC: 0.78 MG/DL (ref 0.73–1.18)
EOSINOPHIL # BLD AUTO: 0 X10(3)/MCL (ref 0–0.9)
EOSINOPHIL NFR BLD AUTO: 0 %
ERYTHROCYTE [DISTWIDTH] IN BLOOD BY AUTOMATED COUNT: 19 % (ref 11.5–17)
GFR SERPLBLD CREATININE-BSD FMLA CKD-EPI: >60 MLS/MIN/1.73/M2
GLOBULIN SER-MCNC: 5.6 GM/DL (ref 2.4–3.5)
GLUCOSE SERPL-MCNC: 120 MG/DL (ref 74–100)
HCT VFR BLD AUTO: 33.7 % (ref 42–52)
HGB BLD-MCNC: 10.6 G/DL (ref 14–18)
HOLD SPECIMEN: NORMAL
IMM GRANULOCYTES # BLD AUTO: 0.21 X10(3)/MCL (ref 0–0.04)
IMM GRANULOCYTES NFR BLD AUTO: 2.4 %
INR PPP: 2.8
LYMPHOCYTES # BLD AUTO: 0.66 X10(3)/MCL (ref 0.6–4.6)
LYMPHOCYTES NFR BLD AUTO: 7.6 %
MAGNESIUM SERPL-MCNC: 2.2 MG/DL (ref 1.6–2.6)
MCH RBC QN AUTO: 28.6 PG (ref 27–31)
MCHC RBC AUTO-ENTMCNC: 31.5 G/DL (ref 33–36)
MCV RBC AUTO: 91.1 FL (ref 80–94)
MONOCYTES # BLD AUTO: 1.15 X10(3)/MCL (ref 0.1–1.3)
MONOCYTES NFR BLD AUTO: 13.2 %
NEUTROPHILS # BLD AUTO: 6.67 X10(3)/MCL (ref 2.1–9.2)
NEUTROPHILS NFR BLD AUTO: 76.6 %
NRBC BLD AUTO-RTO: 0.2 %
PHOSPHATE SERPL-MCNC: 3.5 MG/DL (ref 2.3–4.7)
PLATELET # BLD AUTO: 72 X10(3)/MCL (ref 130–400)
PLATELETS.RETICULATED NFR BLD AUTO: 6.6 % (ref 0.9–11.2)
PMV BLD AUTO: 11 FL (ref 7.4–10.4)
POTASSIUM SERPL-SCNC: 4 MMOL/L (ref 3.5–5.1)
PROT SERPL-MCNC: 7.1 GM/DL (ref 6.4–8.3)
PROTHROMBIN TIME: 28.7 SECONDS (ref 11.4–14)
RBC # BLD AUTO: 3.7 X10(6)/MCL (ref 4.7–6.1)
SODIUM SERPL-SCNC: 146 MMOL/L (ref 136–145)
WBC # SPEC AUTO: 8.71 X10(3)/MCL (ref 4.5–11.5)

## 2024-01-07 PROCEDURE — 85025 COMPLETE CBC W/AUTO DIFF WBC: CPT | Performed by: STUDENT IN AN ORGANIZED HEALTH CARE EDUCATION/TRAINING PROGRAM

## 2024-01-07 PROCEDURE — C9113 INJ PANTOPRAZOLE SODIUM, VIA: HCPCS | Performed by: STUDENT IN AN ORGANIZED HEALTH CARE EDUCATION/TRAINING PROGRAM

## 2024-01-07 PROCEDURE — 25000003 PHARM REV CODE 250: Performed by: STUDENT IN AN ORGANIZED HEALTH CARE EDUCATION/TRAINING PROGRAM

## 2024-01-07 PROCEDURE — 63600175 PHARM REV CODE 636 W HCPCS: Performed by: STUDENT IN AN ORGANIZED HEALTH CARE EDUCATION/TRAINING PROGRAM

## 2024-01-07 PROCEDURE — 27100171 HC OXYGEN HIGH FLOW UP TO 24 HOURS

## 2024-01-07 PROCEDURE — 85610 PROTHROMBIN TIME: CPT | Performed by: STUDENT IN AN ORGANIZED HEALTH CARE EDUCATION/TRAINING PROGRAM

## 2024-01-07 PROCEDURE — 84100 ASSAY OF PHOSPHORUS: CPT | Performed by: STUDENT IN AN ORGANIZED HEALTH CARE EDUCATION/TRAINING PROGRAM

## 2024-01-07 PROCEDURE — 80053 COMPREHEN METABOLIC PANEL: CPT | Performed by: STUDENT IN AN ORGANIZED HEALTH CARE EDUCATION/TRAINING PROGRAM

## 2024-01-07 PROCEDURE — 83735 ASSAY OF MAGNESIUM: CPT | Performed by: STUDENT IN AN ORGANIZED HEALTH CARE EDUCATION/TRAINING PROGRAM

## 2024-01-07 PROCEDURE — 11000001 HC ACUTE MED/SURG PRIVATE ROOM

## 2024-01-07 PROCEDURE — 21400001 HC TELEMETRY ROOM

## 2024-01-07 PROCEDURE — 99900035 HC TECH TIME PER 15 MIN (STAT)

## 2024-01-07 PROCEDURE — 94660 CPAP INITIATION&MGMT: CPT

## 2024-01-07 PROCEDURE — 25000003 PHARM REV CODE 250: Performed by: INTERNAL MEDICINE

## 2024-01-07 PROCEDURE — 94761 N-INVAS EAR/PLS OXIMETRY MLT: CPT

## 2024-01-07 PROCEDURE — 27000207 HC ISOLATION

## 2024-01-07 RX ORDER — LACTULOSE 10 G/15ML
30 SOLUTION ORAL DAILY
Status: DISCONTINUED | OUTPATIENT
Start: 2024-01-07 | End: 2024-01-08

## 2024-01-07 RX ADMIN — THIAMINE HYDROCHLORIDE 100 MG: 100 INJECTION, SOLUTION INTRAMUSCULAR; INTRAVENOUS at 08:01

## 2024-01-07 RX ADMIN — RIFAXIMIN 550 MG: 550 TABLET ORAL at 08:01

## 2024-01-07 RX ADMIN — OSELTAMIVIR PHOSPHATE 75 MG: 75 CAPSULE ORAL at 08:01

## 2024-01-07 RX ADMIN — MUPIROCIN: 20 OINTMENT TOPICAL at 08:01

## 2024-01-07 RX ADMIN — FOLIC ACID 1 MG: 5 INJECTION, SOLUTION INTRAMUSCULAR; INTRAVENOUS; SUBCUTANEOUS at 09:01

## 2024-01-07 RX ADMIN — PREDNISONE 40 MG: 20 TABLET ORAL at 08:01

## 2024-01-07 RX ADMIN — PANTOPRAZOLE SODIUM 40 MG: 40 INJECTION, POWDER, FOR SOLUTION INTRAVENOUS at 08:01

## 2024-01-07 RX ADMIN — CEFTRIAXONE SODIUM 2 G: 2 INJECTION, POWDER, FOR SOLUTION INTRAMUSCULAR; INTRAVENOUS at 08:01

## 2024-01-07 RX ADMIN — LACTULOSE 30 G: 20 SOLUTION ORAL at 08:01

## 2024-01-07 RX ADMIN — ENOXAPARIN SODIUM 40 MG: 40 INJECTION SUBCUTANEOUS at 05:01

## 2024-01-07 NOTE — PROGRESS NOTES
[facility] - ICU  Pulmonary Critical Care Note    Patient Name: Franco Hein  MRN: [id number]  Admission Date: 1/1/2024  Hospital Length of Stay: 6 days  Code Status: Full Code  Attending Provider: MARIAH Nieto MD  Primary Care Provider: Caitie, Primary Doctor     Subjective:     HPI:   Franco Hein is  48 y.o. male with a PMH of ETOH misuse disorder who presented to [facility] ED on 1/1/2024 with complaint of jaundice, weakness for 3 weeks, and AMS. Patient was brought in by a friend who states patient is a daily drinker who has not been able to go to a store for the last 2 days, but patient is noted to have a positive ethanol on admission. Patient unable to provide history given his mental status and was only able to say his name and confirm he drinks alcohol but does not do drugs. Patient was tachypneic, tachycardic, and tremulous, on presentation with jaundice. Admitted to ICU given altered mental status and concern for decompensation in the setting of alcohol withdrawal and influenza A, also found to have strep pneumonia bacteremia, on appropriate IV antibiotics.     Hospital Course/Significant events:  1/1/2024: Admitted to ICU, on 2L NC  1/2/23: Transfused 2 units pRBC for Hgb 7  1/4/23: repeat blood cultures (negative thus far); TTE performed, no evidence of endocarditis. NGT obtained and enteral nutrition started    24 Hour Interval History:  NAEO. Pt continues to become more awake, able to tell me his name today but too weak to converse. Attempting to follow commands. Labs are stable as well as VS but remains tachypneic. Tolerated HFNC 4 lpm overnight with sats 94% this morning. Patient having high output of loose stool via rectal tube, will titrate down on lactulose.       History reviewed. No pertinent past medical history.    No past surgical history on file.    Social History     Socioeconomic History    Marital status: Single           No current outpatient medications    Current  Inpatient Medications   cefTRIAXone (ROCEPHIN) IVPB  2 g Intravenous Q24H    enoxparin  40 mg Subcutaneous Q24H (prophylaxis, 1700)    folic acid (FOLVITE) IVPB  1 mg Intravenous Daily    lactulose  30 g Per NG tube Daily    oseltamivir  75 mg Per NG tube BID    pantoprazole  40 mg Intravenous Daily    predniSONE  40 mg Per NG tube Daily    rifAXIMin  550 mg Per NG tube BID    thiamine (B-1) 100 mg in dextrose 5 % (D5W) 100 mL IVPB  100 mg Intravenous Daily       Current Intravenous Infusions          Review of Systems   Unable to perform ROS: Mental acuity          Objective:       Intake/Output Summary (Last 24 hours) at 1/7/2024 1001  Last data filed at 1/7/2024 0520  Gross per 24 hour   Intake 1207.36 ml   Output 2600 ml   Net -1392.64 ml           Vital Signs (Most Recent):  Temp: 97.6 °F (36.4 °C) (01/07/24 0715)  Pulse: 102 (01/07/24 0800)  Resp: (!) 33 (01/07/24 0800)  BP: 128/81 (01/07/24 0800)  SpO2: (!) 94 % (01/07/24 0800)  Body mass index is 20.6 kg/m².  Weight: 54.4 kg (120 lb) Vital Signs (24h Range):  Temp:  [96.6 °F (35.9 °C)-98.1 °F (36.7 °C)] 97.6 °F (36.4 °C)  Pulse:  [] 102  Resp:  [22-38] 33  SpO2:  [94 %-100 %] 94 %  BP: (111-155)/() 128/81     Physical Exam  Constitutional:       Appearance: He is normal weight. He is ill-appearing. He is not toxic-appearing or diaphoretic.   HENT:      Head: Normocephalic and atraumatic.      Mouth/Throat:      Mouth: Mucous membranes are moist.   Eyes:      General: Scleral icterus present.         Right eye: No discharge.         Left eye: No discharge.      Extraocular Movements: Extraocular movements intact.      Pupils: Pupils are equal, round, and reactive to light.   Cardiovascular:      Rate and Rhythm: Regular rhythm. Tachycardia present.      Pulses: Normal pulses.      Heart sounds: Normal heart sounds. No murmur heard.  Pulmonary:      Effort: No respiratory distress.      Breath sounds: Wheezing and rhonchi present. No rales.       Comments: On oxymizer, satting well in NAD  Abdominal:      General: Bowel sounds are normal. There is no distension.      Palpations: Abdomen is soft.      Tenderness: There is no abdominal tenderness.   Genitourinary:     Comments: Hillman catheter in place  Dark yellow urine noted with adequate output  Musculoskeletal:      Right lower leg: No edema.      Left lower leg: No edema.   Skin:     General: Skin is warm.      Capillary Refill: Capillary refill takes 2 to 3 seconds.      Coloration: Skin is jaundiced.   Neurological:      Comments: Pt awake, responds to verbal stimuili; able to voice his name but unable to converse due to generalized weakness  Appears to be attempting to follow commands  Moving all extremities purposefully           Lines/Drains/Airways       Drain  Duration                  Urethral Catheter 01/02/24 0413 Non-latex 16 Fr. 5 days         NG/OG Tube 01/04/24 1405 nasogastric 16 Fr. Right nostril 2 days         Rectal Tube 01/05/24 2130 rectal tube w/ balloon (indicate number of mLs) 1 day              Peripheral Intravenous Line  Duration                  Peripheral IV - Single Lumen 01/01/24 20 G Left Antecubital 6 days         Peripheral IV - Single Lumen 01/01/24 1310 20 G Right Antecubital 5 days         Peripheral IV - Single Lumen 01/01/24 1551 18 G Anterior;Distal;Right Forearm 5 days                    Significant Labs:    Lab Results   Component Value Date    WBC 8.71 01/07/2024    HGB 10.6 (L) 01/07/2024    HCT 33.7 (L) 01/07/2024    MCV 91.1 01/07/2024    PLT 72 (L) 01/07/2024           BMP  Lab Results   Component Value Date     (H) 01/07/2024    K 4.0 01/07/2024    CHLORIDE 112 (H) 01/07/2024    CO2 30 (H) 01/07/2024    BUN 15.5 01/07/2024    CREATININE 0.78 01/07/2024    CALCIUM 7.9 (L) 01/07/2024    AGAP 6.0 01/03/2024         ABG  Recent Labs   Lab 01/06/24 0419   PH 7.540*   PO2 79.0   PCO2 40.0   HCO3 34.2*   POCBASEDEF 10.70*         Mechanical Ventilation  Support:  Oxygen Concentration (%): 50 (01/06/24 0705)  Oxymizer 4L    Significant Imaging:  X-Ray Chest 1 View    Result Date: 1/1/2024  Bilateral patchy airspace opacities greatest on the left.  Findings concerning for atypical infectious process. Electronically signed by: Josef Sow Date:    01/01/2024 Time:    14:23      CXR 1/6/24 shows improved aeration of left lung, improving consolidations bilaterally. Official read pending.      Assessment/Plan:     Assessment  Alcohol withdrawal, delirium tremens-resolved  Strep pneumonia bacteremia/SEPSIS, SIRS 3/4, lower respiratory infection  AGMA-resolved  Lactic acidosis-resolved  Influenza A  Atypical pneumonia  Possible new cirrhosis diagnosis  Alcohol abuse  Positive hepatitis C antibody; VL negative, no active infection  Hyperbilirubinemia  Elevated transaminases  Elevated INR  MELD-Na: 28->25   Child Hoang: 13, class C-> 13  Maddrey's: 204.7  Macrocytic anemia, thrombocytopenia      Plan  Admitted to ICU for close monitoring  I&O cumulative negative -1045 mL; 2200 cc urine output last 24h and BM x1. Net +5.2L since admission; appears euvolemic, awaiting CXR to determine if we will diurese today  CT Chest Abdomen Pelvis showed multilobar PNA, cirrhotic appearing liver. Will discuss possible follow up liver protocol CT with staff this morning.  Blood culture positive x1/2 Streptococcus; BioFire confirmation strep pneumonia bacteremia which is pan-sensitive, resp culture /Gram stain collection pending. Repeat blood cultures 1/4/24  NGTD x24h; TTE did not have any findings consistent with endocarditis.  Zosyn and Zithromax x4 days; MRSA PCR negative, discontinued vancomycin after D3.   Continue Rocephin 2g daily (pan-sensitive strep pneumo), will need 14 days from negative repeat blood cultures; D3/14 currently as repeat blood culture from 1/4/24 NGTD x48h. Can place PICC line after 3D of negative blood cultures.  CXR showing slow improvement, continue to wean  BIPAP as tolerated at night and HFNC during the day  NGT obtained 1/4/24; on Supplena TF at goal 45cc/hr per dietary recs; will increase FWF to 200 cc q4h as patient mildly hypernatremic today. SLPT consulted for swallow eval, unable to perform Friday will try again Monday.   Ammonia elevated 1/4/24 and patient remains encephalopathic; lactulose 30 mg TID will decrease to daily as pt having diarrhea now, showing some improvement in LOC  Continue oseltamivir 75 mg BID for now, D4  HCV Ab+, RCA quant undetected, no active infection  H&H 7/22.7 on admission --> s/p 2 units pRBC, remains stable  Folate 18.3; B12 > 2000; continue folate supplementation  Given Sandra 204, Pt on day 4/28 day prednisone taper; Prednisone 40 mg will decrease weekly by half, stop after 5 mg dose x1 week  CIWA protocol with lorazepam prn, thiamine IV, folic acid IV  Monitor for refeeding syndrome with CMP, Mg, Ph daily; replete electrolytes as needed  Will step down patient to telemetry with pulse ox today as he has been stable for 3 days now    DVT Prophylaxis: SCDs, started ppx Lovenox 1/5/24  GI Prophylaxis: PPI    Pt's family from [place] called yesterday, will attempt to call via  services for update and obtaining other medical information if available.     45 minutes of critical care was time spent personally by me on the following activities: development of treatment plan with patient or surrogate and bedside caregivers, discussions with consultants, evaluation of patient's response to treatment, examination of patient, ordering and performing treatments and interventions, ordering and review of laboratory studies, ordering and review of radiographic studies, pulse oximetry, re-evaluation of patient's condition.  This critical care time did not overlap with that of any other provider or involve time for any procedures.     Sagrario Rosenthal MD  [employer] IM PGY III  Pulmonary Critical Care Medicine  [facility] - ICU  DOS:  01/07/2024

## 2024-01-07 NOTE — CARE UPDATE
Attempted to contact pt's niece, Rosa ([phone]) and Brother, Kenji ([phone]) via  service without success. Will try again tomorrow.    Sagrario Rosenthal MD  [employer] IM PGY III

## 2024-01-08 LAB
ALBUMIN SERPL-MCNC: 1.5 G/DL (ref 3.5–5)
ALBUMIN/GLOB SERPL: 0.3 RATIO (ref 1.1–2)
ALP SERPL-CCNC: 330 UNIT/L (ref 40–150)
ALT SERPL-CCNC: 72 UNIT/L (ref 0–55)
AST SERPL-CCNC: 95 UNIT/L (ref 5–34)
BASOPHILS # BLD AUTO: 0.03 X10(3)/MCL
BASOPHILS NFR BLD AUTO: 0.2 %
BILIRUB SERPL-MCNC: 6.2 MG/DL
BUN SERPL-MCNC: 17.9 MG/DL (ref 8.9–20.6)
CALCIUM SERPL-MCNC: 8.1 MG/DL (ref 8.4–10.2)
CHLORIDE SERPL-SCNC: 112 MMOL/L (ref 98–107)
CO2 SERPL-SCNC: 29 MMOL/L (ref 22–29)
CREAT SERPL-MCNC: 0.71 MG/DL (ref 0.73–1.18)
EOSINOPHIL # BLD AUTO: 0.14 X10(3)/MCL (ref 0–0.9)
EOSINOPHIL NFR BLD AUTO: 1.1 %
ERYTHROCYTE [DISTWIDTH] IN BLOOD BY AUTOMATED COUNT: 19.5 % (ref 11.5–17)
GFR SERPLBLD CREATININE-BSD FMLA CKD-EPI: >60 MLS/MIN/1.73/M2
GLOBULIN SER-MCNC: 5.6 GM/DL (ref 2.4–3.5)
GLUCOSE SERPL-MCNC: 133 MG/DL (ref 74–100)
HCT VFR BLD AUTO: 33.1 % (ref 42–52)
HGB BLD-MCNC: 10.6 G/DL (ref 14–18)
IMM GRANULOCYTES # BLD AUTO: 0.38 X10(3)/MCL (ref 0–0.04)
IMM GRANULOCYTES NFR BLD AUTO: 3.1 %
INR PPP: 2.4
LYMPHOCYTES # BLD AUTO: 0.94 X10(3)/MCL (ref 0.6–4.6)
LYMPHOCYTES NFR BLD AUTO: 7.6 %
MAGNESIUM SERPL-MCNC: 2.2 MG/DL (ref 1.6–2.6)
MCH RBC QN AUTO: 29.7 PG (ref 27–31)
MCHC RBC AUTO-ENTMCNC: 32 G/DL (ref 33–36)
MCV RBC AUTO: 92.7 FL (ref 80–94)
MONOCYTES # BLD AUTO: 1.37 X10(3)/MCL (ref 0.1–1.3)
MONOCYTES NFR BLD AUTO: 11 %
NEUTROPHILS # BLD AUTO: 9.59 X10(3)/MCL (ref 2.1–9.2)
NEUTROPHILS NFR BLD AUTO: 77 %
NRBC BLD AUTO-RTO: 0.2 %
PHOSPHATE SERPL-MCNC: 3.6 MG/DL (ref 2.3–4.7)
PLATELET # BLD AUTO: 71 X10(3)/MCL (ref 130–400)
PLATELETS.RETICULATED NFR BLD AUTO: 9.6 % (ref 0.9–11.2)
PMV BLD AUTO: ABNORMAL FL
POTASSIUM SERPL-SCNC: 3.4 MMOL/L (ref 3.5–5.1)
PROT SERPL-MCNC: 7.1 GM/DL (ref 6.4–8.3)
PROTHROMBIN TIME: 26.3 SECONDS (ref 11.4–14)
RBC # BLD AUTO: 3.57 X10(6)/MCL (ref 4.7–6.1)
SODIUM SERPL-SCNC: 147 MMOL/L (ref 136–145)
WBC # SPEC AUTO: 12.45 X10(3)/MCL (ref 4.5–11.5)

## 2024-01-08 PROCEDURE — 63600175 PHARM REV CODE 636 W HCPCS: Performed by: STUDENT IN AN ORGANIZED HEALTH CARE EDUCATION/TRAINING PROGRAM

## 2024-01-08 PROCEDURE — 94761 N-INVAS EAR/PLS OXIMETRY MLT: CPT

## 2024-01-08 PROCEDURE — 21400001 HC TELEMETRY ROOM

## 2024-01-08 PROCEDURE — 27100171 HC OXYGEN HIGH FLOW UP TO 24 HOURS

## 2024-01-08 PROCEDURE — 25000003 PHARM REV CODE 250: Performed by: STUDENT IN AN ORGANIZED HEALTH CARE EDUCATION/TRAINING PROGRAM

## 2024-01-08 PROCEDURE — 83735 ASSAY OF MAGNESIUM: CPT | Performed by: STUDENT IN AN ORGANIZED HEALTH CARE EDUCATION/TRAINING PROGRAM

## 2024-01-08 PROCEDURE — 94760 N-INVAS EAR/PLS OXIMETRY 1: CPT

## 2024-01-08 PROCEDURE — 85025 COMPLETE CBC W/AUTO DIFF WBC: CPT | Performed by: STUDENT IN AN ORGANIZED HEALTH CARE EDUCATION/TRAINING PROGRAM

## 2024-01-08 PROCEDURE — 27000207 HC ISOLATION

## 2024-01-08 PROCEDURE — C9113 INJ PANTOPRAZOLE SODIUM, VIA: HCPCS | Performed by: STUDENT IN AN ORGANIZED HEALTH CARE EDUCATION/TRAINING PROGRAM

## 2024-01-08 PROCEDURE — 84100 ASSAY OF PHOSPHORUS: CPT | Performed by: STUDENT IN AN ORGANIZED HEALTH CARE EDUCATION/TRAINING PROGRAM

## 2024-01-08 PROCEDURE — 80053 COMPREHEN METABOLIC PANEL: CPT | Performed by: STUDENT IN AN ORGANIZED HEALTH CARE EDUCATION/TRAINING PROGRAM

## 2024-01-08 PROCEDURE — 85610 PROTHROMBIN TIME: CPT | Performed by: STUDENT IN AN ORGANIZED HEALTH CARE EDUCATION/TRAINING PROGRAM

## 2024-01-08 RX ADMIN — FOLIC ACID 1 MG: 5 INJECTION, SOLUTION INTRAMUSCULAR; INTRAVENOUS; SUBCUTANEOUS at 09:01

## 2024-01-08 RX ADMIN — PANTOPRAZOLE SODIUM 40 MG: 40 INJECTION, POWDER, FOR SOLUTION INTRAVENOUS at 09:01

## 2024-01-08 RX ADMIN — POTASSIUM BICARBONATE 50 MEQ: 977.5 TABLET, EFFERVESCENT ORAL at 09:01

## 2024-01-08 RX ADMIN — PREDNISONE 40 MG: 20 TABLET ORAL at 09:01

## 2024-01-08 RX ADMIN — LORAZEPAM 2 MG: 2 INJECTION INTRAMUSCULAR; INTRAVENOUS at 10:01

## 2024-01-08 RX ADMIN — ENOXAPARIN SODIUM 40 MG: 40 INJECTION SUBCUTANEOUS at 05:01

## 2024-01-08 RX ADMIN — OSELTAMIVIR PHOSPHATE 75 MG: 75 CAPSULE ORAL at 09:01

## 2024-01-08 RX ADMIN — LORAZEPAM 2 MG: 2 INJECTION INTRAMUSCULAR; INTRAVENOUS at 09:01

## 2024-01-08 RX ADMIN — RIFAXIMIN 550 MG: 550 TABLET ORAL at 08:01

## 2024-01-08 RX ADMIN — RIFAXIMIN 550 MG: 550 TABLET ORAL at 09:01

## 2024-01-08 RX ADMIN — THIAMINE HYDROCHLORIDE 100 MG: 100 INJECTION, SOLUTION INTRAMUSCULAR; INTRAVENOUS at 09:01

## 2024-01-08 RX ADMIN — CEFTRIAXONE SODIUM 2 G: 2 INJECTION, POWDER, FOR SOLUTION INTRAMUSCULAR; INTRAVENOUS at 09:01

## 2024-01-08 RX ADMIN — OSELTAMIVIR PHOSPHATE 75 MG: 75 CAPSULE ORAL at 08:01

## 2024-01-08 NOTE — PT/OT/SLP PROGRESS
"[facility]  SPEECH LANGUAGE PATHOLOGY  MISSED VISIT NOTE      PATIENT:  Franco Hein    : 1975    MRN: [id number]    DATE: 2024          HISTORY & PHYSICAL:    Active Ambulatory Problems     Diagnosis Date Noted    No Active Ambulatory Problems     Resolved Ambulatory Problems     Diagnosis Date Noted    No Resolved Ambulatory Problems     No Additional Past Medical History       Per medical record:  "Franco Hein is  48 y.o. male with a PMH of ETOH misuse disorder who presented to [facility] ED on 2024 with complaint of jaundice, weakness for 3 weeks, and AMS. Patient was brought in by a friend who states patient is a daily drinker who has not been able to go to a store for the last 2 days, but patient is noted to have a positive ethanol on admission. Patient unable to provide history given his mental status and was only able to say his name and confirm he drinks alcohol but does not do drugs. Patient was tachypneic, tachycardic, and tremulous, on presentation with jaundice. Admitted to ICU given altered mental status and concern for decompensation in the setting of alcohol withdrawal and influenza A, also found to have strep pneumonia bacteremia, on appropriate IV antibiotics."   "NAEO. Patient awake, oriented to name only. Difficult to redirect but able to follow simple commands. Downgraded yesterday from ICU, VS and labs remain stable. Tolerating HFNC 4 lpm, will attempt to wean to low flow NC today. Still having high output via rectal tube, will hold lactulose today. Repeat blood cultures negative x3 days now. Attempt to contact family unsuccessful yesterday.              Consult received for a clinical swallow evaluation from Speech therapy. Unable to complete at this time 2/2 lethargy and unable to follow commands. Will attempt at next opportunity.          Kain Barlow M.S. [facility]-SLP  [facility]    "

## 2024-01-08 NOTE — PROGRESS NOTES
[facility] Medicine Wards Progress Note     Resident Team: [facility] Medicine List 4  Attending Physician: Kamryn Estevez MD  Resident: MD Galo  Intern: DO Ulysses       Subjective:      Brief HPI:  Franco Hein is  48 y.o. male with a PMH of ETOH misuse disorder who presented to [facility] ED on 2024 with complaint of jaundice, weakness for 3 weeks, and AMS. Patient was brought in by a friend who states patient is a daily drinker who has not been able to go to a store for the last 2 days, but patient is noted to have a positive ethanol on admission. Patient unable to provide history given his mental status and was only able to say his name and confirm he drinks alcohol but does not do drugs. Patient was tachypneic, tachycardic, and tremulous, on presentation with jaundice. Admitted to ICU given altered mental status and concern for decompensation in the setting of alcohol withdrawal and influenza A, also found to have strep pneumonia bacteremia, on appropriate IV antibiotics.      Interval History:   NAEO. Patient awake, oriented to name only. Difficult to redirect but able to follow simple commands. Downgraded yesterday from ICU, VS and labs remain stable. Tolerating HFNC 4 lpm, will attempt to wean to low flow NC today. Still having high output via rectal tube, will hold lactulose today. Repeat blood cultures negative x3 days now. Attempt to contact family unsuccessful yesterday.       Review of Systems:  ROS completed and negative except as indicated above.     Objective:     Last 24 Hour Vital Signs:  BP  Min: 121/86  Max: 153/82  Temp  Av.6 °F (37 °C)  Min: 98.1 °F (36.7 °C)  Max: 99.1 °F (37.3 °C)  Pulse  Av.2  Min: 87  Max: 113  Resp  Av  Min: 19  Max: 32  SpO2  Av.7 %  Min: 93 %  Max: 99 %  I/O last 3 completed shifts:  In: 1140 [NG/GT:1140]  Out: 3050 [Urine:650; Stool:2400]    Physical Examination:  General: disheveled, ill-appearing though non-toxic  Eye: PERRLA, EOMI, scleral  icterus noted  HENT: NC/AT, dry mucus membranes  Neck: full range of motion, no thyromegaly or lymphadenopathy  Respiratory: Rhonchi noted bilaterally, satting well on oxymizer, in NAD  Cardiovascular: regular rate and rhythm without murmurs, gallops or rubs  Gastrointestinal: soft, non-tender, non-distended with normal bowel sounds, without masses to palpation  Musculoskeletal: no obvious deformities, full range of motion of all extremities/spine without limitation or discomfort  Integumentary: spider angiomas present on upper chest, no caput medusa, no rash  Extremities: radial and DP pulses 2+ bilaterally, no LE edema  Neurologic: Pt awake, responds to verbal stimuili; able to voice his name but unable to converse due to generalized weakness  Appears to be attempting to follow commands  Moving all extremities purposefully     Laboratory:  Most Recent Data:  CBC:   Lab Results   Component Value Date    WBC 12.45 (H) 01/08/2024    HGB 10.6 (L) 01/08/2024    HCT 33.1 (L) 01/08/2024    PLT 71 (L) 01/08/2024    MCV 92.7 01/08/2024    RDW 19.5 (H) 01/08/2024     WBC Differential:   Recent Labs   Lab 01/04/24  0429 01/05/24  0312 01/06/24  0427 01/07/24  0320 01/08/24  0305   WBC 9.75 11.56* 10.84 8.71 12.45*   HGB 10.0* 11.0* 10.6* 10.6* 10.6*   HCT 30.8* 33.2* 33.1* 33.7* 33.1*   PLT 87* 86* 72* 72* 71*   MCV 88.3 89.0 89.5 91.1 92.7     BMP:   Lab Results   Component Value Date     (H) 01/08/2024    K 3.4 (L) 01/08/2024    CO2 29 01/08/2024    BUN 17.9 01/08/2024    CREATININE 0.71 (L) 01/08/2024    CALCIUM 8.1 (L) 01/08/2024    MG 2.20 01/08/2024    PHOS 3.6 01/08/2024     LFTs:   Lab Results   Component Value Date    ALBUMIN 1.5 (L) 01/08/2024    BILITOT 6.2 (H) 01/08/2024    AST 95 (H) 01/08/2024    ALKPHOS 330 (H) 01/08/2024    ALT 72 (H) 01/08/2024     Coags:   Lab Results   Component Value Date    INR 2.4 (H) 01/08/2024    PROTIME 26.3 (H) 01/08/2024    PTT 38.6 (H) 01/01/2024     FLP: No results found  "for: "CHOL", "HDL", "LDLCALC", "TRIG", "CHOLHDL"  DM:   Lab Results   Component Value Date    CREATININE 0.71 (L) 01/08/2024     Thyroid: No results found for: "TSH", "FREET4", "Z9QHZIF", "G6EKEWR", "THYROIDAB"  Anemia:   Lab Results   Component Value Date    IRON 39 (L) 01/01/2024    TIBC 203 (L) 01/01/2024    FERRITIN 116.00 01/01/2024    [id number] >2,000 (H) 01/01/2024    FOLATE 18.3 01/01/2024     Cardiac:   Lab Results   Component Value Date    TROPONINI 0.029 01/01/2024    BNP 89.3 01/01/2024       Microbiology Data Reviewed: yes  Pertinent Findings:  1/1/24: bld cx +strep pneumonia pan sensitive x1 bottle  1/4/24: bld cx x2 negative x3 days    Other Results:  TTE 1/4/24: EF 62%, no evidence of vegetation      Radiology:  Imaging Results              CT Chest Abdomen Pelvis Without Contrast (XPD) (Final result)  Result time 01/02/24 09:44:01      Final result by Tika Mcknight MD (01/02/24 09:44:01)                   Impression:      1. Multilobar consolidative opacities within the lungs  2. Cirrhotic morphology of the liver with mass at the dome of the liver.  Evaluation is moderately limited without contrast and due to beam hardening artifact related to patient positioning.  Recommend liver protocol CT abdomen without and with contrast.  3. Ascites  4. Indeterminate right adrenal nodule.  Continued attention recommended on follow-up exams.  5. The preliminary and final reports are concordant.      Electronically signed by: Tika Mcknight  Date:    01/02/2024  Time:    09:44               Narrative:    EXAMINATION:  CT CHEST ABDOMEN PELVIS WITHOUT CONTRAST(XPD)    CLINICAL HISTORY:  Suspect pneumonia, biliary obstruction;    TECHNIQUE:  Helical acquisition through the chest, abdomen and pelvis without  IV administration of contrast. Axial, sagittal and coronal reformats interpreted.    Automated tube current modulation, weight-based exposure dosing, and/or iterative reconstruction technique utilized " to reach lowest reasonably achievable exposure rate.    DLP: 1042 mGy*cm    COMPARISON:  Chest radiograph 01/01/2024    FINDINGS:  BASE OF NECK: No significant abnormality.    HEART: Normal size. There are coronary artery calcifications.  Low-density blood pool with visualization of the interventricular septum compatible with anemia.    THORACIC VASCULATURE: Thoracic aorta is unremarkable.    SIRISHA/MEDIASTINUM: Small nonspecific mediastinal lymph nodes.  Evaluation of hilar lymphadenopathy is limited without contrast.    AIRWAYS: Patent.    LUNGS/PLEURA: Multilobar consolidative opacities within the lungs.    THORACIC SOFT TISSUES: Unremarkable.    LIVER: Limited evaluation the liver due to lack of intravenous contrast and placement of the patient's arms over the abdomen which causes beam hardening artifact.  Heterogeneous attenuation of the liver.  There appears to be a mass at the dome of the liver.  Caudate lobe hypertrophy and changes of cirrhosis.    BILIARY: The gallbladder does not appear to be overly distended.    PANCREAS: Nonspecific retroperitoneal edema.  Unable to assess for changes of pancreatitis given generalized fluid overload.    SPLEEN: Upper limits of normal in size.    ADRENALS: There is a 1.6 cm right adrenal nodule.  Unable to accurately measure internal attenuation due to beam hardening artifact.    KIDNEYS/URETERS: Hyperdense renal pyramids.  This can be related to volume status/dehydration or medullary calcinosis.    GI TRACT/MESENTERY: Evaluation of the bowel is limited without contrast. Bowel is normal in caliber without evidence of obstruction.    PERITONEUM: Small volume ascites.No free air.    LYMPH NODES: No enlarged lymph nodes by size criteria.    ABDOMINOPELVIC VASCULATURE: Normal caliber abdominal aorta.  Large periumbilical vein.    BLADDER: Normal appearance given degree of distention.    REPRODUCTIVE ORGANS: Normal as visualized.    ABDOMINAL WALL: Small fat containing right  inguinal hernia.  Mild body wall edema.    BONES: Anterolisthesis at the lumbosacral junction.                        Preliminary result by Santana Lerma MD (01/01/24 22:56:45)                   Impression:    1. No intrahepatic or extrahepatic biliary duct dilatation is seen.  2. Note of subtle increased attenuation in the medullary portions of the bilateral kidneys which may reflect medullary calcinosis. Correlate with clinical and laboratory findings as regards additional evaluation and follow-up.  3. There is multifocal patchy dense consolidation of both the lungs with predominant involvement of the left upper and right lower lobes. This is consistent with multilobar pneumonia. Correlate with clinical and laboratory findings as regards additional evaluation and follow-up to full resolution.  4. There is a 2.8 x 2 cm soft tissue density nodule in the right adrenal gland (series 2 image 98). This is of indeterminate etiology.  5. The margins of the liver appears somewhat nodular and irregular. This is suggestive of cirrhosis. Correlate with clinical and laboratory findings.  6. Details and other findings as discussed above.               Narrative:    START OF REPORT:  Technique: CT Scan of the chest abdomen and pelvis was performed without intravenous contrast with axial as well as sagittal and, coronal images.    Dosage Information: Automated Exposure Control was utilized 1041.63 mGy.cm.    Comparison: None.    Clinical History: Suspect pneumonia, biliary obstruction.    Findings:  Mediastinum: A few scattered subcentimeter mediastinal lymph nodes are seen.  Heart: The heart size is within normal limits.  Aorta: Unremarkable appearing aorta.  Pulmonary Arteries: Unremarkable.  Lungs: There is multifocal patchy dense consolidation of both the lungs with predominant involvement of the left upper and right lower lobes.  Pleura: No effusions or pneumothorax are identified.  Abdomen:  Abdominal Wall: There is  extensive stranding of the subcutaneous fat as well as the intraabdominal and intrapelvic fat consistent with anasarca edema. Correlate with clinical and laboratory findings.  Liver: The margins of the liver appears somewhat nodular and irregular. There are also prominent portosystemic collaterals consistent with portal hypertension.  Biliary System: No intrahepatic or extrahepatic biliary duct dilatation is seen.  Gallbladder: The gallbladder is not definitely identified on this noncontrast examination and may be contracted.  Pancreas: The pancreas appears unremarkable.  Spleen: The spleen appears unremarkable.  Adrenals: There is a 2.8 x 2 cm soft tissue density nodule in the right adrenal gland (series 2 image 98).  Kidneys: Note of subtle increased attenuation in the medullary portions of the bilateral kidneys which may reflect medullary calcinosis.  Aorta: The abdominal aorta appears unremarkable.  IVC: Unremarkable.  Bowel:  Esophagus: There is a small hiatal hernia.  Stomach: The stomach appears unremarkable.  Duodenum: Unremarkable appearing duodenum.  Small Bowel: The small bowel appears unremarkable.  Colon: Nondistended.  Appendix: The appendix is not identified but no inflammatory changes are seen in the right lower quadrant to suggest appendicitis.  Peritoneum: There is mild ascites. No intraperitoneal free gas is seen.    Pelvis:  Bladder: The bladder appears unremarkable.  Male:  Prostate gland: The prostate gland appears unremarkable.    Bony structures:  Dorsal Spine: There is mild to moderate spondylosis of the visualized dorsal spine.  Bony Pelvis: The visualized bony structures of the pelvis appear unremarkable.                                         X-Ray Chest 1 View (Final result)  Result time 01/01/24 14:23:11   Procedure changed from X-Ray Chest PA And Lateral     Final result by Josef Sow MD (01/01/24 14:23:11)                   Impression:      Bilateral patchy airspace opacities  greatest on the left.  Findings concerning for atypical infectious process.      Electronically signed by: Josef Sow  Date:    01/01/2024  Time:    14:23               Narrative:    EXAMINATION:  XR CHEST 1 VIEW    CLINICAL HISTORY:  cough;  Cough, unspecified    TECHNIQUE:  Single view of the chest    COMPARISON:  No prior imaging available for comparison.    FINDINGS:  Bilateral patchy airspace opacities greatest on the left.    The cardiomediastinal silhouette is within normal limits.    No acute osseous abnormality.                                       RADIOLOGY REPORT (Final result)  Result time 01/04/24 11:39:28      Final result by Unknown User (01/04/24 11:39:28)                                        Current Medications:     Infusions:       Scheduled:   cefTRIAXone (ROCEPHIN) IVPB  2 g Intravenous Q24H    enoxparin  40 mg Subcutaneous Q24H (prophylaxis, 1700)    folic acid (FOLVITE) IVPB  1 mg Intravenous Daily    oseltamivir  75 mg Per NG tube BID    pantoprazole  40 mg Intravenous Daily    predniSONE  40 mg Per NG tube Daily    rifAXIMin  550 mg Per NG tube BID    thiamine (B-1) 100 mg in dextrose 5 % (D5W) 100 mL IVPB  100 mg Intravenous Daily        PRN:  0.9%  NaCl infusion (for blood administration), acetaminophen, dextrose 10%, dextrose 10%, glucagon (human recombinant), glucose, glucose, lorazepam, sodium chloride 0.9%    Antibiotics and Day Number of Therapy:  Vanc x3 days  Zosyn/Azithromycin x4 days  Rocephin 2g on D4/14     Lines and Day Number of Therapy:  PIV      Assessment & Plan:     Decompensated Cirrhosis       - suspected etiology ETOH. Pt reportedly had hx of heavy drinking per friend who dropped him off       - Positive hepatitis C antibody; VL negative, no active infection        - MELD-Na: 28->25; Child Hoang: 13, class C-> 13, Maddrey: 204       - Coagulopathy improving; INR 2.4 today        - Improvement in encephalopathy; hold lactulose for now as pt having diarrhea       -  Continue Rifaxamin for hepatic encephalopathy       -    Pneumonia 2/2 Influenza A       - Positive on 1/1/24       - Continue isolation until resolution of symptoms; continues to need supplemental O2       - Continue Remdesivir on D5/7        - Wean HFNC to low flow as tolerated to keep sats > 92%       - Will hold QHS bipap for now, appears to be maintaining sats well when sleeping       - Possible superimposed Strep PNA given bacteremia and pneumonia; on appropriate Ceftriaxone per below    Strep Pneumoniae Bacteremia       - Blood culture positive x1/2 Streptococcus; Bio[employer] confirmation strep pneumonia bacteremia which is pan-sensitive, resp culture /Gram stain collection pending.       - Repeat blood cultures 1/4/24  NGTD x3SD; TTE did not have any findings consistent with endocarditis.        - s/p Zosyn and Zithromax x4 days; MRSA PCR negative, discontinued vancomycin after D3.        - Continue Rocephin 2g daily (pan-sensitive strep pneumo), will need 14 days from negative repeat blood cultures; D4/14 currently as repeat blood culture from 1/4/24 NGTD x3 days. Can place PICC line after 3D of negative blood cultures.    Skin Breakdown to L buttocks       - Skin tear vs Stage II pressure ulcer       - Wound care consulted, appreciate recs       - Continue q2h turning and mepilex dsg        - Pt now with enteral nutrition, hopeful to aid in healing    Malnutrition  Hypernatremia       - Pt on Supplena TF's per nutrition; at goal 45 cc/hr       - Albumin remains low likely 2/2 cirrhosis and poor nutrition       - Na 147 today; continues to have diarrhea, holding Lactulose today       - Increasing FWF to 250 cc q4h from 200 cc     CODE STATUS: Full  Access: PIV  Antibiotics: Ceftriaxone  Diet: Supplena TF's  DVT Prophylaxis: Lovenox  GI Prophylaxis: Protonix  Fluids: n/a      Disposition: Patient continues to meet inpatient needs requiring 14 day course of IV abx for strep pneumoniae bacteremia; currently on day  4/14; self pay, likely undocumented and will need to stay for duration. Continues to recover slowly from decompensated cirrhosis as well, hopeful for resolution prior to end of antibiotics.          Sagrario Rosenthal MD  [facility]

## 2024-01-08 NOTE — NURSING
Pt arrived to the unit, as a transfer. No change noted from previous assessment. Tele connected, vitals are being assessed. Will cont to [facility].

## 2024-01-08 NOTE — CONSULTS
Patient is seen at bedside to eval and treat reported buttocks wound present on admission. Patient is noted with small ulceration to right inner buttocks/perianal area. This is not a typical area for pressure injuries as it is not over a bony prominence. More likely related to MASD s/t incontinence based on incontinence of bowel requiring a rectal tube. Area measuring approximately 2.0x0.5x0.3cm. Pink superficial wound base noted without purulence or erythema. Location is not amendable to a foam dressing. Barrier cream applied and ordered to be continued by nursing. Patient appears to have a rash to bilateral buttocks. I cannot confirm if this was present on admission or not so I am unsure if this is patients baseline. If it is suspected to be acute I would recommend antifungal treatment. Wound remains available to assist as needed.

## 2024-01-09 LAB
ALBUMIN SERPL-MCNC: 1.5 G/DL (ref 3.5–5)
ALBUMIN/GLOB SERPL: 0.3 RATIO (ref 1.1–2)
ALP SERPL-CCNC: 329 UNIT/L (ref 40–150)
ALT SERPL-CCNC: 66 UNIT/L (ref 0–55)
AST SERPL-CCNC: 87 UNIT/L (ref 5–34)
BACTERIA BLD CULT: NORMAL
BACTERIA BLD CULT: NORMAL
BASOPHILS # BLD AUTO: 0.02 X10(3)/MCL
BASOPHILS NFR BLD AUTO: 0.1 %
BILIRUB SERPL-MCNC: 5.7 MG/DL
BUN SERPL-MCNC: 18.4 MG/DL (ref 8.9–20.6)
CALCIUM SERPL-MCNC: 8 MG/DL (ref 8.4–10.2)
CHLORIDE SERPL-SCNC: 111 MMOL/L (ref 98–107)
CO2 SERPL-SCNC: 31 MMOL/L (ref 22–29)
CREAT SERPL-MCNC: 0.6 MG/DL (ref 0.73–1.18)
EOSINOPHIL # BLD AUTO: 0.14 X10(3)/MCL (ref 0–0.9)
EOSINOPHIL NFR BLD AUTO: 1 %
ERYTHROCYTE [DISTWIDTH] IN BLOOD BY AUTOMATED COUNT: 19.8 % (ref 11.5–17)
GFR SERPLBLD CREATININE-BSD FMLA CKD-EPI: >60 MLS/MIN/1.73/M2
GLOBULIN SER-MCNC: 5.3 GM/DL (ref 2.4–3.5)
GLUCOSE SERPL-MCNC: 131 MG/DL (ref 74–100)
HCT VFR BLD AUTO: 33.1 % (ref 42–52)
HGB BLD-MCNC: 10.2 G/DL (ref 14–18)
IMM GRANULOCYTES # BLD AUTO: 0.21 X10(3)/MCL (ref 0–0.04)
IMM GRANULOCYTES NFR BLD AUTO: 1.5 %
INR PPP: 2.2
LYMPHOCYTES # BLD AUTO: 1.14 X10(3)/MCL (ref 0.6–4.6)
LYMPHOCYTES NFR BLD AUTO: 7.9 %
MAGNESIUM SERPL-MCNC: 2 MG/DL (ref 1.6–2.6)
MCH RBC QN AUTO: 29.1 PG (ref 27–31)
MCHC RBC AUTO-ENTMCNC: 30.8 G/DL (ref 33–36)
MCV RBC AUTO: 94.6 FL (ref 80–94)
MONOCYTES # BLD AUTO: 1.09 X10(3)/MCL (ref 0.1–1.3)
MONOCYTES NFR BLD AUTO: 7.6 %
NEUTROPHILS # BLD AUTO: 11.81 X10(3)/MCL (ref 2.1–9.2)
NEUTROPHILS NFR BLD AUTO: 81.9 %
NRBC BLD AUTO-RTO: 0.3 %
PHOSPHATE SERPL-MCNC: 2.9 MG/DL (ref 2.3–4.7)
PLATELET # BLD AUTO: 70 X10(3)/MCL (ref 130–400)
PMV BLD AUTO: 12.1 FL (ref 7.4–10.4)
POTASSIUM SERPL-SCNC: 3.7 MMOL/L (ref 3.5–5.1)
PROT SERPL-MCNC: 6.8 GM/DL (ref 6.4–8.3)
PROTHROMBIN TIME: 24.8 SECONDS (ref 11.4–14)
RBC # BLD AUTO: 3.5 X10(6)/MCL (ref 4.7–6.1)
SODIUM SERPL-SCNC: 146 MMOL/L (ref 136–145)
WBC # SPEC AUTO: 14.41 X10(3)/MCL (ref 4.5–11.5)

## 2024-01-09 PROCEDURE — 63600175 PHARM REV CODE 636 W HCPCS: Performed by: STUDENT IN AN ORGANIZED HEALTH CARE EDUCATION/TRAINING PROGRAM

## 2024-01-09 PROCEDURE — 92610 EVALUATE SWALLOWING FUNCTION: CPT

## 2024-01-09 PROCEDURE — 85610 PROTHROMBIN TIME: CPT | Performed by: STUDENT IN AN ORGANIZED HEALTH CARE EDUCATION/TRAINING PROGRAM

## 2024-01-09 PROCEDURE — C1751 CATH, INF, PER/CENT/MIDLINE: HCPCS

## 2024-01-09 PROCEDURE — 80053 COMPREHEN METABOLIC PANEL: CPT | Performed by: STUDENT IN AN ORGANIZED HEALTH CARE EDUCATION/TRAINING PROGRAM

## 2024-01-09 PROCEDURE — 27000221 HC OXYGEN, UP TO 24 HOURS

## 2024-01-09 PROCEDURE — 84100 ASSAY OF PHOSPHORUS: CPT | Performed by: STUDENT IN AN ORGANIZED HEALTH CARE EDUCATION/TRAINING PROGRAM

## 2024-01-09 PROCEDURE — 36410 VNPNXR 3YR/> PHY/QHP DX/THER: CPT

## 2024-01-09 PROCEDURE — 25000003 PHARM REV CODE 250: Performed by: STUDENT IN AN ORGANIZED HEALTH CARE EDUCATION/TRAINING PROGRAM

## 2024-01-09 PROCEDURE — 97162 PT EVAL MOD COMPLEX 30 MIN: CPT

## 2024-01-09 PROCEDURE — 83735 ASSAY OF MAGNESIUM: CPT | Performed by: STUDENT IN AN ORGANIZED HEALTH CARE EDUCATION/TRAINING PROGRAM

## 2024-01-09 PROCEDURE — 85025 COMPLETE CBC W/AUTO DIFF WBC: CPT | Performed by: STUDENT IN AN ORGANIZED HEALTH CARE EDUCATION/TRAINING PROGRAM

## 2024-01-09 PROCEDURE — 97167 OT EVAL HIGH COMPLEX 60 MIN: CPT

## 2024-01-09 PROCEDURE — 94761 N-INVAS EAR/PLS OXIMETRY MLT: CPT

## 2024-01-09 PROCEDURE — 21400001 HC TELEMETRY ROOM

## 2024-01-09 PROCEDURE — 02HV33Z INSERTION OF INFUSION DEVICE INTO SUPERIOR VENA CAVA, PERCUTANEOUS APPROACH: ICD-10-PCS | Performed by: INTERNAL MEDICINE

## 2024-01-09 PROCEDURE — C9113 INJ PANTOPRAZOLE SODIUM, VIA: HCPCS | Performed by: STUDENT IN AN ORGANIZED HEALTH CARE EDUCATION/TRAINING PROGRAM

## 2024-01-09 RX ORDER — SODIUM CHLORIDE 0.9 % (FLUSH) 0.9 %
10 SYRINGE (ML) INJECTION
Status: DISCONTINUED | OUTPATIENT
Start: 2024-01-09 | End: 2024-01-25 | Stop reason: HOSPADM

## 2024-01-09 RX ORDER — SODIUM CHLORIDE 0.9 % (FLUSH) 0.9 %
10 SYRINGE (ML) INJECTION EVERY 6 HOURS
Status: DISCONTINUED | OUTPATIENT
Start: 2024-01-10 | End: 2024-01-25 | Stop reason: HOSPADM

## 2024-01-09 RX ORDER — BENZONATATE 100 MG/1
100 CAPSULE ORAL 3 TIMES DAILY PRN
Status: DISCONTINUED | OUTPATIENT
Start: 2024-01-09 | End: 2024-01-25 | Stop reason: HOSPADM

## 2024-01-09 RX ORDER — PREDNISONE 20 MG/1
20 TABLET ORAL DAILY
Status: DISCONTINUED | OUTPATIENT
Start: 2024-01-10 | End: 2024-01-10

## 2024-01-09 RX ADMIN — RIFAXIMIN 550 MG: 550 TABLET ORAL at 09:01

## 2024-01-09 RX ADMIN — THIAMINE HYDROCHLORIDE 100 MG: 100 INJECTION, SOLUTION INTRAMUSCULAR; INTRAVENOUS at 09:01

## 2024-01-09 RX ADMIN — PANTOPRAZOLE SODIUM 40 MG: 40 INJECTION, POWDER, FOR SOLUTION INTRAVENOUS at 09:01

## 2024-01-09 RX ADMIN — ENOXAPARIN SODIUM 40 MG: 40 INJECTION SUBCUTANEOUS at 05:01

## 2024-01-09 RX ADMIN — OSELTAMIVIR PHOSPHATE 75 MG: 75 CAPSULE ORAL at 09:01

## 2024-01-09 RX ADMIN — CEFTRIAXONE SODIUM 2 G: 2 INJECTION, POWDER, FOR SOLUTION INTRAMUSCULAR; INTRAVENOUS at 09:01

## 2024-01-09 RX ADMIN — FOLIC ACID 1 MG: 5 INJECTION, SOLUTION INTRAMUSCULAR; INTRAVENOUS; SUBCUTANEOUS at 10:01

## 2024-01-09 RX ADMIN — RIFAXIMIN 550 MG: 550 TABLET ORAL at 08:01

## 2024-01-09 RX ADMIN — PREDNISONE 40 MG: 20 TABLET ORAL at 09:01

## 2024-01-09 NOTE — NURSING
Patient pulled NG tube for tube feeding out. Notified team 4, spoke with Dr. Arora. Nurse also informed Dr. Arora patient completed and passed swallow study. Per Dr. Arora okay to leave ng tube out. Patient

## 2024-01-09 NOTE — PROGRESS NOTES
Inpatient Nutrition Assessment    Admit Date: 1/1/2024   Total duration of encounter: 8 days   Patient Age: 48 y.o.    Nutrition Recommendation/Prescription     ADAT to Low Na diet, Diet consistency per SLP with Suplena (provides 420 kcal, 11 g protein per serving) TID   If unable to tolerate po diet resume TF--Suplena @ 40ml/hr; increase as tolerated to goal rte 45 ml/hr (23 hr cycle) to provide 2070 calories, 47 gm protein, 763 ml free water. Flush tube with 125 ml water every 4hrs.  Suggest MVI, continue folic acid/thiamine 2 ETOH abuse.   Biweekly wt    Communication of Recommendations: reviewed with nurse    Nutrition Assessment     Malnutrition Assessment/Nutrition-Focused Physical Exam    Malnutrition Context: acute illness or injury (01/04/24 1119)  Malnutrition Level: moderate (01/04/24 1119)  Energy Intake (Malnutrition): other (see comments) (NPO x3 day; unsure oral intake PTA) (01/04/24 1119)  Weight Loss (Malnutrition): other (see comments) (unable to obtain wt hx; pt with AMS) (01/04/24 1119)  Subcutaneous Fat (Malnutrition): mild depletion (01/04/24 1119)  Orbital Region (Subcutaneous Fat Loss): mild depletion  Upper Arm Region (Subcutaneous Fat Loss): mild depletion     Muscle Mass (Malnutrition): mild depletion (01/04/24 1119)     Clavicle Bone Region (Muscle Loss): mild depletion         A minimum of two characteristics is recommended for diagnosis of either severe or non-severe malnutrition.    Chart Review    Reason Seen: follow-up    Malnutrition Screening Tool Results   Have you recently lost weight without trying?: Unsure  Have you been eating poorly because of a decreased appetite?: Yes   MST Score: 3   Diagnosis:  ETOH WD, DT, influenza A, PNA, Decompensated cirrhosis, Hep C+, anemia , Malnutrition, hypernatremia    Relevant Medical History: ETOH misuse     Nutrition-Related Medications:  folic acid, pantoprazole,  thiamine, rocephin, Prednisone  Calorie Containing IV Medications: no  "significant kcals from medications at this time    Nutrition-Related Labs:  (1-4) NH 90.1(H)   1/9/24 -- H/H 10/33 L, K 3.7, BUN 18.4, Cr 0.6, Glu 131 H, AST 87 H, ALT 66 H    Nutrition Orders:   Diet full liquid  Dietary nutrition supplements Suplena; TID     Appetite/Oral Intake: poor/0-25% of meals    Factors Affecting Nutritional Intake: impaired cognitive status/motor control    Food/Evangelical/Cultural Preferences: unable to obtain    Food Allergies: unable to obtain    Wound(s):   small ulceration to buttock    Last Bowel Movement: 01/08/24    Comments    1/9/24 -- Pt pulled out NGT this am for TF; SLP completed bedside swallow with recs to begin FL diet only at this time, will order Suplena TID to supplement meals; Monitor diet tolerance/po intake for need to resume TF; Rectal tube now removed, loose stools improved; no new ammonia level noted, lactulose not ordered at this time noted    (1/5) Pt remains NPO; started on TF yesterday; currently TF infusing @ 40ml/hr; RN reported to achieved goal rate later today; goal rate should meet estimated needs. Labs acknolwedged.     (1/4) Pt remains NPO x3 days ; AMS/on bipap; on 1/3--ST unable to complete swallow eval 2 pt on Bipap; no new wt. Both TF/PPN recs provided for nutrition support; discussed with RN caring for pt. Noted NH4--elevated to 90; no lactulose ; lowered protein recs until level decreases.     (1/2) Received MST for unsure wt loss/ appetite. Pt not alert during rounds; AMS; unable to obtain diet/wt hx at this time; PPN and diet recs provided. Suggest ST swallow eval prior to diet progression. Abnormal labs noted: Tbili (H)--hx ETOH abuse. Suggest continue folic acid/thiamine tx.     Anthropometrics    Height: 5' 4" (162.6 cm) Height Method: Stated  Last Weight: 54.4 kg (120 lb) (01/04/24 1000) Weight Method: Bed Scale  BMI (Calculated): 20.6  BMI Classification: normal (BMI 18.5-24.9)        Ideal Body Weight (IBW), Male: 130 lb     % Ideal Body " Weight, Male (lb): 92.31 %      Usual Body Weight (UBW), kg:  (pt unable to give UBW; no wt hx EMR)        Usual Weight Provided By: EMR weight history    Wt Readings from Last 5 Encounters:   24 54.4 kg (120 lb)     Weight Change(s) Since Admission: no wt hx   Wt Readings from Last 1 Encounters:   24 1000 54.4 kg (120 lb)   24 0156 54.4 kg (120 lb)   24 1255 54.4 kg (120 lb)   Admit Weight: 54.4 kg (120 lb) (24 1255), Weight Method: Bed Scale    Estimated Needs    Weight Used For Calorie Calculations: 54.4 kg (119 lb 14.9 oz)  Energy Calorie Requirements (kcal): 1904 kcal/d; 35 federico/kg/wt gain  Energy Need Method: Kcal/kg  Weight Used For Protein Calculations: 54.4 kg (119 lb 14.9 oz)  Protein Requirements: 54 gm protein/d; 1.0 gm /kg --limit protein--NH4 level elevated  Fluid Requirements (mL): 1904 ml/d; 1ml/federico  Temp (24hrs), Av.7 °F (36.5 °C), Min:97.4 °F (36.3 °C), Max:97.9 °F (36.6 °C)       Enteral Nutrition    Formula:  None  Rate/Volume: 0ml/hr  Water Flushes:0  Additives/Modulars: none at this time  Route:  none  Method:  none  Total Nutrition Provided by Tube Feeding, Additives, and Flushes:  Calories Provided  0 kcal/d, 0% needs   Protein Provided  0 g/d, 0% needs   Fluid Provided  0 ml/d, 0% needs   Continuous feeding calculations based on estimated 23 hr/d run time unless otherwise stated.    Parenteral Nutrition    Patient not receiving parenteral nutrition support at this time.    Evaluation of Received Nutrient Intake    Calories: not meeting estimated needs  Protein: not meeting estimated needs    Patient Education    Not applicable.    Nutrition Diagnosis     PES: Inadequate oral intake related to acute illness as evidenced by < 50% nutrition intake, FL diet only. (active)    Interventions/Goals     Intervention(s): general/healthful diet, commercial beverage, multivitamin/mineral supplement therapy, and collaboration with other providers    Goal: Meet greater  than 80% of nutritional needs by follow-up. (goal progressing)    Monitoring & Evaluation     Dietitian will monitor food and beverage intake, enteral nutrition intake, weight, and glucose/endocrine profile.    Nutrition Risk/Follow-Up: high (follow-up in 1-4 days)   Please consult if re-assessment needed sooner.

## 2024-01-09 NOTE — PLAN OF CARE
Problem: Physical Therapy  Goal: Physical Therapy Goal  Description: Goals to be met by: dc     Patient will increase functional independence with mobility by performin. Supine to sit with Modified [place]  2. Sit to stand transfer with Modified [place]  3. Gait  x 130 feet with Supervision using Rolling Walker.     Outcome: Ongoing, Progressing

## 2024-01-09 NOTE — PLAN OF CARE
Problem: Occupational Therapy  Goal: Occupational Therapy Goal  Description: Patient will perform feeding with min A when able to tolerate PO diet.     Patient will perform grooming with mod assist while seated EOB.    Patient will perform toileting with max assist using BSC.    Patient will perform toilet transfer with max assist with use of RW.    Patient will perform upper body dressing with max assist while seated EOB.       Outcome: Ongoing, Progressing

## 2024-01-09 NOTE — PLAN OF CARE
Problem: Skin Injury Risk Increased  Goal: Skin Health and Integrity  Intervention: Optimize Skin Protection  Flowsheets (Taken 1/9/2024 0608)  Pressure Reduction Devices: positioning supports utilized  Skin Protection: tubing/devices free from skin contact  Head of Bed (HOB) Positioning: HOB at 30-45 degrees     Problem: Impaired Wound Healing  Goal: Optimal Wound Healing  Intervention: Promote Wound Healing  Flowsheets (Taken 1/9/2024 0608)  Activity Management: Rolling - L1     Problem: Infection  Goal: Absence of Infection Signs and Symptoms  Intervention: Prevent or Manage Infection  Flowsheets (Taken 1/9/2024 0608)  Infection Management: aseptic technique maintained  Isolation Precautions: precautions initiated

## 2024-01-09 NOTE — PT/OT/SLP EVAL
"[facility]  Cranial Nerve Exam and Clinical Swallow Exam  Re-evaluation      Name: Franco Hein   MRN: [id number]    Therapy Diagnosis: suspect oropharyngeal dysphagia    Physician: Sagrario Rosenthal MD     Date of Evaluation:  01/09/2024    Speech Start Time:  1015  Speech Stop Time:  1030     Speech Total Time (min):  15 min    Billable Minutes: Eval Swallow and Oral Function 15    Procedure Min.   Swallow and Oral Function Evaluation   15     Recommendations:     Consistency Recommendations: Initiate Full Liquids  Precautions:supervision recommended, feed only when alert, sit as upright as possible, upright 30 minutes after meals, alternate food and liquid, decrease bite/drink size, and frequent oral care  Risk Management: use good oral hygiene , sit upright for all PO intake, and increase physical mobility as tolerated  Specialist Referrals:   Ancillary Tests:   Therapy: Dysphagia therapy is recommended including TBD.  Frequency:  2 days a week  Follow-up exam: monitor for MBSS  Discharge disposition: low intensity     Subjective:       Chief Complaint: "Delirium tremens"    Active Ambulatory Problems     Diagnosis Date Noted    No Active Ambulatory Problems     Resolved Ambulatory Problems     Diagnosis Date Noted    No Resolved Ambulatory Problems     No Additional Past Medical History          HISTORY & PHYSICAL:  Per medical record: "Franco Hein is  48 y.o. male with a PMH of ETOH misuse disorder who presented to [facility] ED on 1/1/2024 with complaint of jaundice, weakness for 3 weeks, and AMS. Patient was brought in by a friend who states patient is a daily drinker who has not been able to go to a store for the last 2 days, but patient is noted to have a positive ethanol on admission. Patient unable to provide history given his mental status and was only able to say his name and confirm he drinks alcohol but does not do drugs. Patient was tachypneic, " "tachycardic, and tremulous, on presentation with jaundice. Admitted to ICU given altered mental status and concern for decompensation in the setting of alcohol withdrawal and influenza A, also found to have strep pneumonia bacteremia, on appropriate IV antibiotics.      Interval History:   NAEO. Patient awake, oriented to name only. Difficult to redirect but able to follow simple commands. Downgraded yesterday from ICU, VS and labs remain stable. Tolerating HFNC 4 lpm, will attempt to wean to low flow NC today. Still having high output via rectal tube, will hold lactulose today. Repeat blood cultures negative x3 days now. Attempt to contact family unsuccessful yesterday." Speech pathology consulted to re-assess swallow    Imaging:  CXR: 1/5/24  Impression:     Bilateral consolidation stable to slightly improved.          PREVIOUS SPEECH THERAPY:  CSE: 1/3/24  IMPRESSION:   Patient presents with suspected oropharyngeal dysphagia 2/2 AMS and lethargy. Pt currently on venti-mask for this assessment. Oral phase remarkable for reduced bolus acceptance from utensils. Patient required max verbal and tactile cues to accept bolus. Oral hold with ice chips appreciated. Pharyngeal phase characterized by suspected delay with swallow timing. Cough post swallow appreciated. Suspect airway invasion. Given patient's lethargy and AMS, he appears at risk for aspiration at this time. SLP to follow for dysphagia management.         General Information:  Affect: Alert  Cooperative  Distractible  Oxygen:  room air  Hearing: WFL  Orientation:Ox1        Objective:       Oral motor exam limited.    Other information:  Volitional Swallow: Able to palpate laryngeal rise  Mucosal Quality: No abnormal findings  Secretion Management: Secretion Mgmt: adequate  Dentition: Good condition for speech and mastication     Predisposing dysphagia risk factors: AMS  Clinical signs of possible chronic dysphagia: coughing with and without PO  Precipitating " dysphagia risk factors: AMS    Pain:   0/10  Pain Location / Description: no pain reported    Assessment :     [place] Swallow Protocol:  The [place] Swallow Protocol was administered. The patient was alert and provided the instructions prior to the beginning of the protocol. The patient consumed 3 oz before putting the cup down. Patient drank with consecutive swallows. Patient with cough. Patient with overt signs or symptoms of penetration/aspiration. Patient  failed the screener.    [place] Swallow Protocol dictates patient remain NPO if fail screener; (Marlinr et al. 2014) however, an objective swallow assessment is not available at this time, patient will remain NPO until objective assessment is completed unless otherwise indicated. SLP is recommending MBSS assess swallow effectiveness, ID/rule out penetration and aspiration, make appropriate recommendations regarding safest diet consistency, effective compensatory strategies and safe eating environment.       PO Trials:   Patient presented with:   ICE CHIPS: SLP fed  THIN:   straw sips thin liquids  PUREE: SLP fed  MINCED AND MOIST: DNT  SOFT AND BITE SIZED: DNT  SOLID: DNT        Limitations: poor endurance    IMPRESSION:   Patient alert improved from previous attempts. Oral phase WFL for consistencies presented. No pocketing. Pharyngeal phase remarkable for coughing with PO trials and without PO trials. Cough does appear wet. Based on this assessment, patient ok to initiate full liquids. Aspiration precautions. SLP to follow for dysphagia management.         Goals:     LONG TERM GOAL    1.Franco Hein will tolerate full liquids without overt s/sx of aspiration to maintain appropriate nutrition and hydration.  Initial   2. Franco Hein will participate in and MBSS to assess swallow effectiveness, ID/rule out penetration and aspiration, make appropriate recommendations regarding safest diet consistency, effective compensatory strategies and safe eating  environment. monitor     SHORT TERM GOAL    Franco Hein  will participate in dysphagia program to restore and maintain current labial, lingual and laryngeal strength and ROM.  Initial   2. Franco Hein and/or family will be educated on the recommended aspiration precautions and swallowing strategies.  Initial           The Functional Oral Intake Scale (FOIS) is an ordinal scale that is used to assess the current status and meaningful change in the oral intake. FOIS levels include:        TUBE DEPENDENT   (Levels 1-3) 1. No oral intake    2. Tube dependent with minimal/inconsistent oral intake    3. Tube supplements with consistent oral intake          TOTAL ORAL INTAKE (Levels 4-7) 4. Total oral intake of a single consistency    5. Total oral intake of multiple consistencies requiring special preparation    6. Total oral intake with no special preparation, but must avoid specific foods or liquid items    7. Total oral intake with no restrictions   Patient is currently judged to be at FOIS Level 5     Education:  Aspiration precautions and Recommendations     Learners: Patient,  Nurse (Eyal), were educated on the results and recommendations of this evaluation. All expressed understanding. Patient will benefit from ongoing education.    Barriers to Learning: participation level    Teaching Method: Verbal    *If this is the last documented evaluation/treatment note, then it will signify discharge from acute care prior to discharge from speech services and will serve as the discharge summary.*    Reference:   American Speech-Language-Hearing Association. (n.d.b). Adult dysphagia. (Practice Portal). https://www.felipe.org/practice-portal/clinical-topics/adult-dysphagia/  Bertin SElyse T. (2018). Use of modified diets to prevent aspiration in oropharyngeal dysphagia: Is current practice justified?. BMC Geriatrics, 18(1), 1-10.  BERKLEY Orona., LEONIDES Hall, RAAD Staley, & JOVANI Live (2002). Predictors of aspiration  pneumonia in nursing home residents.  Dysphagia, 17(4), 298-307.  LEONIDES Weiss (2016). Best practices for dehydration prevention. Perspectives of the [employer] Special Interest Groups - SIG 13, 1(2), 72- 80.    [place] Swallow Protocol Validation Information   1. Three-ounce water swallow test validation first reported on 44 stroke patients by Jack et al. (1992). Failure required referral for objective (VFSS) dysphagia test.   2. A revised 3-ounce water swallow challenge administered to 3,000 hospitalized patients with 14 distinct diagnoses and referenced with FEES as the standard correctly predicted aspiration 96.5% of the time, with a negative predictive value of 97.9%, and a false negative rate of ?2.0%. (RONAN Martinez. & Tammy, S.B. [2008]. Clinical utility of the 3-ounce water swallow test. Dysphagia, 23, 244-250.)   3. Validation study of [place] Swallow Protocol was reported using 25 subjects with categorical diagnoses of esophageal surgery, head & neck cancer, neurosurgery, medical issues, or neurological (CAV, MS, TBI) and using VFSS as the standard reference. Seven participants passed and 18 failed the 3-ounce swallow challenge. Of the 18 who failed, 14 aspirated on VFSS (true positives) and 4 did not aspirate on VFSS (false positives). Sensitivity for the protocol = 100%, specificity = 64%, positive predictive value = 78%, and negative predictive value = 100%. All participants who passed the protocol, i.e., deemed to have no aspiration risk, also did not aspirate during VFSS. (DILLON Martinez., DALTON Fry, & Tammy, S.B. [2014). Validation of the [place] Swallow Protocol: A prospective double-blinded videofluoroscopic study. Dysphagia, 29, 199-203.          Kain Barlow M.S. [facility]-SLP  [facility]

## 2024-01-09 NOTE — PROGRESS NOTES
[facility] Medicine Wards Progress Note     Resident Team: [facility] Medicine List 4  Attending Physician: Kamryn Estevez MD  Resident: MD Galo  Intern: DO Ulysses       Subjective:      Brief HPI:  Franco Hein is  48 y.o. male with a PMH of ETOH misuse disorder who presented to [facility] ED on 2024 with complaint of jaundice, weakness for 3 weeks, and AMS. Patient was brought in by a friend who states patient is a daily drinker who has not been able to go to a store for the last 2 days, but patient is noted to have a positive ethanol on admission. Patient unable to provide history given his mental status and was only able to say his name and confirm he drinks alcohol but does not do drugs. Patient was tachypneic, tachycardic, and tremulous, on presentation with jaundice. Admitted to ICU given altered mental status and concern for decompensation in the setting of alcohol withdrawal and influenza A, also found to have strep pneumonia bacteremia, on appropriate IV antibiotics.      Interval History:   NAEO. Patient more awake and alert today able to answer simple questions and follow commands; up with PT with 2-person assist but improving daily. Difficulty speaking due to fatigue, but oriented to person and place; incorrectly guessed date stated . Will attempt  in AM as he was too tired to continue conversation. He removed his NGT this morning but, per nurse, passed swallow and is cleared for clear liquids so will start clear liquid diet today and progress as tolerated. Blood cultures remain negative x4 days, VS stable and labs improving slowly.       Review of Systems:  ROS completed and negative except as indicated above.     Objective:     Last 24 Hour Vital Signs:  BP  Min: 132/83  Max: 154/88  Temp  Av.7 °F (36.5 °C)  Min: 97.4 °F (36.3 °C)  Max: 97.9 °F (36.6 °C)  Pulse  Av.3  Min: 86  Max: 92  Resp  Av.7  Min: 19  Max: 20  SpO2  Av.8 %  Min: 89 %  Max: 100 %  I/O last 3  completed shifts:  In: -   Out: 900 [Urine:900]    Physical Examination:  General: disheveled, ill-appearing though non-toxic  Eye: PERRLA, EOMI, scleral icterus noted  HENT: NC/AT, moist mucus membranes  Neck: full range of motion, no thyromegaly or lymphadenopathy  Respiratory: Rhonchi noted bilaterally, satting well on oxymizer, in NAD  Cardiovascular: regular rate and rhythm without murmurs, gallops or rubs  Gastrointestinal: soft, non-tender, non-distended with normal bowel sounds, without masses to palpation  Musculoskeletal: no obvious deformities, full range of motion of all extremities/spine without limitation or discomfort  Integumentary: spider angiomas present on upper chest, no caput medusa, no rash  Extremities: radial and DP pulses 2+ bilaterally, no LE edema  Neurologic: Pt awake, oriented to self and place only. Responds to verbal stimuili; able to voice his name and answer yes/no questions. Following commands appropriately.       Laboratory:  Most Recent Data:  CBC:   Lab Results   Component Value Date    WBC 14.41 (H) 01/09/2024    HGB 10.2 (L) 01/09/2024    HCT 33.1 (L) 01/09/2024    PLT 70 (L) 01/09/2024    MCV 94.6 (H) 01/09/2024    RDW 19.8 (H) 01/09/2024     WBC Differential:   Recent Labs   Lab 01/05/24  0312 01/06/24  0427 01/07/24  0320 01/08/24  0305 01/09/24  0342   WBC 11.56* 10.84 8.71 12.45* 14.41*   HGB 11.0* 10.6* 10.6* 10.6* 10.2*   HCT 33.2* 33.1* 33.7* 33.1* 33.1*   PLT 86* 72* 72* 71* 70*   MCV 89.0 89.5 91.1 92.7 94.6*       BMP:   Lab Results   Component Value Date     (H) 01/09/2024    K 3.7 01/09/2024    CO2 31 (H) 01/09/2024    BUN 18.4 01/09/2024    CREATININE 0.60 (L) 01/09/2024    CALCIUM 8.0 (L) 01/09/2024    MG 2.00 01/09/2024    PHOS 2.9 01/09/2024     LFTs:   Lab Results   Component Value Date    ALBUMIN 1.5 (L) 01/09/2024    BILITOT 5.7 (H) 01/09/2024    AST 87 (H) 01/09/2024    ALKPHOS 329 (H) 01/09/2024    ALT 66 (H) 01/09/2024     Coags:   Lab Results  "  Component Value Date    INR 2.2 (H) 01/09/2024    PROTIME 24.8 (H) 01/09/2024    PTT 38.6 (H) 01/01/2024     FLP: No results found for: "CHOL", "HDL", "LDLCALC", "TRIG", "CHOLHDL"  DM:   Lab Results   Component Value Date    CREATININE 0.60 (L) 01/09/2024     Thyroid: No results found for: "TSH", "FREET4", "R0WRRLO", "C3EGOOS", "THYROIDAB"  Anemia:   Lab Results   Component Value Date    IRON 39 (L) 01/01/2024    TIBC 203 (L) 01/01/2024    FERRITIN 116.00 01/01/2024    HVDZPLHT32 >2,000 (H) 01/01/2024    FOLATE 18.3 01/01/2024     Cardiac:   Lab Results   Component Value Date    TROPONINI 0.029 01/01/2024    BNP 89.3 01/01/2024       Microbiology Data Reviewed: yes  Pertinent Findings:  1/1/24: bld cx +strep pneumonia pan sensitive x1 bottle  1/4/24: bld cx x2 negative x4 days    Other Results:  TTE 1/4/24: EF 62%, no evidence of vegetation      Radiology:  Imaging Results              CT Chest Abdomen Pelvis Without Contrast (XPD) (Final result)  Result time 01/02/24 09:44:01      Final result by Tika Mcknight MD (01/02/24 09:44:01)                   Impression:      1. Multilobar consolidative opacities within the lungs  2. Cirrhotic morphology of the liver with mass at the dome of the liver.  Evaluation is moderately limited without contrast and due to beam hardening artifact related to patient positioning.  Recommend liver protocol CT abdomen without and with contrast.  3. Ascites  4. Indeterminate right adrenal nodule.  Continued attention recommended on follow-up exams.  5. The preliminary and final reports are concordant.      Electronically signed by: Tika Mcknight  Date:    01/02/2024  Time:    09:44               Narrative:    EXAMINATION:  CT CHEST ABDOMEN PELVIS WITHOUT CONTRAST(XPD)    CLINICAL HISTORY:  Suspect pneumonia, biliary obstruction;    TECHNIQUE:  Helical acquisition through the chest, abdomen and pelvis without  IV administration of contrast. Axial, sagittal and coronal " reformats interpreted.    Automated tube current modulation, weight-based exposure dosing, and/or iterative reconstruction technique utilized to reach lowest reasonably achievable exposure rate.    DLP: 1042 mGy*cm    COMPARISON:  Chest radiograph 01/01/2024    FINDINGS:  BASE OF NECK: No significant abnormality.    HEART: Normal size. There are coronary artery calcifications.  Low-density blood pool with visualization of the interventricular septum compatible with anemia.    THORACIC VASCULATURE: Thoracic aorta is unremarkable.    SIRISHA/MEDIASTINUM: Small nonspecific mediastinal lymph nodes.  Evaluation of hilar lymphadenopathy is limited without contrast.    AIRWAYS: Patent.    LUNGS/PLEURA: Multilobar consolidative opacities within the lungs.    THORACIC SOFT TISSUES: Unremarkable.    LIVER: Limited evaluation the liver due to lack of intravenous contrast and placement of the patient's arms over the abdomen which causes beam hardening artifact.  Heterogeneous attenuation of the liver.  There appears to be a mass at the dome of the liver.  Caudate lobe hypertrophy and changes of cirrhosis.    BILIARY: The gallbladder does not appear to be overly distended.    PANCREAS: Nonspecific retroperitoneal edema.  Unable to assess for changes of pancreatitis given generalized fluid overload.    SPLEEN: Upper limits of normal in size.    ADRENALS: There is a 1.6 cm right adrenal nodule.  Unable to accurately measure internal attenuation due to beam hardening artifact.    KIDNEYS/URETERS: Hyperdense renal pyramids.  This can be related to volume status/dehydration or medullary calcinosis.    GI TRACT/MESENTERY: Evaluation of the bowel is limited without contrast. Bowel is normal in caliber without evidence of obstruction.    PERITONEUM: Small volume ascites.No free air.    LYMPH NODES: No enlarged lymph nodes by size criteria.    ABDOMINOPELVIC VASCULATURE: Normal caliber abdominal aorta.  Large periumbilical vein.    BLADDER:  Normal appearance given degree of distention.    REPRODUCTIVE ORGANS: Normal as visualized.    ABDOMINAL WALL: Small fat containing right inguinal hernia.  Mild body wall edema.    BONES: Anterolisthesis at the lumbosacral junction.                        Preliminary result by Santana Lerma MD (01/01/24 22:56:45)                   Impression:    1. No intrahepatic or extrahepatic biliary duct dilatation is seen.  2. Note of subtle increased attenuation in the medullary portions of the bilateral kidneys which may reflect medullary calcinosis. Correlate with clinical and laboratory findings as regards additional evaluation and follow-up.  3. There is multifocal patchy dense consolidation of both the lungs with predominant involvement of the left upper and right lower lobes. This is consistent with multilobar pneumonia. Correlate with clinical and laboratory findings as regards additional evaluation and follow-up to full resolution.  4. There is a 2.8 x 2 cm soft tissue density nodule in the right adrenal gland (series 2 image 98). This is of indeterminate etiology.  5. The margins of the liver appears somewhat nodular and irregular. This is suggestive of cirrhosis. Correlate with clinical and laboratory findings.  6. Details and other findings as discussed above.               Narrative:    START OF REPORT:  Technique: CT Scan of the chest abdomen and pelvis was performed without intravenous contrast with axial as well as sagittal and, coronal images.    Dosage Information: Automated Exposure Control was utilized 1041.63 mGy.cm.    Comparison: None.    Clinical History: Suspect pneumonia, biliary obstruction.    Findings:  Mediastinum: A few scattered subcentimeter mediastinal lymph nodes are seen.  Heart: The heart size is within normal limits.  Aorta: Unremarkable appearing aorta.  Pulmonary Arteries: Unremarkable.  Lungs: There is multifocal patchy dense consolidation of both the lungs with predominant  involvement of the left upper and right lower lobes.  Pleura: No effusions or pneumothorax are identified.  Abdomen:  Abdominal Wall: There is extensive stranding of the subcutaneous fat as well as the intraabdominal and intrapelvic fat consistent with anasarca edema. Correlate with clinical and laboratory findings.  Liver: The margins of the liver appears somewhat nodular and irregular. There are also prominent portosystemic collaterals consistent with portal hypertension.  Biliary System: No intrahepatic or extrahepatic biliary duct dilatation is seen.  Gallbladder: The gallbladder is not definitely identified on this noncontrast examination and may be contracted.  Pancreas: The pancreas appears unremarkable.  Spleen: The spleen appears unremarkable.  Adrenals: There is a 2.8 x 2 cm soft tissue density nodule in the right adrenal gland (series 2 image 98).  Kidneys: Note of subtle increased attenuation in the medullary portions of the bilateral kidneys which may reflect medullary calcinosis.  Aorta: The abdominal aorta appears unremarkable.  IVC: Unremarkable.  Bowel:  Esophagus: There is a small hiatal hernia.  Stomach: The stomach appears unremarkable.  Duodenum: Unremarkable appearing duodenum.  Small Bowel: The small bowel appears unremarkable.  Colon: Nondistended.  Appendix: The appendix is not identified but no inflammatory changes are seen in the right lower quadrant to suggest appendicitis.  Peritoneum: There is mild ascites. No intraperitoneal free gas is seen.    Pelvis:  Bladder: The bladder appears unremarkable.  Male:  Prostate gland: The prostate gland appears unremarkable.    Bony structures:  Dorsal Spine: There is mild to moderate spondylosis of the visualized dorsal spine.  Bony Pelvis: The visualized bony structures of the pelvis appear unremarkable.                                         X-Ray Chest 1 View (Final result)  Result time 01/01/24 14:23:11   Procedure changed from X-Ray Chest PA  And Lateral     Final result by Josef Sow MD (01/01/24 14:23:11)                   Impression:      Bilateral patchy airspace opacities greatest on the left.  Findings concerning for atypical infectious process.      Electronically signed by: Josef Sow  Date:    01/01/2024  Time:    14:23               Narrative:    EXAMINATION:  XR CHEST 1 VIEW    CLINICAL HISTORY:  cough;  Cough, unspecified    TECHNIQUE:  Single view of the chest    COMPARISON:  No prior imaging available for comparison.    FINDINGS:  Bilateral patchy airspace opacities greatest on the left.    The cardiomediastinal silhouette is within normal limits.    No acute osseous abnormality.                                       RADIOLOGY REPORT (Final result)  Result time 01/04/24 11:39:28      Final result by Unknown User (01/04/24 11:39:28)                                        Current Medications:     Infusions:       Scheduled:   cefTRIAXone (ROCEPHIN) IVPB  2 g Intravenous Q24H    enoxparin  40 mg Subcutaneous Q24H (prophylaxis, 1700)    folic acid (FOLVITE) IVPB  1 mg Intravenous Daily    pantoprazole  40 mg Intravenous Daily    [START ON 1/10/2024] predniSONE  20 mg Per NG tube Daily    rifAXIMin  550 mg Per NG tube BID    thiamine (B-1) 100 mg in dextrose 5 % (D5W) 100 mL IVPB  100 mg Intravenous Daily        PRN:  0.9%  NaCl infusion (for blood administration), acetaminophen, benzonatate, dextrose 10%, dextrose 10%, glucagon (human recombinant), glucose, glucose, sodium chloride 0.9%    Antibiotics and Day Number of Therapy:  Vanc x3 days  Zosyn/Azithromycin x4 days  Rocephin 2g on D4/14     Lines and Day Number of Therapy:  PIV      Assessment & Plan:     Decompensated Cirrhosis       - suspected etiology ETOH. Pt reportedly had hx of heavy drinking per friend who dropped him off       - Positive hepatitis C antibody; VL negative, no active infection        - MELD-Na: 28->25-> 22 today; Child Hoang: 13-> 11 today, class C,  Maddrey: 204       - Coagulopathy improving; INR 2.4 today        - Improvement in encephalopathy; hold lactulose for now as pt having diarrhea       - Continue Rifaxamin for hepatic encephalopathy       - Prednisone taper given Maddrey; 40 mg x1 week then 20 x1 week, then 10, then 5, then stop after 28 days. Currently on D1 of 20 mg dosing    Pneumonia 2/2 Influenza A       - Positive on 1/1/24       - Patient now significantly improved, afebrile, mild cough, and decreased O2 requirements; okay to stop isolation today       - Completed Oseltamivir 5 day course       - Wean HFNC to low flow as tolerated to keep sats > 92%       - Will hold QHS bipap for now, appears to be maintaining sats well when sleeping       - Possible superimposed Strep PNA given bacteremia and pneumonia; on appropriate Ceftriaxone per below    Strep Pneumoniae Bacteremia       - Blood culture positive x1/2 Streptococcus; [employer] confirmation strep pneumonia bacteremia which is pan-sensitive, resp culture /Gram stain collection pending.       - Repeat blood cultures 1/4/24  NGTD x4 days; TTE did not have any findings consistent with endocarditis.        - s/p Zosyn and Zithromax x4 days; MRSA PCR negative, discontinued vancomycin after D3.        - Continue Rocephin 2g daily (pan-sensitive strep pneumo), will need 14 days from negative repeat blood cultures; D5/14 currently as repeat blood culture from 1/4/24 NGTD x4 days.        - Will order PICC line today    Skin Breakdown to L buttocks       - Skin tear vs Stage II pressure ulcer       - Wound care consulted, appreciate recs       - Continue q2h turning and mepilex dsg        - Pt now with enteral nutrition, hopeful to aid in healing    Malnutrition  Hypernatremia       - Pt was on Supplena TF's; removed NGT himself but passed swallow with speech today       - Clear liquid diet per speech, will advance as recommended    CODE STATUS: Full  Access: PIV  Antibiotics: Ceftriaxone  Diet:  Clears  DVT Prophylaxis: Lovenox  GI Prophylaxis: Protonix  Fluids: n/a      Disposition: Patient continues to meet inpatient needs requiring 14 day course of IV abx for strep pneumoniae bacteremia; currently on day 5/14; self pay, likely undocumented and will need to stay for duration. Continues to recover slowly from decompensated cirrhosis as well, hopeful for resolution prior to end of antibiotics.          Sagrario Rosenthal MD  [facility] Internal Medicine HO[facility]

## 2024-01-09 NOTE — PT/OT/SLP EVAL
Physical Therapy Evaluation    Patient Name:  Franco Hein   MRN:  [id number]    Recommendations:     Therapy Intensity Recommendations at Discharge: Low Intensity Therapy  Discharge Equipment Recommendations: bedside commode, walker, rolling, wheelchair   Equipment to be obtained for discharge: rolling walker, bedside commode, wheelchair.  Barriers to discharge: fall risk and level of skilled assistance required    Assessment:     Franco Hein is a 48 y.o. male admitted with a medical diagnosis of Delirium tremens.  He presents with the following impairments/functional limitations:  weakness, impaired balance, decreased safety awareness, impaired skin, impaired endurance, impaired self care skills, decreased coordination, impaired functional mobility, impaired coordination, gait instability, decreased lower extremity function, impaired fine motor.    Rehab Prognosis: Good.    Patient would benefit from continued skilled acute PT services to: address above listed impairments/functional limitations; receive patient/caregiver education; reduce fall risk; and maximize independency/safety with functional mobility.    Recent Surgery: * No surgery found *      Plan:     During this hospitalization, patient to be seen 3 x/week to address the identified impairments/functional limitations via gait training, therapeutic activities, therapeutic exercises and progress toward the established goals.    Plan of Care Expires:  02/08/24    Subjective     Communicated with patient's nurse Eyal prior to session.    Patient agreeable to participate in evaluation.     Chief Complaint: none  Patient/Family Comments/goals: return home  Pain/Comfort:  Pain Rating 1: 0/10  Pain Rating Post-Intervention 1: 0/10    Patients cultural, spiritual, Judaism conflicts given the current situation: no    Social History  Living Environment: Patient  lives with friends  in a single level home, with 4 steps steps  outside.  Functional Level: Prior to admission patient was employed, ambulated without assistive device, and was independent in ADL's.  Equipment Used at Home: none  Equipment owned (not currently used): none.  Assistance Upon Discharge: unknown.    Objective:     Patient found supine in bed with    upon PT entry to room.    General Precautions: Standard,     Orthopedic Precautions:N/A   Braces: N/A  Respiratory Status: 2 liters/min O2 via oximizer    Vitals   After activity    O2 Sat %   97     Exams:  Orientation: Patient is person, situation  Commands: Patient follows commands consistently  Gross Motor Coordination: impaired  RLE ROM: WFL  RLE Strength: WFL  LLE ROM: WFL  LLE Strength: WFL    Functional Mobility:    Bed Mobility:  Supine to Sit: maximal assistance    Transfers:  Sit to Stand: moderate assistance and of 2 persons with hand-held assist    Gait:  Patient ambulated 1 ft with hand-held assist and maximal assistance and of 2 persons.  Unable to lift feet, pt. Needs assist with stepping and weight shifting. Flexed posture. Gait unable at this time.      Other Mobility:  not assessed    Balance:  Sit  Static: FAIR-: Maintains without assist but inconsistent   Dynamic: POOR: N/A  Stand  Static: POOR: Needs MODERATE assist to maintain  Dynamic: 0: N/A    Additional Treatment Session  n/a    Patient left supine in bed and with HOB elevated with all lines intact, call button in reach, and patient' nurse notified. Bed alarm on.    Education     Patient was instructed to utilize staff assistance for mobility/transfers.  White board updated regarding patient's safest level of mobility with staff assistance.    Goals     Multidisciplinary Problems       Physical Therapy Goals          Problem: Physical Therapy    Goal Priority Disciplines Outcome Goal Variances Interventions   Physical Therapy Goal     PT, PT/OT Ongoing, Progressing     Description: Goals to be met by: dc     Patient will increase functional  independence with mobility by performin. Supine to sit with Modified [facility]  2. Sit to stand transfer with Modified [facility]  3. Gait  x 130 feet with Supervision using Rolling Walker.                        History:   History reviewed. No pertinent past medical history.  No past surgical history on file.  Time Tracking:     PT Received On: 24  PT Start Time: 830     PT Stop Time: 910  PT Total Time (min): 40 min     Billable Minutes: Evaluation 40    2024

## 2024-01-09 NOTE — PT/OT/SLP EVAL
Occupational Therapy   Evaluation    Name: Franco Hein  MRN: [id number]  Admitting Diagnosis: Delirium tremens, decompensated cirrhosis, pneumonia 2/2 influenza A, strep pneumonia  Recent Surgery: * No surgery found *      Recommendations:     Discharge Recommendations: High Intensity Therapy  Discharge Equipment Recommendations:  bedside commode, wheelchair, walker, rolling  Barriers to discharge:  Decreased caregiver support    Assessment:     Franco Hein is a 48 y.o. male with a medical diagnosis of   Patient Active Problem List   Diagnosis    Delirium tremens    Influenza A       He presents with significant impairment in mobility, strength, requiring max A to transfer to sitting EOB, and fluctuating between mod A and CGA to maintain sitting balance Eob.  B hand restraints (mittens) removed for session and pt adhered to education not to pull at NG tube.  Pt attempted to follow all commands, in a mix of Kosovan and English, at times unable to understand pt d/t dysarthria but often able to clearly express answers to questions or express desires.      Performance deficits affecting function: weakness, impaired endurance, impaired sensation, impaired self care skills, impaired functional mobility, gait instability, impaired balance, impaired cognition, decreased upper extremity function, decreased lower extremity function, decreased safety awareness, pain, decreased ROM, impaired coordination, impaired fine motor, edema, impaired cardiopulmonary response to activity.      Rehab Prognosis: Good; patient would benefit from acute skilled OT services to address these deficits and reach maximum level of function.       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  Patient found HOB elevated with restraints, NG tube, telemetry, SCD upon OT entry to room.    General Precautions: Standard, droplet, fall, DT, NPO, other (see comments) (Serbian first language but able to speak/understand some English)  Orthopedic Precautions: N/A  Braces:    Respiratory Status: Room air  Pt had removed oximizer, noted on floor, O2 sat checked at 92%, nurse notified and stated she will monitor and okay to leave pt on room air at this time    Occupational Performance:    Bed Mobility:    Patient completed Supine to Sit with maximal assistance  Patient completed Sit to Supine with maximal assistance and 2 persons    Functional Mobility/Transfers:  Patient completed Sit <> Stand Transfer with moderate assistance and of 2 persons  with  hand-held assist   Functional Mobility: NA; pt fluctuated with sitting EOB from max A to SBA, leaning to R side, but following cues (verbal and tactile) to correct to midline and with good endurance for activities sitting EOB    Activities of Daily Living:  Feeding:  pt NPO,  NG tube in place at time of eval (note later in AM pt pulled his NG tube however per ST eval pt passed swallow test and able to tolerate PO intake)  Grooming: maximal assistance to perform oral hygiene seated EOB (hand  over hand assist to bring toothbrush to mouth, bring mouthwash to mouth, and pt did follow max cues to rinse and spit out mouthwash)  Upper Body Dressing: maximal assistance seated EOB  Lower Body Dressing: total assistance .  Toileting: total assistance incontinent at this time    Cognitive/Visual Perceptual:  Cognitive/Psychosocial Skills:     -       Oriented to: Person and somewhat to place, somewhat to situation   -       Follows Commands/attention:Follows one-step commands and required frequent repetition and commands limited to basic, single-step commands, in both Turkish and English  -       Safety awareness/insight to disability: impaired   -       Mood/Affect/Coping skills/emotional control: Lethargic and slow to respond at times but attempting to maintain attention to tall tasks and to follow commands    Physical Exam:  PROM BUE WFL, but pt did not follow cues to demonstrate AROM, with noted significant BUE weakness requiring hand over hand max A to bring liquids/utensils to mouth, but able to use BUE functionally for stabilization at bed surface during sitting EOB to help maintain balance    Treatment & Education:  Pt. educated on OT goals, POC, orientation to environment, and use of call bell (limited by presence of mitten restraints, nurse aware) for assist with transfers OOB or for any other needs due to fall risk.      Patient left HOB elevated with all lines intact, call button in reach, and nurse Keybrady present    GOALS:   Multidisciplinary Problems       Occupational Therapy Goals          Problem: Occupational Therapy    Goal Priority Disciplines Outcome Interventions   Occupational Therapy Goal     OT, PT/OT Ongoing, Progressing    Description: Patient will perform feeding with min A when able to tolerate PO diet.     Patient will perform grooming with mod assist while seated EOB.    Patient will perform toileting with max assist using BSC.    Patient will perform toilet transfer with max  assist with use of RW.    Patient will perform upper body dressing with max assist while seated EOB.                            History:     History reviewed. No pertinent past medical history.    No past surgical history on file.    Time Tracking:     OT Date of Treatment: 01/09/24  OT Start Time: 0831  OT Stop Time: 0915  OT Total Time (min): 44 min    Billable Minutes:Evaluation 44    1/9/2024

## 2024-01-10 LAB
ALBUMIN SERPL-MCNC: 1.3 G/DL (ref 3.5–5)
ALBUMIN/GLOB SERPL: 0.3 RATIO (ref 1.1–2)
ALP SERPL-CCNC: 210 UNIT/L (ref 40–150)
ALT SERPL-CCNC: 57 UNIT/L (ref 0–55)
AST SERPL-CCNC: 70 UNIT/L (ref 5–34)
BASOPHILS # BLD AUTO: 0.01 X10(3)/MCL
BASOPHILS NFR BLD AUTO: 0.1 %
BILIRUB SERPL-MCNC: 6.6 MG/DL
BUN SERPL-MCNC: 19.2 MG/DL (ref 8.9–20.6)
CALCIUM SERPL-MCNC: 7.8 MG/DL (ref 8.4–10.2)
CHLORIDE SERPL-SCNC: 111 MMOL/L (ref 98–107)
CO2 SERPL-SCNC: 29 MMOL/L (ref 22–29)
CREAT SERPL-MCNC: 0.61 MG/DL (ref 0.73–1.18)
EOSINOPHIL # BLD AUTO: 0.03 X10(3)/MCL (ref 0–0.9)
EOSINOPHIL NFR BLD AUTO: 0.3 %
ERYTHROCYTE [DISTWIDTH] IN BLOOD BY AUTOMATED COUNT: 19.7 % (ref 11.5–17)
GFR SERPLBLD CREATININE-BSD FMLA CKD-EPI: >60 MLS/MIN/1.73/M2
GLOBULIN SER-MCNC: 5.1 GM/DL (ref 2.4–3.5)
GLUCOSE SERPL-MCNC: 95 MG/DL (ref 74–100)
HCT VFR BLD AUTO: 32.7 % (ref 42–52)
HGB BLD-MCNC: 10.3 G/DL (ref 14–18)
IMM GRANULOCYTES # BLD AUTO: 0.1 X10(3)/MCL (ref 0–0.04)
IMM GRANULOCYTES NFR BLD AUTO: 0.9 %
LYMPHOCYTES # BLD AUTO: 1.1 X10(3)/MCL (ref 0.6–4.6)
LYMPHOCYTES NFR BLD AUTO: 10.4 %
MAGNESIUM SERPL-MCNC: 1.8 MG/DL (ref 1.6–2.6)
MCH RBC QN AUTO: 29.9 PG (ref 27–31)
MCHC RBC AUTO-ENTMCNC: 31.5 G/DL (ref 33–36)
MCV RBC AUTO: 94.8 FL (ref 80–94)
MONOCYTES # BLD AUTO: 0.92 X10(3)/MCL (ref 0.1–1.3)
MONOCYTES NFR BLD AUTO: 8.7 %
NEUTROPHILS # BLD AUTO: 8.39 X10(3)/MCL (ref 2.1–9.2)
NEUTROPHILS NFR BLD AUTO: 79.6 %
NRBC BLD AUTO-RTO: 0.3 %
PHOSPHATE SERPL-MCNC: 3.4 MG/DL (ref 2.3–4.7)
PLATELET # BLD AUTO: 56 X10(3)/MCL (ref 130–400)
PLATELETS.RETICULATED NFR BLD AUTO: 12 % (ref 0.9–11.2)
PMV BLD AUTO: ABNORMAL FL
POTASSIUM SERPL-SCNC: 3.8 MMOL/L (ref 3.5–5.1)
PROT SERPL-MCNC: 6.4 GM/DL (ref 6.4–8.3)
RBC # BLD AUTO: 3.45 X10(6)/MCL (ref 4.7–6.1)
SODIUM SERPL-SCNC: 146 MMOL/L (ref 136–145)
WBC # SPEC AUTO: 10.55 X10(3)/MCL (ref 4.5–11.5)

## 2024-01-10 PROCEDURE — A4216 STERILE WATER/SALINE, 10 ML: HCPCS

## 2024-01-10 PROCEDURE — 97535 SELF CARE MNGMENT TRAINING: CPT

## 2024-01-10 PROCEDURE — 84100 ASSAY OF PHOSPHORUS: CPT | Performed by: STUDENT IN AN ORGANIZED HEALTH CARE EDUCATION/TRAINING PROGRAM

## 2024-01-10 PROCEDURE — 21400001 HC TELEMETRY ROOM

## 2024-01-10 PROCEDURE — 63600175 PHARM REV CODE 636 W HCPCS: Performed by: STUDENT IN AN ORGANIZED HEALTH CARE EDUCATION/TRAINING PROGRAM

## 2024-01-10 PROCEDURE — 25000003 PHARM REV CODE 250

## 2024-01-10 PROCEDURE — 83735 ASSAY OF MAGNESIUM: CPT | Performed by: STUDENT IN AN ORGANIZED HEALTH CARE EDUCATION/TRAINING PROGRAM

## 2024-01-10 PROCEDURE — 97530 THERAPEUTIC ACTIVITIES: CPT

## 2024-01-10 PROCEDURE — 92610 EVALUATE SWALLOWING FUNCTION: CPT

## 2024-01-10 PROCEDURE — 85025 COMPLETE CBC W/AUTO DIFF WBC: CPT | Performed by: STUDENT IN AN ORGANIZED HEALTH CARE EDUCATION/TRAINING PROGRAM

## 2024-01-10 PROCEDURE — 25000003 PHARM REV CODE 250: Performed by: STUDENT IN AN ORGANIZED HEALTH CARE EDUCATION/TRAINING PROGRAM

## 2024-01-10 PROCEDURE — 80053 COMPREHEN METABOLIC PANEL: CPT | Performed by: STUDENT IN AN ORGANIZED HEALTH CARE EDUCATION/TRAINING PROGRAM

## 2024-01-10 PROCEDURE — 94761 N-INVAS EAR/PLS OXIMETRY MLT: CPT

## 2024-01-10 PROCEDURE — 97116 GAIT TRAINING THERAPY: CPT

## 2024-01-10 PROCEDURE — C9113 INJ PANTOPRAZOLE SODIUM, VIA: HCPCS | Performed by: STUDENT IN AN ORGANIZED HEALTH CARE EDUCATION/TRAINING PROGRAM

## 2024-01-10 PROCEDURE — 25000003 PHARM REV CODE 250: Performed by: INTERNAL MEDICINE

## 2024-01-10 RX ORDER — THIAMINE HCL 100 MG
100 TABLET ORAL DAILY
Status: DISCONTINUED | OUTPATIENT
Start: 2024-01-11 | End: 2024-01-25 | Stop reason: HOSPADM

## 2024-01-10 RX ORDER — PANTOPRAZOLE SODIUM 40 MG/1
40 TABLET, DELAYED RELEASE ORAL DAILY
Status: DISCONTINUED | OUTPATIENT
Start: 2024-01-11 | End: 2024-01-25 | Stop reason: HOSPADM

## 2024-01-10 RX ORDER — FOLIC ACID 1 MG/1
1 TABLET ORAL DAILY
Status: DISCONTINUED | OUTPATIENT
Start: 2024-01-10 | End: 2024-01-10

## 2024-01-10 RX ORDER — PREDNISONE 20 MG/1
20 TABLET ORAL DAILY
Status: DISCONTINUED | OUTPATIENT
Start: 2024-01-11 | End: 2024-01-15

## 2024-01-10 RX ORDER — THIAMINE HCL 100 MG
100 TABLET ORAL DAILY
Status: DISCONTINUED | OUTPATIENT
Start: 2024-01-10 | End: 2024-01-10

## 2024-01-10 RX ORDER — LACTULOSE 10 G/15ML
20 SOLUTION ORAL 3 TIMES DAILY PRN
Status: DISCONTINUED | OUTPATIENT
Start: 2024-01-10 | End: 2024-01-24

## 2024-01-10 RX ORDER — SODIUM CHLORIDE 9 MG/ML
INJECTION, SOLUTION INTRAVENOUS
Status: DISCONTINUED | OUTPATIENT
Start: 2024-01-10 | End: 2024-01-25 | Stop reason: HOSPADM

## 2024-01-10 RX ORDER — PANTOPRAZOLE SODIUM 40 MG/1
40 TABLET, DELAYED RELEASE ORAL DAILY
Status: DISCONTINUED | OUTPATIENT
Start: 2024-01-10 | End: 2024-01-10

## 2024-01-10 RX ORDER — FOLIC ACID 1 MG/1
1 TABLET ORAL DAILY
Status: DISCONTINUED | OUTPATIENT
Start: 2024-01-11 | End: 2024-01-25 | Stop reason: HOSPADM

## 2024-01-10 RX ADMIN — CEFTRIAXONE SODIUM 2 G: 2 INJECTION, POWDER, FOR SOLUTION INTRAMUSCULAR; INTRAVENOUS at 10:01

## 2024-01-10 RX ADMIN — BENZONATATE 100 MG: 100 CAPSULE ORAL at 01:01

## 2024-01-10 RX ADMIN — SODIUM CHLORIDE, PRESERVATIVE FREE 10 ML: 5 INJECTION INTRAVENOUS at 05:01

## 2024-01-10 RX ADMIN — THIAMINE HYDROCHLORIDE 100 MG: 100 INJECTION, SOLUTION INTRAMUSCULAR; INTRAVENOUS at 09:01

## 2024-01-10 RX ADMIN — RIFAXIMIN 550 MG: 550 TABLET ORAL at 08:01

## 2024-01-10 RX ADMIN — PREDNISONE 20 MG: 20 TABLET ORAL at 10:01

## 2024-01-10 RX ADMIN — FOLIC ACID 1 MG: 5 INJECTION, SOLUTION INTRAMUSCULAR; INTRAVENOUS; SUBCUTANEOUS at 09:01

## 2024-01-10 RX ADMIN — SODIUM CHLORIDE, PRESERVATIVE FREE 10 ML: 5 INJECTION INTRAVENOUS at 04:01

## 2024-01-10 RX ADMIN — SODIUM CHLORIDE, PRESERVATIVE FREE 10 ML: 5 INJECTION INTRAVENOUS at 12:01

## 2024-01-10 RX ADMIN — PANTOPRAZOLE SODIUM 40 MG: 40 INJECTION, POWDER, FOR SOLUTION INTRAVENOUS at 10:01

## 2024-01-10 RX ADMIN — SODIUM CHLORIDE: 9 INJECTION, SOLUTION INTRAVENOUS at 10:01

## 2024-01-10 RX ADMIN — RIFAXIMIN 550 MG: 550 TABLET ORAL at 10:01

## 2024-01-10 RX ADMIN — ENOXAPARIN SODIUM 40 MG: 40 INJECTION SUBCUTANEOUS at 05:01

## 2024-01-10 NOTE — PROCEDURES
"Franco Hein is a 48 y.o. male patient.    Temp: 98.2 °F (36.8 °C) (01/09/24 1537)  Pulse: 76 (01/09/24 1537)  Resp: 18 (01/09/24 1537)  BP: 119/79 (01/09/24 1537)  SpO2: (!) 90 % (01/09/24 1537)  Weight: 54.4 kg (120 lb) (01/04/24 1000)  Height: 5' 4" (162.6 cm) (01/04/24 1000)    PICC  Date/Time: 1/9/2024 5:39 PM  Performed by: Ricardo Deleon, RN  Consent Done: Yes  Time out: Immediately prior to procedure a time out was called to verify the correct patient, procedure, equipment, support staff and site/side marked as required  Preparation: skin prepped with ChloraPrep  Skin prep agent dried: skin prep agent completely dried prior to procedure  Sterile barriers: all five maximum sterile barriers used - cap, mask, sterile gown, sterile gloves, and large sterile sheet  Hand hygiene: hand hygiene performed prior to central venous catheter insertion  Location details: right brachial  Catheter type: single lumen  Catheter size: 4 Fr  Catheter Length: 12cm    Ultrasound guidance: yes  Vessel Caliber: medium and patent, compressibility normal  Number of attempts: 1  Post-procedure: blood return through all ports and sterile dressing applied            Name Ricardo Deleon RN  1/9/2024    "

## 2024-01-10 NOTE — PT/OT/SLP PROGRESS
Physical Therapy Treatment    Patient Name:  Franco Hein   MRN:  [id number]    Recommendations     Therapy Intensity Recommendations at Discharge: Low Intensity Therapy  Discharge Equipment Recommendations: bedside commode, walker, rolling, wheelchair   Barriers to discharge: fall risk, level of skilled assistance required, decreased endurance, impaired cognitive status, and decreased safety awareness    Assessment     Franco Hein is a 48 y.o. male admitted with a medical diagnosis of Delirium tremens.    He presents with the following impairments/functional limitations:  weakness, impaired balance, decreased safety awareness, impaired skin, impaired endurance, impaired cognition, impaired cardiopulmonary response to activity, impaired self care skills, decreased coordination, impaired functional mobility, impaired coordination, gait instability, decreased lower extremity function, impaired fine motor.    Rehab Prognosis: Good.    Patient would benefit from continued skilled acute PT services to: address above listed impairments/functional limitations; receive patient/caregiver education; reduce fall risk; and maximize independency/safety with functional mobility.    Recent Surgery: * No surgery found *      Plan     During this hospitalization, patient to be seen 3 x/week to address the identified impairments/functional limitations via gait training, therapeutic activities, therapeutic exercises and progress toward the established goals.    Plan of Care Expires:  02/08/24    Subjective     Communicated with patient's nurse Ramonita prior to session.    Patient agreeable to participate in treatment session.    Chief Complaint: none  Patient/Family Comments/goals: none stated  Pain/Comfort:  Pain Rating 1: 0/10  Pain Rating Post-Intervention 1: 0/10    Objective     Patient found supine in bed and with HOB elevated with    upon PT entry to room.    General Precautions: Standard,     Orthopedic  Precautions:N/A   Braces:    Respiratory Status: room air    Functional Mobility:    Bed Mobility:  Supine to Sit: modified independence    Transfers:  Sit to Stand: minimum assistance and of 2 persons with rolling walker    Gait:  Patient ambulated 50 ft with rolling walker and moderate assistance and of 2 persons.  Patient demonstrates :       unsteady gait       decreased niko       decreased step length       decreased foot/floor clearance       flexed posture.       Knee buckled once towards the end  Increased heart rate 131. Pt. Rested and it took a long time over 5 min to decrease to 121 bpm.    Other Mobility:  not assessed    Patient left in chair position in bed with all lines intact, call button in reach, tray table at bedside, patient' nurse notified, and friend present.    Goals     Multidisciplinary Problems       Physical Therapy Goals          Problem: Physical Therapy    Goal Priority Disciplines Outcome Goal Variances Interventions   Physical Therapy Goal     PT, PT/OT Ongoing, Progressing     Description: Goals to be met by: dc     Patient will increase functional independence with mobility by performin. Supine to sit with Modified [place] met 1/10/2024    2. Sit to stand transfer with Modified [place]  3. Gait  x 130 feet with Supervision using Rolling Walker.   Reviewed 1/10/2024                         Time Tracking     PT Received On: 01/10/24  PT Start Time: 1055     PT Stop Time: 1135  PT Total Time (min): 40 min     Billable Minutes: Gait Training 15 and Therapeutic Activity 10    01/10/2024

## 2024-01-10 NOTE — PT/OT/SLP PROGRESS
Occupational Therapy   Treatment    Name: Franco Hein  MRN: [id number]  Admitting Diagnosis:  Delirium tremens, decompensated cirrhosis, pneumonia 2/2 influenza A, strep pneumonia        Recommendations:     Discharge Recommendations: Moderate Intensity Therapy  Discharge Equipment Recommendations:  bedside commode, walker, rolling, shower chair  Barriers to discharge:  Decreased caregiver support    Assessment:     Franco Hein is a 48 y.o. male with a medical diagnosis of   Patient Active Problem List   Diagnosis    Delirium tremens    Influenza A      He presents with significant improvement in functional mobility, alertness, ability to follow commands, and tolerating full liquid diet.  However also note pt still very confused, and pt's Thai-speaking friend in room confirmed not d/t language barrier, that pt appears very confused to friend in his native language.  Pt on room air maintaining O2 sats above 95% throughout, but with noted elevated HR after ambulating 50 ft in jones (up to 131bpm and even with seated rest break remained elevated; noted by end of session roughly 8 min later after return to supine with HOB elevated to 116bpm, nurse notified)     Performance deficits affecting function are weakness, impaired endurance, impaired sensation, impaired self care skills, impaired functional mobility, gait instability, impaired balance, impaired cognition, decreased upper extremity function, decreased lower extremity function, decreased safety awareness, pain, decreased ROM, impaired coordination, impaired fine motor, edema, impaired cardiopulmonary response to activity.     Rehab Prognosis:  Good; patient would benefit from acute skilled OT services to address these deficits and reach maximum level of function.       Plan:     Patient to be seen 3 x/week to address the above listed problems via therapeutic exercises, therapeutic activities, self-care/home management  Plan of Care  Expires:  (DC)  Plan of Care Reviewed with: patient, friend    Subjective     Chief Complaint: pt very thirsty throughout session, nurse stated okay to give water, pt drank 3 cups over course of session; pt also requested assist to toilet and demonstrated partial continence with very loose stool  Patient/Family Comments/goals: in progress  Pain/Comfort:  Pain Rating 1: 0/10  Pain Rating Post-Intervention 1: 0/10    Objective:     Communicated with: nurse Shipman prior to session.  Patient found HOB elevated with telemetry, peripheral IV upon OT entry to room.    General Precautions: Standard, droplet, fall (liquid diet)    Orthopedic Precautions:N/A  Braces: N/A  Respiratory Status: Room air     Occupational Performance:     Bed Mobility:    Patient completed Supine to Sit with stand by assistance  Patient completed Sit to Supine with contact guard assistance and touching assist to manage BLE into bed, along with cues to initiate and sequence transfers      Functional Mobility/Transfers:  Patient completed Sit <> Stand Transfer with minimum assistance and of 2 persons  with  rolling walker   Patient completed Toilet Transfer Step Transfer technique with minimum assistance and of 2 persons with  rolling walker and and frequent cues for safety, sequencing, and step-by-step commands required d/t poor problem-solving ability and confusion, but very pleasant and attempting to follow all commands  Functional Mobility: min A x2 to ambulate in jones 50 ft using RW, but with noted increase in HR to 130 and required over 5 min to decrease to 116 at end of session    Activities of Daily Living:  Feeding:  stand by assistance with setup of all items and cues to initiate task, also note significant spillage  Grooming: stand by assistance for setup and cues to initiate washing face with washcloth seated EOB  Toileting: maximal assistance and of 2 persons ; pt requested assist to bathroom, however d/t elevated HR a BSC was placed  next to bed and pt was assisted with transfer using RW; note some loose stool in brief but once positioned at BSC pt demonstrated signficant continent bowel episode, loose stool, and continent bladder however d/t poor positioning on toilet seat, urine spilling onto floor.  Max A x2 for hygiene in standing and clothing management in standing at RW to replace with clean diaper.      Treatment & Education:  Pt.'s friend in room assisted with translations however pt does understand some English and OT/PT both with rudimentary Macedonian fluency.  Pt's friend spoke with  and will assist with locating pt's family or friends in [place].  Pt was educated on updated OT goals, POC, orientation to environment, and especially on use of call bell for assist with transfers OOB or for any other needs due to fall risk.  Pt verbalized understanding.      Patient left  eating his breakfast with HOB elevated  with all lines intact, call button in reach, and nurse notified    GOALS:   Multidisciplinary Problems       Occupational Therapy Goals          Problem: Occupational Therapy    Goal Priority Disciplines Outcome Interventions   Occupational Therapy Goal     OT, PT/OT Ongoing, Progressing    Description: Patient will perform feeding with min A when able to tolerate PO diet.     Patient will perform grooming with mod assist while seated EOB.    Patient will perform toileting with max assist using BSC.    Patient will perform toilet transfer with max assist with use of RW.    Patient will perform upper body dressing with max assist while seated EOB.                            Time Tracking:     OT Date of Treatment: 01/10/24  OT Start Time: 1055  OT Stop Time: 1135  OT Total Time (min): 40 min    Billable Minutes:Self Care/Home Management 25, part of which was cotx with PT addressing balance and transfer training during sitting EOB and transfer training to BSC requiring 2 persons d/t confusion, elevated HR, and poor standing  balance/endurance  Total Time 40 min, with 15 min assist to PT during gait    OT/[facility]: OT          1/10/2024

## 2024-01-10 NOTE — PLAN OF CARE
Problem: Adult Inpatient Plan of Care  Goal: Plan of Care Review  1/10/2024 0729 by Irena Dong RN  Outcome: Ongoing, Progressing  1/10/2024 0728 by Irena Dong RN  Outcome: Ongoing, Progressing  Goal: Patient-Specific Goal (Individualized)  1/10/2024 0729 by Irena Dong RN  Outcome: Ongoing, Progressing  1/10/2024 0728 by Irena Dong RN  Outcome: Ongoing, Progressing  Goal: Absence of Hospital-Acquired Illness or Injury  1/10/2024 0729 by Irena Dong RN  Outcome: Ongoing, Progressing  1/10/2024 0728 by Irena Dong RN  Outcome: Ongoing, Progressing  Goal: Optimal Comfort and Wellbeing  1/10/2024 0729 by Irena Dong RN  Outcome: Ongoing, Progressing  1/10/2024 0728 by Irena Dong RN  Outcome: Ongoing, Progressing  Goal: Readiness for Transition of Care  1/10/2024 0729 by Irena Dong RN  Outcome: Ongoing, Progressing  1/10/2024 0728 by Irena Dong RN  Outcome: Ongoing, Progressing     Problem: Infection  Goal: Absence of Infection Signs and Symptoms  1/10/2024 0729 by Irena Dong RN  Outcome: Ongoing, Progressing  1/10/2024 0728 by Irena Dong RN  Outcome: Ongoing, Progressing     Problem: Skin Injury Risk Increased  Goal: Skin Health and Integrity  1/10/2024 0729 by Irena Dong RN  Outcome: Ongoing, Progressing  1/10/2024 0728 by Irena Dong RN  Outcome: Ongoing, Progressing     Problem: Impaired Wound Healing  Goal: Optimal Wound Healing  1/10/2024 0729 by Irena Dong RN  Outcome: Ongoing, Progressing  1/10/2024 0728 by Irena Dong RN  Outcome: Ongoing, Progressing

## 2024-01-10 NOTE — PROGRESS NOTES
[facility] Medicine Wards Progress Note     Resident Team: [facility] Medicine List 4  Attending Physician: Kamryn Estevez MD  Resident: MD Galo  Intern: DO Ulysses       Subjective:      Brief HPI:  Franco Hein is  48 y.o. male with a PMH of ETOH misuse disorder who presented to [facility] ED on 2024 with complaint of jaundice, weakness for 3 weeks, and AMS. Patient was brought in by a friend who states patient is a daily drinker who has not been able to go to a store for the last 2 days, but patient is noted to have a positive ethanol on admission. Patient unable to provide history given his mental status and was only able to say his name and confirm he drinks alcohol but does not do drugs. Patient was tachypneic, tachycardic, and tremulous, on presentation with jaundice. Admitted to ICU given altered mental status and concern for decompensation in the setting of alcohol withdrawal and influenza A, also found to have strep pneumonia bacteremia, on appropriate IV antibiotics.      Interval History:   NAEO.  Patient's mental status continues to improve daily, watching television today up in bed and was able to have a good conversation, oriented to self and place and approximate date today.  Able to follow commands appropriately.  Tolerating clear liquid diet and able to be upgraded to soft bite size today.  Vital signs continue to be stable, patient's complaints are of fatigue and generalized weakness and still having loose stools.  Denies any other complaints at this time.      Review of Systems:  ROS completed and negative except as indicated above.     Objective:     Last 24 Hour Vital Signs:  BP  Min: 119/79  Max: 147/56  Temp  Av °F (36.7 °C)  Min: 97.4 °F (36.3 °C)  Max: 98.8 °F (37.1 °C)  Pulse  Av.5  Min: 67  Max: 111  Resp  Av  Min: 18  Max: 18  SpO2  Av.9 %  Min: 90 %  Max: 100 %  I/O last 3 completed shifts:  In: 1165.1 [P.O.:300; IV Piggyback:865.1]  Out: -     Physical  Examination:  General: disheveled, ill-appearing though non-toxic  Eye: PERRLA, EOMI, scleral icterus noted  HENT: NC/AT, moist mucus membranes  Neck: full range of motion, no thyromegaly or lymphadenopathy  Respiratory: Rhonchi noted bilaterally, satting well on oxymizer, in NAD  Cardiovascular: regular rate and rhythm without murmurs, gallops or rubs  Gastrointestinal: soft, non-tender, non-distended with normal bowel sounds, without masses to palpation  Musculoskeletal: no obvious deformities, full range of motion of all extremities/spine without limitation or discomfort  Integumentary: Jaundice, spider angiomas present on upper chest, no caput medusa, no rash  Extremities: radial and DP pulses 2+ bilaterally, no LE edema  Neurologic: Pt AAOx3, following commands and conversing appropriately. No focal neuro deficits appreciated, CN II-XII grossly intact.       Laboratory:  Most Recent Data:  CBC:   Lab Results   Component Value Date    WBC 10.55 01/10/2024    HGB 10.3 (L) 01/10/2024    HCT 32.7 (L) 01/10/2024    PLT 56 (L) 01/10/2024    MCV 94.8 (H) 01/10/2024    RDW 19.7 (H) 01/10/2024     WBC Differential:   Recent Labs   Lab 01/06/24  0427 01/07/24  0320 01/08/24  0305 01/09/24  0342 01/10/24  0331   WBC 10.84 8.71 12.45* 14.41* 10.55   HGB 10.6* 10.6* 10.6* 10.2* 10.3*   HCT 33.1* 33.7* 33.1* 33.1* 32.7*   PLT 72* 72* 71* 70* 56*   MCV 89.5 91.1 92.7 94.6* 94.8*       BMP:   Lab Results   Component Value Date     (H) 01/10/2024    K 3.8 01/10/2024    CO2 29 01/10/2024    BUN 19.2 01/10/2024    CREATININE 0.61 (L) 01/10/2024    CALCIUM 7.8 (L) 01/10/2024    MG 1.80 01/10/2024    PHOS 3.4 01/10/2024     LFTs:   Lab Results   Component Value Date    ALBUMIN 1.3 (L) 01/10/2024    BILITOT 6.6 (H) 01/10/2024    AST 70 (H) 01/10/2024    ALKPHOS 210 (H) 01/10/2024    ALT 57 (H) 01/10/2024     Coags:   Lab Results   Component Value Date    INR 2.2 (H) 01/09/2024    PROTIME 24.8 (H) 01/09/2024    PTT 38.6 (H)  "01/01/2024     FLP: No results found for: "CHOL", "HDL", "LDLCALC", "TRIG", "CHOLHDL"  DM:   Lab Results   Component Value Date    CREATININE 0.61 (L) 01/10/2024     Thyroid: No results found for: "TSH", "FREET4", "Q7KLUVW", "L0ESTKR", "THYROIDAB"  Anemia:   Lab Results   Component Value Date    IRON 39 (L) 01/01/2024    TIBC 203 (L) 01/01/2024    FERRITIN 116.00 01/01/2024    OHGIOYDI62 >2,000 (H) 01/01/2024    FOLATE 18.3 01/01/2024     Cardiac:   Lab Results   Component Value Date    TROPONINI 0.029 01/01/2024    BNP 89.3 01/01/2024       Microbiology Data Reviewed: yes  Pertinent Findings:  1/1/24: bld cx +strep pneumonia pan sensitive x1 bottle  1/4/24: bld cx x2 negative x5 days    Other Results:  TTE 1/4/24: EF 62%, no evidence of vegetation      Radiology:  Imaging Results              CT Chest Abdomen Pelvis Without Contrast (XPD) (Final result)  Result time 01/02/24 09:44:01      Final result by Tika Mcknight MD (01/02/24 09:44:01)                   Impression:      1. Multilobar consolidative opacities within the lungs  2. Cirrhotic morphology of the liver with mass at the dome of the liver.  Evaluation is moderately limited without contrast and due to beam hardening artifact related to patient positioning.  Recommend liver protocol CT abdomen without and with contrast.  3. Ascites  4. Indeterminate right adrenal nodule.  Continued attention recommended on follow-up exams.  5. The preliminary and final reports are concordant.      Electronically signed by: Tika Mcknight  Date:    01/02/2024  Time:    09:44               Narrative:    EXAMINATION:  CT CHEST ABDOMEN PELVIS WITHOUT CONTRAST(XPD)    CLINICAL HISTORY:  Suspect pneumonia, biliary obstruction;    TECHNIQUE:  Helical acquisition through the chest, abdomen and pelvis without  IV administration of contrast. Axial, sagittal and coronal reformats interpreted.    Automated tube current modulation, weight-based exposure dosing, and/or " iterative reconstruction technique utilized to reach lowest reasonably achievable exposure rate.    DLP: 1042 mGy*cm    COMPARISON:  Chest radiograph 01/01/2024    FINDINGS:  BASE OF NECK: No significant abnormality.    HEART: Normal size. There are coronary artery calcifications.  Low-density blood pool with visualization of the interventricular septum compatible with anemia.    THORACIC VASCULATURE: Thoracic aorta is unremarkable.    SIRISHA/MEDIASTINUM: Small nonspecific mediastinal lymph nodes.  Evaluation of hilar lymphadenopathy is limited without contrast.    AIRWAYS: Patent.    LUNGS/PLEURA: Multilobar consolidative opacities within the lungs.    THORACIC SOFT TISSUES: Unremarkable.    LIVER: Limited evaluation the liver due to lack of intravenous contrast and placement of the patient's arms over the abdomen which causes beam hardening artifact.  Heterogeneous attenuation of the liver.  There appears to be a mass at the dome of the liver.  Caudate lobe hypertrophy and changes of cirrhosis.    BILIARY: The gallbladder does not appear to be overly distended.    PANCREAS: Nonspecific retroperitoneal edema.  Unable to assess for changes of pancreatitis given generalized fluid overload.    SPLEEN: Upper limits of normal in size.    ADRENALS: There is a 1.6 cm right adrenal nodule.  Unable to accurately measure internal attenuation due to beam hardening artifact.    KIDNEYS/URETERS: Hyperdense renal pyramids.  This can be related to volume status/dehydration or medullary calcinosis.    GI TRACT/MESENTERY: Evaluation of the bowel is limited without contrast. Bowel is normal in caliber without evidence of obstruction.    PERITONEUM: Small volume ascites.No free air.    LYMPH NODES: No enlarged lymph nodes by size criteria.    ABDOMINOPELVIC VASCULATURE: Normal caliber abdominal aorta.  Large periumbilical vein.    BLADDER: Normal appearance given degree of distention.    REPRODUCTIVE ORGANS: Normal as  visualized.    ABDOMINAL WALL: Small fat containing right inguinal hernia.  Mild body wall edema.    BONES: Anterolisthesis at the lumbosacral junction.                        Preliminary result by Santana Lerma MD (01/01/24 22:56:45)                   Impression:    1. No intrahepatic or extrahepatic biliary duct dilatation is seen.  2. Note of subtle increased attenuation in the medullary portions of the bilateral kidneys which may reflect medullary calcinosis. Correlate with clinical and laboratory findings as regards additional evaluation and follow-up.  3. There is multifocal patchy dense consolidation of both the lungs with predominant involvement of the left upper and right lower lobes. This is consistent with multilobar pneumonia. Correlate with clinical and laboratory findings as regards additional evaluation and follow-up to full resolution.  4. There is a 2.8 x 2 cm soft tissue density nodule in the right adrenal gland (series 2 image 98). This is of indeterminate etiology.  5. The margins of the liver appears somewhat nodular and irregular. This is suggestive of cirrhosis. Correlate with clinical and laboratory findings.  6. Details and other findings as discussed above.               Narrative:    START OF REPORT:  Technique: CT Scan of the chest abdomen and pelvis was performed without intravenous contrast with axial as well as sagittal and, coronal images.    Dosage Information: Automated Exposure Control was utilized 1041.63 mGy.cm.    Comparison: None.    Clinical History: Suspect pneumonia, biliary obstruction.    Findings:  Mediastinum: A few scattered subcentimeter mediastinal lymph nodes are seen.  Heart: The heart size is within normal limits.  Aorta: Unremarkable appearing aorta.  Pulmonary Arteries: Unremarkable.  Lungs: There is multifocal patchy dense consolidation of both the lungs with predominant involvement of the left upper and right lower lobes.  Pleura: No effusions or  pneumothorax are identified.  Abdomen:  Abdominal Wall: There is extensive stranding of the subcutaneous fat as well as the intraabdominal and intrapelvic fat consistent with anasarca edema. Correlate with clinical and laboratory findings.  Liver: The margins of the liver appears somewhat nodular and irregular. There are also prominent portosystemic collaterals consistent with portal hypertension.  Biliary System: No intrahepatic or extrahepatic biliary duct dilatation is seen.  Gallbladder: The gallbladder is not definitely identified on this noncontrast examination and may be contracted.  Pancreas: The pancreas appears unremarkable.  Spleen: The spleen appears unremarkable.  Adrenals: There is a 2.8 x 2 cm soft tissue density nodule in the right adrenal gland (series 2 image 98).  Kidneys: Note of subtle increased attenuation in the medullary portions of the bilateral kidneys which may reflect medullary calcinosis.  Aorta: The abdominal aorta appears unremarkable.  IVC: Unremarkable.  Bowel:  Esophagus: There is a small hiatal hernia.  Stomach: The stomach appears unremarkable.  Duodenum: Unremarkable appearing duodenum.  Small Bowel: The small bowel appears unremarkable.  Colon: Nondistended.  Appendix: The appendix is not identified but no inflammatory changes are seen in the right lower quadrant to suggest appendicitis.  Peritoneum: There is mild ascites. No intraperitoneal free gas is seen.    Pelvis:  Bladder: The bladder appears unremarkable.  Male:  Prostate gland: The prostate gland appears unremarkable.    Bony structures:  Dorsal Spine: There is mild to moderate spondylosis of the visualized dorsal spine.  Bony Pelvis: The visualized bony structures of the pelvis appear unremarkable.                                         X-Ray Chest 1 View (Final result)  Result time 01/01/24 14:23:11   Procedure changed from X-Ray Chest PA And Lateral     Final result by Josef Sow MD (01/01/24 14:23:11)                    Impression:      Bilateral patchy airspace opacities greatest on the left.  Findings concerning for atypical infectious process.      Electronically signed by: Josef Sow  Date:    01/01/2024  Time:    14:23               Narrative:    EXAMINATION:  XR CHEST 1 VIEW    CLINICAL HISTORY:  cough;  Cough, unspecified    TECHNIQUE:  Single view of the chest    COMPARISON:  No prior imaging available for comparison.    FINDINGS:  Bilateral patchy airspace opacities greatest on the left.    The cardiomediastinal silhouette is within normal limits.    No acute osseous abnormality.                                       RADIOLOGY REPORT (Final result)  Result time 01/04/24 11:39:28      Final result by Unknown User (01/04/24 11:39:28)                                        Current Medications:     Infusions:       Scheduled:   cefTRIAXone (ROCEPHIN) IVPB  2 g Intravenous Q24H    enoxparin  40 mg Subcutaneous Q24H (prophylaxis, 1700)    folic acid (FOLVITE) IVPB  1 mg Intravenous Daily    pantoprazole  40 mg Intravenous Daily    predniSONE  20 mg Per NG tube Daily    rifAXIMin  550 mg Per NG tube BID    sodium chloride 0.9%  10 mL Intravenous Q6H    thiamine (B-1) 100 mg in dextrose 5 % (D5W) 100 mL IVPB  100 mg Intravenous Daily        PRN:  0.9%  NaCl infusion (for blood administration), sodium chloride 0.9%, acetaminophen, benzonatate, dextrose 10%, dextrose 10%, glucagon (human recombinant), glucose, glucose, sodium chloride 0.9%, Flushing PICC/Midline Protocol **AND** sodium chloride 0.9% **AND** sodium chloride 0.9%    Antibiotics and Day Number of Therapy:  Vanc x3 days  Zosyn/Azithromycin x4 days  Rocephin 2g on D4/14     Lines and Day Number of Therapy:  PIV      Assessment & Plan:     Decompensated Cirrhosis       - suspected etiology ETOH. Pt reportedly had hx of heavy drinking per friend who dropped him off       - Positive hepatitis C antibody; VL negative, no active infection        - MELD-Na:  28->25-> 22 today; Child Hoang: 13-> 11 today, class C, Maddrey: 204       - Coagulopathy improving; INR 2.4 today        - Improvement in encephalopathy; hold lactulose for now as pt having diarrhea       - Continue Rifaxamin for hepatic encephalopathy       - Prednisone taper given Maddrey; 40 mg x1 week then 20 x1 week, then 10, then 5, then stop after 28 days. Currently on D1 of 20 mg dosing    Pneumonia 2/2 Influenza A       - Positive on 1/1/24       - Patient now significantly improved, afebrile, mild cough, and decreased O2 requirements; okay to stop isolation today       - Completed Oseltamivir 5 day course       - Wean HFNC to low flow as tolerated to keep sats > 92%       - Will hold QHS bipap for now, appears to be maintaining sats well when sleeping       - Possible superimposed Strep PNA given bacteremia and pneumonia; on appropriate Ceftriaxone per below    Strep Pneumoniae Bacteremia       - Blood culture positive x1/2 Streptococcus; Bio[employer] confirmation strep pneumonia bacteremia which is pan-sensitive, resp culture /Gram stain collection pending.       - Repeat blood cultures 1/4/24  NGTD x4 days; TTE did not have any findings consistent with endocarditis.        - s/p Zosyn and Zithromax x4 days; MRSA PCR negative, discontinued vancomycin after D3.        - Continue Rocephin 2g daily (pan-sensitive strep pneumo), will need 14 days from negative repeat blood cultures; D6/14 currently as repeat blood culture from 1/4/24 NGTD x5 days.        - s/p PICC for long term IV Abx    Skin Breakdown to L buttocks       - Skin tear vs Stage II pressure ulcer       - Wound care consulted, appreciate recs       - Continue q2h turning and mepilex dsg        - Pt now with enteral nutrition, hopeful to aid in healing    Malnutrition  Hypernatremia       - Soft mechanical diet ordered, pt working with SLT       - Diet per dietary recs    CODE STATUS: Full  Access: PIV  Antibiotics: Ceftriaxone  Diet: Clears  DVT  Prophylaxis: Lovenox  GI Prophylaxis: Protonix  Fluids: n/a      Disposition: Patient continues to meet inpatient needs requiring 14 day course of IV abx for strep pneumoniae bacteremia; currently on day 5/14; self pay, likely undocumented and will need to stay for duration. Continues to recover slowly from decompensated cirrhosis as well, hopeful for resolution prior to end of antibiotics.          Sagrario Rosenthal MD  [facility] Internal Medicine -III

## 2024-01-10 NOTE — PT/OT/SLP PROGRESS
"[facility]  Speech Language Pathology -   Swallowing Follow-up Note           Chart reviewed.?Patient was previously seen for clinical swallowing evaluation and recommendations were for Full liquids. Will advanced to IDDSI 6 (soft/bite size) with IDDSI 0 (thin) liquids.      Patient was seen for swallowing reassessment, training on swallowing strategies, and education/training for maximizing safety and diet tolerance.          Pain:   0/10  Pain Location / Description: no pain reported        HISTORY & PHYSICAL  Active Ambulatory Problems     Diagnosis Date Noted    No Active Ambulatory Problems     Resolved Ambulatory Problems     Diagnosis Date Noted    No Resolved Ambulatory Problems     No Additional Past Medical History       Per medical record:  "Franco Hein is  48 y.o. male with a PMH of ETOH misuse disorder who presented to [facility] ED on 1/1/2024 with complaint of jaundice, weakness for 3 weeks, and AMS. Patient was brought in by a friend who states patient is a daily drinker who has not been able to go to a store for the last 2 days, but patient is noted to have a positive ethanol on admission. Patient unable to provide history given his mental status and was only able to say his name and confirm he drinks alcohol but does not do drugs. Patient was tachypneic, tachycardic, and tremulous, on presentation with jaundice. Admitted to ICU given altered mental status and concern for decompensation in the setting of alcohol withdrawal and influenza A, also found to have strep pneumonia bacteremia, on appropriate IV antibiotics."      Impression: Patient continues to present with oral-pharyngeal dysphagia. The patients swallowing function appeared to be improved since the last session.       GOALS:     Long Term Goals:     Consume least restrictive diet safely    Provide individualized education to the patient and family regarding disorder and management     Short Term Goals:  "   Consume the recommended diet with no clinical s/s of aspiration using facilitative swallowing strategies with supervision    Patient and family will be educated on the recommended aspiration precautions and swallowing strategies                Recommendations:      IDDSI 6 (soft/bite size) with IDDSI 0 (thin) liquids.   Medication presentation: Whole with liquid wash (can crush as indicated)  Oral Care: Minimum of regular toothbrush use 2-3 times/day    Please offer tray set up with constant supervision   Adhere to these aspiration precautions and swallowing strategies (feed only when alert, upright 90 degrees, small bites/sips, slow rate, alternate solids/liquids, remain upright 30 minutes after meals)   Patient will be seen for dysphagia management for 15-30 min / 2 time(s) per week   Discharge disposition: low intensity         *If this is the last documented treatment note, then it will signify discharge from acute care prior to discharge from speech services and will serve as the discharge summary.*          Education: Patient, RN (Ramonita)were educated on the results and recommendations of this evaluation. She expressed understanding. Patient will benefit from ongoing education.       Kain Barlow M.S. CCC-SLP  [facility]

## 2024-01-10 NOTE — PLAN OF CARE
01/10/24 1030   Discharge Assessment   Assessment Type Discharge Planning Assessment   Confirmed/corrected address, phone number and insurance Yes   Confirmed Demographics Correct on Facesheet   Source of Information other (see comments)  (Family Friend Margarito [phone])   When was your last doctors appointment?   (No PCP)   Reason For Admission DT's, alcohol withdrawl, weakness,   People in Home friend(s)   Facility Arrived From: home   Do you expect to return to your current living situation? Yes   Do you have help at home or someone to help you manage your care at home? No   Equipment Currently Used at Home none   Patient currently being followed by outpatient case management? No   Do you currently have service(s) that help you manage your care at home? No   Do you take prescription medications? No   Do you have prescription coverage? No   Do you have any problems affording any of your prescribed medications? No   Are you on dialysis? No   Do you take coumadin? No   Discharge Plan A Home with family   DME Needed Upon Discharge    (Pending PT eval)   Discharge Plan discussed with: Friend   Name(s) and Number(s) Margarito [phone]   Physical Activity   On average, how many days per week do you engage in moderate to strenuous exercise (like a brisk walk)? 0 days   On average, how many minutes do you engage in exercise at this level? 0 min   Financial Resource Strain   How hard is it for you to pay for the very basics like food, housing, medical care, and heating? Not hard   Housing Stability   In the last 12 months, was there a time when you were not able to pay the mortgage or rent on time? N   In the last 12 months, was there a time when you did not have a steady place to sleep or slept in a shelter (including now)? N   Transportation Needs   In the past 12 months, has lack of transportation kept you from medical appointments or from getting medications? no   In the past 12 months, has lack of transportation  kept you from meetings, work, or from getting things needed for daily living? No   Food Insecurity   Within the past 12 months, you worried that your food would run out before you got the money to buy more. Never true   Within the past 12 months, the food you bought just didn't last and you didn't have money to get more. Never true   Stress   Do you feel stress - tense, restless, nervous, or anxious, or unable to sleep at night because your mind is troubled all the time - these days? Not at all   Social Connections   In a typical week, how many times do you talk on the phone with family, friends, or neighbors? Once a week   How often do you get together with friends or relatives? Once   How often do you attend Nondenominational or Lutheran services? Never   Do you belong to any clubs or organizations such as Nondenominational groups, unions, fraternal or athletic groups, or school groups? No   How often do you attend meetings of the clubs or organizations you belong to? Never   Are you , , , , never , or living with a partner? Never marrie   Alcohol Use   Q1: How often do you have a drink containing alcohol? 4 or more ti   Q2: How many drinks containing alcohol do you have on a typical day when you are drinking? 5 or 6   Q3: How often do you have six or more drinks on one occasion? Daily        3

## 2024-01-10 NOTE — PLAN OF CARE
Problem: Adult Inpatient Plan of Care  Goal: Plan of Care Review  Outcome: Ongoing, Progressing  Goal: Absence of Hospital-Acquired Illness or Injury  Outcome: Ongoing, Progressing  Intervention: Prevent and Manage VTE (Venous Thromboembolism) Risk  Flowsheets (Taken 1/10/2024 1649)  Activity Management: Step side-to-side along edge of bed - L3  VTE Prevention/Management: fluids promoted  Goal: Optimal Comfort and Wellbeing  Outcome: Ongoing, Progressing  Intervention: Provide Person-Centered Care  Flowsheets (Taken 1/10/2024 1649)  Trust Relationship/Rapport: care explained

## 2024-01-11 LAB
ALBUMIN SERPL-MCNC: 1.3 G/DL (ref 3.5–5)
ALBUMIN/GLOB SERPL: 0.3 RATIO (ref 1.1–2)
ALP SERPL-CCNC: 235 UNIT/L (ref 40–150)
ALT SERPL-CCNC: 58 UNIT/L (ref 0–55)
AST SERPL-CCNC: 74 UNIT/L (ref 5–34)
BASOPHILS # BLD AUTO: 0.01 X10(3)/MCL
BASOPHILS NFR BLD AUTO: 0.1 %
BILIRUB SERPL-MCNC: 6.6 MG/DL
BUN SERPL-MCNC: 17.9 MG/DL (ref 8.9–20.6)
CALCIUM SERPL-MCNC: 7.5 MG/DL (ref 8.4–10.2)
CHLORIDE SERPL-SCNC: 106 MMOL/L (ref 98–107)
CO2 SERPL-SCNC: 27 MMOL/L (ref 22–29)
CREAT SERPL-MCNC: 0.61 MG/DL (ref 0.73–1.18)
EOSINOPHIL # BLD AUTO: 0.02 X10(3)/MCL (ref 0–0.9)
EOSINOPHIL NFR BLD AUTO: 0.1 %
ERYTHROCYTE [DISTWIDTH] IN BLOOD BY AUTOMATED COUNT: 19.4 % (ref 11.5–17)
GFR SERPLBLD CREATININE-BSD FMLA CKD-EPI: >60 MLS/MIN/1.73/M2
GLOBULIN SER-MCNC: 5 GM/DL (ref 2.4–3.5)
GLUCOSE SERPL-MCNC: 96 MG/DL (ref 74–100)
HCT VFR BLD AUTO: 29.7 % (ref 42–52)
HGB BLD-MCNC: 9.4 G/DL (ref 14–18)
IMM GRANULOCYTES # BLD AUTO: 0.08 X10(3)/MCL (ref 0–0.04)
IMM GRANULOCYTES NFR BLD AUTO: 0.5 %
LYMPHOCYTES # BLD AUTO: 1.12 X10(3)/MCL (ref 0.6–4.6)
LYMPHOCYTES NFR BLD AUTO: 7.6 %
MAGNESIUM SERPL-MCNC: 1.6 MG/DL (ref 1.6–2.6)
MCH RBC QN AUTO: 29.3 PG (ref 27–31)
MCHC RBC AUTO-ENTMCNC: 31.6 G/DL (ref 33–36)
MCV RBC AUTO: 92.5 FL (ref 80–94)
MONOCYTES # BLD AUTO: 0.85 X10(3)/MCL (ref 0.1–1.3)
MONOCYTES NFR BLD AUTO: 5.7 %
NEUTROPHILS # BLD AUTO: 12.72 X10(3)/MCL (ref 2.1–9.2)
NEUTROPHILS NFR BLD AUTO: 86 %
NRBC BLD AUTO-RTO: 0.1 %
PHOSPHATE SERPL-MCNC: 3.1 MG/DL (ref 2.3–4.7)
PLATELET # BLD AUTO: 56 X10(3)/MCL (ref 130–400)
PLATELETS.RETICULATED NFR BLD AUTO: 14.3 % (ref 0.9–11.2)
PMV BLD AUTO: 12.1 FL (ref 7.4–10.4)
POTASSIUM SERPL-SCNC: 3.6 MMOL/L (ref 3.5–5.1)
PROT SERPL-MCNC: 6.3 GM/DL (ref 6.4–8.3)
RBC # BLD AUTO: 3.21 X10(6)/MCL (ref 4.7–6.1)
SODIUM SERPL-SCNC: 138 MMOL/L (ref 136–145)
WBC # SPEC AUTO: 14.8 X10(3)/MCL (ref 4.5–11.5)

## 2024-01-11 PROCEDURE — 85025 COMPLETE CBC W/AUTO DIFF WBC: CPT | Performed by: STUDENT IN AN ORGANIZED HEALTH CARE EDUCATION/TRAINING PROGRAM

## 2024-01-11 PROCEDURE — 25000003 PHARM REV CODE 250

## 2024-01-11 PROCEDURE — 63600175 PHARM REV CODE 636 W HCPCS: Performed by: STUDENT IN AN ORGANIZED HEALTH CARE EDUCATION/TRAINING PROGRAM

## 2024-01-11 PROCEDURE — 94761 N-INVAS EAR/PLS OXIMETRY MLT: CPT

## 2024-01-11 PROCEDURE — 25000003 PHARM REV CODE 250: Performed by: STUDENT IN AN ORGANIZED HEALTH CARE EDUCATION/TRAINING PROGRAM

## 2024-01-11 PROCEDURE — 84100 ASSAY OF PHOSPHORUS: CPT | Performed by: STUDENT IN AN ORGANIZED HEALTH CARE EDUCATION/TRAINING PROGRAM

## 2024-01-11 PROCEDURE — 92526 ORAL FUNCTION THERAPY: CPT

## 2024-01-11 PROCEDURE — 80053 COMPREHEN METABOLIC PANEL: CPT | Performed by: STUDENT IN AN ORGANIZED HEALTH CARE EDUCATION/TRAINING PROGRAM

## 2024-01-11 PROCEDURE — 83735 ASSAY OF MAGNESIUM: CPT | Performed by: STUDENT IN AN ORGANIZED HEALTH CARE EDUCATION/TRAINING PROGRAM

## 2024-01-11 PROCEDURE — A4216 STERILE WATER/SALINE, 10 ML: HCPCS

## 2024-01-11 PROCEDURE — 25000003 PHARM REV CODE 250: Performed by: INTERNAL MEDICINE

## 2024-01-11 PROCEDURE — 21400001 HC TELEMETRY ROOM

## 2024-01-11 RX ORDER — HYDROXYZINE PAMOATE 25 MG/1
50 CAPSULE ORAL EVERY 6 HOURS PRN
Status: DISCONTINUED | OUTPATIENT
Start: 2024-01-11 | End: 2024-01-25 | Stop reason: HOSPADM

## 2024-01-11 RX ADMIN — ENOXAPARIN SODIUM 40 MG: 40 INJECTION SUBCUTANEOUS at 04:01

## 2024-01-11 RX ADMIN — SODIUM CHLORIDE, PRESERVATIVE FREE 10 ML: 5 INJECTION INTRAVENOUS at 12:01

## 2024-01-11 RX ADMIN — Medication 100 MG: at 08:01

## 2024-01-11 RX ADMIN — FOLIC ACID 1 MG: 1 TABLET ORAL at 08:01

## 2024-01-11 RX ADMIN — SODIUM CHLORIDE, PRESERVATIVE FREE 10 ML: 5 INJECTION INTRAVENOUS at 05:01

## 2024-01-11 RX ADMIN — CEFTRIAXONE SODIUM 2 G: 2 INJECTION, POWDER, FOR SOLUTION INTRAMUSCULAR; INTRAVENOUS at 09:01

## 2024-01-11 RX ADMIN — PANTOPRAZOLE SODIUM 40 MG: 40 TABLET, DELAYED RELEASE ORAL at 08:01

## 2024-01-11 RX ADMIN — PREDNISONE 20 MG: 20 TABLET ORAL at 08:01

## 2024-01-11 RX ADMIN — RIFAXIMIN 550 MG: 550 TABLET ORAL at 08:01

## 2024-01-11 NOTE — PROGRESS NOTES
[facility] Medicine Wards Progress Note     Resident Team: [facility] Medicine List 4  Attending Physician: Kamryn Estevez MD  Resident: MD Galo  Intern: DO Ulysses       Subjective:      Brief HPI:  Franco Hein is  48 y.o. male with a PMH of ETOH misuse disorder who presented to [facility] ED on 2024 with complaint of jaundice, weakness for 3 weeks, and AMS. Patient was brought in by a friend who states patient is a daily drinker who has not been able to go to a store for the last 2 days, but patient is noted to have a positive ethanol on admission. Patient unable to provide history given his mental status and was only able to say his name and confirm he drinks alcohol but does not do drugs. Patient was tachypneic, tachycardic, and tremulous, on presentation with jaundice. Admitted to ICU given altered mental status and concern for decompensation in the setting of alcohol withdrawal and influenza A, also found to have strep pneumonia bacteremia, on appropriate IV antibiotics.      Interval History:   NAEO.  Denied any CP, SOB, N/V. Saturating well on RA. Reports of nml BM and tolerating diet.    Review of Systems:  ROS completed and negative except as indicated above.     Objective:     Last 24 Hour Vital Signs:  BP  Min: 113/71  Max: 137/90  Temp  Av °F (36.7 °C)  Min: 97.4 °F (36.3 °C)  Max: 98.4 °F (36.9 °C)  Pulse  Av.2  Min: 74  Max: 114  Resp  Av  Min: 18  Max: 18  SpO2  Av.7 %  Min: 91 %  Max: 96 %  I/O last 3 completed shifts:  In: 1417.7 [P.O.:1080; I.V.:51.9; IV Piggyback:285.7]  Out: -     Physical Examination:  General: ill-appearing though non-toxic  Eye: PERRLA, EOMI, scleral icterus noted  HENT: NC/AT, moist mucus membranes  Neck: full range of motion, no thyromegaly or lymphadenopathy  Respiratory: Rhonchi noted bilaterally, satting well on RA  Cardiovascular: regular rate and rhythm without murmurs, gallops or rubs  Gastrointestinal: soft, non-tender, non-distended with  "normal bowel sounds, without masses to palpation  Musculoskeletal: no obvious deformities, full range of motion of all extremities/spine without limitation or discomfort  Integumentary: Jaundice, spider angiomas present on upper chest, no caput medusa, no rash  Extremities: radial and DP pulses 2+ bilaterally, no LE edema  Neurologic: Pt AAOx3, following commands and conversing appropriately. No focal neuro deficits appreciated, CN II-XII grossly intact.       Laboratory:  Most Recent Data:  CBC:   Lab Results   Component Value Date    WBC 14.80 (H) 01/11/2024    HGB 9.4 (L) 01/11/2024    HCT 29.7 (L) 01/11/2024    PLT 56 (L) 01/11/2024    MCV 92.5 01/11/2024    RDW 19.4 (H) 01/11/2024     WBC Differential:   Recent Labs   Lab 01/07/24  0320 01/08/24  0305 01/09/24  0342 01/10/24  0331 01/11/24  0353   WBC 8.71 12.45* 14.41* 10.55 14.80*   HGB 10.6* 10.6* 10.2* 10.3* 9.4*   HCT 33.7* 33.1* 33.1* 32.7* 29.7*   PLT 72* 71* 70* 56* 56*   MCV 91.1 92.7 94.6* 94.8* 92.5       BMP:   Lab Results   Component Value Date     01/11/2024    K 3.6 01/11/2024    CO2 27 01/11/2024    BUN 17.9 01/11/2024    CREATININE 0.61 (L) 01/11/2024    CALCIUM 7.5 (L) 01/11/2024    MG 1.60 01/11/2024    PHOS 3.1 01/11/2024     LFTs:   Lab Results   Component Value Date    ALBUMIN 1.3 (L) 01/11/2024    BILITOT 6.6 (H) 01/11/2024    AST 74 (H) 01/11/2024    ALKPHOS 235 (H) 01/11/2024    ALT 58 (H) 01/11/2024     Coags:   Lab Results   Component Value Date    INR 2.2 (H) 01/09/2024    PROTIME 24.8 (H) 01/09/2024    PTT 38.6 (H) 01/01/2024     FLP: No results found for: "CHOL", "HDL", "LDLCALC", "TRIG", "CHOLHDL"  DM:   Lab Results   Component Value Date    CREATININE 0.61 (L) 01/11/2024     Thyroid: No results found for: "TSH", "FREET4", "L9YLJVY", "F9JUNQM", "THYROIDAB"  Anemia:   Lab Results   Component Value Date    IRON 39 (L) 01/01/2024    TIBC 203 (L) 01/01/2024    FERRITIN 116.00 01/01/2024    [id number] >2,000 (H) 01/01/2024    " FOLATE 18.3 01/01/2024     Cardiac:   Lab Results   Component Value Date    TROPONINI 0.029 01/01/2024    BNP 89.3 01/01/2024       Microbiology Data Reviewed: yes  Pertinent Findings:  1/1/24: bld cx +strep pneumonia pan sensitive x1 bottle  1/4/24: bld cx x2 negative x5 days    Other Results:  TTE 1/4/24: EF 62%, no evidence of vegetation      Radiology:  Imaging Results              CT Chest Abdomen Pelvis Without Contrast (XPD) (Final result)  Result time 01/02/24 09:44:01      Final result by Tika Mcknight MD (01/02/24 09:44:01)                   Impression:      1. Multilobar consolidative opacities within the lungs  2. Cirrhotic morphology of the liver with mass at the dome of the liver.  Evaluation is moderately limited without contrast and due to beam hardening artifact related to patient positioning.  Recommend liver protocol CT abdomen without and with contrast.  3. Ascites  4. Indeterminate right adrenal nodule.  Continued attention recommended on follow-up exams.  5. The preliminary and final reports are concordant.      Electronically signed by: Tika Mcknight  Date:    01/02/2024  Time:    09:44               Narrative:    EXAMINATION:  CT CHEST ABDOMEN PELVIS WITHOUT CONTRAST(XPD)    CLINICAL HISTORY:  Suspect pneumonia, biliary obstruction;    TECHNIQUE:  Helical acquisition through the chest, abdomen and pelvis without  IV administration of contrast. Axial, sagittal and coronal reformats interpreted.    Automated tube current modulation, weight-based exposure dosing, and/or iterative reconstruction technique utilized to reach lowest reasonably achievable exposure rate.    DLP: 1042 mGy*cm    COMPARISON:  Chest radiograph 01/01/2024    FINDINGS:  BASE OF NECK: No significant abnormality.    HEART: Normal size. There are coronary artery calcifications.  Low-density blood pool with visualization of the interventricular septum compatible with anemia.    THORACIC VASCULATURE: Thoracic aorta is  unremarkable.    SIRISHA/MEDIASTINUM: Small nonspecific mediastinal lymph nodes.  Evaluation of hilar lymphadenopathy is limited without contrast.    AIRWAYS: Patent.    LUNGS/PLEURA: Multilobar consolidative opacities within the lungs.    THORACIC SOFT TISSUES: Unremarkable.    LIVER: Limited evaluation the liver due to lack of intravenous contrast and placement of the patient's arms over the abdomen which causes beam hardening artifact.  Heterogeneous attenuation of the liver.  There appears to be a mass at the dome of the liver.  Caudate lobe hypertrophy and changes of cirrhosis.    BILIARY: The gallbladder does not appear to be overly distended.    PANCREAS: Nonspecific retroperitoneal edema.  Unable to assess for changes of pancreatitis given generalized fluid overload.    SPLEEN: Upper limits of normal in size.    ADRENALS: There is a 1.6 cm right adrenal nodule.  Unable to accurately measure internal attenuation due to beam hardening artifact.    KIDNEYS/URETERS: Hyperdense renal pyramids.  This can be related to volume status/dehydration or medullary calcinosis.    GI TRACT/MESENTERY: Evaluation of the bowel is limited without contrast. Bowel is normal in caliber without evidence of obstruction.    PERITONEUM: Small volume ascites.No free air.    LYMPH NODES: No enlarged lymph nodes by size criteria.    ABDOMINOPELVIC VASCULATURE: Normal caliber abdominal aorta.  Large periumbilical vein.    BLADDER: Normal appearance given degree of distention.    REPRODUCTIVE ORGANS: Normal as visualized.    ABDOMINAL WALL: Small fat containing right inguinal hernia.  Mild body wall edema.    BONES: Anterolisthesis at the lumbosacral junction.                        Preliminary result by Santana Lerma MD (01/01/24 22:56:45)                   Impression:    1. No intrahepatic or extrahepatic biliary duct dilatation is seen.  2. Note of subtle increased attenuation in the medullary portions of the bilateral kidneys which may  reflect medullary calcinosis. Correlate with clinical and laboratory findings as regards additional evaluation and follow-up.  3. There is multifocal patchy dense consolidation of both the lungs with predominant involvement of the left upper and right lower lobes. This is consistent with multilobar pneumonia. Correlate with clinical and laboratory findings as regards additional evaluation and follow-up to full resolution.  4. There is a 2.8 x 2 cm soft tissue density nodule in the right adrenal gland (series 2 image 98). This is of indeterminate etiology.  5. The margins of the liver appears somewhat nodular and irregular. This is suggestive of cirrhosis. Correlate with clinical and laboratory findings.  6. Details and other findings as discussed above.               Narrative:    START OF REPORT:  Technique: CT Scan of the chest abdomen and pelvis was performed without intravenous contrast with axial as well as sagittal and, coronal images.    Dosage Information: Automated Exposure Control was utilized 1041.63 mGy.cm.    Comparison: None.    Clinical History: Suspect pneumonia, biliary obstruction.    Findings:  Mediastinum: A few scattered subcentimeter mediastinal lymph nodes are seen.  Heart: The heart size is within normal limits.  Aorta: Unremarkable appearing aorta.  Pulmonary Arteries: Unremarkable.  Lungs: There is multifocal patchy dense consolidation of both the lungs with predominant involvement of the left upper and right lower lobes.  Pleura: No effusions or pneumothorax are identified.  Abdomen:  Abdominal Wall: There is extensive stranding of the subcutaneous fat as well as the intraabdominal and intrapelvic fat consistent with anasarca edema. Correlate with clinical and laboratory findings.  Liver: The margins of the liver appears somewhat nodular and irregular. There are also prominent portosystemic collaterals consistent with portal hypertension.  Biliary System: No intrahepatic or extrahepatic  biliary duct dilatation is seen.  Gallbladder: The gallbladder is not definitely identified on this noncontrast examination and may be contracted.  Pancreas: The pancreas appears unremarkable.  Spleen: The spleen appears unremarkable.  Adrenals: There is a 2.8 x 2 cm soft tissue density nodule in the right adrenal gland (series 2 image 98).  Kidneys: Note of subtle increased attenuation in the medullary portions of the bilateral kidneys which may reflect medullary calcinosis.  Aorta: The abdominal aorta appears unremarkable.  IVC: Unremarkable.  Bowel:  Esophagus: There is a small hiatal hernia.  Stomach: The stomach appears unremarkable.  Duodenum: Unremarkable appearing duodenum.  Small Bowel: The small bowel appears unremarkable.  Colon: Nondistended.  Appendix: The appendix is not identified but no inflammatory changes are seen in the right lower quadrant to suggest appendicitis.  Peritoneum: There is mild ascites. No intraperitoneal free gas is seen.    Pelvis:  Bladder: The bladder appears unremarkable.  Male:  Prostate gland: The prostate gland appears unremarkable.    Bony structures:  Dorsal Spine: There is mild to moderate spondylosis of the visualized dorsal spine.  Bony Pelvis: The visualized bony structures of the pelvis appear unremarkable.                                         X-Ray Chest 1 View (Final result)  Result time 01/01/24 14:23:11   Procedure changed from X-Ray Chest PA And Lateral     Final result by Josef Sow MD (01/01/24 14:23:11)                   Impression:      Bilateral patchy airspace opacities greatest on the left.  Findings concerning for atypical infectious process.      Electronically signed by: Josef Sow  Date:    01/01/2024  Time:    14:23               Narrative:    EXAMINATION:  XR CHEST 1 VIEW    CLINICAL HISTORY:  cough;  Cough, unspecified    TECHNIQUE:  Single view of the chest    COMPARISON:  No prior imaging available for  comparison.    FINDINGS:  Bilateral patchy airspace opacities greatest on the left.    The cardiomediastinal silhouette is within normal limits.    No acute osseous abnormality.                                       RADIOLOGY REPORT (Final result)  Result time 01/04/24 11:39:28      Final result by Unknown User (01/04/24 11:39:28)                                        Current Medications:     Infusions:       Scheduled:   cefTRIAXone (ROCEPHIN) IVPB  2 g Intravenous Q24H    enoxparin  40 mg Subcutaneous Q24H (prophylaxis, 1700)    folic acid  1 mg Oral Daily    pantoprazole  40 mg Oral Daily    predniSONE  20 mg Oral Daily    rifAXIMin  550 mg Oral BID    sodium chloride 0.9%  10 mL Intravenous Q6H    thiamine  100 mg Oral Daily        PRN:  0.9%  NaCl infusion (for blood administration), sodium chloride 0.9%, acetaminophen, benzonatate, dextrose 10%, dextrose 10%, glucagon (human recombinant), glucose, glucose, lactulose, sodium chloride 0.9%, Flushing PICC/Midline Protocol **AND** sodium chloride 0.9% **AND** sodium chloride 0.9%    Antibiotics and Day Number of Therapy:  Vanc x3 days  Zosyn/Azithromycin x4 days  Rocephin 2g on D4/14     Lines and Day Number of Therapy:  PIV      Assessment & Plan:     Decompensated Cirrhosis       - suspected etiology ETOH. Pt reportedly had hx of heavy drinking per friend who dropped him off       - Positive hepatitis C antibody; VL negative, no active infection        - MELD-Na: 28->25-> 22 1/9/24; Child Hoang: 13-> 11 1/9/24, class C, Maddrey: 204       - Coagulopathy improving; INR 2.4 1/9/23       - Improvement in encephalopathy; hold lactulose for now as pt having diarrhea       - Continue Rifaxamin for hepatic encephalopathy       - Prednisone taper given Maddrey; 40 mg x1 week then 20 x1 week, then 10, then 5, then stop after 28 days. Currently on D1 of 20 mg dosing    Pneumonia 2/2 Influenza A       - Positive on 1/1/24       - Patient now significantly improved,  afebrile, mild cough, and decreased O2 requirements; okay to stop isolation today       - Completed Oseltamivir 5 day course       - Wean HFNC to low flow as tolerated to keep sats > 92%       - Will hold QHS bipap for now, appears to be maintaining sats well when sleeping       - Possible superimposed Strep PNA given bacteremia and pneumonia; on appropriate Ceftriaxone per below    Strep Pneumoniae Bacteremia       - Blood culture positive x1/2 Streptococcus; [employer] confirmation strep pneumonia bacteremia which is pan-sensitive, resp culture /Gram stain collection pending.       - Repeat blood cultures 1/4/24  NGTD x4 days; TTE did not have any findings consistent with endocarditis.        - s/p Zosyn and Zithromax x4 days; MRSA PCR negative, discontinued vancomycin after D3.        - Continue Rocephin 2g daily (pan-sensitive strep pneumo), will need 14 days from negative repeat blood cultures; D8/14 currently as repeat blood culture from 1/4/24 NGTD x5 days.        - s/p PICC for long term IV Abx    Skin Breakdown to L buttocks       - Skin tear vs Stage II pressure ulcer       - Wound care consulted, appreciate recs       - Continue q2h turning and mepilex dsg        - Pt now with enteral nutrition, hopeful to aid in healing    Malnutrition  Hypernatremia       - Soft mechanical diet ordered, pt working with SLT       - Diet per dietary recs    CODE STATUS: Full  Access: PIV  Antibiotics: Ceftriaxone  Diet: Clears  DVT Prophylaxis: Lovenox  GI Prophylaxis: Protonix  Fluids: n/a      Disposition: Patient continues to meet inpatient needs requiring 14 day course of IV abx for strep pneumoniae bacteremia; currently on day 8/14; self pay, likely undocumented and will need to stay for duration. Continues to recover slowly from decompensated cirrhosis as well, hopeful for resolution prior to end of antibiotics.      Al Marshall DO  [facility]

## 2024-01-11 NOTE — PT/OT/SLP PROGRESS
"[facility]  Speech Language Pathology -   Swallowing Follow-up Note             Chart reviewed.?Patient was previously seen for clinical swallowing evaluation and recommendations were for Full liquids. Will advanced to IDDSI 6 (soft/bite size) with IDDSI 0 (thin) liquids.       Patient was seen for swallowing reassessment, training on swallowing strategies, and education/training for maximizing safety and diet tolerance.             Pain:   0/10  Pain Location / Description: no pain reported           HISTORY & PHYSICAL       Active Ambulatory Problems     Diagnosis Date Noted    No Active Ambulatory Problems           Resolved Ambulatory Problems     Diagnosis Date Noted    No Resolved Ambulatory Problems      No Additional Past Medical History         Per medical record:  "Franco Hein is  48 y.o. male with a PMH of ETOH misuse disorder who presented to [facility] ED on 1/1/2024 with complaint of jaundice, weakness for 3 weeks, and AMS. Patient was brought in by a friend who states patient is a daily drinker who has not been able to go to a store for the last 2 days, but patient is noted to have a positive ethanol on admission. Patient unable to provide history given his mental status and was only able to say his name and confirm he drinks alcohol but does not do drugs. Patient was tachypneic, tachycardic, and tremulous, on presentation with jaundice. Admitted to ICU given altered mental status and concern for decompensation in the setting of alcohol withdrawal and influenza A, also found to have strep pneumonia bacteremia, on appropriate IV antibiotics."        Impression: Patient's swallow function has improved. The patients swallowing function appeared to be improved since the last session.         GOALS:      Long Term Goals:      Consume least restrictive diet safely    Provide individualized education to the patient and family regarding disorder and management      Short Term " Goals:    Consume the recommended diet with no clinical s/s of aspiration using facilitative swallowing strategies with supervision. Tolerating current PO diet at this time. Will continue to follow. Goal ongoing  Patient and family will be educated on the recommended aspiration precautions and swallowing strategies. Educated patient on aspiration precautions. Verbal understanding given. Goal ongoing.                 Recommendations:       IDDSI 6 (soft/bite size) with IDDSI 0 (thin) liquids.   Medication presentation: Whole with liquid wash (can crush as indicated)  Oral Care: Minimum of regular toothbrush use 2-3 times/day    Please offer tray set up with constant supervision   Adhere to these aspiration precautions and swallowing strategies (feed only when alert, upright 90 degrees, small bites/sips, slow rate, alternate solids/liquids, remain upright 30 minutes after meals)   Patient will be seen for dysphagia management for 15-30 min / 2 time(s) per week   Discharge disposition: moderate intensity           *If this is the last documented treatment note, then it will signify discharge from acute care prior to discharge from speech services and will serve as the discharge summary.*            Education: Patient, RN (Eyal)were educated on the results and recommendations of this evaluation. She expressed understanding. Patient will benefit from ongoing education.            Kain Barlow M.S. CCC-SLP  [facility]

## 2024-01-12 LAB
ALBUMIN SERPL-MCNC: 1.4 G/DL (ref 3.5–5)
ALBUMIN/GLOB SERPL: 0.3 RATIO (ref 1.1–2)
ALP SERPL-CCNC: 261 UNIT/L (ref 40–150)
ALT SERPL-CCNC: 60 UNIT/L (ref 0–55)
AST SERPL-CCNC: 80 UNIT/L (ref 5–34)
BASOPHILS # BLD AUTO: 0.01 X10(3)/MCL
BASOPHILS NFR BLD AUTO: 0.1 %
BILIRUB SERPL-MCNC: 6.3 MG/DL
BUN SERPL-MCNC: 17.1 MG/DL (ref 8.9–20.6)
CALCIUM SERPL-MCNC: 7.6 MG/DL (ref 8.4–10.2)
CHLORIDE SERPL-SCNC: 102 MMOL/L (ref 98–107)
CO2 SERPL-SCNC: 26 MMOL/L (ref 22–29)
CREAT SERPL-MCNC: 0.64 MG/DL (ref 0.73–1.18)
EOSINOPHIL # BLD AUTO: 0.1 X10(3)/MCL (ref 0–0.9)
EOSINOPHIL NFR BLD AUTO: 0.6 %
ERYTHROCYTE [DISTWIDTH] IN BLOOD BY AUTOMATED COUNT: 19.9 % (ref 11.5–17)
GFR SERPLBLD CREATININE-BSD FMLA CKD-EPI: >60 MLS/MIN/1.73/M2
GLOBULIN SER-MCNC: 5.6 GM/DL (ref 2.4–3.5)
GLUCOSE SERPL-MCNC: 115 MG/DL (ref 74–100)
HCT VFR BLD AUTO: 29.8 % (ref 42–52)
HGB BLD-MCNC: 9.7 G/DL (ref 14–18)
IMM GRANULOCYTES # BLD AUTO: 0.11 X10(3)/MCL (ref 0–0.04)
IMM GRANULOCYTES NFR BLD AUTO: 0.7 %
LYMPHOCYTES # BLD AUTO: 1.23 X10(3)/MCL (ref 0.6–4.6)
LYMPHOCYTES NFR BLD AUTO: 8 %
MAGNESIUM SERPL-MCNC: 1.6 MG/DL (ref 1.6–2.6)
MCH RBC QN AUTO: 30.3 PG (ref 27–31)
MCHC RBC AUTO-ENTMCNC: 32.6 G/DL (ref 33–36)
MCV RBC AUTO: 93.1 FL (ref 80–94)
MONOCYTES # BLD AUTO: 1.08 X10(3)/MCL (ref 0.1–1.3)
MONOCYTES NFR BLD AUTO: 7 %
NEUTROPHILS # BLD AUTO: 12.94 X10(3)/MCL (ref 2.1–9.2)
NEUTROPHILS NFR BLD AUTO: 83.6 %
NRBC BLD AUTO-RTO: 0 %
PHOSPHATE SERPL-MCNC: 2.8 MG/DL (ref 2.3–4.7)
PLATELET # BLD AUTO: 64 X10(3)/MCL (ref 130–400)
PLATELETS.RETICULATED NFR BLD AUTO: 15.7 % (ref 0.9–11.2)
PMV BLD AUTO: ABNORMAL FL
POCT GLUCOSE: 123 MG/DL (ref 70–110)
POCT GLUCOSE: 150 MG/DL (ref 70–110)
POCT GLUCOSE: 163 MG/DL (ref 70–110)
POTASSIUM SERPL-SCNC: 3.6 MMOL/L (ref 3.5–5.1)
PROT SERPL-MCNC: 7 GM/DL (ref 6.4–8.3)
RBC # BLD AUTO: 3.2 X10(6)/MCL (ref 4.7–6.1)
SODIUM SERPL-SCNC: 132 MMOL/L (ref 136–145)
WBC # SPEC AUTO: 15.47 X10(3)/MCL (ref 4.5–11.5)

## 2024-01-12 PROCEDURE — A4216 STERILE WATER/SALINE, 10 ML: HCPCS

## 2024-01-12 PROCEDURE — 63600175 PHARM REV CODE 636 W HCPCS: Performed by: STUDENT IN AN ORGANIZED HEALTH CARE EDUCATION/TRAINING PROGRAM

## 2024-01-12 PROCEDURE — 25000003 PHARM REV CODE 250: Performed by: INTERNAL MEDICINE

## 2024-01-12 PROCEDURE — 94761 N-INVAS EAR/PLS OXIMETRY MLT: CPT

## 2024-01-12 PROCEDURE — 83735 ASSAY OF MAGNESIUM: CPT | Performed by: STUDENT IN AN ORGANIZED HEALTH CARE EDUCATION/TRAINING PROGRAM

## 2024-01-12 PROCEDURE — 21400001 HC TELEMETRY ROOM

## 2024-01-12 PROCEDURE — 85025 COMPLETE CBC W/AUTO DIFF WBC: CPT | Performed by: STUDENT IN AN ORGANIZED HEALTH CARE EDUCATION/TRAINING PROGRAM

## 2024-01-12 PROCEDURE — 84100 ASSAY OF PHOSPHORUS: CPT | Performed by: STUDENT IN AN ORGANIZED HEALTH CARE EDUCATION/TRAINING PROGRAM

## 2024-01-12 PROCEDURE — 63600175 PHARM REV CODE 636 W HCPCS

## 2024-01-12 PROCEDURE — 80053 COMPREHEN METABOLIC PANEL: CPT | Performed by: STUDENT IN AN ORGANIZED HEALTH CARE EDUCATION/TRAINING PROGRAM

## 2024-01-12 PROCEDURE — 25000003 PHARM REV CODE 250: Performed by: STUDENT IN AN ORGANIZED HEALTH CARE EDUCATION/TRAINING PROGRAM

## 2024-01-12 PROCEDURE — 92526 ORAL FUNCTION THERAPY: CPT

## 2024-01-12 PROCEDURE — 25000003 PHARM REV CODE 250

## 2024-01-12 RX ORDER — ONDANSETRON HYDROCHLORIDE 2 MG/ML
4 INJECTION, SOLUTION INTRAVENOUS ONCE
Status: COMPLETED | OUTPATIENT
Start: 2024-01-12 | End: 2024-01-12

## 2024-01-12 RX ADMIN — SODIUM CHLORIDE, PRESERVATIVE FREE 10 ML: 5 INJECTION INTRAVENOUS at 06:01

## 2024-01-12 RX ADMIN — SODIUM CHLORIDE, PRESERVATIVE FREE 10 ML: 5 INJECTION INTRAVENOUS at 12:01

## 2024-01-12 RX ADMIN — PREDNISONE 20 MG: 20 TABLET ORAL at 08:01

## 2024-01-12 RX ADMIN — Medication 100 MG: at 08:01

## 2024-01-12 RX ADMIN — FOLIC ACID 1 MG: 1 TABLET ORAL at 08:01

## 2024-01-12 RX ADMIN — CEFTRIAXONE SODIUM 2 G: 2 INJECTION, POWDER, FOR SOLUTION INTRAMUSCULAR; INTRAVENOUS at 08:01

## 2024-01-12 RX ADMIN — ENOXAPARIN SODIUM 40 MG: 40 INJECTION SUBCUTANEOUS at 04:01

## 2024-01-12 RX ADMIN — RIFAXIMIN 550 MG: 550 TABLET ORAL at 08:01

## 2024-01-12 RX ADMIN — ONDANSETRON 4 MG: 2 INJECTION INTRAMUSCULAR; INTRAVENOUS at 01:01

## 2024-01-12 RX ADMIN — PANTOPRAZOLE SODIUM 40 MG: 40 TABLET, DELAYED RELEASE ORAL at 08:01

## 2024-01-12 NOTE — PROGRESS NOTES
[facility] Medicine Wards Progress Note     Resident Team: [facility] Medicine List 4  Attending Physician: Kamryn Estevez MD  Resident: MD Galo  Intern: DO Ulysses       Subjective:      Brief HPI:  Franco Hein is  48 y.o. male with a PMH of ETOH misuse disorder who presented to [facility] ED on 2024 with complaint of jaundice, weakness for 3 weeks, and AMS. Patient was brought in by a friend who states patient is a daily drinker who has not been able to go to a store for the last 2 days, but patient is noted to have a positive ethanol on admission. Patient unable to provide history given his mental status and was only able to say his name and confirm he drinks alcohol but does not do drugs. Patient was tachypneic, tachycardic, and tremulous, on presentation with jaundice. Admitted to ICU given altered mental status and concern for decompensation in the setting of alcohol withdrawal and influenza A, also found to have strep pneumonia bacteremia, on appropriate IV antibiotics.      Interval History:   NAEO.  Denied any CP, abdominal pain, n/v/d, dysuria. Denies SOB at rest but some MONTENEGRO as well as mild NP cough. Tolerating room air well, as well as diet which is now soft/bite sided and thin liquids, working with PT but was apparently not feeling well today and unable to participate with them this morning. Leukocytosis stable around 15 today, no fevers and VS have been stable.     Review of Systems:  ROS completed and negative except as indicated above.     Objective:     Last 24 Hour Vital Signs:  BP  Min: 101/69  Max: 136/88  Temp  Av.3 °F (36.8 °C)  Min: 97.8 °F (36.6 °C)  Max: 98.5 °F (36.9 °C)  Pulse  Av.8  Min: 81  Max: 95  Resp  Av  Min: 17  Max: 17  SpO2  Av.3 %  Min: 94 %  Max: 100 %  Weight  Av.1 kg (158 lb 15.2 oz)  Min: 72.1 kg (158 lb 15.2 oz)  Max: 72.1 kg (158 lb 15.2 oz)  I/O last 3 completed shifts:  In: 240 [P.O.:240]  Out: -     Physical Examination:  General:  "ill-appearing though non-toxic  Eye: PERRLA, EOMI, scleral icterus noted  HENT: NC/AT, moist mucus membranes  Neck: full range of motion, no thyromegaly or lymphadenopathy  Respiratory: Rhonchi noted bilaterally, satting well on RA  Cardiovascular: regular rate and rhythm without murmurs, gallops or rubs  Gastrointestinal: soft, non-tender, non-distended with normal bowel sounds, without masses to palpation  Musculoskeletal: no obvious deformities, full range of motion of all extremities/spine without limitation or discomfort  Integumentary: Jaundice, spider angiomas present on upper chest, no caput medusa, no rash  Extremities: radial and DP pulses 2+ bilaterally, no LE edema  Neurologic: Pt AAOx3, following commands and conversing appropriately. No focal neuro deficits appreciated, CN II-XII grossly intact.       Laboratory:  Most Recent Data:  CBC:   Lab Results   Component Value Date    WBC 15.47 (H) 01/12/2024    HGB 9.7 (L) 01/12/2024    HCT 29.8 (L) 01/12/2024    PLT 64 (L) 01/12/2024    MCV 93.1 01/12/2024    RDW 19.9 (H) 01/12/2024     WBC Differential:   Recent Labs   Lab 01/08/24  0305 01/09/24  0342 01/10/24  0331 01/11/24  0353 01/12/24  0410   WBC 12.45* 14.41* 10.55 14.80* 15.47*   HGB 10.6* 10.2* 10.3* 9.4* 9.7*   HCT 33.1* 33.1* 32.7* 29.7* 29.8*   PLT 71* 70* 56* 56* 64*   MCV 92.7 94.6* 94.8* 92.5 93.1       BMP:   Lab Results   Component Value Date     (L) 01/12/2024    K 3.6 01/12/2024    CO2 26 01/12/2024    BUN 17.1 01/12/2024    CREATININE 0.64 (L) 01/12/2024    CALCIUM 7.6 (L) 01/12/2024    MG 1.60 01/12/2024    PHOS 2.8 01/12/2024     LFTs:   Lab Results   Component Value Date    ALBUMIN 1.4 (L) 01/12/2024    BILITOT 6.3 (H) 01/12/2024    AST 80 (H) 01/12/2024    ALKPHOS 261 (H) 01/12/2024    ALT 60 (H) 01/12/2024     Coags:   Lab Results   Component Value Date    INR 2.2 (H) 01/09/2024    PROTIME 24.8 (H) 01/09/2024    PTT 38.6 (H) 01/01/2024     FLP: No results found for: "CHOL", " ""HDL", "LDLCALC", "TRIG", "CHOLHDL"  DM:   Lab Results   Component Value Date    CREATININE 0.64 (L) 01/12/2024     Thyroid: No results found for: "TSH", "FREET4", "Q6QNWDH", "B5CKLFI", "THYROIDAB"  Anemia:   Lab Results   Component Value Date    IRON 39 (L) 01/01/2024    TIBC 203 (L) 01/01/2024    FERRITIN 116.00 01/01/2024    [id number] >2,000 (H) 01/01/2024    FOLATE 18.3 01/01/2024     Cardiac:   Lab Results   Component Value Date    TROPONINI 0.029 01/01/2024    BNP 89.3 01/01/2024       Microbiology Data Reviewed: yes  Pertinent Findings:  1/1/24: bld cx +strep pneumonia pan sensitive x1 bottle  1/4/24: bld cx x2 negative    Other Results:  TTE 1/4/24: EF 62%, no evidence of vegetation      Radiology:  Imaging Results              CT Chest Abdomen Pelvis Without Contrast (XPD) (Final result)  Result time 01/02/24 09:44:01      Final result by Tika Mcknight MD (01/02/24 09:44:01)                   Impression:      1. Multilobar consolidative opacities within the lungs  2. Cirrhotic morphology of the liver with mass at the dome of the liver.  Evaluation is moderately limited without contrast and due to beam hardening artifact related to patient positioning.  Recommend liver protocol CT abdomen without and with contrast.  3. Ascites  4. Indeterminate right adrenal nodule.  Continued attention recommended on follow-up exams.  5. The preliminary and final reports are concordant.      Electronically signed by: Tika Mcknight  Date:    01/02/2024  Time:    09:44               Narrative:    EXAMINATION:  CT CHEST ABDOMEN PELVIS WITHOUT CONTRAST(XPD)    CLINICAL HISTORY:  Suspect pneumonia, biliary obstruction;    TECHNIQUE:  Helical acquisition through the chest, abdomen and pelvis without  IV administration of contrast. Axial, sagittal and coronal reformats interpreted.    Automated tube current modulation, weight-based exposure dosing, and/or iterative reconstruction technique utilized to reach lowest " reasonably achievable exposure rate.    DLP: 1042 mGy*cm    COMPARISON:  Chest radiograph 01/01/2024    FINDINGS:  BASE OF NECK: No significant abnormality.    HEART: Normal size. There are coronary artery calcifications.  Low-density blood pool with visualization of the interventricular septum compatible with anemia.    THORACIC VASCULATURE: Thoracic aorta is unremarkable.    SIRISHA/MEDIASTINUM: Small nonspecific mediastinal lymph nodes.  Evaluation of hilar lymphadenopathy is limited without contrast.    AIRWAYS: Patent.    LUNGS/PLEURA: Multilobar consolidative opacities within the lungs.    THORACIC SOFT TISSUES: Unremarkable.    LIVER: Limited evaluation the liver due to lack of intravenous contrast and placement of the patient's arms over the abdomen which causes beam hardening artifact.  Heterogeneous attenuation of the liver.  There appears to be a mass at the dome of the liver.  Caudate lobe hypertrophy and changes of cirrhosis.    BILIARY: The gallbladder does not appear to be overly distended.    PANCREAS: Nonspecific retroperitoneal edema.  Unable to assess for changes of pancreatitis given generalized fluid overload.    SPLEEN: Upper limits of normal in size.    ADRENALS: There is a 1.6 cm right adrenal nodule.  Unable to accurately measure internal attenuation due to beam hardening artifact.    KIDNEYS/URETERS: Hyperdense renal pyramids.  This can be related to volume status/dehydration or medullary calcinosis.    GI TRACT/MESENTERY: Evaluation of the bowel is limited without contrast. Bowel is normal in caliber without evidence of obstruction.    PERITONEUM: Small volume ascites.No free air.    LYMPH NODES: No enlarged lymph nodes by size criteria.    ABDOMINOPELVIC VASCULATURE: Normal caliber abdominal aorta.  Large periumbilical vein.    BLADDER: Normal appearance given degree of distention.    REPRODUCTIVE ORGANS: Normal as visualized.    ABDOMINAL WALL: Small fat containing right inguinal hernia.   Mild body wall edema.    BONES: Anterolisthesis at the lumbosacral junction.                        Preliminary result by Santana Lerma MD (01/01/24 22:56:45)                   Impression:    1. No intrahepatic or extrahepatic biliary duct dilatation is seen.  2. Note of subtle increased attenuation in the medullary portions of the bilateral kidneys which may reflect medullary calcinosis. Correlate with clinical and laboratory findings as regards additional evaluation and follow-up.  3. There is multifocal patchy dense consolidation of both the lungs with predominant involvement of the left upper and right lower lobes. This is consistent with multilobar pneumonia. Correlate with clinical and laboratory findings as regards additional evaluation and follow-up to full resolution.  4. There is a 2.8 x 2 cm soft tissue density nodule in the right adrenal gland (series 2 image 98). This is of indeterminate etiology.  5. The margins of the liver appears somewhat nodular and irregular. This is suggestive of cirrhosis. Correlate with clinical and laboratory findings.  6. Details and other findings as discussed above.               Narrative:    START OF REPORT:  Technique: CT Scan of the chest abdomen and pelvis was performed without intravenous contrast with axial as well as sagittal and, coronal images.    Dosage Information: Automated Exposure Control was utilized 1041.63 mGy.cm.    Comparison: None.    Clinical History: Suspect pneumonia, biliary obstruction.    Findings:  Mediastinum: A few scattered subcentimeter mediastinal lymph nodes are seen.  Heart: The heart size is within normal limits.  Aorta: Unremarkable appearing aorta.  Pulmonary Arteries: Unremarkable.  Lungs: There is multifocal patchy dense consolidation of both the lungs with predominant involvement of the left upper and right lower lobes.  Pleura: No effusions or pneumothorax are identified.  Abdomen:  Abdominal Wall: There is extensive stranding of  the subcutaneous fat as well as the intraabdominal and intrapelvic fat consistent with anasarca edema. Correlate with clinical and laboratory findings.  Liver: The margins of the liver appears somewhat nodular and irregular. There are also prominent portosystemic collaterals consistent with portal hypertension.  Biliary System: No intrahepatic or extrahepatic biliary duct dilatation is seen.  Gallbladder: The gallbladder is not definitely identified on this noncontrast examination and may be contracted.  Pancreas: The pancreas appears unremarkable.  Spleen: The spleen appears unremarkable.  Adrenals: There is a 2.8 x 2 cm soft tissue density nodule in the right adrenal gland (series 2 image 98).  Kidneys: Note of subtle increased attenuation in the medullary portions of the bilateral kidneys which may reflect medullary calcinosis.  Aorta: The abdominal aorta appears unremarkable.  IVC: Unremarkable.  Bowel:  Esophagus: There is a small hiatal hernia.  Stomach: The stomach appears unremarkable.  Duodenum: Unremarkable appearing duodenum.  Small Bowel: The small bowel appears unremarkable.  Colon: Nondistended.  Appendix: The appendix is not identified but no inflammatory changes are seen in the right lower quadrant to suggest appendicitis.  Peritoneum: There is mild ascites. No intraperitoneal free gas is seen.    Pelvis:  Bladder: The bladder appears unremarkable.  Male:  Prostate gland: The prostate gland appears unremarkable.    Bony structures:  Dorsal Spine: There is mild to moderate spondylosis of the visualized dorsal spine.  Bony Pelvis: The visualized bony structures of the pelvis appear unremarkable.                                         X-Ray Chest 1 View (Final result)  Result time 01/01/24 14:23:11   Procedure changed from X-Ray Chest PA And Lateral     Final result by Josef Sow MD (01/01/24 14:23:11)                   Impression:      Bilateral patchy airspace opacities greatest on the left.   Findings concerning for atypical infectious process.      Electronically signed by: Josef Sow  Date:    01/01/2024  Time:    14:23               Narrative:    EXAMINATION:  XR CHEST 1 VIEW    CLINICAL HISTORY:  cough;  Cough, unspecified    TECHNIQUE:  Single view of the chest    COMPARISON:  No prior imaging available for comparison.    FINDINGS:  Bilateral patchy airspace opacities greatest on the left.    The cardiomediastinal silhouette is within normal limits.    No acute osseous abnormality.                                       RADIOLOGY REPORT (Final result)  Result time 01/04/24 11:39:28      Final result by Unknown User (01/04/24 11:39:28)                                        Current Medications:     Infusions:       Scheduled:   cefTRIAXone (ROCEPHIN) IVPB  2 g Intravenous Q24H    enoxparin  40 mg Subcutaneous Q24H (prophylaxis, 1700)    folic acid  1 mg Oral Daily    pantoprazole  40 mg Oral Daily    predniSONE  20 mg Oral Daily    rifAXIMin  550 mg Oral BID    sodium chloride 0.9%  10 mL Intravenous Q6H    thiamine  100 mg Oral Daily        PRN:  0.9%  NaCl infusion (for blood administration), sodium chloride 0.9%, acetaminophen, benzonatate, dextrose 10%, dextrose 10%, glucagon (human recombinant), glucose, glucose, hydrOXYzine pamoate, lactulose, sodium chloride 0.9%, Flushing PICC/Midline Protocol **AND** sodium chloride 0.9% **AND** sodium chloride 0.9%    Antibiotics and Day Number of Therapy:  Vanc x3 days  Zosyn/Azithromycin x4 days  Rocephin 2g on D9/14     Lines and Day Number of Therapy:  PIV      Assessment & Plan:     Decompensated Cirrhosis       - suspected etiology ETOH. Pt reportedly had hx of heavy drinking per friend who dropped him off       - Positive hepatitis C antibody; VL negative, no active infection        - MELD-Na: 28->25-> 22 1/9/24; Child Hoang: 13-> 11 1/9/24, class C, Maddrey: 204       - Coagulopathy improving; INR 2.4 1/9/23       - Improvement in encephalopathy;  hold lactulose for now as pt having diarrhea       - Continue Rifaxamin for hepatic encephalopathy       - Prednisone taper given Maddrey; 40 mg x1 week then 20 x1 week, then 10, then 5, then stop after 28 days. Currently on D1 of 20 mg dosing    Pneumonia 2/2 Influenza A       - Positive on 1/1/24       - Patient now significantly improved, afebrile, mild cough, and decreased O2 requirements; okay to stop isolation today       - Completed Oseltamivir 5 day course       - NC and supplemental O2 to keep sats > 92%       - Possible superimposed Strep PNA given bacteremia and pneumonia; on appropriate Ceftriaxone per below    Strep Pneumoniae Bacteremia       - Blood culture positive x1/2 Streptococcus; [employer] confirmation strep pneumonia bacteremia which is pan-sensitive, resp culture /Gram stain collection pending.       - Repeat blood cultures 1/4/24  negative; TTE did not have any findings consistent with endocarditis.        - s/p Zosyn and Zithromax x4 days; MRSA PCR negative, discontinued vancomycin after D3.        - Continue Rocephin 2g daily (pan-sensitive strep pneumo), will need 14 days from negative repeat blood cultures; D9/14 currently as repeat blood culture from 1/4/24 negative.        - s/p PICC for long term IV Abx    Skin Breakdown to L buttocks-improving       - Skin tear vs Stage II pressure ulcer       - Wound care consulted, appreciate recs       - Continue q2h turning and mepilex dsg        - Pt now with enteral nutrition, hopeful to aid in healing    Malnutrition       - Soft mechanical diet ordered, pt working with SLT       - Diet per dietary recs    CODE STATUS: Full  Access: PIV  Antibiotics: Ceftriaxone  Diet: Clears  DVT Prophylaxis: Lovenox  GI Prophylaxis: Protonix  Fluids: n/a      Disposition: Patient continues to meet inpatient needs requiring 14 day course of IV abx for strep pneumoniae bacteremia; currently on day 9/14; self pay, likely undocumented and will need to stay for  duration. Continues to recover slowly from decompensated cirrhosis as well, hopeful for resolution prior to end of antibiotics.      Sagrario Rosenthal MD  \[facility]\"" Internal Medicine HO-II

## 2024-01-12 NOTE — PT/OT/SLP PROGRESS
"[facility]  Speech Language Pathology -   Swallowing Follow-up Note             Chart reviewed.?Patient was previously seen for clinical swallowing evaluation and recommendations were for Full liquids. Will advanced to IDDSI 6 (soft/bite size) with IDDSI 0 (thin) liquids.       Patient was seen for swallowing reassessment, training on swallowing strategies, and education/training for maximizing safety and diet tolerance.             Pain:   0/10  Pain Location / Description: no pain reported      Recommendations:       IDDSI 6 (soft/bite size) with IDDSI 0 (thin) liquids.   Medication presentation: Whole with liquid wash (can crush as indicated)  Oral Care: Minimum of regular toothbrush use 2-3 times/day    Please offer tray set up with constant supervision   Adhere to these aspiration precautions and swallowing strategies (feed only when alert, upright 90 degrees, small bites/sips, slow rate, alternate solids/liquids, remain upright 30 minutes after meals)   Patient will be seen for dysphagia management for 15-30 min / 2 time(s) per week   Cognitive evaluation at next opportunity.   Discharge disposition: moderate intensity        HISTORY & PHYSICAL          Active Ambulatory Problems     Diagnosis Date Noted    No Active Ambulatory Problems              Resolved Ambulatory Problems     Diagnosis Date Noted    No Resolved Ambulatory Problems      No Additional Past Medical History         Per medical record:  "Franco Hein is  48 y.o. male with a PMH of ETOH misuse disorder who presented to [facility] ED on 1/1/2024 with complaint of jaundice, weakness for 3 weeks, and AMS. Patient was brought in by a friend who states patient is a daily drinker who has not been able to go to a store for the last 2 days, but patient is noted to have a positive ethanol on admission. Patient unable to provide history given his mental status and was only able to say his name and confirm he drinks " "alcohol but does not do drugs. Patient was tachypneic, tachycardic, and tremulous, on presentation with jaundice. Admitted to ICU given altered mental status and concern for decompensation in the setting of alcohol withdrawal and influenza A, also found to have strep pneumonia bacteremia, on appropriate IV antibiotics."        Impression:   Patient's swallow function has improved. The patients swallowing function appeared to be improved since the last session. Noted intermittent dry cough continues.         GOALS:      Long Term Goals:      Consume least restrictive diet safely    Provide individualized education to the patient and family regarding disorder and management      Short Term Goals:    Consume the recommended diet with no clinical s/s of aspiration using facilitative swallowing strategies with supervision. Tolerating current PO diet at this time. Will continue to follow. Goal ongoing  Patient and family will be educated on the recommended aspiration precautions and swallowing strategies. Educated patient on aspiration precautions. Verbal understanding given. Goal ongoing.                 *If this is the last documented treatment note, then it will signify discharge from acute care prior to discharge from speech services and will serve as the discharge summary.*           Kain Barlow M.S. CCC-SLP  [facility]             "

## 2024-01-12 NOTE — PROGRESS NOTES
Inpatient Nutrition Assessment    Admit Date: 1/1/2024   Total duration of encounter: 11 days   Patient Age: 48 y.o.    Nutrition Recommendation/Prescription     Low Na diet, Diet consistency per SLP   Change Suplena to Boost Plus (provides 360 kcal, 14 g protein per serving)  TID   Demetrius (provides 90 kcal, 2.5 g protein per serving) BID for wound healing  Medical management of diarrhea   Biweekly wt    Communication of Recommendations: reviewed with nurse and reviewed with patient    Nutrition Assessment     Malnutrition Assessment/Nutrition-Focused Physical Exam    Malnutrition Context: acute illness or injury (01/04/24 1119)  Malnutrition Level: moderate (01/04/24 1119)  Energy Intake (Malnutrition):  (Usnure of oral intake PTA, On TF up until 1/9; Po diet 1/9 with ~50% intake) (01/12/24 1233)  Weight Loss (Malnutrition): other (see comments) (unable to obtain wt hx; pt with AMS) (01/04/24 1119)  Subcutaneous Fat (Malnutrition): mild depletion (01/04/24 1119)  Orbital Region (Subcutaneous Fat Loss): mild depletion  Upper Arm Region (Subcutaneous Fat Loss): mild depletion     Muscle Mass (Malnutrition): mild depletion (01/04/24 1119)     Clavicle Bone Region (Muscle Loss): mild depletion         A minimum of two characteristics is recommended for diagnosis of either severe or non-severe malnutrition.    Chart Review    Reason Seen: follow-up    Malnutrition Screening Tool Results   Have you recently lost weight without trying?: Unsure  Have you been eating poorly because of a decreased appetite?: Yes   MST Score: 3   Diagnosis:  ETOH WD, DT, influenza A, PNA, Decompensated cirrhosis, Hep C+, anemia , Malnutrition, hypernatremia    Relevant Medical History: ETOH misuse     Nutrition-Related Medications:  folic acid, pantoprazole,  thiamine, rocephin, Prednisone  Calorie Containing IV Medications: no significant kcals from medications at this time    Nutrition-Related Labs:  (1-4) NH 90.1(H)   1/9/24 -- H/H 10/33 L,  "K 3.7, BUN 18.4, Cr 0.6, Glu 131 H, AST 87 H, ALT 66 H  1/12/24 -- H/H 9.7/29 L, Na 132 L, K 3.6, Glu 115, BUN 17, Cr 0.64    Nutrition Orders:   Diet Soft & Bite Sized (IDDSI Level 6)  Dietary nutrition supplements Boost Plus; TID     Appetite/Oral Intake: fair/25-50% of meals    Factors Affecting Nutritional Intake: diarrhea    Food/U[place]/Cultural Preferences: unable to obtain    Food Allergies: unable to obtain    Wound(s):   Right superficial wound to inner buttock healing, Demetrius & Boost added    Last Bowel Movement: 01/12/24    Comments    1/12/24 --  [id number] used during visit; Diet advanced to Soft & Bite size per SLP on 1/10; pt with ~50% meal intake d/t complaints of diarrhea 10 minutes after eating; pt requesting medicine for his "stomach" - nsing notified, reports call in to MD, lactulose on hold; pt reports drinking ONS, will change to Boost Plus to better meet nutrition needs until po intake > 75%; Demetrius ordered to promote wound healing; new bed weight obtained at visit indicates gain, ? Accuracy of bed weight, pt unsure of UBW but reports weight loss PTA, will continue to monitor    1/9/24 -- Pt pulled out NGT this am for TF; SLP completed bedside swallow with recs to begin FL diet only at this time, will order Suplena TID to supplement meals; Monitor diet tolerance/po intake for need to resume TF; Rectal tube now removed, loose stools improved; no new ammonia level noted, lactulose not ordered at this time noted    (1/5) Pt remains NPO; started on TF yesterday; currently TF infusing @ 40ml/hr; RN reported to achieved goal rate later today; goal rate should meet estimated needs. Labs acknolwedged.     (1/4) Pt remains NPO x3 days ; AMS/on bipap; on 1/3--ST unable to complete swallow eval 2 pt on Bipap; no new wt. Both TF/PPN recs provided for nutrition support; discussed with RN caring for pt. Noted NH4--elevated to 90; no lactulose ; lowered protein recs until level decreases.     (1/2) " "Received MST for unsure wt loss/ appetite. Pt not alert during rounds; AMS; unable to obtain diet/wt hx at this time; PPN and diet recs provided. Suggest ST swallow eval prior to diet progression. Abnormal labs noted: Tbili (H)--hx ETOH abuse. Suggest continue folic acid/thiamine tx.     Anthropometrics    Height: 5' 4" (162.6 cm) Height Method: Stated  Last Weight: 72.1 kg (158 lb 15.2 oz) (24 1232) Weight Method: Bed Scale  BMI (Calculated): 27.3  BMI Classification: normal (BMI 18.5-24.9)        Ideal Body Weight (IBW), Male: 130 lb     % Ideal Body Weight, Male (lb): 92.31 %      Usual Body Weight (UBW), kg:  (pt unable to give UBW; no wt hx EMR)        Usual Weight Provided By: EMR weight history    Wt Readings from Last 5 Encounters:   24 72.1 kg (158 lb 15.2 oz)     Weight Change(s) Since Admission: no wt hx   Wt Readings from Last 1 Encounters:   24 1232 72.1 kg (158 lb 15.2 oz)   24 1000 54.4 kg (120 lb)   24 0156 54.4 kg (120 lb)   24 1255 54.4 kg (120 lb)   Admit Weight: 54.4 kg (120 lb) (24 1255), Weight Method: Bed Scale    Estimated Needs    Weight Used For Calorie Calculations: 54.4 kg (119 lb 14.9 oz)  Energy Calorie Requirements (kcal): 1904 kcal/d; 35 federico/kg/wt gain  Energy Need Method: Kcal/kg  Weight Used For Protein Calculations: 54.4 kg (119 lb 14.9 oz)  Protein Requirements: 54 gm protein/d; 1.0 gm /kg --limit protein--NH4 level elevated  Fluid Requirements (mL): 1904 ml/d; 1ml/federico  Temp (24hrs), Av.3 °F (36.8 °C), Min:97.8 °F (36.6 °C), Max:98.6 °F (37 °C)       Enteral Nutrition    Formula:  None  Rate/Volume: 0ml/hr  Water Flushes:0  Additives/Modulars: none at this time  Route:  none  Method:  none  Total Nutrition Provided by Tube Feeding, Additives, and Flushes:  Calories Provided  0 kcal/d, 0% needs   Protein Provided  0 g/d, 0% needs   Fluid Provided  0 ml/d, 0% needs   Continuous feeding calculations based on estimated 23 hr/d run time " unless otherwise stated.    Parenteral Nutrition    Patient not receiving parenteral nutrition support at this time.    Evaluation of Received Nutrient Intake    Calories: not meeting estimated needs  Protein: not meeting estimated needs    Patient Education    Not applicable.    Nutrition Diagnosis     PES: Inadequate oral intake related to acute illness as evidenced by < 50% nutrition intake, FL diet only. (active)    Interventions/Goals     Intervention(s): general/healthful diet, commercial beverage, multivitamin/mineral supplement therapy, prescription medication, and collaboration with other providers    Goal: Meet greater than 80% of nutritional needs by follow-up. (goal progressing)    Monitoring & Evaluation     Dietitian will monitor food and beverage intake, weight, electrolyte/renal panel, glucose/endocrine profile, and gastrointestinal profile.    Nutrition Risk/Follow-Up: moderate (follow-up in 3-5 days)   Please consult if re-assessment needed sooner.

## 2024-01-12 NOTE — PROGRESS NOTES
Patient is seen at bedside to follow up on right inner buttocks wound. Patient is now without rectal tube and using the BSC. Wound improvement noted today with decreasing size and depth. Superficial wound without drainage noted. Continue barrier cream BID and PRN. No need for wound care follow up outpatient due to healing nature of small wound. Wound care remains available as needed.

## 2024-01-12 NOTE — PT/OT/SLP PROGRESS
Physical Therapy    Missed Treatment Session    Patient Name:  Franco Hein   MRN:  [id number]      Patient not seen at this time secondary to Patient unwilling to participate.  Through . Pt asked to participate tomorrow as he is not feeling well.  He reports shortness of breath and cough. Reported to RN.    Will follow-up as patient is appropriate/available/agreeable to participate and as therapists' schedule allows.

## 2024-01-13 LAB
ADV 40+41 DNA STL QL NAA+NON-PROBE: NOT DETECTED
ALBUMIN SERPL-MCNC: 1.4 G/DL (ref 3.5–5)
ALBUMIN/GLOB SERPL: 0.3 RATIO (ref 1.1–2)
ALP SERPL-CCNC: 296 UNIT/L (ref 40–150)
ALT SERPL-CCNC: 58 UNIT/L (ref 0–55)
ANION GAP SERPL CALC-SCNC: 5 MEQ/L
AST SERPL-CCNC: 76 UNIT/L (ref 5–34)
ASTRO TYP 1-8 RNA STL QL NAA+NON-PROBE: NOT DETECTED
BASOPHILS # BLD AUTO: 0.02 X10(3)/MCL
BASOPHILS NFR BLD AUTO: 0.1 %
BILIRUB SERPL-MCNC: 5.6 MG/DL
BUN SERPL-MCNC: 15.4 MG/DL (ref 8.9–20.6)
BUN SERPL-MCNC: 16.2 MG/DL (ref 8.9–20.6)
C CAYETANENSIS DNA STL QL NAA+NON-PROBE: NOT DETECTED
C COLI+JEJ+UPSA DNA STL QL NAA+NON-PROBE: NOT DETECTED
C DIFF TOX A+B STL QL IA: NEGATIVE
CALCIUM SERPL-MCNC: 7.3 MG/DL (ref 8.4–10.2)
CALCIUM SERPL-MCNC: 7.4 MG/DL (ref 8.4–10.2)
CHLORIDE SERPL-SCNC: 100 MMOL/L (ref 98–107)
CHLORIDE SERPL-SCNC: 99 MMOL/L (ref 98–107)
CHLORIDE UR-SCNC: <20 MMOL/L
CLOSTRIDIUM DIFFICILE GDH ANTIGEN (OHS): NEGATIVE
CO2 SERPL-SCNC: 23 MMOL/L (ref 22–29)
CO2 SERPL-SCNC: 24 MMOL/L (ref 22–29)
CREAT SERPL-MCNC: 0.64 MG/DL (ref 0.73–1.18)
CREAT SERPL-MCNC: 0.65 MG/DL (ref 0.73–1.18)
CREAT UR-MCNC: 151.1 MG/DL (ref 63–166)
CREAT/UREA NIT SERPL: 25
CRYPTOSP DNA STL QL NAA+NON-PROBE: NOT DETECTED
E HISTOLYT DNA STL QL NAA+NON-PROBE: NOT DETECTED
EAEC PAA PLAS AGGR+AATA ST NAA+NON-PRB: NOT DETECTED
EC STX1+STX2 GENES STL QL NAA+NON-PROBE: NOT DETECTED
EOSINOPHIL # BLD AUTO: 0.11 X10(3)/MCL (ref 0–0.9)
EOSINOPHIL NFR BLD AUTO: 0.7 %
EPEC EAE GENE STL QL NAA+NON-PROBE: NOT DETECTED
ERYTHROCYTE [DISTWIDTH] IN BLOOD BY AUTOMATED COUNT: 19.8 % (ref 11.5–17)
ETEC LTA+ST1A+ST1B TOX ST NAA+NON-PROBE: NOT DETECTED
G LAMBLIA DNA STL QL NAA+NON-PROBE: NOT DETECTED
GFR SERPLBLD CREATININE-BSD FMLA CKD-EPI: >60 MLS/MIN/1.73/M2
GFR SERPLBLD CREATININE-BSD FMLA CKD-EPI: >60 MLS/MIN/1.73/M2
GLOBULIN SER-MCNC: 5.3 GM/DL (ref 2.4–3.5)
GLUCOSE SERPL-MCNC: 138 MG/DL (ref 74–100)
GLUCOSE SERPL-MCNC: 175 MG/DL (ref 74–100)
HCT VFR BLD AUTO: 27.8 % (ref 42–52)
HGB BLD-MCNC: 9 G/DL (ref 14–18)
HOLD SPECIMEN: NORMAL
IMM GRANULOCYTES # BLD AUTO: 0.09 X10(3)/MCL (ref 0–0.04)
IMM GRANULOCYTES NFR BLD AUTO: 0.6 %
LYMPHOCYTES # BLD AUTO: 1.07 X10(3)/MCL (ref 0.6–4.6)
LYMPHOCYTES NFR BLD AUTO: 6.9 %
MAGNESIUM SERPL-MCNC: 1.5 MG/DL (ref 1.6–2.6)
MCH RBC QN AUTO: 29.8 PG (ref 27–31)
MCHC RBC AUTO-ENTMCNC: 32.4 G/DL (ref 33–36)
MCV RBC AUTO: 92.1 FL (ref 80–94)
MONOCYTES # BLD AUTO: 1.2 X10(3)/MCL (ref 0.1–1.3)
MONOCYTES NFR BLD AUTO: 7.8 %
NEUTROPHILS # BLD AUTO: 12.92 X10(3)/MCL (ref 2.1–9.2)
NEUTROPHILS NFR BLD AUTO: 83.9 %
NOROVIRUS GI+II RNA STL QL NAA+NON-PROBE: NOT DETECTED
NRBC BLD AUTO-RTO: 0 %
OSMOLALITY SERPL: 288 MOSM/KG (ref 280–300)
P SHIGELLOIDES DNA STL QL NAA+NON-PROBE: NOT DETECTED
PHOSPHATE SERPL-MCNC: 2.8 MG/DL (ref 2.3–4.7)
PLATELET # BLD AUTO: 69 X10(3)/MCL (ref 130–400)
PLATELETS.RETICULATED NFR BLD AUTO: 13.5 % (ref 0.9–11.2)
PMV BLD AUTO: 12.9 FL (ref 7.4–10.4)
POCT GLUCOSE: 110 MG/DL (ref 70–110)
POCT GLUCOSE: 158 MG/DL (ref 70–110)
POCT GLUCOSE: 199 MG/DL (ref 70–110)
POTASSIUM SERPL-SCNC: 3.8 MMOL/L (ref 3.5–5.1)
POTASSIUM SERPL-SCNC: 4.1 MMOL/L (ref 3.5–5.1)
POTASSIUM UR-SCNC: 40.8 MMOL/L
PROT SERPL-MCNC: 6.7 GM/DL (ref 6.4–8.3)
RBC # BLD AUTO: 3.02 X10(6)/MCL (ref 4.7–6.1)
RVA RNA STL QL NAA+NON-PROBE: NOT DETECTED
S ENT+BONG DNA STL QL NAA+NON-PROBE: NOT DETECTED
SAPO I+II+IV+V RNA STL QL NAA+NON-PROBE: NOT DETECTED
SHIGELLA SP+EIEC IPAH ST NAA+NON-PROBE: NOT DETECTED
SODIUM SERPL-SCNC: 128 MMOL/L (ref 136–145)
SODIUM SERPL-SCNC: 129 MMOL/L (ref 136–145)
SODIUM UR-SCNC: <20 MMOL/L
V CHOL+PARA+VUL DNA STL QL NAA+NON-PROBE: NOT DETECTED
V CHOLERAE DNA STL QL NAA+NON-PROBE: NOT DETECTED
WBC # SPEC AUTO: 15.41 X10(3)/MCL (ref 4.5–11.5)
Y ENTEROCOL DNA STL QL NAA+NON-PROBE: NOT DETECTED

## 2024-01-13 PROCEDURE — 86318 IA INFECTIOUS AGENT ANTIBODY: CPT | Performed by: INTERNAL MEDICINE

## 2024-01-13 PROCEDURE — 83930 ASSAY OF BLOOD OSMOLALITY: CPT

## 2024-01-13 PROCEDURE — 82436 ASSAY OF URINE CHLORIDE: CPT

## 2024-01-13 PROCEDURE — 27000221 HC OXYGEN, UP TO 24 HOURS

## 2024-01-13 PROCEDURE — A4216 STERILE WATER/SALINE, 10 ML: HCPCS

## 2024-01-13 PROCEDURE — 87077 CULTURE AEROBIC IDENTIFY: CPT

## 2024-01-13 PROCEDURE — 25000003 PHARM REV CODE 250

## 2024-01-13 PROCEDURE — 21400001 HC TELEMETRY ROOM

## 2024-01-13 PROCEDURE — 25000003 PHARM REV CODE 250: Performed by: STUDENT IN AN ORGANIZED HEALTH CARE EDUCATION/TRAINING PROGRAM

## 2024-01-13 PROCEDURE — 63600175 PHARM REV CODE 636 W HCPCS

## 2024-01-13 PROCEDURE — 84300 ASSAY OF URINE SODIUM: CPT

## 2024-01-13 PROCEDURE — 85025 COMPLETE CBC W/AUTO DIFF WBC: CPT | Performed by: STUDENT IN AN ORGANIZED HEALTH CARE EDUCATION/TRAINING PROGRAM

## 2024-01-13 PROCEDURE — 83935 ASSAY OF URINE OSMOLALITY: CPT

## 2024-01-13 PROCEDURE — 63600175 PHARM REV CODE 636 W HCPCS: Performed by: STUDENT IN AN ORGANIZED HEALTH CARE EDUCATION/TRAINING PROGRAM

## 2024-01-13 PROCEDURE — 87507 IADNA-DNA/RNA PROBE TQ 12-25: CPT

## 2024-01-13 PROCEDURE — 94761 N-INVAS EAR/PLS OXIMETRY MLT: CPT

## 2024-01-13 PROCEDURE — 25000003 PHARM REV CODE 250: Performed by: INTERNAL MEDICINE

## 2024-01-13 PROCEDURE — 82570 ASSAY OF URINE CREATININE: CPT

## 2024-01-13 PROCEDURE — 84100 ASSAY OF PHOSPHORUS: CPT | Performed by: STUDENT IN AN ORGANIZED HEALTH CARE EDUCATION/TRAINING PROGRAM

## 2024-01-13 PROCEDURE — 84133 ASSAY OF URINE POTASSIUM: CPT

## 2024-01-13 PROCEDURE — 80053 COMPREHEN METABOLIC PANEL: CPT | Performed by: STUDENT IN AN ORGANIZED HEALTH CARE EDUCATION/TRAINING PROGRAM

## 2024-01-13 PROCEDURE — 83735 ASSAY OF MAGNESIUM: CPT | Performed by: STUDENT IN AN ORGANIZED HEALTH CARE EDUCATION/TRAINING PROGRAM

## 2024-01-13 RX ORDER — MAGNESIUM SULFATE HEPTAHYDRATE 40 MG/ML
4 INJECTION, SOLUTION INTRAVENOUS ONCE
Status: COMPLETED | OUTPATIENT
Start: 2024-01-13 | End: 2024-01-13

## 2024-01-13 RX ADMIN — RIFAXIMIN 550 MG: 550 TABLET ORAL at 08:01

## 2024-01-13 RX ADMIN — FOLIC ACID 1 MG: 1 TABLET ORAL at 08:01

## 2024-01-13 RX ADMIN — PREDNISONE 20 MG: 20 TABLET ORAL at 08:01

## 2024-01-13 RX ADMIN — SODIUM CHLORIDE, PRESERVATIVE FREE 10 ML: 5 INJECTION INTRAVENOUS at 06:01

## 2024-01-13 RX ADMIN — CEFTRIAXONE SODIUM 2 G: 2 INJECTION, POWDER, FOR SOLUTION INTRAMUSCULAR; INTRAVENOUS at 08:01

## 2024-01-13 RX ADMIN — BENZONATATE 100 MG: 100 CAPSULE ORAL at 10:01

## 2024-01-13 RX ADMIN — ENOXAPARIN SODIUM 40 MG: 40 INJECTION SUBCUTANEOUS at 05:01

## 2024-01-13 RX ADMIN — HYDROXYZINE PAMOATE 50 MG: 25 CAPSULE ORAL at 02:01

## 2024-01-13 RX ADMIN — Medication 100 MG: at 08:01

## 2024-01-13 RX ADMIN — SODIUM CHLORIDE 500 ML: 9 INJECTION, SOLUTION INTRAVENOUS at 05:01

## 2024-01-13 RX ADMIN — MAGNESIUM SULFATE HEPTAHYDRATE 4 G: 40 INJECTION, SOLUTION INTRAVENOUS at 08:01

## 2024-01-13 RX ADMIN — SODIUM CHLORIDE: 9 INJECTION, SOLUTION INTRAVENOUS at 08:01

## 2024-01-13 RX ADMIN — PANTOPRAZOLE SODIUM 40 MG: 40 TABLET, DELAYED RELEASE ORAL at 08:01

## 2024-01-13 RX ADMIN — SODIUM CHLORIDE, PRESERVATIVE FREE 10 ML: 5 INJECTION INTRAVENOUS at 12:01

## 2024-01-13 RX ADMIN — SODIUM CHLORIDE, PRESERVATIVE FREE 10 ML: 5 INJECTION INTRAVENOUS at 11:01

## 2024-01-13 RX ADMIN — SODIUM CHLORIDE, PRESERVATIVE FREE 10 ML: 5 INJECTION INTRAVENOUS at 05:01

## 2024-01-13 NOTE — PROGRESS NOTES
[facility] Medicine Wards Progress Note     Resident Team: [facility] Medicine List 4  Attending Physician: Kamryn Estevez MD  Resident: MD Galo  Intern: DO Ulysses       Subjective:      Brief HPI:  Franco Hein is  48 y.o. male with a PMH of ETOH misuse disorder who presented to [facility] ED on 2024 with complaint of jaundice, weakness for 3 weeks, and AMS. Patient was brought in by a friend who states patient is a daily drinker who has not been able to go to a store for the last 2 days, but patient is noted to have a positive ethanol on admission. Patient unable to provide history given his mental status and was only able to say his name and confirm he drinks alcohol but does not do drugs. Patient was tachypneic, tachycardic, and tremulous, on presentation with jaundice. Admitted to ICU given altered mental status and concern for decompensation in the setting of alcohol withdrawal and influenza A, also found to have strep pneumonia bacteremia, on appropriate IV antibiotics.      Interval History:   NAEO.  Denied any CP, abdominal pain, n/v/d, dysuria.  Nurse calls reported that patient is having large volume diarrhea.  Patient also hyponatremic at 1:29 a.m.>> 128.  We will give 500 cc normal saline bolus.  C diff PCR, GI panel, stool culture; however, given hemodynamic status, this is likely due to reinitiating a diet.    Review of Systems:  ROS completed and negative except as indicated above.     Objective:     Last 24 Hour Vital Signs:  BP  Min: 113/70  Max: 134/79  Temp  Av.3 °F (36.8 °C)  Min: 98 °F (36.7 °C)  Max: 98.5 °F (36.9 °C)  Pulse  Av.7  Min: 82  Max: 100  Resp  Av  Min: 18  Max: 18  SpO2  Av.8 %  Min: 93 %  Max: 96 %  No intake/output data recorded.    Physical Examination:  General: ill-appearing though non-toxic  Eye: PERRLA, EOMI, scleral icterus noted  HENT: NC/AT, moist mucus membranes  Neck: full range of motion, no thyromegaly or lymphadenopathy  Respiratory:  Letter by Eve Contreras MD at      Author: Eve Contreras MD Service: -- Author Type: --    Filed:  Encounter Date: 5/9/2019 Status: (Other)         [facility] FAMILY MEDICINE/OB  05/09/19    Patient: Anais Kelly  YOB: 1947  Medical Record Number: [id number]  CSN: [id number]                                                                              Non-opioid Controlled Substance Agreement  Ativan 1mg tablet for Insomnia   #30 tabs/month  Follow ups every 6 months  [facility], [place]    I understand that my care provider has prescribed a controlled substance to help manage my condition(s). I am taking this medicine to help me function or work. I know this is strong medicine, and that it can cause serious side effects. Controlled substances can be sedating, addicting and may cause a dependency on the drug. They can affect my ability to drive or think, and cause depression. They need to be taken exactly as prescribed. Combining controlled substances with certain medicines or chemicals (such as cocaine, sedatives and tranquilizers, sleeping pills, meth) can be dangerous or even fatal. Also, if I stop controlled substances suddenly, I may have severe withdrawal symptoms.  If not helpful, I may be asked to stop them.    The risks, benefits, and side effects of these medicine(s) were explained to me. I agree that:    1. I will take part in other treatments as advised by my care team. This may be psychiatry or counseling, physical therapy, behavioral therapy, group treatment or a referral to a pain clinic. I will reduce or stop my medicine when my care team tells me to do so.  2. I will take my medicines as prescribed. I will not change the dose or schedule unless my care team tells me to. There will be no refills if I run out early.  I may be contactedwithout warning and asked to complete a urine drug test or pill count at any time.   3. I will keep all my appointments, and  "Rhonchi noted bilaterally, satting well on RA  Cardiovascular: regular rate and rhythm without murmurs, gallops or rubs  Gastrointestinal: soft, non-tender, non-distended with normal bowel sounds, without masses to palpation  Musculoskeletal: no obvious deformities, full range of motion of all extremities/spine without limitation or discomfort  Integumentary: Jaundice, spider angiomas present on upper chest, no caput medusa, no rash  Extremities: radial and DP pulses 2+ bilaterally, no LE edema  Neurologic: Pt AAOx3, following commands and conversing appropriately. No focal neuro deficits appreciated, CN II-XII grossly intact.       Laboratory:  Most Recent Data:  CBC:   Lab Results   Component Value Date    WBC 15.41 (H) 01/13/2024    HGB 9.0 (L) 01/13/2024    HCT 27.8 (L) 01/13/2024    PLT 69 (L) 01/13/2024    MCV 92.1 01/13/2024    RDW 19.8 (H) 01/13/2024     WBC Differential:   Recent Labs   Lab 01/09/24  0342 01/10/24  0331 01/11/24  0353 01/12/24  0410 01/13/24  0317   WBC 14.41* 10.55 14.80* 15.47* 15.41*   HGB 10.2* 10.3* 9.4* 9.7* 9.0*   HCT 33.1* 32.7* 29.7* 29.8* 27.8*   PLT 70* 56* 56* 64* 69*   MCV 94.6* 94.8* 92.5 93.1 92.1       BMP:   Lab Results   Component Value Date     (L) 01/13/2024    K 4.1 01/13/2024    CO2 24 01/13/2024    BUN 16.2 01/13/2024    CREATININE 0.64 (L) 01/13/2024    CALCIUM 7.3 (L) 01/13/2024    MG 1.50 (L) 01/13/2024    PHOS 2.8 01/13/2024     LFTs:   Lab Results   Component Value Date    ALBUMIN 1.4 (L) 01/13/2024    BILITOT 5.6 (H) 01/13/2024    AST 76 (H) 01/13/2024    ALKPHOS 296 (H) 01/13/2024    ALT 58 (H) 01/13/2024     Coags:   Lab Results   Component Value Date    INR 2.2 (H) 01/09/2024    PROTIME 24.8 (H) 01/09/2024    PTT 38.6 (H) 01/01/2024     FLP: No results found for: "CHOL", "HDL", "LDLCALC", "TRIG", "CHOLHDL"  DM:   Lab Results   Component Value Date    CREATININE 0.64 (L) 01/13/2024     Thyroid: No results found for: "TSH", "FREET4", "M8HOHUL", "P2DQFNT", " understand this is part of the monitoring of controlled substances. My care team may require an office visit for EVERY controlled substance refill. If I miss appointments or dont follow instructions, my care team may stop my medicine.  4. I will not ask other providers to prescribe controlled substances, and I will not accept controlled substances from other people. If I need another prescribed controlled substance for a new reason, I will tell my care team within 1 business day.  5. I will use one pharmacy to fill all of my controlled substance prescriptions, and it is up to me to make sure that I do not run out of my medicines on weekends or holidays. If my care team is willing to refill my controlled substance prescription without a visit, I must request refills only during office hours, refills may take up to 3 days to process, and it may take up to 5 to 7 days for my medicine to be mailed and ready at my pharmacy. Prescriptions will not be mailed anywhere except my pharmacy.    6. I am responsible for my prescriptions. If the medicine/prescription is lost or stolen, it will not be replaced. I also agree not to share controlled substance medicines with anyone.      [facility] FAMILY MEDICINE/OB  05/09/19  Patient:  Anais Kelly  YOB: 1947  Medical Record Number: [id number]  CSN: [id number]    7. I agree to not use ANY illegal or recreational drugs. This includes marijuana, cocaine, bath salts or other drugs. I agree not to use alcohol unless my care team says I may. I agree to give urine samples whenever asked. If I dont give a urine sample, the care team may stop my medicine.    8. If I enroll in the [place] Medical Marijuana program, I will tell my care team. I will also sign an agreement to share my medical records with my care team.    9. I will bring in my list of medicines (or my medicine bottles) each time I come to the clinic.   10. I will tell my care team right away if I  ""THYROIDAB"  Anemia:   Lab Results   Component Value Date    IRON 39 (L) 01/01/2024    TIBC 203 (L) 01/01/2024    FERRITIN 116.00 01/01/2024    LWNLWSHC02 >2,000 (H) 01/01/2024    FOLATE 18.3 01/01/2024     Cardiac:   Lab Results   Component Value Date    TROPONINI 0.029 01/01/2024    BNP 89.3 01/01/2024       Microbiology Data Reviewed: yes  Pertinent Findings:  1/1/24: bld cx +strep pneumonia pan sensitive x1 bottle  1/4/24: bld cx x2 negative    Other Results:  TTE 1/4/24: EF 62%, no evidence of vegetation      Radiology:  Imaging Results              CT Chest Abdomen Pelvis Without Contrast (XPD) (Final result)  Result time 01/02/24 09:44:01      Final result by Tika Mcknight MD (01/02/24 09:44:01)                   Impression:      1. Multilobar consolidative opacities within the lungs  2. Cirrhotic morphology of the liver with mass at the dome of the liver.  Evaluation is moderately limited without contrast and due to beam hardening artifact related to patient positioning.  Recommend liver protocol CT abdomen without and with contrast.  3. Ascites  4. Indeterminate right adrenal nodule.  Continued attention recommended on follow-up exams.  5. The preliminary and final reports are concordant.      Electronically signed by: Tika Mcknight  Date:    01/02/2024  Time:    09:44               Narrative:    EXAMINATION:  CT CHEST ABDOMEN PELVIS WITHOUT CONTRAST(XPD)    CLINICAL HISTORY:  Suspect pneumonia, biliary obstruction;    TECHNIQUE:  Helical acquisition through the chest, abdomen and pelvis without  IV administration of contrast. Axial, sagittal and coronal reformats interpreted.    Automated tube current modulation, weight-based exposure dosing, and/or iterative reconstruction technique utilized to reach lowest reasonably achievable exposure rate.    DLP: 1042 mGy*cm    COMPARISON:  Chest radiograph 01/01/2024    FINDINGS:  BASE OF NECK: No significant abnormality.    HEART: Normal size. There are " become pregnant or have a new medical problem treated outside of my regular clinic.  11. I understand that this medicine can affect my thinking and judgment. It may be unsafe for me to drive, use machinery and do dangerous tasks. I will not do any of these things until I know how the medicine affects me. If my dose changes, I will wait to see how it affects me. I will contact my care team if I have concerns about medicine side effects.    I understand that if I do not follow any of the conditions above, my prescriptions or treatment may be stopped.      I agree that my provider, clinic care team, and pharmacy may work with any city, state or federal law enforcement agency that investigates the misuse, sale, or other diversion of my controlled medicine. I will allow my provider to discuss my care with or share a copy of this agreement with any other treating provider, pharmacy or emergency room where I receive care. I agree to give up (waive) any right of privacy or confidentiality with respect to these consents.   I have read this agreement and have asked questions about anything I did not understand.    ___________________________________________________________________________  Patient signature - Date/Time  -Anais Kelly                                      ___________________________________________________________________________  Witness signature                                                                    ___________________________________________________________________________  Provider signature- Eve Contreras MD            coronary artery calcifications.  Low-density blood pool with visualization of the interventricular septum compatible with anemia.    THORACIC VASCULATURE: Thoracic aorta is unremarkable.    SIRISHA/MEDIASTINUM: Small nonspecific mediastinal lymph nodes.  Evaluation of hilar lymphadenopathy is limited without contrast.    AIRWAYS: Patent.    LUNGS/PLEURA: Multilobar consolidative opacities within the lungs.    THORACIC SOFT TISSUES: Unremarkable.    LIVER: Limited evaluation the liver due to lack of intravenous contrast and placement of the patient's arms over the abdomen which causes beam hardening artifact.  Heterogeneous attenuation of the liver.  There appears to be a mass at the dome of the liver.  Caudate lobe hypertrophy and changes of cirrhosis.    BILIARY: The gallbladder does not appear to be overly distended.    PANCREAS: Nonspecific retroperitoneal edema.  Unable to assess for changes of pancreatitis given generalized fluid overload.    SPLEEN: Upper limits of normal in size.    ADRENALS: There is a 1.6 cm right adrenal nodule.  Unable to accurately measure internal attenuation due to beam hardening artifact.    KIDNEYS/URETERS: Hyperdense renal pyramids.  This can be related to volume status/dehydration or medullary calcinosis.    GI TRACT/MESENTERY: Evaluation of the bowel is limited without contrast. Bowel is normal in caliber without evidence of obstruction.    PERITONEUM: Small volume ascites.No free air.    LYMPH NODES: No enlarged lymph nodes by size criteria.    ABDOMINOPELVIC VASCULATURE: Normal caliber abdominal aorta.  Large periumbilical vein.    BLADDER: Normal appearance given degree of distention.    REPRODUCTIVE ORGANS: Normal as visualized.    ABDOMINAL WALL: Small fat containing right inguinal hernia.  Mild body wall edema.    BONES: Anterolisthesis at the lumbosacral junction.                        Preliminary result by Santana Lerma MD (01/01/24 22:56:45)                   Impression:     1. No intrahepatic or extrahepatic biliary duct dilatation is seen.  2. Note of subtle increased attenuation in the medullary portions of the bilateral kidneys which may reflect medullary calcinosis. Correlate with clinical and laboratory findings as regards additional evaluation and follow-up.  3. There is multifocal patchy dense consolidation of both the lungs with predominant involvement of the left upper and right lower lobes. This is consistent with multilobar pneumonia. Correlate with clinical and laboratory findings as regards additional evaluation and follow-up to full resolution.  4. There is a 2.8 x 2 cm soft tissue density nodule in the right adrenal gland (series 2 image 98). This is of indeterminate etiology.  5. The margins of the liver appears somewhat nodular and irregular. This is suggestive of cirrhosis. Correlate with clinical and laboratory findings.  6. Details and other findings as discussed above.               Narrative:    START OF REPORT:  Technique: CT Scan of the chest abdomen and pelvis was performed without intravenous contrast with axial as well as sagittal and, coronal images.    Dosage Information: Automated Exposure Control was utilized 1041.63 mGy.cm.    Comparison: None.    Clinical History: Suspect pneumonia, biliary obstruction.    Findings:  Mediastinum: A few scattered subcentimeter mediastinal lymph nodes are seen.  Heart: The heart size is within normal limits.  Aorta: Unremarkable appearing aorta.  Pulmonary Arteries: Unremarkable.  Lungs: There is multifocal patchy dense consolidation of both the lungs with predominant involvement of the left upper and right lower lobes.  Pleura: No effusions or pneumothorax are identified.  Abdomen:  Abdominal Wall: There is extensive stranding of the subcutaneous fat as well as the intraabdominal and intrapelvic fat consistent with anasarca edema. Correlate with clinical and laboratory findings.  Liver: The margins of the liver  appears somewhat nodular and irregular. There are also prominent portosystemic collaterals consistent with portal hypertension.  Biliary System: No intrahepatic or extrahepatic biliary duct dilatation is seen.  Gallbladder: The gallbladder is not definitely identified on this noncontrast examination and may be contracted.  Pancreas: The pancreas appears unremarkable.  Spleen: The spleen appears unremarkable.  Adrenals: There is a 2.8 x 2 cm soft tissue density nodule in the right adrenal gland (series 2 image 98).  Kidneys: Note of subtle increased attenuation in the medullary portions of the bilateral kidneys which may reflect medullary calcinosis.  Aorta: The abdominal aorta appears unremarkable.  IVC: Unremarkable.  Bowel:  Esophagus: There is a small hiatal hernia.  Stomach: The stomach appears unremarkable.  Duodenum: Unremarkable appearing duodenum.  Small Bowel: The small bowel appears unremarkable.  Colon: Nondistended.  Appendix: The appendix is not identified but no inflammatory changes are seen in the right lower quadrant to suggest appendicitis.  Peritoneum: There is mild ascites. No intraperitoneal free gas is seen.    Pelvis:  Bladder: The bladder appears unremarkable.  Male:  Prostate gland: The prostate gland appears unremarkable.    Bony structures:  Dorsal Spine: There is mild to moderate spondylosis of the visualized dorsal spine.  Bony Pelvis: The visualized bony structures of the pelvis appear unremarkable.                                         X-Ray Chest 1 View (Final result)  Result time 01/01/24 14:23:11   Procedure changed from X-Ray Chest PA And Lateral     Final result by Josef Sow MD (01/01/24 14:23:11)                   Impression:      Bilateral patchy airspace opacities greatest on the left.  Findings concerning for atypical infectious process.      Electronically signed by: Josef Sow  Date:    01/01/2024  Time:    14:23               Narrative:    EXAMINATION:  XR  CHEST 1 VIEW    CLINICAL HISTORY:  cough;  Cough, unspecified    TECHNIQUE:  Single view of the chest    COMPARISON:  No prior imaging available for comparison.    FINDINGS:  Bilateral patchy airspace opacities greatest on the left.    The cardiomediastinal silhouette is within normal limits.    No acute osseous abnormality.                                       RADIOLOGY REPORT (Final result)  Result time 01/04/24 11:39:28      Final result by Unknown User (01/04/24 11:39:28)                                        Current Medications:     Infusions:       Scheduled:   cefTRIAXone (ROCEPHIN) IVPB  2 g Intravenous Q24H    enoxparin  40 mg Subcutaneous Q24H (prophylaxis, 1700)    folic acid  1 mg Oral Daily    pantoprazole  40 mg Oral Daily    predniSONE  20 mg Oral Daily    rifAXIMin  550 mg Oral BID    sodium chloride 0.9%  500 mL Intravenous Once    sodium chloride 0.9%  10 mL Intravenous Q6H    thiamine  100 mg Oral Daily        PRN:  0.9%  NaCl infusion (for blood administration), sodium chloride 0.9%, acetaminophen, benzonatate, dextrose 10%, dextrose 10%, glucagon (human recombinant), glucose, glucose, hydrOXYzine pamoate, lactulose, sodium chloride 0.9%, Flushing PICC/Midline Protocol **AND** sodium chloride 0.9% **AND** sodium chloride 0.9%    Antibiotics and Day Number of Therapy:  Vanc x3 days  Zosyn/Azithromycin x4 days  Rocephin 2g on D9/14     Lines and Day Number of Therapy:  PIV      Assessment & Plan:     Decompensated Cirrhosis       - suspected etiology ETOH. Pt reportedly had hx of heavy drinking per friend who dropped him off       - Positive hepatitis C antibody; VL negative, no active infection        - MELD-Na: 28->25-> 22 1/9/24; Child Hoang: 13-> 11 1/9/24, class C, Maddrey: 204       - Coagulopathy improving; INR 2.4 1/9/23       - Improvement in encephalopathy; hold lactulose for now as pt having diarrhea       - Continue Rifaxamin for hepatic encephalopathy       - Prednisone taper given  Sandra; 40 mg x1 week then 20 x1 week, then 10, then 5, then stop after 28 days. Currently on D1 of 20 mg dosing    Pneumonia 2/2 Influenza A       - Positive on 1/1/24       - Patient now significantly improved, afebrile, mild cough, and decreased O2 requirements; okay to stop isolation today       - Completed Oseltamivir 5 day course       - NC and supplemental O2 to keep sats > 92%       - Possible superimposed Strep PNA given bacteremia and pneumonia; on appropriate Ceftriaxone per below    Strep Pneumoniae Bacteremia       - Blood culture positive x1/2 Streptococcus; [employer] confirmation strep pneumonia bacteremia which is pan-sensitive, resp culture /Gram stain collection pending.       - Repeat blood cultures 1/4/24  negative; TTE did not have any findings consistent with endocarditis.        - s/p Zosyn and Zithromax x4 days; MRSA PCR negative, discontinued vancomycin after D3.        - Continue Rocephin 2g daily (pan-sensitive strep pneumo), will need 14 days from negative repeat blood cultures; D9/14 currently as repeat blood culture from 1/4/24 negative.        - s/p PICC for long term IV Abx    Skin Breakdown to L buttocks-improving       - Skin tear vs Stage II pressure ulcer       - Wound care consulted, appreciate recs       - Continue q2h turning and mepilex dsg        - Pt now with enteral nutrition, hopeful to aid in healing    Malnutrition       - Soft mechanical diet ordered, pt working with [facility]       - Diet per dietary recs    CODE STATUS: Full  Access: PIV  Antibiotics: Ceftriaxone  Diet: Clears  DVT Prophylaxis: Lovenox  GI Prophylaxis: Protonix  Fluids: n/a      Disposition: Patient continues to meet inpatient needs requiring 14 day course of IV abx for strep pneumoniae bacteremia; currently on day 9/14; self pay, likely undocumented and will need to stay for duration. Continues to recover slowly from decompensated cirrhosis as well, hopeful for resolution prior to end of  antibiotics.      Gary Arora DO  [facility]

## 2024-01-13 NOTE — PROGRESS NOTES
Faculty addendum:     I have reviewed the patients history, residents  findings on physical examination, diagnosis and treatment plan. Care provided was reasonable and necessary.    PATIENT SEEN AND EXAMINED THIS MORNING.    Date 10/14 of IV Rocephin, continue 2 g daily.    Giving supplemental magnesium today as well.    Tolerating p.o..    On room air.    Hyponatremia however continues to worsen now 129, okay to start p.o. salt tabs in the meantime, however, send off urine sodium, urine osmoles and fractional excretion of sodium this morning.      Fully dictated resident progress note forthcoming

## 2024-01-14 LAB
ALBUMIN SERPL-MCNC: 1.3 G/DL (ref 3.5–5)
ALBUMIN/GLOB SERPL: 0.3 RATIO (ref 1.1–2)
ALP SERPL-CCNC: 279 UNIT/L (ref 40–150)
ALT SERPL-CCNC: 69 UNIT/L (ref 0–55)
AST SERPL-CCNC: 88 UNIT/L (ref 5–34)
BASOPHILS # BLD AUTO: 0.03 X10(3)/MCL
BASOPHILS NFR BLD AUTO: 0.2 %
BILIRUB SERPL-MCNC: 5.7 MG/DL
BUN SERPL-MCNC: 14.6 MG/DL (ref 8.9–20.6)
CALCIUM SERPL-MCNC: 7.1 MG/DL (ref 8.4–10.2)
CHLORIDE SERPL-SCNC: 102 MMOL/L (ref 98–107)
CO2 SERPL-SCNC: 22 MMOL/L (ref 22–29)
CREAT SERPL-MCNC: 0.61 MG/DL (ref 0.73–1.18)
EOSINOPHIL # BLD AUTO: 0.1 X10(3)/MCL (ref 0–0.9)
EOSINOPHIL NFR BLD AUTO: 0.8 %
ERYTHROCYTE [DISTWIDTH] IN BLOOD BY AUTOMATED COUNT: 20.4 % (ref 11.5–17)
GFR SERPLBLD CREATININE-BSD FMLA CKD-EPI: >60 MLS/MIN/1.73/M2
GLOBULIN SER-MCNC: 5.2 GM/DL (ref 2.4–3.5)
GLUCOSE SERPL-MCNC: 81 MG/DL (ref 74–100)
HCT VFR BLD AUTO: 26.9 % (ref 42–52)
HGB BLD-MCNC: 8.7 G/DL (ref 14–18)
IMM GRANULOCYTES # BLD AUTO: 0.08 X10(3)/MCL (ref 0–0.04)
IMM GRANULOCYTES NFR BLD AUTO: 0.6 %
LYMPHOCYTES # BLD AUTO: 1.44 X10(3)/MCL (ref 0.6–4.6)
LYMPHOCYTES NFR BLD AUTO: 11.5 %
MAGNESIUM SERPL-MCNC: 1.8 MG/DL (ref 1.6–2.6)
MCH RBC QN AUTO: 30.1 PG (ref 27–31)
MCHC RBC AUTO-ENTMCNC: 32.3 G/DL (ref 33–36)
MCV RBC AUTO: 93.1 FL (ref 80–94)
MONOCYTES # BLD AUTO: 1.19 X10(3)/MCL (ref 0.1–1.3)
MONOCYTES NFR BLD AUTO: 9.5 %
NEUTROPHILS # BLD AUTO: 9.71 X10(3)/MCL (ref 2.1–9.2)
NEUTROPHILS NFR BLD AUTO: 77.4 %
NRBC BLD AUTO-RTO: 0 %
OSMOLALITY UR: 798 MOSM/KG (ref 300–1300)
PHOSPHATE SERPL-MCNC: 2.5 MG/DL (ref 2.3–4.7)
PLATELET # BLD AUTO: 69 X10(3)/MCL (ref 130–400)
PLATELETS.RETICULATED NFR BLD AUTO: 11.7 % (ref 0.9–11.2)
PMV BLD AUTO: 12.9 FL (ref 7.4–10.4)
POCT GLUCOSE: 120 MG/DL (ref 70–110)
POCT GLUCOSE: 166 MG/DL (ref 70–110)
POCT GLUCOSE: 97 MG/DL (ref 70–110)
POTASSIUM SERPL-SCNC: 4.4 MMOL/L (ref 3.5–5.1)
PROT SERPL-MCNC: 6.5 GM/DL (ref 6.4–8.3)
RBC # BLD AUTO: 2.89 X10(6)/MCL (ref 4.7–6.1)
SODIUM SERPL-SCNC: 130 MMOL/L (ref 136–145)
WBC # SPEC AUTO: 12.55 X10(3)/MCL (ref 4.5–11.5)

## 2024-01-14 PROCEDURE — 83735 ASSAY OF MAGNESIUM: CPT | Performed by: STUDENT IN AN ORGANIZED HEALTH CARE EDUCATION/TRAINING PROGRAM

## 2024-01-14 PROCEDURE — 63600175 PHARM REV CODE 636 W HCPCS: Performed by: STUDENT IN AN ORGANIZED HEALTH CARE EDUCATION/TRAINING PROGRAM

## 2024-01-14 PROCEDURE — 25000003 PHARM REV CODE 250: Performed by: STUDENT IN AN ORGANIZED HEALTH CARE EDUCATION/TRAINING PROGRAM

## 2024-01-14 PROCEDURE — 80053 COMPREHEN METABOLIC PANEL: CPT | Performed by: STUDENT IN AN ORGANIZED HEALTH CARE EDUCATION/TRAINING PROGRAM

## 2024-01-14 PROCEDURE — 94761 N-INVAS EAR/PLS OXIMETRY MLT: CPT

## 2024-01-14 PROCEDURE — 85025 COMPLETE CBC W/AUTO DIFF WBC: CPT | Performed by: STUDENT IN AN ORGANIZED HEALTH CARE EDUCATION/TRAINING PROGRAM

## 2024-01-14 PROCEDURE — 25000003 PHARM REV CODE 250: Performed by: INTERNAL MEDICINE

## 2024-01-14 PROCEDURE — 21400001 HC TELEMETRY ROOM

## 2024-01-14 PROCEDURE — 84100 ASSAY OF PHOSPHORUS: CPT | Performed by: STUDENT IN AN ORGANIZED HEALTH CARE EDUCATION/TRAINING PROGRAM

## 2024-01-14 PROCEDURE — A4216 STERILE WATER/SALINE, 10 ML: HCPCS

## 2024-01-14 PROCEDURE — 25000003 PHARM REV CODE 250

## 2024-01-14 RX ORDER — SODIUM CHLORIDE 1 G/1
1000 TABLET ORAL 2 TIMES DAILY
Status: DISCONTINUED | OUTPATIENT
Start: 2024-01-14 | End: 2024-01-15

## 2024-01-14 RX ADMIN — CEFTRIAXONE SODIUM 2 G: 2 INJECTION, POWDER, FOR SOLUTION INTRAMUSCULAR; INTRAVENOUS at 09:01

## 2024-01-14 RX ADMIN — SODIUM CHLORIDE, PRESERVATIVE FREE 10 ML: 5 INJECTION INTRAVENOUS at 05:01

## 2024-01-14 RX ADMIN — SODIUM CHLORIDE, PRESERVATIVE FREE 10 ML: 5 INJECTION INTRAVENOUS at 12:01

## 2024-01-14 RX ADMIN — FOLIC ACID 1 MG: 1 TABLET ORAL at 09:01

## 2024-01-14 RX ADMIN — SODIUM CHLORIDE 1000 MG: 1 TABLET ORAL at 12:01

## 2024-01-14 RX ADMIN — SODIUM CHLORIDE: 9 INJECTION, SOLUTION INTRAVENOUS at 09:01

## 2024-01-14 RX ADMIN — SODIUM CHLORIDE, PRESERVATIVE FREE 10 ML: 5 INJECTION INTRAVENOUS at 11:01

## 2024-01-14 RX ADMIN — SODIUM CHLORIDE 1000 MG: 1 TABLET ORAL at 08:01

## 2024-01-14 RX ADMIN — Medication 100 MG: at 09:01

## 2024-01-14 RX ADMIN — ENOXAPARIN SODIUM 40 MG: 40 INJECTION SUBCUTANEOUS at 05:01

## 2024-01-14 RX ADMIN — PREDNISONE 20 MG: 20 TABLET ORAL at 09:01

## 2024-01-14 RX ADMIN — RIFAXIMIN 550 MG: 550 TABLET ORAL at 08:01

## 2024-01-14 RX ADMIN — PANTOPRAZOLE SODIUM 40 MG: 40 TABLET, DELAYED RELEASE ORAL at 09:01

## 2024-01-14 RX ADMIN — RIFAXIMIN 550 MG: 550 TABLET ORAL at 09:01

## 2024-01-14 NOTE — PLAN OF CARE
Problem: Adult Inpatient Plan of Care  Goal: Plan of Care Review  Outcome: Ongoing, Progressing  Flowsheets (Taken 1/14/2024 0404)  Plan of Care Reviewed With: patient  Goal: Absence of Hospital-Acquired Illness or Injury  Outcome: Ongoing, Progressing  Intervention: Identify and Manage Fall Risk  Flowsheets (Taken 1/14/2024 0404)  Safety Promotion/Fall Prevention:   assistive device/personal item within reach   medications reviewed   high risk medications identified   lighting adjusted   nonskid shoes/socks when out of bed   side rails raised x 2   instructed to call staff for mobility  Intervention: Prevent Skin Injury  Flowsheets (Taken 1/14/2024 0404)  Body Position: position changed independently  Skin Protection:   adhesive use limited   tubing/devices free from skin contact  Intervention: Prevent and Manage VTE (Venous Thromboembolism) Risk  Flowsheets (Taken 1/14/2024 0404)  Activity Management: Up to bedside commode - L3  VTE Prevention/Management:   remove, assess skin, and reapply sequential compression device   ambulation promoted   bleeding risk assessed  Intervention: Prevent Infection  Flowsheets (Taken 1/14/2024 0404)  Infection Prevention: rest/sleep promoted

## 2024-01-14 NOTE — PROGRESS NOTES
[facility] Medicine Wards Progress Note     Resident Team: [facility] Medicine List 4  Attending Physician: Kamryn Estevez MD  Resident: MD Galo  Intern: DO Ulysses       Subjective:      Brief HPI:  Franco Hein is  48 y.o. male with a PMH of ETOH misuse disorder who presented to [facility] ED on 2024 with complaint of jaundice, weakness for 3 weeks, and AMS. Patient was brought in by a friend who states patient is a daily drinker who has not been able to go to a store for the last 2 days, but patient is noted to have a positive ethanol on admission. Patient unable to provide history given his mental status and was only able to say his name and confirm he drinks alcohol but does not do drugs. Patient was tachypneic, tachycardic, and tremulous, on presentation with jaundice. Admitted to ICU given altered mental status and concern for decompensation in the setting of alcohol withdrawal and influenza A, also found to have strep pneumonia bacteremia, on appropriate IV antibiotics.      Interval History:   NAEO. Only complaint is of cough today. Denied any CP, abdominal pain, n/v/d, dysuria. FeNA 1% yesterday, improvement in Na to 131 from 129 overnight. Starting on salt tabs, continuing to improve slowly.     Review of Systems:  ROS completed and negative except as indicated above.     Objective:     Last 24 Hour Vital Signs:  BP  Min: 117/72  Max: 134/47  Temp  Av.2 °F (36.8 °C)  Min: 98 °F (36.7 °C)  Max: 98.5 °F (36.9 °C)  Pulse  Av.3  Min: 84  Max: 100  Resp  Av.7  Min: 17  Max: 18  SpO2  Av.8 %  Min: 93 %  Max: 96 %  I/O last 3 completed shifts:  In: 842.7 [I.V.:140.9; IV Piggyback:701.8]  Out: 100 [Urine:100]    Physical Examination:  General: ill-appearing though non-toxic  Eye: PERRLA, EOMI, scleral icterus noted  HENT: NC/AT, moist mucus membranes  Neck: full range of motion, no thyromegaly or lymphadenopathy  Respiratory: Rhonchi noted bilaterally, satting well on RA  Cardiovascular:  "regular rate and rhythm without murmurs, gallops or rubs  Gastrointestinal: soft, non-tender, non-distended with normal bowel sounds, without masses to palpation  Musculoskeletal: no obvious deformities, full range of motion of all extremities/spine without limitation or discomfort  Integumentary: Jaundice, spider angiomas present on upper chest, no caput medusa, no rash  Extremities: radial and DP pulses 2+ bilaterally, no LE edema  Neurologic: Pt AAOx3, following commands and conversing appropriately. No focal neuro deficits appreciated, CN II-XII grossly intact.       Laboratory:  Most Recent Data:  CBC:   Lab Results   Component Value Date    WBC 12.55 (H) 01/14/2024    HGB 8.7 (L) 01/14/2024    HCT 26.9 (L) 01/14/2024    PLT 69 (L) 01/14/2024    MCV 93.1 01/14/2024    RDW 20.4 (H) 01/14/2024     WBC Differential:   Recent Labs   Lab 01/10/24  0331 01/11/24  0353 01/12/24  0410 01/13/24  0317 01/14/24  0529   WBC 10.55 14.80* 15.47* 15.41* 12.55*   HGB 10.3* 9.4* 9.7* 9.0* 8.7*   HCT 32.7* 29.7* 29.8* 27.8* 26.9*   PLT 56* 56* 64* 69* 69*   MCV 94.8* 92.5 93.1 92.1 93.1       BMP:   Lab Results   Component Value Date     (L) 01/14/2024    K 4.4 01/14/2024    CO2 22 01/14/2024    BUN 14.6 01/14/2024    CREATININE 0.61 (L) 01/14/2024    CALCIUM 7.1 (L) 01/14/2024    MG 1.80 01/14/2024    PHOS 2.5 01/14/2024     LFTs:   Lab Results   Component Value Date    ALBUMIN 1.3 (L) 01/14/2024    BILITOT 5.7 (H) 01/14/2024    AST 88 (H) 01/14/2024    ALKPHOS 279 (H) 01/14/2024    ALT 69 (H) 01/14/2024     Coags:   Lab Results   Component Value Date    INR 2.2 (H) 01/09/2024    PROTIME 24.8 (H) 01/09/2024    PTT 38.6 (H) 01/01/2024     FLP: No results found for: "CHOL", "HDL", "LDLCALC", "TRIG", "CHOLHDL"  DM:   Lab Results   Component Value Date    CREATININE 0.61 (L) 01/14/2024     Thyroid: No results found for: "TSH", "FREET4", "T6GAOIE", "U2MZOYT", "THYROIDAB"  Anemia:   Lab Results   Component Value Date    IRON 39 " (L) 01/01/2024    TIBC 203 (L) 01/01/2024    FERRITIN 116.00 01/01/2024    FBTSIONP13 >2,000 (H) 01/01/2024    FOLATE 18.3 01/01/2024     Cardiac:   Lab Results   Component Value Date    TROPONINI 0.029 01/01/2024    BNP 89.3 01/01/2024       Microbiology Data Reviewed: yes  Pertinent Findings:  1/1/24: bld cx +strep pneumonia pan sensitive x1 bottle  1/4/24: bld cx x2 negative    Other Results:  TTE 1/4/24: EF 62%, no evidence of vegetation      Radiology:  Imaging Results              CT Chest Abdomen Pelvis Without Contrast (XPD) (Final result)  Result time 01/02/24 09:44:01      Final result by Tika Mcknight MD (01/02/24 09:44:01)                   Impression:      1. Multilobar consolidative opacities within the lungs  2. Cirrhotic morphology of the liver with mass at the dome of the liver.  Evaluation is moderately limited without contrast and due to beam hardening artifact related to patient positioning.  Recommend liver protocol CT abdomen without and with contrast.  3. Ascites  4. Indeterminate right adrenal nodule.  Continued attention recommended on follow-up exams.  5. The preliminary and final reports are concordant.      Electronically signed by: Tika Mcknight  Date:    01/02/2024  Time:    09:44               Narrative:    EXAMINATION:  CT CHEST ABDOMEN PELVIS WITHOUT CONTRAST(XPD)    CLINICAL HISTORY:  Suspect pneumonia, biliary obstruction;    TECHNIQUE:  Helical acquisition through the chest, abdomen and pelvis without  IV administration of contrast. Axial, sagittal and coronal reformats interpreted.    Automated tube current modulation, weight-based exposure dosing, and/or iterative reconstruction technique utilized to reach lowest reasonably achievable exposure rate.    DLP: 1042 mGy*cm    COMPARISON:  Chest radiograph 01/01/2024    FINDINGS:  BASE OF NECK: No significant abnormality.    HEART: Normal size. There are coronary artery calcifications.  Low-density blood pool with  visualization of the interventricular septum compatible with anemia.    THORACIC VASCULATURE: Thoracic aorta is unremarkable.    SIRISHA/MEDIASTINUM: Small nonspecific mediastinal lymph nodes.  Evaluation of hilar lymphadenopathy is limited without contrast.    AIRWAYS: Patent.    LUNGS/PLEURA: Multilobar consolidative opacities within the lungs.    THORACIC SOFT TISSUES: Unremarkable.    LIVER: Limited evaluation the liver due to lack of intravenous contrast and placement of the patient's arms over the abdomen which causes beam hardening artifact.  Heterogeneous attenuation of the liver.  There appears to be a mass at the dome of the liver.  Caudate lobe hypertrophy and changes of cirrhosis.    BILIARY: The gallbladder does not appear to be overly distended.    PANCREAS: Nonspecific retroperitoneal edema.  Unable to assess for changes of pancreatitis given generalized fluid overload.    SPLEEN: Upper limits of normal in size.    ADRENALS: There is a 1.6 cm right adrenal nodule.  Unable to accurately measure internal attenuation due to beam hardening artifact.    KIDNEYS/URETERS: Hyperdense renal pyramids.  This can be related to volume status/dehydration or medullary calcinosis.    GI TRACT/MESENTERY: Evaluation of the bowel is limited without contrast. Bowel is normal in caliber without evidence of obstruction.    PERITONEUM: Small volume ascites.No free air.    LYMPH NODES: No enlarged lymph nodes by size criteria.    ABDOMINOPELVIC VASCULATURE: Normal caliber abdominal aorta.  Large periumbilical vein.    BLADDER: Normal appearance given degree of distention.    REPRODUCTIVE ORGANS: Normal as visualized.    ABDOMINAL WALL: Small fat containing right inguinal hernia.  Mild body wall edema.    BONES: Anterolisthesis at the lumbosacral junction.                        Preliminary result by Santana Lerma MD (01/01/24 22:56:45)                   Impression:    1. No intrahepatic or extrahepatic biliary duct  dilatation is seen.  2. Note of subtle increased attenuation in the medullary portions of the bilateral kidneys which may reflect medullary calcinosis. Correlate with clinical and laboratory findings as regards additional evaluation and follow-up.  3. There is multifocal patchy dense consolidation of both the lungs with predominant involvement of the left upper and right lower lobes. This is consistent with multilobar pneumonia. Correlate with clinical and laboratory findings as regards additional evaluation and follow-up to full resolution.  4. There is a 2.8 x 2 cm soft tissue density nodule in the right adrenal gland (series 2 image 98). This is of indeterminate etiology.  5. The margins of the liver appears somewhat nodular and irregular. This is suggestive of cirrhosis. Correlate with clinical and laboratory findings.  6. Details and other findings as discussed above.               Narrative:    START OF REPORT:  Technique: CT Scan of the chest abdomen and pelvis was performed without intravenous contrast with axial as well as sagittal and, coronal images.    Dosage Information: Automated Exposure Control was utilized 1041.63 mGy.cm.    Comparison: None.    Clinical History: Suspect pneumonia, biliary obstruction.    Findings:  Mediastinum: A few scattered subcentimeter mediastinal lymph nodes are seen.  Heart: The heart size is within normal limits.  Aorta: Unremarkable appearing aorta.  Pulmonary Arteries: Unremarkable.  Lungs: There is multifocal patchy dense consolidation of both the lungs with predominant involvement of the left upper and right lower lobes.  Pleura: No effusions or pneumothorax are identified.  Abdomen:  Abdominal Wall: There is extensive stranding of the subcutaneous fat as well as the intraabdominal and intrapelvic fat consistent with anasarca edema. Correlate with clinical and laboratory findings.  Liver: The margins of the liver appears somewhat nodular and irregular. There are also  prominent portosystemic collaterals consistent with portal hypertension.  Biliary System: No intrahepatic or extrahepatic biliary duct dilatation is seen.  Gallbladder: The gallbladder is not definitely identified on this noncontrast examination and may be contracted.  Pancreas: The pancreas appears unremarkable.  Spleen: The spleen appears unremarkable.  Adrenals: There is a 2.8 x 2 cm soft tissue density nodule in the right adrenal gland (series 2 image 98).  Kidneys: Note of subtle increased attenuation in the medullary portions of the bilateral kidneys which may reflect medullary calcinosis.  Aorta: The abdominal aorta appears unremarkable.  IVC: Unremarkable.  Bowel:  Esophagus: There is a small hiatal hernia.  Stomach: The stomach appears unremarkable.  Duodenum: Unremarkable appearing duodenum.  Small Bowel: The small bowel appears unremarkable.  Colon: Nondistended.  Appendix: The appendix is not identified but no inflammatory changes are seen in the right lower quadrant to suggest appendicitis.  Peritoneum: There is mild ascites. No intraperitoneal free gas is seen.    Pelvis:  Bladder: The bladder appears unremarkable.  Male:  Prostate gland: The prostate gland appears unremarkable.    Bony structures:  Dorsal Spine: There is mild to moderate spondylosis of the visualized dorsal spine.  Bony Pelvis: The visualized bony structures of the pelvis appear unremarkable.                                         X-Ray Chest 1 View (Final result)  Result time 01/01/24 14:23:11   Procedure changed from X-Ray Chest PA And Lateral     Final result by Josef Sow MD (01/01/24 14:23:11)                   Impression:      Bilateral patchy airspace opacities greatest on the left.  Findings concerning for atypical infectious process.      Electronically signed by: Josef Sow  Date:    01/01/2024  Time:    14:23               Narrative:    EXAMINATION:  XR CHEST 1 VIEW    CLINICAL HISTORY:  cough;  Cough,  unspecified    TECHNIQUE:  Single view of the chest    COMPARISON:  No prior imaging available for comparison.    FINDINGS:  Bilateral patchy airspace opacities greatest on the left.    The cardiomediastinal silhouette is within normal limits.    No acute osseous abnormality.                                       RADIOLOGY REPORT (Final result)  Result time 01/04/24 11:39:28      Final result by Unknown User (01/04/24 11:39:28)                                        Current Medications:     Infusions:       Scheduled:   cefTRIAXone (ROCEPHIN) IVPB  2 g Intravenous Q24H    enoxparin  40 mg Subcutaneous Q24H (prophylaxis, 1700)    folic acid  1 mg Oral Daily    pantoprazole  40 mg Oral Daily    predniSONE  20 mg Oral Daily    rifAXIMin  550 mg Oral BID    sodium chloride 0.9%  10 mL Intravenous Q6H    thiamine  100 mg Oral Daily        PRN:  0.9%  NaCl infusion (for blood administration), sodium chloride 0.9%, acetaminophen, benzonatate, dextrose 10%, dextrose 10%, glucagon (human recombinant), glucose, glucose, hydrOXYzine pamoate, lactulose, sodium chloride 0.9%, Flushing PICC/Midline Protocol **AND** sodium chloride 0.9% **AND** sodium chloride 0.9%    Antibiotics and Day Number of Therapy:  Vanc x3 days  Zosyn/Azithromycin x4 days  Rocephin 2g on D9/14     Lines and Day Number of Therapy:  PIV      Assessment & Plan:     Decompensated Cirrhosis       - suspected etiology ETOH. Pt reportedly had hx of heavy drinking per friend who dropped him off       - Positive hepatitis C antibody; VL negative, no active infection        - MELD-Na: 28->25-> 22 1/9/24; Child Hoang: 13-> 11 1/9/24, class C, Maddrey: 204       - Coagulopathy improving; INR 2.4 1/9/23       - Improvement in encephalopathy; hold lactulose for now as pt having diarrhea       - Continue Rifaxamin for hepatic encephalopathy       - Prednisone taper given Maddrey; 40 mg x1 week then 20 x1 week, then 10, then 5, then stop after 28 days. Currently on D5 of  20 mg dosing    Pneumonia 2/2 Influenza A       - Positive on 1/1/24       - Patient now significantly improved, afebrile, mild cough, and decreased O2 requirements; okay to stop isolation today       - Completed Oseltamivir 5 day course       - NC and supplemental O2 to keep sats > 92%       - Possible superimposed Strep PNA given bacteremia and pneumonia; on appropriate Ceftriaxone per below    Strep Pneumoniae Bacteremia       - Blood culture positive x1/2 Streptococcus; [employer] confirmation strep pneumonia bacteremia which is pan-sensitive, resp culture /Gram stain collection pending.       - Repeat blood cultures 1/4/24  negative; TTE did not have any findings consistent with endocarditis.        - s/p Zosyn and Zithromax x4 days; MRSA PCR negative, discontinued vancomycin after D3.        - Continue Rocephin 2g daily (pan-sensitive strep pneumo), will need 14 days from negative repeat blood cultures; D10/14 currently as repeat blood culture from 1/4/24 negative.        - s/p PICC for long term IV Abx    Skin Breakdown to L buttocks-improving       - Skin tear vs Stage II pressure ulcer       - Wound care consulted, appreciate recs       - Continue q2h turning and mepilex dsg        - Pt now with enteral nutrition, hopeful to aid in healing    Malnutrition  Hyponatremia       - Soft mechanical diet ordered, pt working with [facility]T       - Diet per dietary recs       - Likely 2/2 cirrhosis; FeNA 1% indicated prerenal       - Na 131 this morning, will start salt tabs 1g BID     CODE STATUS: Full  Access: PIV  Antibiotics: Ceftriaxone  Diet: Clears  DVT Prophylaxis: Lovenox  GI Prophylaxis: Protonix  Fluids: n/a      Disposition: Patient continues to meet inpatient needs requiring 14 day course of IV abx for strep pneumoniae bacteremia; currently on day 10/14; self pay, likely undocumented and will need to stay for duration. Continues to recover slowly from decompensated cirrhosis as well, hopeful for resolution prior  to end of antibiotics.      Sagrario Rosenthal MD  [facility] Internal Medicine -III

## 2024-01-15 LAB
ALBUMIN SERPL-MCNC: 1.4 G/DL (ref 3.5–5)
ALBUMIN/GLOB SERPL: 0.3 RATIO (ref 1.1–2)
ALP SERPL-CCNC: 256 UNIT/L (ref 40–150)
ALT SERPL-CCNC: 69 UNIT/L (ref 0–55)
AST SERPL-CCNC: 83 UNIT/L (ref 5–34)
BACTERIA STL CULT: NORMAL
BASOPHILS # BLD AUTO: 0.04 X10(3)/MCL
BASOPHILS NFR BLD AUTO: 0.3 %
BILIRUB SERPL-MCNC: 5.3 MG/DL
BUN SERPL-MCNC: 15.4 MG/DL (ref 8.9–20.6)
CALCIUM SERPL-MCNC: 7.3 MG/DL (ref 8.4–10.2)
CHLORIDE SERPL-SCNC: 102 MMOL/L (ref 98–107)
CO2 SERPL-SCNC: 25 MMOL/L (ref 22–29)
CREAT SERPL-MCNC: 0.6 MG/DL (ref 0.73–1.18)
EOSINOPHIL # BLD AUTO: 0.13 X10(3)/MCL (ref 0–0.9)
EOSINOPHIL NFR BLD AUTO: 1 %
ERYTHROCYTE [DISTWIDTH] IN BLOOD BY AUTOMATED COUNT: 21.2 % (ref 11.5–17)
GFR SERPLBLD CREATININE-BSD FMLA CKD-EPI: >60 MLS/MIN/1.73/M2
GLOBULIN SER-MCNC: 5.2 GM/DL (ref 2.4–3.5)
GLUCOSE SERPL-MCNC: 121 MG/DL (ref 74–100)
HCT VFR BLD AUTO: 25.8 % (ref 42–52)
HGB BLD-MCNC: 8.7 G/DL (ref 14–18)
HOLD SPECIMEN: NORMAL
HOLD SPECIMEN: NORMAL
IMM GRANULOCYTES # BLD AUTO: 0.08 X10(3)/MCL (ref 0–0.04)
IMM GRANULOCYTES NFR BLD AUTO: 0.6 %
LYMPHOCYTES # BLD AUTO: 1.5 X10(3)/MCL (ref 0.6–4.6)
LYMPHOCYTES NFR BLD AUTO: 11.9 %
MAGNESIUM SERPL-MCNC: 1.6 MG/DL (ref 1.6–2.6)
MCH RBC QN AUTO: 31.4 PG (ref 27–31)
MCHC RBC AUTO-ENTMCNC: 33.7 G/DL (ref 33–36)
MCV RBC AUTO: 93.1 FL (ref 80–94)
MONOCYTES # BLD AUTO: 1.41 X10(3)/MCL (ref 0.1–1.3)
MONOCYTES NFR BLD AUTO: 11.2 %
NEUTROPHILS # BLD AUTO: 9.4 X10(3)/MCL (ref 2.1–9.2)
NEUTROPHILS NFR BLD AUTO: 75 %
NRBC BLD AUTO-RTO: 0 %
PHOSPHATE SERPL-MCNC: 2.4 MG/DL (ref 2.3–4.7)
PLATELET # BLD AUTO: 80 X10(3)/MCL (ref 130–400)
PMV BLD AUTO: 12 FL (ref 7.4–10.4)
POCT GLUCOSE: 121 MG/DL (ref 70–110)
POCT GLUCOSE: 134 MG/DL (ref 70–110)
POCT GLUCOSE: 135 MG/DL (ref 70–110)
POCT GLUCOSE: 168 MG/DL (ref 70–110)
POTASSIUM SERPL-SCNC: 4 MMOL/L (ref 3.5–5.1)
PROT SERPL-MCNC: 6.6 GM/DL (ref 6.4–8.3)
RBC # BLD AUTO: 2.77 X10(6)/MCL (ref 4.7–6.1)
SODIUM SERPL-SCNC: 130 MMOL/L (ref 136–145)
WBC # SPEC AUTO: 12.56 X10(3)/MCL (ref 4.5–11.5)

## 2024-01-15 PROCEDURE — 25000003 PHARM REV CODE 250

## 2024-01-15 PROCEDURE — 80053 COMPREHEN METABOLIC PANEL: CPT | Performed by: STUDENT IN AN ORGANIZED HEALTH CARE EDUCATION/TRAINING PROGRAM

## 2024-01-15 PROCEDURE — 21400001 HC TELEMETRY ROOM

## 2024-01-15 PROCEDURE — A4216 STERILE WATER/SALINE, 10 ML: HCPCS

## 2024-01-15 PROCEDURE — 94761 N-INVAS EAR/PLS OXIMETRY MLT: CPT

## 2024-01-15 PROCEDURE — 63600175 PHARM REV CODE 636 W HCPCS: Performed by: STUDENT IN AN ORGANIZED HEALTH CARE EDUCATION/TRAINING PROGRAM

## 2024-01-15 PROCEDURE — 25000003 PHARM REV CODE 250: Performed by: INTERNAL MEDICINE

## 2024-01-15 PROCEDURE — 84100 ASSAY OF PHOSPHORUS: CPT | Performed by: STUDENT IN AN ORGANIZED HEALTH CARE EDUCATION/TRAINING PROGRAM

## 2024-01-15 PROCEDURE — 83735 ASSAY OF MAGNESIUM: CPT | Performed by: STUDENT IN AN ORGANIZED HEALTH CARE EDUCATION/TRAINING PROGRAM

## 2024-01-15 PROCEDURE — 25000003 PHARM REV CODE 250: Performed by: STUDENT IN AN ORGANIZED HEALTH CARE EDUCATION/TRAINING PROGRAM

## 2024-01-15 PROCEDURE — 85025 COMPLETE CBC W/AUTO DIFF WBC: CPT | Performed by: STUDENT IN AN ORGANIZED HEALTH CARE EDUCATION/TRAINING PROGRAM

## 2024-01-15 PROCEDURE — 97116 GAIT TRAINING THERAPY: CPT

## 2024-01-15 RX ORDER — FUROSEMIDE 10 MG/ML
40 INJECTION INTRAMUSCULAR; INTRAVENOUS ONCE
Status: COMPLETED | OUTPATIENT
Start: 2024-01-15 | End: 2024-01-15

## 2024-01-15 RX ORDER — MICONAZOLE NITRATE 2 %
POWDER (GRAM) TOPICAL 2 TIMES DAILY
Status: DISCONTINUED | OUTPATIENT
Start: 2024-01-15 | End: 2024-01-25 | Stop reason: HOSPADM

## 2024-01-15 RX ORDER — PREDNISONE 10 MG/1
10 TABLET ORAL DAILY
Status: COMPLETED | OUTPATIENT
Start: 2024-01-16 | End: 2024-01-16

## 2024-01-15 RX ORDER — SPIRONOLACTONE 25 MG/1
50 TABLET ORAL DAILY
Status: DISCONTINUED | OUTPATIENT
Start: 2024-01-15 | End: 2024-01-16

## 2024-01-15 RX ORDER — FUROSEMIDE 20 MG/1
20 TABLET ORAL DAILY
Status: DISCONTINUED | OUTPATIENT
Start: 2024-01-15 | End: 2024-01-16

## 2024-01-15 RX ADMIN — MICONAZOLE NITRATE: 20 POWDER TOPICAL at 12:01

## 2024-01-15 RX ADMIN — PREDNISONE 20 MG: 20 TABLET ORAL at 09:01

## 2024-01-15 RX ADMIN — CEFTRIAXONE SODIUM 2 G: 2 INJECTION, POWDER, FOR SOLUTION INTRAMUSCULAR; INTRAVENOUS at 09:01

## 2024-01-15 RX ADMIN — PANTOPRAZOLE SODIUM 40 MG: 40 TABLET, DELAYED RELEASE ORAL at 09:01

## 2024-01-15 RX ADMIN — RIFAXIMIN 550 MG: 550 TABLET ORAL at 09:01

## 2024-01-15 RX ADMIN — FOLIC ACID 1 MG: 1 TABLET ORAL at 09:01

## 2024-01-15 RX ADMIN — MICONAZOLE NITRATE: 20 POWDER TOPICAL at 09:01

## 2024-01-15 RX ADMIN — SODIUM CHLORIDE 1000 MG: 1 TABLET ORAL at 09:01

## 2024-01-15 RX ADMIN — SODIUM CHLORIDE, PRESERVATIVE FREE 10 ML: 5 INJECTION INTRAVENOUS at 05:01

## 2024-01-15 RX ADMIN — BENZONATATE 100 MG: 100 CAPSULE ORAL at 09:01

## 2024-01-15 RX ADMIN — FUROSEMIDE 40 MG: 10 INJECTION, SOLUTION INTRAVENOUS at 11:01

## 2024-01-15 RX ADMIN — Medication 100 MG: at 09:01

## 2024-01-15 RX ADMIN — SODIUM CHLORIDE, PRESERVATIVE FREE 10 ML: 5 INJECTION INTRAVENOUS at 12:01

## 2024-01-15 RX ADMIN — SPIRONOLACTONE 50 MG: 25 TABLET ORAL at 09:01

## 2024-01-15 RX ADMIN — FUROSEMIDE 20 MG: 20 TABLET ORAL at 09:01

## 2024-01-15 RX ADMIN — ENOXAPARIN SODIUM 40 MG: 40 INJECTION SUBCUTANEOUS at 05:01

## 2024-01-15 NOTE — PROGRESS NOTES
[facility] Medicine Wards Progress Note     Resident Team: [facility] Medicine List 4  Attending Physician: Kamryn Estevez MD  Resident: MD Galo  Intern: DO Ulysses       Subjective:      Brief HPI:  Franco Hein is  48 y.o. male with a PMH of ETOH misuse disorder who presented to [facility] ED on 2024 with complaint of jaundice, weakness for 3 weeks, and AMS. Patient was brought in by a friend who states patient is a daily drinker who has not been able to go to a store for the last 2 days, but patient is noted to have a positive ethanol on admission. Patient unable to provide history given his mental status and was only able to say his name and confirm he drinks alcohol but does not do drugs. Patient was tachypneic, tachycardic, and tremulous, on presentation with jaundice. Admitted to ICU given altered mental status and concern for decompensation in the setting of alcohol withdrawal and influenza A, also found to have strep pneumonia bacteremia, on appropriate IV antibiotics.      Interval History:   NAEO. Only complaint today is of significant bilateral leg swelling. Otherwise no complaints, denies fever, chills, chest pain, palpitations, n/v/d, abdominal pain, or dysuria. Does have fungal appearing rash on groin/diaper area, endorses mild pruritus there. VS and labs stable, completing abx on 24 and can be discharged at that time.     Review of Systems:  ROS completed and negative except as indicated above.     Objective:     Last 24 Hour Vital Signs:  BP  Min: 115/73  Max: 145/77  Temp  Av.4 °F (36.9 °C)  Min: 97.9 °F (36.6 °C)  Max: 98.7 °F (37.1 °C)  Pulse  Av.8  Min: 85  Max: 111  Resp  Av.3  Min: 17  Max: 18  SpO2  Av %  Min: 93 %  Max: 99 %  I/O last 3 completed shifts:  In: 135.8 [I.V.:35.8; IV Piggyback:100]  Out: 100 [Urine:100]    Physical Examination:  General: ill-appearing though non-toxic  Eye: PERRLA, EOMI, scleral icterus noted  HENT: NC/AT, moist mucus  "membranes  Neck: full range of motion, no thyromegaly or lymphadenopathy  Respiratory: Rhonchi noted bilaterally, satting well on RA  Cardiovascular: regular rate and rhythm without murmurs, gallops or rubs  Gastrointestinal: soft, non-tender, non-distended with normal bowel sounds, without masses to palpation  Musculoskeletal: no obvious deformities, full range of motion of all extremities/spine without limitation or discomfort  Integumentary: Jaundice, spider angiomas present on upper chest, no caput medusa, no rash  Extremities: radial and DP pulses 2+ bilaterally, no LE edema  Neurologic: Pt AAOx3, following commands and conversing appropriately. No focal neuro deficits appreciated, CN II-XII grossly intact.       Laboratory:  Most Recent Data:  CBC:   Lab Results   Component Value Date    WBC 12.56 (H) 01/15/2024    HGB 8.7 (L) 01/15/2024    HCT 25.8 (L) 01/15/2024    PLT 80 (L) 01/15/2024    MCV 93.1 01/15/2024    RDW 21.2 (H) 01/15/2024     WBC Differential:   Recent Labs   Lab 01/11/24  0353 01/12/24  0410 01/13/24  0317 01/14/24  0529 01/15/24  0629   WBC 14.80* 15.47* 15.41* 12.55* 12.56*   HGB 9.4* 9.7* 9.0* 8.7* 8.7*   HCT 29.7* 29.8* 27.8* 26.9* 25.8*   PLT 56* 64* 69* 69* 80*   MCV 92.5 93.1 92.1 93.1 93.1       BMP:   Lab Results   Component Value Date     (L) 01/15/2024    K 4.0 01/15/2024    CO2 25 01/15/2024    BUN 15.4 01/15/2024    CREATININE 0.60 (L) 01/15/2024    CALCIUM 7.3 (L) 01/15/2024    MG 1.60 01/15/2024    PHOS 2.4 01/15/2024     LFTs:   Lab Results   Component Value Date    ALBUMIN 1.4 (L) 01/15/2024    BILITOT 5.3 (H) 01/15/2024    AST 83 (H) 01/15/2024    ALKPHOS 256 (H) 01/15/2024    ALT 69 (H) 01/15/2024     Coags:   Lab Results   Component Value Date    INR 2.2 (H) 01/09/2024    PROTIME 24.8 (H) 01/09/2024    PTT 38.6 (H) 01/01/2024     FLP: No results found for: "CHOL", "HDL", "LDLCALC", "TRIG", "CHOLHDL"  DM:   Lab Results   Component Value Date    CREATININE 0.60 (L) " "01/15/2024     Thyroid: No results found for: "TSH", "FREET4", "Y8MAQBU", "H3GXLJK", "THYROIDAB"  Anemia:   Lab Results   Component Value Date    IRON 39 (L) 01/01/2024    TIBC 203 (L) 01/01/2024    FERRITIN 116.00 01/01/2024    XVVUNCZZ15 >2,000 (H) 01/01/2024    FOLATE 18.3 01/01/2024     Cardiac:   Lab Results   Component Value Date    TROPONINI 0.029 01/01/2024    BNP 89.3 01/01/2024       Microbiology Data Reviewed: yes  Pertinent Findings:  1/1/24: bld cx +strep pneumonia pan sensitive x1 bottle  1/4/24: bld cx x2 negative    Other Results:  TTE 1/4/24: EF 62%, no evidence of vegetation      Radiology:  Imaging Results              CT Chest Abdomen Pelvis Without Contrast (XPD) (Final result)  Result time 01/02/24 09:44:01      Final result by Tika Mcknight MD (01/02/24 09:44:01)                   Impression:      1. Multilobar consolidative opacities within the lungs  2. Cirrhotic morphology of the liver with mass at the dome of the liver.  Evaluation is moderately limited without contrast and due to beam hardening artifact related to patient positioning.  Recommend liver protocol CT abdomen without and with contrast.  3. Ascites  4. Indeterminate right adrenal nodule.  Continued attention recommended on follow-up exams.  5. The preliminary and final reports are concordant.      Electronically signed by: Tika Mcknight  Date:    01/02/2024  Time:    09:44               Narrative:    EXAMINATION:  CT CHEST ABDOMEN PELVIS WITHOUT CONTRAST(XPD)    CLINICAL HISTORY:  Suspect pneumonia, biliary obstruction;    TECHNIQUE:  Helical acquisition through the chest, abdomen and pelvis without  IV administration of contrast. Axial, sagittal and coronal reformats interpreted.    Automated tube current modulation, weight-based exposure dosing, and/or iterative reconstruction technique utilized to reach lowest reasonably achievable exposure rate.    DLP: 1042 mGy*cm    COMPARISON:  Chest radiograph " 01/01/2024    FINDINGS:  BASE OF NECK: No significant abnormality.    HEART: Normal size. There are coronary artery calcifications.  Low-density blood pool with visualization of the interventricular septum compatible with anemia.    THORACIC VASCULATURE: Thoracic aorta is unremarkable.    SIRISHA/MEDIASTINUM: Small nonspecific mediastinal lymph nodes.  Evaluation of hilar lymphadenopathy is limited without contrast.    AIRWAYS: Patent.    LUNGS/PLEURA: Multilobar consolidative opacities within the lungs.    THORACIC SOFT TISSUES: Unremarkable.    LIVER: Limited evaluation the liver due to lack of intravenous contrast and placement of the patient's arms over the abdomen which causes beam hardening artifact.  Heterogeneous attenuation of the liver.  There appears to be a mass at the dome of the liver.  Caudate lobe hypertrophy and changes of cirrhosis.    BILIARY: The gallbladder does not appear to be overly distended.    PANCREAS: Nonspecific retroperitoneal edema.  Unable to assess for changes of pancreatitis given generalized fluid overload.    SPLEEN: Upper limits of normal in size.    ADRENALS: There is a 1.6 cm right adrenal nodule.  Unable to accurately measure internal attenuation due to beam hardening artifact.    KIDNEYS/URETERS: Hyperdense renal pyramids.  This can be related to volume status/dehydration or medullary calcinosis.    GI TRACT/MESENTERY: Evaluation of the bowel is limited without contrast. Bowel is normal in caliber without evidence of obstruction.    PERITONEUM: Small volume ascites.No free air.    LYMPH NODES: No enlarged lymph nodes by size criteria.    ABDOMINOPELVIC VASCULATURE: Normal caliber abdominal aorta.  Large periumbilical vein.    BLADDER: Normal appearance given degree of distention.    REPRODUCTIVE ORGANS: Normal as visualized.    ABDOMINAL WALL: Small fat containing right inguinal hernia.  Mild body wall edema.    BONES: Anterolisthesis at the lumbosacral junction.                         Preliminary result by Santana Lerma MD (01/01/24 22:56:45)                   Impression:    1. No intrahepatic or extrahepatic biliary duct dilatation is seen.  2. Note of subtle increased attenuation in the medullary portions of the bilateral kidneys which may reflect medullary calcinosis. Correlate with clinical and laboratory findings as regards additional evaluation and follow-up.  3. There is multifocal patchy dense consolidation of both the lungs with predominant involvement of the left upper and right lower lobes. This is consistent with multilobar pneumonia. Correlate with clinical and laboratory findings as regards additional evaluation and follow-up to full resolution.  4. There is a 2.8 x 2 cm soft tissue density nodule in the right adrenal gland (series 2 image 98). This is of indeterminate etiology.  5. The margins of the liver appears somewhat nodular and irregular. This is suggestive of cirrhosis. Correlate with clinical and laboratory findings.  6. Details and other findings as discussed above.               Narrative:    START OF REPORT:  Technique: CT Scan of the chest abdomen and pelvis was performed without intravenous contrast with axial as well as sagittal and, coronal images.    Dosage Information: Automated Exposure Control was utilized 1041.63 mGy.cm.    Comparison: None.    Clinical History: Suspect pneumonia, biliary obstruction.    Findings:  Mediastinum: A few scattered subcentimeter mediastinal lymph nodes are seen.  Heart: The heart size is within normal limits.  Aorta: Unremarkable appearing aorta.  Pulmonary Arteries: Unremarkable.  Lungs: There is multifocal patchy dense consolidation of both the lungs with predominant involvement of the left upper and right lower lobes.  Pleura: No effusions or pneumothorax are identified.  Abdomen:  Abdominal Wall: There is extensive stranding of the subcutaneous fat as well as the intraabdominal and intrapelvic fat consistent with  anasarca edema. Correlate with clinical and laboratory findings.  Liver: The margins of the liver appears somewhat nodular and irregular. There are also prominent portosystemic collaterals consistent with portal hypertension.  Biliary System: No intrahepatic or extrahepatic biliary duct dilatation is seen.  Gallbladder: The gallbladder is not definitely identified on this noncontrast examination and may be contracted.  Pancreas: The pancreas appears unremarkable.  Spleen: The spleen appears unremarkable.  Adrenals: There is a 2.8 x 2 cm soft tissue density nodule in the right adrenal gland (series 2 image 98).  Kidneys: Note of subtle increased attenuation in the medullary portions of the bilateral kidneys which may reflect medullary calcinosis.  Aorta: The abdominal aorta appears unremarkable.  IVC: Unremarkable.  Bowel:  Esophagus: There is a small hiatal hernia.  Stomach: The stomach appears unremarkable.  Duodenum: Unremarkable appearing duodenum.  Small Bowel: The small bowel appears unremarkable.  Colon: Nondistended.  Appendix: The appendix is not identified but no inflammatory changes are seen in the right lower quadrant to suggest appendicitis.  Peritoneum: There is mild ascites. No intraperitoneal free gas is seen.    Pelvis:  Bladder: The bladder appears unremarkable.  Male:  Prostate gland: The prostate gland appears unremarkable.    Bony structures:  Dorsal Spine: There is mild to moderate spondylosis of the visualized dorsal spine.  Bony Pelvis: The visualized bony structures of the pelvis appear unremarkable.                                         X-Ray Chest 1 View (Final result)  Result time 01/01/24 14:23:11   Procedure changed from X-Ray Chest PA And Lateral     Final result by Josef Sow MD (01/01/24 14:23:11)                   Impression:      Bilateral patchy airspace opacities greatest on the left.  Findings concerning for atypical infectious process.      Electronically signed  by: Josef Sow  Date:    01/01/2024  Time:    14:23               Narrative:    EXAMINATION:  XR CHEST 1 VIEW    CLINICAL HISTORY:  cough;  Cough, unspecified    TECHNIQUE:  Single view of the chest    COMPARISON:  No prior imaging available for comparison.    FINDINGS:  Bilateral patchy airspace opacities greatest on the left.    The cardiomediastinal silhouette is within normal limits.    No acute osseous abnormality.                                       RADIOLOGY REPORT (Final result)  Result time 01/04/24 11:39:28      Final result by Unknown User (01/04/24 11:39:28)                                        Current Medications:     Infusions:       Scheduled:   cefTRIAXone (ROCEPHIN) IVPB  2 g Intravenous Q24H    enoxparin  40 mg Subcutaneous Q24H (prophylaxis, 1700)    folic acid  1 mg Oral Daily    furosemide (LASIX) injection  40 mg Intravenous Once    furosemide  20 mg Oral Daily    pantoprazole  40 mg Oral Daily    predniSONE  20 mg Oral Daily    rifAXIMin  550 mg Oral BID    sodium chloride 0.9%  10 mL Intravenous Q6H    spironolactone  50 mg Oral Daily    thiamine  100 mg Oral Daily        PRN:  0.9%  NaCl infusion (for blood administration), sodium chloride 0.9%, acetaminophen, benzonatate, dextrose 10%, dextrose 10%, glucagon (human recombinant), glucose, glucose, hydrOXYzine pamoate, lactulose, sodium chloride 0.9%, Flushing PICC/Midline Protocol **AND** sodium chloride 0.9% **AND** sodium chloride 0.9%    Antibiotics and Day Number of Therapy:  Vanc x3 days  Zosyn/Azithromycin x4 days  Rocephin 2g on D12/14     Lines and Day Number of Therapy:  PIV      Assessment & Plan:     Decompensated Cirrhosis  Hypervolemic Hyponotremia       - suspected etiology ETOH. Pt reportedly had hx of heavy drinking per friend who dropped him off       - Positive hepatitis C antibody; VL negative, no active infection        - MELD-Na: 28->25-> 22 1/9/24; Child Hoang: 13-> 11 1/9/24, class C, [place]: 204       -  Coagulopathy improving; INR 2.4 1/9/23       - Improvement in encephalopathy; hold lactulose for now as pt having diarrhea       - Continue Rifaxamin for hepatic encephalopathy       - Prednisone taper given Maddrey; 40 mg x1 week then 20 x1 week, then 10, then 5, then stop after 28 days. Currently on D7 of 20 mg dosing       - FeNA 1%, Nina < 20; lower extremity edema; likely hypervolemic hyponotremia       - Will start diuretic therapy Aldactone 50 mg daily and Lasix 20 mg daily    Pneumonia 2/2 Influenza A       - Positive on 1/1/24       - Patient now significantly improved, afebrile, mild cough, and decreased O2 requirements; okay to stop isolation today       - Completed Oseltamivir 5 day course       - NC and supplemental O2 to keep sats > 92%       - Possible superimposed Strep PNA given bacteremia and pneumonia; on appropriate Ceftriaxone per below    Strep Pneumoniae Bacteremia       - Blood culture positive x1/2 Streptococcus; [employer] confirmation strep pneumonia bacteremia which is pan-sensitive, resp culture /Gram stain collection pending.       - Repeat blood cultures 1/4/24  negative; TTE did not have any findings consistent with endocarditis.        - s/p Zosyn and Zithromax x4 days; MRSA PCR negative, discontinued vancomycin after D3.        - Continue Rocephin 2g daily (pan-sensitive strep pneumo), will need 14 days from negative repeat blood cultures; D12/14 currently as repeat blood culture from 1/4/24 negative.        - s/p PICC for long term IV Abx    Skin Breakdown to L buttocks-improving  Pruritic, Erythematous Rash to groin       - Skin tear vs Stage II pressure ulcer       - Wound care consulted, appreciate recs       - Continue q2h turning and mepilex dsg        - Pt now with enteral nutrition, hopeful to aid in healing       - Rash appears fungal in nature, will start miconazole powder to groin BID    Malnutrition  Hyponatremia       - Soft mechanical diet ordered, pt working with SLT        - Diet per dietary recs       - Likely 2/2 cirrhosis; FeNA 1% indicated prerenal       - Na 131 this morning, given volume overload noted with lower extremity edema will stop salt tabs, likely hypervolemic hypernatremia. IV lasix 40 mg x 1 dose and will start lasix/aldactone at 20/50 mg dosing daily    CODE STATUS: Full  Access: PIV  Antibiotics: Ceftriaxone  Diet: Clears  DVT Prophylaxis: Lovenox  GI Prophylaxis: Protonix  Fluids: n/a      Disposition: Patient continues to meet inpatient needs requiring 14 day course of IV abx for strep pneumoniae bacteremia; currently on day 12/14; self pay, likely undocumented and will need to stay for duration. Continues to recover slowly from decompensated cirrhosis as well, continues to improve slowly but will need close follow up to establish care and go to cirrhosis clinic upon discharge.       Sagrario Rosenthal MD  [facility] -III

## 2024-01-15 NOTE — PT/OT/SLP PROGRESS
Physical Therapy Treatment    Patient Name:  Franco Hein   MRN:  [id number]    Recommendations     Therapy Intensity Recommendations at Discharge: Low Intensity Therapy  Discharge Equipment Recommendations: Rolling walker  Barriers to discharge: fall risk    Assessment     Franco Hein is a 48 y.o. male admitted with a medical diagnosis of Delirium tremens.    He presents with the following impairments/functional limitations:  impaired balance, impaired functional mobility, gait instability, decreased safety awareness, impaired skin, impaired self care skills, impaired coordination, decreased coordination, impaired fine motor.    Rehab Prognosis: Good.    Patient would benefit from continued skilled acute PT services to: address above listed impairments/functional limitations; receive patient/caregiver education; reduce fall risk; and maximize independency/safety with functional mobility.    Recent Surgery: * No surgery found *      Plan     During this hospitalization, patient to be seen 3 x/week to address the identified impairments/functional limitations via gait training, therapeutic activities, therapeutic exercises and progress toward the established goals.    Plan of Care Expires:  02/08/24    Subjective     Communicated with patient's nurse Milly prior to session.    Patient agreeable to participate in treatment session.    Chief Complaint: abd pain  Patient/Family Comments/goals: return home  Pain/Comfort:  Pain Rating 1: 2/10  Location 1: abdomen  Pain Addressed 1: Nurse notified  Pain Rating Post-Intervention 1: 2/10    Objective     Patient found sitting edge of bed with    upon PT entry to room.    General Precautions: Standard,     Orthopedic Precautions:N/A   Braces:    Respiratory Status: room air    Functional Mobility:    Bed Mobility:  Supine to Sit: modified independence    Transfers:  Sit to Stand: supervision with no assistive device    Gait:  Patient ambulated 400 ft with hand-held  assist and minimum assistance.  Patient demonstrates :       unsteady gait       flexed posture.    Other Mobility:  not assessed    Patient left sitting on BSC with  CNA present.    Goals     Multidisciplinary Problems       Physical Therapy Goals          Problem: Physical Therapy    Goal Priority Disciplines Outcome Goal Variances Interventions   Physical Therapy Goal     PT, PT/OT Ongoing, Progressing     Description: Goals to be met by: dc     Patient will increase functional independence with mobility by performin. Supine to sit with Modified [place] met 1/10/2024    2. Sit to stand transfer with Modified [place]  3. Gait  x 130 feet with Supervision using Rolling Walker.   Reviewed 1/15/2024                         Time Tracking     PT Received On: 01/15/24  PT Start Time: 907     PT Stop Time: 915  PT Total Time (min): 8 min     Billable Minutes: Gait Training 8    01/15/2024

## 2024-01-16 LAB
ABO + RH BLD: NORMAL
ALBUMIN SERPL-MCNC: 1.4 G/DL (ref 3.5–5)
ALBUMIN/GLOB SERPL: 0.3 RATIO (ref 1.1–2)
ALP SERPL-CCNC: 271 UNIT/L (ref 40–150)
ALT SERPL-CCNC: 71 UNIT/L (ref 0–55)
ANISOCYTOSIS BLD QL SMEAR: ABNORMAL
AST SERPL-CCNC: 81 UNIT/L (ref 5–34)
BASOPHILS # BLD AUTO: 0.03 X10(3)/MCL
BASOPHILS NFR BLD AUTO: 0.2 %
BILIRUB SERPL-MCNC: 5.1 MG/DL
BLD PROD TYP BPU: NORMAL
BLOOD UNIT EXPIRATION DATE: NORMAL
BLOOD UNIT TYPE CODE: 5100
BLOOD UNIT TYPE CODE: 5100
BLOOD UNIT TYPE CODE: 9500
BUN SERPL-MCNC: 18.8 MG/DL (ref 8.9–20.6)
CALCIUM SERPL-MCNC: 7.3 MG/DL (ref 8.4–10.2)
CHLORIDE SERPL-SCNC: 100 MMOL/L (ref 98–107)
CO2 SERPL-SCNC: 23 MMOL/L (ref 22–29)
CREAT SERPL-MCNC: 0.74 MG/DL (ref 0.73–1.18)
CROSSMATCH INTERPRETATION: NORMAL
DISPENSE STATUS: NORMAL
EOSINOPHIL # BLD AUTO: 0.11 X10(3)/MCL (ref 0–0.9)
EOSINOPHIL NFR BLD AUTO: 0.8 %
ERYTHROCYTE [DISTWIDTH] IN BLOOD BY AUTOMATED COUNT: 22.2 % (ref 11.5–17)
GFR SERPLBLD CREATININE-BSD FMLA CKD-EPI: >60 MLS/MIN/1.73/M2
GLOBULIN SER-MCNC: 4.8 GM/DL (ref 2.4–3.5)
GLUCOSE SERPL-MCNC: 104 MG/DL (ref 74–100)
GROUP & RH: NORMAL
HCT VFR BLD AUTO: 23.3 % (ref 42–52)
HCT VFR BLD AUTO: 26.2 % (ref 42–52)
HGB BLD-MCNC: 7.5 G/DL (ref 14–18)
HGB BLD-MCNC: 8.7 G/DL (ref 14–18)
HOLD SPECIMEN: NORMAL
HOLD SPECIMEN: NORMAL
HYPOCHROMIA BLD QL SMEAR: ABNORMAL
IMM GRANULOCYTES # BLD AUTO: 0.09 X10(3)/MCL (ref 0–0.04)
IMM GRANULOCYTES NFR BLD AUTO: 0.7 %
INDIRECT COOMBS: NORMAL
INR PPP: 2.1
LACTATE SERPL-SCNC: 4.3 MMOL/L (ref 0.5–2.2)
LACTATE SERPL-SCNC: 4.9 MMOL/L (ref 0.5–2.2)
LYMPHOCYTES # BLD AUTO: 1.72 X10(3)/MCL (ref 0.6–4.6)
LYMPHOCYTES NFR BLD AUTO: 12.6 %
MAGNESIUM SERPL-MCNC: 1.5 MG/DL (ref 1.6–2.6)
MCH RBC QN AUTO: 30.6 PG (ref 27–31)
MCHC RBC AUTO-ENTMCNC: 32.2 G/DL (ref 33–36)
MCV RBC AUTO: 95.1 FL (ref 80–94)
MONOCYTES # BLD AUTO: 1.51 X10(3)/MCL (ref 0.1–1.3)
MONOCYTES NFR BLD AUTO: 11 %
NEUTROPHILS # BLD AUTO: 10.22 X10(3)/MCL (ref 2.1–9.2)
NEUTROPHILS NFR BLD AUTO: 74.7 %
NRBC BLD AUTO-RTO: 0 %
PHOSPHATE SERPL-MCNC: 3.8 MG/DL (ref 2.3–4.7)
PLATELET # BLD AUTO: 99 X10(3)/MCL (ref 130–400)
PLATELET # BLD EST: ABNORMAL 10*3/UL
PMV BLD AUTO: 12.3 FL (ref 7.4–10.4)
POCT GLUCOSE: 114 MG/DL (ref 70–110)
POCT GLUCOSE: 128 MG/DL (ref 70–110)
POCT GLUCOSE: 132 MG/DL (ref 70–110)
POLYCHROMASIA BLD QL SMEAR: ABNORMAL
POTASSIUM SERPL-SCNC: 3.9 MMOL/L (ref 3.5–5.1)
PROT SERPL-MCNC: 6.2 GM/DL (ref 6.4–8.3)
PROTHROMBIN TIME: 23.2 SECONDS (ref 11.4–14)
RBC # BLD AUTO: 2.45 X10(6)/MCL (ref 4.7–6.1)
RBC MORPH BLD: ABNORMAL
SODIUM SERPL-SCNC: 130 MMOL/L (ref 136–145)
SPECIMEN OUTDATE: NORMAL
TARGETS BLD QL SMEAR: ABNORMAL
UNIT NUMBER: NORMAL
WBC # SPEC AUTO: 13.68 X10(3)/MCL (ref 4.5–11.5)

## 2024-01-16 PROCEDURE — 94761 N-INVAS EAR/PLS OXIMETRY MLT: CPT

## 2024-01-16 PROCEDURE — B4121ZZ FLUOROSCOPY OF HEPATIC ARTERY USING LOW OSMOLAR CONTRAST: ICD-10-PCS | Performed by: SPECIALIST

## 2024-01-16 PROCEDURE — C1760 CLOSURE DEV, VASC: HCPCS | Performed by: SPECIALIST

## 2024-01-16 PROCEDURE — 36247 INS CATH ABD/L-EXT ART 3RD: CPT | Performed by: SPECIALIST

## 2024-01-16 PROCEDURE — 04L33DZ OCCLUSION OF HEPATIC ARTERY WITH INTRALUMINAL DEVICE, PERCUTANEOUS APPROACH: ICD-10-PCS | Performed by: SPECIALIST

## 2024-01-16 PROCEDURE — 75774 ARTERY X-RAY EACH VESSEL: CPT | Mod: XU | Performed by: SPECIALIST

## 2024-01-16 PROCEDURE — 85018 HEMOGLOBIN: CPT

## 2024-01-16 PROCEDURE — C1887 CATHETER, GUIDING: HCPCS | Performed by: SPECIALIST

## 2024-01-16 PROCEDURE — C1769 GUIDE WIRE: HCPCS | Performed by: SPECIALIST

## 2024-01-16 PROCEDURE — 25000003 PHARM REV CODE 250: Performed by: INTERNAL MEDICINE

## 2024-01-16 PROCEDURE — P9016 RBC LEUKOCYTES REDUCED: HCPCS | Performed by: STUDENT IN AN ORGANIZED HEALTH CARE EDUCATION/TRAINING PROGRAM

## 2024-01-16 PROCEDURE — 25500020 PHARM REV CODE 255: Performed by: INTERNAL MEDICINE

## 2024-01-16 PROCEDURE — 25000003 PHARM REV CODE 250

## 2024-01-16 PROCEDURE — 25000003 PHARM REV CODE 250: Performed by: STUDENT IN AN ORGANIZED HEALTH CARE EDUCATION/TRAINING PROGRAM

## 2024-01-16 PROCEDURE — 85610 PROTHROMBIN TIME: CPT | Performed by: STUDENT IN AN ORGANIZED HEALTH CARE EDUCATION/TRAINING PROGRAM

## 2024-01-16 PROCEDURE — 85025 COMPLETE CBC W/AUTO DIFF WBC: CPT | Performed by: STUDENT IN AN ORGANIZED HEALTH CARE EDUCATION/TRAINING PROGRAM

## 2024-01-16 PROCEDURE — B41B1ZZ FLUOROSCOPY OF OTHER INTRA-ABDOMINAL ARTERIES USING LOW OSMOLAR CONTRAST: ICD-10-PCS | Performed by: SPECIALIST

## 2024-01-16 PROCEDURE — 80053 COMPREHEN METABOLIC PANEL: CPT | Performed by: STUDENT IN AN ORGANIZED HEALTH CARE EDUCATION/TRAINING PROGRAM

## 2024-01-16 PROCEDURE — 25500020 PHARM REV CODE 255: Performed by: SPECIALIST

## 2024-01-16 PROCEDURE — C1894 INTRO/SHEATH, NON-LASER: HCPCS | Performed by: SPECIALIST

## 2024-01-16 PROCEDURE — 37244 VASC EMBOLIZE/OCCLUDE BLEED: CPT | Performed by: SPECIALIST

## 2024-01-16 PROCEDURE — 25000003 PHARM REV CODE 250: Performed by: SPECIALIST

## 2024-01-16 PROCEDURE — P9017 PLASMA 1 DONOR FRZ W/IN 8 HR: HCPCS | Performed by: STUDENT IN AN ORGANIZED HEALTH CARE EDUCATION/TRAINING PROGRAM

## 2024-01-16 PROCEDURE — 21400001 HC TELEMETRY ROOM

## 2024-01-16 PROCEDURE — 83605 ASSAY OF LACTIC ACID: CPT | Performed by: STUDENT IN AN ORGANIZED HEALTH CARE EDUCATION/TRAINING PROGRAM

## 2024-01-16 PROCEDURE — 63600175 PHARM REV CODE 636 W HCPCS

## 2024-01-16 PROCEDURE — 83605 ASSAY OF LACTIC ACID: CPT

## 2024-01-16 PROCEDURE — 75726 ARTERY X-RAYS ABDOMEN: CPT | Mod: XU | Performed by: SPECIALIST

## 2024-01-16 PROCEDURE — 36430 TRANSFUSION BLD/BLD COMPNT: CPT

## 2024-01-16 PROCEDURE — 97164 PT RE-EVAL EST PLAN CARE: CPT

## 2024-01-16 PROCEDURE — 86850 RBC ANTIBODY SCREEN: CPT

## 2024-01-16 PROCEDURE — 63600175 PHARM REV CODE 636 W HCPCS: Performed by: STUDENT IN AN ORGANIZED HEALTH CARE EDUCATION/TRAINING PROGRAM

## 2024-01-16 PROCEDURE — 84100 ASSAY OF PHOSPHORUS: CPT | Performed by: STUDENT IN AN ORGANIZED HEALTH CARE EDUCATION/TRAINING PROGRAM

## 2024-01-16 PROCEDURE — A4216 STERILE WATER/SALINE, 10 ML: HCPCS

## 2024-01-16 PROCEDURE — 83735 ASSAY OF MAGNESIUM: CPT | Performed by: STUDENT IN AN ORGANIZED HEALTH CARE EDUCATION/TRAINING PROGRAM

## 2024-01-16 PROCEDURE — 86923 COMPATIBILITY TEST ELECTRIC: CPT | Mod: 91 | Performed by: STUDENT IN AN ORGANIZED HEALTH CARE EDUCATION/TRAINING PROGRAM

## 2024-01-16 RX ORDER — MAGNESIUM SULFATE HEPTAHYDRATE 40 MG/ML
4 INJECTION, SOLUTION INTRAVENOUS ONCE
Status: COMPLETED | OUTPATIENT
Start: 2024-01-16 | End: 2024-01-16

## 2024-01-16 RX ORDER — HYDROCODONE BITARTRATE AND ACETAMINOPHEN 500; 5 MG/1; MG/1
TABLET ORAL
Status: DISCONTINUED | OUTPATIENT
Start: 2024-01-16 | End: 2024-01-25 | Stop reason: HOSPADM

## 2024-01-16 RX ORDER — FUROSEMIDE 20 MG/1
40 TABLET ORAL DAILY
Status: DISCONTINUED | OUTPATIENT
Start: 2024-01-16 | End: 2024-01-17

## 2024-01-16 RX ORDER — HYDROCODONE BITARTRATE AND ACETAMINOPHEN 5; 325 MG/1; MG/1
1 TABLET ORAL EVERY 4 HOURS PRN
Status: DISCONTINUED | OUTPATIENT
Start: 2024-01-16 | End: 2024-01-25 | Stop reason: HOSPADM

## 2024-01-16 RX ORDER — ACETAMINOPHEN 325 MG/1
650 TABLET ORAL EVERY 4 HOURS PRN
Status: DISCONTINUED | OUTPATIENT
Start: 2024-01-16 | End: 2024-01-25 | Stop reason: HOSPADM

## 2024-01-16 RX ORDER — LIDOCAINE HYDROCHLORIDE 10 MG/ML
INJECTION INFILTRATION; PERINEURAL
Status: DISCONTINUED | OUTPATIENT
Start: 2024-01-16 | End: 2024-01-16 | Stop reason: HOSPADM

## 2024-01-16 RX ORDER — FUROSEMIDE 10 MG/ML
20 INJECTION INTRAMUSCULAR; INTRAVENOUS ONCE
Status: COMPLETED | OUTPATIENT
Start: 2024-01-16 | End: 2024-01-16

## 2024-01-16 RX ORDER — SODIUM CHLORIDE 450 MG/100ML
INJECTION, SOLUTION INTRAVENOUS CONTINUOUS
Status: DISCONTINUED | OUTPATIENT
Start: 2024-01-16 | End: 2024-01-17

## 2024-01-16 RX ORDER — SPIRONOLACTONE 25 MG/1
100 TABLET ORAL DAILY
Status: DISCONTINUED | OUTPATIENT
Start: 2024-01-16 | End: 2024-01-18

## 2024-01-16 RX ORDER — FUROSEMIDE 10 MG/ML
40 INJECTION INTRAMUSCULAR; INTRAVENOUS ONCE
Status: COMPLETED | OUTPATIENT
Start: 2024-01-16 | End: 2024-01-16

## 2024-01-16 RX ORDER — MORPHINE SULFATE 2 MG/ML
2 INJECTION, SOLUTION INTRAMUSCULAR; INTRAVENOUS ONCE
Status: COMPLETED | OUTPATIENT
Start: 2024-01-16 | End: 2024-01-16

## 2024-01-16 RX ADMIN — FUROSEMIDE 40 MG: 10 INJECTION, SOLUTION INTRAMUSCULAR; INTRAVENOUS at 11:01

## 2024-01-16 RX ADMIN — BENZONATATE 100 MG: 100 CAPSULE ORAL at 09:01

## 2024-01-16 RX ADMIN — HYDROXYZINE PAMOATE 50 MG: 25 CAPSULE ORAL at 12:01

## 2024-01-16 RX ADMIN — MICONAZOLE NITRATE: 20 POWDER TOPICAL at 08:01

## 2024-01-16 RX ADMIN — RIFAXIMIN 550 MG: 550 TABLET ORAL at 08:01

## 2024-01-16 RX ADMIN — Medication 100 MG: at 08:01

## 2024-01-16 RX ADMIN — SPIRONOLACTONE 100 MG: 25 TABLET ORAL at 08:01

## 2024-01-16 RX ADMIN — FUROSEMIDE 20 MG: 10 INJECTION, SOLUTION INTRAMUSCULAR; INTRAVENOUS at 01:01

## 2024-01-16 RX ADMIN — SODIUM CHLORIDE, PRESERVATIVE FREE 10 ML: 5 INJECTION INTRAVENOUS at 06:01

## 2024-01-16 RX ADMIN — CEFTRIAXONE SODIUM 2 G: 2 INJECTION, POWDER, FOR SOLUTION INTRAMUSCULAR; INTRAVENOUS at 09:01

## 2024-01-16 RX ADMIN — ACETAMINOPHEN 650 MG: 325 TABLET, FILM COATED ORAL at 08:01

## 2024-01-16 RX ADMIN — IOHEXOL 100 ML: 350 INJECTION, SOLUTION INTRAVENOUS at 11:01

## 2024-01-16 RX ADMIN — MORPHINE SULFATE 2 MG: 2 INJECTION, SOLUTION INTRAMUSCULAR; INTRAVENOUS at 08:01

## 2024-01-16 RX ADMIN — FUROSEMIDE 40 MG: 20 TABLET ORAL at 08:01

## 2024-01-16 RX ADMIN — FOLIC ACID 1 MG: 1 TABLET ORAL at 08:01

## 2024-01-16 RX ADMIN — SODIUM CHLORIDE: 4.5 INJECTION, SOLUTION INTRAVENOUS at 07:01

## 2024-01-16 RX ADMIN — SODIUM CHLORIDE, PRESERVATIVE FREE 10 ML: 5 INJECTION INTRAVENOUS at 12:01

## 2024-01-16 RX ADMIN — MAGNESIUM SULFATE HEPTAHYDRATE 4 G: 40 INJECTION, SOLUTION INTRAVENOUS at 11:01

## 2024-01-16 RX ADMIN — SODIUM CHLORIDE, PRESERVATIVE FREE 10 ML: 5 INJECTION INTRAVENOUS at 11:01

## 2024-01-16 RX ADMIN — PREDNISONE 10 MG: 10 TABLET ORAL at 08:01

## 2024-01-16 RX ADMIN — PANTOPRAZOLE SODIUM 40 MG: 40 TABLET, DELAYED RELEASE ORAL at 08:01

## 2024-01-16 NOTE — PROGRESS NOTES
01/16/24 0900   Missed Time Reason   Missed Treatment Reason Pain     Pt unable to participate at this time d/t increased stomach/abdominal pain, nurse aware and is addressing, requested attempt again later today.  OT to f/u for treatment as schedule allows.

## 2024-01-16 NOTE — NURSING
Notified Dr Adi gordon in bedside commode when patient had a bowel movement. Also, he is nauseated and does not have any PRN nausea meds.    She will come up to see patient

## 2024-01-16 NOTE — PT/OT/SLP RE-EVAL
Physical Therapy Re-Evaluation    Patient Name:  Franco Hein   MRN:  [id number]    Recommendations     Therapy Intensity Recommendations at Discharge: Low Intensity Therapy  Discharge Equipment Recommendations: walker, rolling, shower chair   Equipment to be obtained for discharge: rolling walker, shower chair.  Barriers to discharge: fall risk and decreased endurance    Assessment     Franco Hein is a 48 y.o. male admitted with a medical diagnosis of Delirium tremens.  He presents with the following impairments/functional limitations:  impaired balance, decreased safety awareness, impaired endurance, pain, edema, impaired self care skills, impaired functional mobility, gait instability.    Rehab Prognosis: Good.    Patient would benefit from continued skilled acute PT services to: address above listed impairments/functional limitations; receive patient/caregiver education; reduce fall risk; and maximize independency/safety with functional mobility.    Recent Surgery: Procedure(s) (LRB):  ANGIOGRAM-ABDOMINAL (N/A) Day of Surgery    Plan     During this hospitalization, patient to be seen 3 x/week to address the identified impairments/functional limitations via gait training, therapeutic activities, therapeutic exercises and progress toward the established goals.    Plan of Care Expires:  02/08/24    Plan of Care reviewed with: patient    Subjective     Communicated with patient's nurse Rosanne prior to session.    Patient agreeable to participate in re-evaluation.    Chief Complaint: abdominal pressure and pain  Patient/Family Comments/goals: get better and go home  Pain/Comfort:  Pain Rating 1: 7/10  Location 1: abdomen  Pain Addressed 1: Nurse notified  Pain Rating Post-Intervention 1: 7/10    Patients cultural, spiritual, Pentec[employer]alism conflicts given the current situation: no    Objective     Patient found sitting in wheelchair with    upon PT entry to room.    General Precautions: Standard, fall, droplet    Orthopedic Precautions:N/A   Braces: N/A  Respiratory Status: room air    Exams:  Orientation: Patient is person, place, situation  Commands: Patient follows commands consistently  RUE ROM: WFL  LUE ROM: WFL  RLE ROM: WFL  RLE Strength: WFL  LLE ROM: WFL  LLE Strength: WFL    Functional Mobility:    Bed Mobility:  Supine to Sit: modified independence    Transfers:  Sit to Stand: supervision with no assistive device    Gait:  Patient ambulated 260 ft with hand-held assist and minimum assistance.  Patient demonstrates :       occasional unsteady gait.    Other Mobility:  not assessed    Balance:  Sit  Static: NORMAL: No deviations seen in posture held statically  Dynamic: NORMAL: No deviations seen in posture held dynamically  Stand  Static: GOOD+: Takes MAXIMAL challenges from all directions  Dynamic: FAIR: Needs CONTACT GUARD during gait    Additional Treatment Session  n/a    Patient left supine in bed and with HOB elevated with all lines intact, call button in reach, tray table at bedside, and patient' nurse notified.    Goals     Multidisciplinary Problems       Physical Therapy Goals          Problem: Physical Therapy    Goal Priority Disciplines Outcome Goal Variances Interventions   Physical Therapy Goal     PT, PT/OT Ongoing, Progressing     Description: Goals to be met by: dc     Patient will increase functional independence with mobility by performin. Supine to sit with Modified [place] met 1/10/2024    2. Sit to stand transfer with Modified [place]  3. Gait  x 130 feet with Supervision using Rolling Walker.   Reviewed 2024                         History   History reviewed. No pertinent past medical history.  No past surgical history on file.  Time Tracking     PT Received On: 24  PT Start Time: 1033     PT Stop Time: 1045  PT Total Time (min): 12 min     Billable Minutes: Demarco 12      2024

## 2024-01-16 NOTE — NURSING
Spoke with MD Reji regarding pt abdominal pain (ascities) and pt is sustaining between 110-120 HR, lasix ordered and given. Also exlained to patient what was going on with him at this present time and suggested he lie down on either side to hopefully relieve any discomfort he may be feeling, pt complied

## 2024-01-16 NOTE — CARE UPDATE
Informed by nurse that patient complained of generalized abdominal pain, PS 3/10. No signs of guarding on examination. Per nursing report, they noted progressive abdominal swelling and bilateral lower extremity swelling today. Will give one dose Lasix and defer to primary team to consider increasing diuretics in the morning.    Shantel Davis MD  [employer] [facility] PGY-2

## 2024-01-16 NOTE — OP NOTE
Angiogram Operative Note    Patient Name: Franco Hein  Age: 48 y.o.  Sex: male  YOB: 1975  MRN: [id number]  Author: Uriah Kelly  Location:[facility]  Date of Procedure: 1/16/2024    Pre Op Dx: Alcoholic cirrhosis, unspecified whether ascites present [K70.30]      Post Op Dx: Alcoholic cirrhosis, unspecified whether ascites present [K70.30]    Procedure performed:  Aortogram with selective angiography of the celiac artery, common hepatic artery, and right hepatic artery.  Embolization of the right hepatic artery.      Surgeon: Uriah Kelly    Assistant: None    Anesthesia Type: local    Complications:  None    Estimated Blood Loss:  None    Indications:  The patient has bleeding from a capsular bleed on the right liver.  He also has a mass.  We will plan to perform a Gelfoam embolization of the right hepatic artery.    Findings:  No active bleeding noted.  Successful Gelfoam embolization of the right hepatic artery.    Procedure in detail: With the patient the table supine position, the right groin was prepped and draped in sterile cell technique.  1% lidocaine was used for local anesthesia.  Using ultrasound guidance the right common femoral artery was visualized and viewed as patent; the artery was then accessed using real time ultrasound visualization of vascular needle entry with permanent recording and reporting.  A 5 [place] sheath was placed.  A ProGlide was placed.  We upsized to a 6.5 [place] steerable sheath.  We then selected the celiac artery.  Angiogram was obtained.  Then selected the common hepatic artery.  Angiogram was obtained.  We then selected the right hepatic artery.  Angiogram was obtained.  No active bleeding was noted.  We then embolized the right hepatic artery with Gelfoam slurry.  Completion angiogram showed no flow to the right hepatic artery without evidence of ambulation to any other vessels.  At this point the procedure  was completed.  The patient was not given heparin.  We pulled the sheath and used the ProGlide for hemostasis.  Good hemostasis  was noted.      Recommendations:  Successful embolization of the right hepatic artery with Gelfoam slurry.        Note: I was personally responsible for the administration of moderate sedation services during the procedure performed and I affirm all the guidelines and requirements described in the CPT 2017 section on moderate sedation were followed, including the use of an independently trained observer. The total face-to-face time was 45 minutes.     Uriah Kelly  1/16/2024  2:58 PM

## 2024-01-16 NOTE — CARE UPDATE
CTA abdomen performed. Radiologist  called significant results, intrahepatic bleeding noted with capsular rupture, lesion to R lobe cystic and solid in nature concerning for HCC. Vascular surgery consulted for possible emobolization, will give 2 units pRBC for active bleed. Pt hemodynamically stable at this time.     Sagrario Rosenthal MD  [employer] IM PGY III

## 2024-01-16 NOTE — CONSULTS
[facility] General Surgery Service  CONSULT / H&P NOTE  Date: 1/16/2024    CC:   Franco Hein is a 48 y.o. male presenting with hepatic bleed    HPI:   48-year-old male no reported past medical history currently admitted with several weeks of jaundice and weakness, found to have cirrhosis in setting of alcohol abuse and positive hep C antibody.  Daily drinker, now with concern for DTs due to withdrawal.  Due to abdominal pain, CTA of the abdomen and pelvis was obtained today, which showed a large mass within the right hepatic lobe with associated capsular rupture and extravasation.  Vascular surgery was consulted for endovascular intervention.    PMH:   History reviewed. No pertinent past medical history.    PSH:   No past surgical history on file.    FamHx:   History reviewed. No pertinent family history.    SocHx:  Social History     Socioeconomic History    Marital status: Single     Social Determinants of Health     Financial Resource Strain: Low Risk  (1/10/2024)    Overall Financial Resource Strain (CARDIA)     Difficulty of Paying Living Expenses: Not hard at all   Food Insecurity: No Food Insecurity (1/10/2024)    Hunger Vital Sign     Worried About Running Out of Food in the Last Year: Never true     Ran Out of Food in the Last Year: Never true   Transportation Needs: No Transportation Needs (1/10/2024)    PRAPARE - Transportation     Lack of Transportation (Medical): No     Lack of Transportation (Non-Medical): No   Physical Activity: Inactive (1/10/2024)    Exercise Vital Sign     Days of Exercise per Week: 0 days     Minutes of Exercise per Session: 0 min   Stress: No Stress Concern Present (1/10/2024)    [employer] - Occupational Stress Questionnaire     Feeling of Stress : Not at all   Social Connections: Socially Isolated (1/10/2024)    Social Connection and Isolation Panel [NHANES]     Frequency of Communication with Friends and Family: Once a week     Frequency of Social  "Gatherings with Friends and Family: Once a week     Attends Church Services: Never     Active Member of Clubs or Organizations: No     Attends Club or Organization Meetings: Never     Marital Status: Never    Housing Stability: Unknown (1/10/2024)    Housing Stability Vital Sign     Unable to Pay for Housing in the Last Year: No     Unstable Housing in the Last Year: No       Allergies:   Review of patient's allergies indicates:  No Known Allergies    Medications:  No current facility-administered medications on file prior to encounter.     No current outpatient medications on file prior to encounter.         ROS:   Focused review of systems performed, pertinent positives and negatives as above    Objective:    VITAL SIGNS: 24 HR MIN & MAX LAST    Temp  Min: 94.7 °F (34.8 °C)  Max: 98.1 °F (36.7 °C)  (!) 94.7 °F (34.8 °C)        BP  Min: 97/66  Max: 118/79  97/66     Pulse  Min: 101  Max: 117  109     Resp  Min: 16  Max: 28  (!) 24    SpO2  Min: 96 %  Max: 99 %  99 %      HT: 5' 4" (162.6 cm)  WT: 72.1 kg (158 lb 15.2 oz)  BMI: 27.3       Physical Exam:  GENERAL: NAD, resting comfortably in bed, notably jaundiced  NEURO: awake, alert, oriented x3  HEENT: Atraumatic, scleral icterus  CARDIO: regular rate, regular rhythm on palpation  CHEST: Non-labored breathing, good resp effort, CTAB  ABD:  Soft, notably distended, positive fluid wave.  Tender to palpation throughout bilateral lower quadrants and at umbilicus    Results  Recent Labs   Lab 01/13/24  0317 01/13/24  1435 01/14/24  0331 01/14/24  0529 01/15/24  0629 01/16/24  0659   WBC 15.41*  --   --  12.55* 12.56* 13.68*   HGB 9.0*  --   --  8.7* 8.7* 7.5*   HCT 27.8*  --   --  26.9* 25.8* 23.3*   PLT 69*  --   --  69* 80* 99*   INR  --   --   --   --   --  2.1*   * 128* 130*  --  130* 130*   CHLORIDE 100 99 102  --  102 100   CO2 23 24 22  --  25 23   BUN 15.4 16.2 14.6  --  15.4 18.8   CREATININE 0.65* 0.64* 0.61*  --  0.60* 0.74   GLUCOSE 138* " 175* 81  --  121* 104*   CALCIUM 7.4* 7.3* 7.1*  --  7.3* 7.3*   MG 1.50*  --  1.80  --  1.60 1.50*   PHOS 2.8  --  2.5  --  2.4 3.8   ALKPHOS 296*  --  279*  --  256* 271*       Imaging:  CTA abdomen and pelvis, 01/16/2024   Impression:     1. Solid and cystic mass lesion in the superior aspect of the right hepatic lobe with invasion of the intrahepatic left portal vein and sequela of portal hypertension as above.  Additionally there is capsular rupture and active extravasation with a moderate amount of blood products in the posterior right upper quadrant.  Given the portal venous invasion and cirrhotic morphology of the liver this is favored to represent hepatocellular carcinoma although enhancement characteristics are not classic.  2. Indeterminate right adrenal nodule.  Metastatic disease cannot be excluded.  3. Minimal right lower lobe atelectasis.    A/P:   48 y.o. male with cirrhosis found to have bleeding intrahepatic mass.  -angiography today for Gelfoam embolization  -written consent obtained with the assistance of a     Igor Carballo MD  PGY-6, [facility]  01/16/2024

## 2024-01-16 NOTE — PROGRESS NOTES
01/16/24 1215   Missed Time Reason   Missed Treatment Reason Nurse/ DOM hold     Nurse Cheryl stated pt status unchanged, still significant abdominal swelling and stated hold pt pending MD recs for treatment.  OT to f/u for treatment as schedule allows.

## 2024-01-17 LAB
ABO + RH BLD: NORMAL
ALBUMIN SERPL-MCNC: 1.6 G/DL (ref 3.5–5)
ALBUMIN/GLOB SERPL: 0.4 RATIO (ref 1.1–2)
ALP SERPL-CCNC: 190 UNIT/L (ref 40–150)
ALT SERPL-CCNC: 80 UNIT/L (ref 0–55)
AST SERPL-CCNC: 102 UNIT/L (ref 5–34)
BASOPHILS # BLD AUTO: 0.02 X10(3)/MCL
BASOPHILS NFR BLD AUTO: 0.1 %
BILIRUB SERPL-MCNC: 7.5 MG/DL
BLD PROD TYP BPU: NORMAL
BLOOD UNIT EXPIRATION DATE: NORMAL
BLOOD UNIT TYPE CODE: 6200
BUN SERPL-MCNC: 28.4 MG/DL (ref 8.9–20.6)
CALCIUM SERPL-MCNC: 7.7 MG/DL (ref 8.4–10.2)
CHLORIDE SERPL-SCNC: 98 MMOL/L (ref 98–107)
CO2 SERPL-SCNC: 22 MMOL/L (ref 22–29)
CREAT SERPL-MCNC: 1.07 MG/DL (ref 0.73–1.18)
CROSSMATCH INTERPRETATION: NORMAL
DISPENSE STATUS: NORMAL
EOSINOPHIL # BLD AUTO: 0.17 X10(3)/MCL (ref 0–0.9)
EOSINOPHIL NFR BLD AUTO: 1.3 %
ERYTHROCYTE [DISTWIDTH] IN BLOOD BY AUTOMATED COUNT: 18.7 % (ref 11.5–17)
GFR SERPLBLD CREATININE-BSD FMLA CKD-EPI: >60 MLS/MIN/1.73/M2
GLOBULIN SER-MCNC: 4.4 GM/DL (ref 2.4–3.5)
GLUCOSE SERPL-MCNC: 104 MG/DL (ref 74–100)
HCT VFR BLD AUTO: 22.8 % (ref 42–52)
HCT VFR BLD AUTO: 25.4 % (ref 42–52)
HGB BLD-MCNC: 7.7 G/DL (ref 14–18)
HGB BLD-MCNC: 8.9 G/DL (ref 14–18)
IMM GRANULOCYTES # BLD AUTO: 0.13 X10(3)/MCL (ref 0–0.04)
IMM GRANULOCYTES NFR BLD AUTO: 1 %
INR PPP: 1.9
LYMPHOCYTES # BLD AUTO: 1.34 X10(3)/MCL (ref 0.6–4.6)
LYMPHOCYTES NFR BLD AUTO: 9.9 %
MAGNESIUM SERPL-MCNC: 2.5 MG/DL (ref 1.6–2.6)
MCH RBC QN AUTO: 32.4 PG (ref 27–31)
MCHC RBC AUTO-ENTMCNC: 35 G/DL (ref 33–36)
MCV RBC AUTO: 92.4 FL (ref 80–94)
MONOCYTES # BLD AUTO: 1.73 X10(3)/MCL (ref 0.1–1.3)
MONOCYTES NFR BLD AUTO: 12.8 %
NEUTROPHILS # BLD AUTO: 10.13 X10(3)/MCL (ref 2.1–9.2)
NEUTROPHILS NFR BLD AUTO: 74.9 %
NRBC BLD AUTO-RTO: 0.2 %
PHOSPHATE SERPL-MCNC: 5.5 MG/DL (ref 2.3–4.7)
PLATELET # BLD AUTO: 67 X10(3)/MCL (ref 130–400)
PLATELETS.RETICULATED NFR BLD AUTO: 10.6 % (ref 0.9–11.2)
PMV BLD AUTO: 12 FL (ref 7.4–10.4)
POCT GLUCOSE: 132 MG/DL (ref 70–110)
POCT GLUCOSE: 168 MG/DL (ref 70–110)
POCT GLUCOSE: 285 MG/DL (ref 70–110)
POCT GLUCOSE: 90 MG/DL (ref 70–110)
POTASSIUM SERPL-SCNC: 4.5 MMOL/L (ref 3.5–5.1)
PROT SERPL-MCNC: 6 GM/DL (ref 6.4–8.3)
PROTHROMBIN TIME: 21.4 SECONDS (ref 11.4–14)
RBC # BLD AUTO: 2.75 X10(6)/MCL (ref 4.7–6.1)
SODIUM SERPL-SCNC: 127 MMOL/L (ref 136–145)
UNIT NUMBER: NORMAL
WBC # SPEC AUTO: 13.52 X10(3)/MCL (ref 4.5–11.5)

## 2024-01-17 PROCEDURE — 25000003 PHARM REV CODE 250

## 2024-01-17 PROCEDURE — P9016 RBC LEUKOCYTES REDUCED: HCPCS | Performed by: STUDENT IN AN ORGANIZED HEALTH CARE EDUCATION/TRAINING PROGRAM

## 2024-01-17 PROCEDURE — 85025 COMPLETE CBC W/AUTO DIFF WBC: CPT | Performed by: STUDENT IN AN ORGANIZED HEALTH CARE EDUCATION/TRAINING PROGRAM

## 2024-01-17 PROCEDURE — 84100 ASSAY OF PHOSPHORUS: CPT | Performed by: STUDENT IN AN ORGANIZED HEALTH CARE EDUCATION/TRAINING PROGRAM

## 2024-01-17 PROCEDURE — 80053 COMPREHEN METABOLIC PANEL: CPT | Performed by: STUDENT IN AN ORGANIZED HEALTH CARE EDUCATION/TRAINING PROGRAM

## 2024-01-17 PROCEDURE — 63600175 PHARM REV CODE 636 W HCPCS: Mod: JZ,JG | Performed by: STUDENT IN AN ORGANIZED HEALTH CARE EDUCATION/TRAINING PROGRAM

## 2024-01-17 PROCEDURE — 97535 SELF CARE MNGMENT TRAINING: CPT

## 2024-01-17 PROCEDURE — 82570 ASSAY OF URINE CREATININE: CPT | Performed by: STUDENT IN AN ORGANIZED HEALTH CARE EDUCATION/TRAINING PROGRAM

## 2024-01-17 PROCEDURE — 25000003 PHARM REV CODE 250: Performed by: INTERNAL MEDICINE

## 2024-01-17 PROCEDURE — P9017 PLASMA 1 DONOR FRZ W/IN 8 HR: HCPCS | Performed by: STUDENT IN AN ORGANIZED HEALTH CARE EDUCATION/TRAINING PROGRAM

## 2024-01-17 PROCEDURE — 97168 OT RE-EVAL EST PLAN CARE: CPT

## 2024-01-17 PROCEDURE — 82150 ASSAY OF AMYLASE: CPT | Performed by: STUDENT IN AN ORGANIZED HEALTH CARE EDUCATION/TRAINING PROGRAM

## 2024-01-17 PROCEDURE — 97116 GAIT TRAINING THERAPY: CPT

## 2024-01-17 PROCEDURE — 25000003 PHARM REV CODE 250: Performed by: STUDENT IN AN ORGANIZED HEALTH CARE EDUCATION/TRAINING PROGRAM

## 2024-01-17 PROCEDURE — 94761 N-INVAS EAR/PLS OXIMETRY MLT: CPT

## 2024-01-17 PROCEDURE — A4216 STERILE WATER/SALINE, 10 ML: HCPCS

## 2024-01-17 PROCEDURE — 85018 HEMOGLOBIN: CPT | Performed by: STUDENT IN AN ORGANIZED HEALTH CARE EDUCATION/TRAINING PROGRAM

## 2024-01-17 PROCEDURE — P9047 ALBUMIN (HUMAN), 25%, 50ML: HCPCS | Mod: JZ,JG | Performed by: STUDENT IN AN ORGANIZED HEALTH CARE EDUCATION/TRAINING PROGRAM

## 2024-01-17 PROCEDURE — 82042 OTHER SOURCE ALBUMIN QUAN EA: CPT | Performed by: STUDENT IN AN ORGANIZED HEALTH CARE EDUCATION/TRAINING PROGRAM

## 2024-01-17 PROCEDURE — 21400001 HC TELEMETRY ROOM

## 2024-01-17 PROCEDURE — 82247 BILIRUBIN TOTAL: CPT | Performed by: STUDENT IN AN ORGANIZED HEALTH CARE EDUCATION/TRAINING PROGRAM

## 2024-01-17 PROCEDURE — 83735 ASSAY OF MAGNESIUM: CPT | Performed by: STUDENT IN AN ORGANIZED HEALTH CARE EDUCATION/TRAINING PROGRAM

## 2024-01-17 PROCEDURE — 88108 CYTOPATH CONCENTRATE TECH: CPT | Mod: TC | Performed by: STUDENT IN AN ORGANIZED HEALTH CARE EDUCATION/TRAINING PROGRAM

## 2024-01-17 PROCEDURE — 85610 PROTHROMBIN TIME: CPT | Performed by: STUDENT IN AN ORGANIZED HEALTH CARE EDUCATION/TRAINING PROGRAM

## 2024-01-17 PROCEDURE — 86923 COMPATIBILITY TEST ELECTRIC: CPT | Performed by: STUDENT IN AN ORGANIZED HEALTH CARE EDUCATION/TRAINING PROGRAM

## 2024-01-17 RX ORDER — HYDROCODONE BITARTRATE AND ACETAMINOPHEN 500; 5 MG/1; MG/1
TABLET ORAL
Status: DISCONTINUED | OUTPATIENT
Start: 2024-01-17 | End: 2024-01-25 | Stop reason: HOSPADM

## 2024-01-17 RX ORDER — ALBUMIN HUMAN 250 G/1000ML
25 SOLUTION INTRAVENOUS ONCE
Status: COMPLETED | OUTPATIENT
Start: 2024-01-17 | End: 2024-01-17

## 2024-01-17 RX ORDER — LIDOCAINE HYDROCHLORIDE 10 MG/ML
1 INJECTION INFILTRATION; PERINEURAL ONCE
Status: DISCONTINUED | OUTPATIENT
Start: 2024-01-17 | End: 2024-01-25

## 2024-01-17 RX ORDER — FUROSEMIDE 10 MG/ML
40 INJECTION INTRAMUSCULAR; INTRAVENOUS DAILY
Status: DISCONTINUED | OUTPATIENT
Start: 2024-01-17 | End: 2024-01-18

## 2024-01-17 RX ORDER — PREDNISONE 10 MG/1
10 TABLET ORAL DAILY
Status: DISCONTINUED | OUTPATIENT
Start: 2024-01-17 | End: 2024-01-18

## 2024-01-17 RX ADMIN — Medication 100 MG: at 09:01

## 2024-01-17 RX ADMIN — BENZONATATE 100 MG: 100 CAPSULE ORAL at 08:01

## 2024-01-17 RX ADMIN — SPIRONOLACTONE 100 MG: 25 TABLET ORAL at 09:01

## 2024-01-17 RX ADMIN — SODIUM CHLORIDE, PRESERVATIVE FREE 10 ML: 5 INJECTION INTRAVENOUS at 11:01

## 2024-01-17 RX ADMIN — SODIUM CHLORIDE: 9 INJECTION, SOLUTION INTRAVENOUS at 09:01

## 2024-01-17 RX ADMIN — RIFAXIMIN 550 MG: 550 TABLET ORAL at 09:01

## 2024-01-17 RX ADMIN — SODIUM CHLORIDE, PRESERVATIVE FREE 10 ML: 5 INJECTION INTRAVENOUS at 12:01

## 2024-01-17 RX ADMIN — MICONAZOLE NITRATE: 20 POWDER TOPICAL at 08:01

## 2024-01-17 RX ADMIN — PANTOPRAZOLE SODIUM 40 MG: 40 TABLET, DELAYED RELEASE ORAL at 09:01

## 2024-01-17 RX ADMIN — FUROSEMIDE 40 MG: 20 TABLET ORAL at 09:01

## 2024-01-17 RX ADMIN — MICONAZOLE NITRATE: 20 POWDER TOPICAL at 09:01

## 2024-01-17 RX ADMIN — FOLIC ACID 1 MG: 1 TABLET ORAL at 09:01

## 2024-01-17 RX ADMIN — FUROSEMIDE 40 MG: 10 INJECTION, SOLUTION INTRAMUSCULAR; INTRAVENOUS at 01:01

## 2024-01-17 RX ADMIN — RIFAXIMIN 550 MG: 550 TABLET ORAL at 08:01

## 2024-01-17 RX ADMIN — CEFTRIAXONE SODIUM 2 G: 2 INJECTION, POWDER, FOR SOLUTION INTRAMUSCULAR; INTRAVENOUS at 09:01

## 2024-01-17 RX ADMIN — PREDNISONE 10 MG: 10 TABLET ORAL at 01:01

## 2024-01-17 RX ADMIN — ALBUMIN (HUMAN) 25 G: 0.25 INJECTION, SOLUTION INTRAVENOUS at 03:01

## 2024-01-17 RX ADMIN — SODIUM CHLORIDE, PRESERVATIVE FREE 10 ML: 5 INJECTION INTRAVENOUS at 06:01

## 2024-01-17 NOTE — PT/OT/SLP RE-EVAL
Occupational Therapy   Re-evaluation    Name: Franco Hein  MRN: [id number]  Admitting Diagnosis:  Delirium tremens, alcoholic cirrhosis with bleeding intrahepatic mass s/p angiogram with embolization of R hepatic artery  Recent Surgery: Procedure(s) (LRB):  ANGIOGRAM-ABDOMINAL (N/A) 1 Day Post-Op    Recommendations:     Discharge Recommendations: Moderate Intensity Therapy  Discharge Equipment Recommendations: none  Barriers to discharge:       Assessment:   Use of  [employer] [id number] for reevaluation    Franco Hein is a 48 y.o. male with a medical diagnosis of Delirium tremens, alcoholic cirrhosis with bleeding intrahepatic mass s/p angiogram with embolization of R hepatic artery. Performance deficits affecting function are impaired endurance, impaired self care skills, impaired functional mobility, impaired balance, edema, impaired cognition. Pt making good progress toward goals since initial evaluation. B LE edema , distended abdomen, decreased activity tolerance with SOB with min exertion, with some cognitive impairments hinders I with ADL tasks and functional mobility.     Rehab Prognosis:  good ; patient would benefit from acute skilled OT services to address these deficits and reach maximum level of function.       Plan:     Patient to be seen 3 x/week to address the above listed problems via self-care/home management, therapeutic activities, therapeutic exercises, neuromuscular re-education  Plan of Care Expires:  (DC)  Plan of Care Reviewed with: patient    Subjective     Chief Complaint: no c/o  Patient/Family stated goals: none stated  Communicated with: nurse prior to session.  Pain/Comfort:  Pain Rating 1: 0/10    Objective:     Communicated with: nurse prior to session.  Patient found supine with: telemetry upon OT entry to room.    General Precautions: Standard, fall   Orthopedic Precautions: N/A  Braces: N/A  Respiratory Status: Room air  Hr 101-115  O2 sat 98%    Occupational  Performance:    Bed Mobility:    Patient completed Scooting/Bridging with modified independence  Patient completed Supine to Sit with stand by assistance    Functional Mobility/Transfers:  Patient completed Sit <> Stand Transfer with stand by assistance  with  no assistive device   Patient completed Toilet Transfer Step Transfer technique with stand by assistance with  grab bars  Functional Mobility: pt able to ambulate in room to/from bathroom to/from bed/sink SBA without AD    Activities of Daily Living:  Feeding:  pt able to open milk container and cereal container and utilize utensil, hand to mouth excursion mod I    Grooming: standing at sink pt brushed teeth SBA,  req'd mod A for shaving with electric razor in standing; vc's req'd for pt to attempt task prior to asking for assistance  Lower Body Dressing: total assistance unable to doff/ema B socks due to B LE edema  Toileting: SBA clothing management with hospital gown; vc's for pt to attempt pericleaning +BM on toilet, pt unsuccessful req'd assistance ; total A posterior pericleaning    Cognitive/Visual Perceptual:  Cognitive/Psychosocial Skills:     -       Follows Commands/attention:Follows one-step commands and Follows two-step commands  -       Memory: pt req'd vc's throughout session for sequential steps for functional tasks; pt appears confused although able to follow commands in [place] with    -       Mood/Affect/Coping skills/emotional control: Cooperative and Pleasant    Physical Exam:  Balance: -       sitting balance mod I, standing balance SBA pt tolerated standing at sink x 5 minutes x 2 trials, seated restbreak between trials   Upper Extremity Range of Motion:     -       Right Upper Extremity: WFL  -       Left Upper Extremity: WFL  Upper Extremity Strength:    -       Right Upper Extremity: WFL  -       Left Upper Extremity: WFL    Treatment & Education:  ADL charge for extended time for ADL retraining, see above for assist  levels.    Patient left sitting edge of bed with all lines intact, call button in reach, and nurse notified    GOALS:   Multidisciplinary Problems       Occupational Therapy Goals          Problem: Occupational Therapy    Goal Priority Disciplines Outcome Interventions   Occupational Therapy Goal     OT, PT/OT Ongoing, Progressing    Description: OT reevaluation 1/17, all goals updated    Patient will perform feeding with min A when able to tolerate PO diet. -met    Patient will perform grooming with SBA while standing at sink.-upgraded    Patient will perform toileting with min assist using BSC.- upgraded    Patient will perform toilet transfer with max assist with use of RW.-met  Pt will complete standard toilet t/f without AD mod I.-new    Patient will perform upper body dressing with SBA while seated EOB.-upgraded   Pt will complete LE Dressing with AE PRN min A.-new                         History:     History reviewed. No pertinent past medical history.    No past surgical history on file.    Time Tracking:     OT Date of Treatment:    OT Start Time: 1045  OT Stop Time: 1124  OT Total Time (min): 39 min    Billable Minutes:Re-eval 1  Self Care/Home Management 1 unit    1/17/2024

## 2024-01-17 NOTE — PLAN OF CARE
Problem: Occupational Therapy  Goal: Occupational Therapy Goal  Description: OT reevaluation 1/17, all goals updated    Patient will perform feeding with min A when able to tolerate PO diet. -met    Patient will perform grooming with SBA while standing at sink.-upgraded    Patient will perform toileting with min assist using BSC.- upgraded    Patient will perform toilet transfer with max assist with use of RW.-met  Pt will complete standard toilet t/f without AD mod I.-new    Patient will perform upper body dressing with SBA while seated EOB.-upgraded   Pt will complete LE Dressing with AE PRN min A.-new    Outcome: Ongoing, Progressing

## 2024-01-17 NOTE — PROCEDURES
"Franco Hein is a 48 y.o. male patient admitted for decomepnsated ETOH cirrhosis and strep pneumoniae bacteremia likely secondary from post-influenza pneumonia. Found to have lesion on right lobe liver, likely HCC which caused some bleeding and hepatic capsule rupture with bloody ascites fluid. S/p gelfoam embolization yesterday, H&H stable today but needing therapeutic/diagnostic paracentesis today.    Temp: 98 °F (36.7 °C) (24 1240)  Pulse: 110 (24 1240)  Resp: (!) 24 (24 194)  BP: 123/83 (24 1240)  SpO2: 96 % (24 1240)  Weight: 78 kg (171 lb 15.3 oz) (24 1115)  Height: 5' 4" (162.6 cm) (24 1000)       Paracentesis    Date/Time: 2024 2:51 PM  Location procedure was performed: [facility] MEDICINE    Performed by: Sagrario Rosenthal MD  Authorized by: Sagrario Rosenthal MD  Pre-operative diagnosis: Cirrhosis  Post-operative diagnosis: Cirrhosis  Consent Done: Yes  Consent: Verbal consent obtained. Written consent obtained.  Consent given by: patient  Patient understanding: patient states understanding of the procedure being performed  Patient consent: the patient's understanding of the procedure matches consent given  Procedure consent: procedure consent matches procedure scheduled  Relevant documents: relevant documents present and verified  Test results: test results available and properly labeled  Site marked: the operative site was marked  Imaging studies: imaging studies available  Patient identity confirmed: , MRN, name, provided demographic data and verbally with patient  Time out: Immediately prior to procedure a "time out" was called to verify the correct patient, procedure, equipment, support staff and site/side marked as required.  Initial or subsequent exam: initial  Procedure purpose: therapeutic and diagnostic  Indications: abdominal discomfort secondary to ascites  Anesthesia: local infiltration    Anesthesia:  Local Anesthetic: " "lidocaine 1% without epinephrine  Anesthetic total: 10 mL    Patient sedated: no  Preparation: Patient was prepped and draped in the usual sterile fashion.  Description of findings: large pocket ascites >4 cm LLQ, bloody drainage noted and expected   Needle gauge: 20  Ultrasound guidance: yes  Puncture site: left lower quadrant  Fluid removed: 4500(ml)  Fluid appearance: bloody  Dressing: 4x4 sterile gauze  Technical procedures used: US guided asceptic technique, seldinger technique  Significant surgical tasks conducted by the assistant(s): medical student assisted in handing bottles for fluid  Complications: No  Estimated blood loss (mL): 5  Specimens: No  Implants: No  Patient tolerance: Patient tolerated the procedure well with no immediate complications  Comments: Bloody ascites fluid, otherwise 5 cc blood locally from incision for procedure. Patient tolerated well, improvement in ascites now soft and improvement in SOB. Pt reports feeling "more comfortable" now.        Sagrario Rosenthal MD  [employer] IM PGY III  1/17/2024    "

## 2024-01-17 NOTE — PROGRESS NOTES
Inpatient Nutrition Assessment    Admit Date: 1/1/2024   Total duration of encounter: 16 days   Patient Age: 48 y.o.    Nutrition Recommendation/Prescription     Suggest Renal diet with Soft & Bite size consistency per SLP   Suggest Suplena (provides 420 kcal, 11 g protein per serving) TID d/t renal function  Demetrius (provides 90 kcal, 2.5 g protein per serving) BID for wound healing  Biweekly wt    Communication of Recommendations: reviewed with nurse and reviewed with patient    Nutrition Assessment     Malnutrition Assessment/Nutrition-Focused Physical Exam    Malnutrition Context: acute illness or injury (01/04/24 1119)  Malnutrition Level: moderate (01/04/24 1119)  Energy Intake (Malnutrition):  (Usnure of oral intake PTA, On TF up until 1/9; Po diet 1/9 with ~50% intake) (01/12/24 1233)  Weight Loss (Malnutrition): other (see comments) (unable to obtain wt hx; pt with AMS) (01/04/24 1119)  Subcutaneous Fat (Malnutrition): mild depletion (01/04/24 1119)  Orbital Region (Subcutaneous Fat Loss): mild depletion  Upper Arm Region (Subcutaneous Fat Loss): mild depletion     Muscle Mass (Malnutrition): mild depletion (01/04/24 1119)     Clavicle Bone Region (Muscle Loss): mild depletion         A minimum of two characteristics is recommended for diagnosis of either severe or non-severe malnutrition.    Chart Review    Reason Seen: follow-up    Malnutrition Screening Tool Results   Have you recently lost weight without trying?: Unsure  Have you been eating poorly because of a decreased appetite?: Yes   MST Score: 3   Diagnosis:  ETOH WD, DT, influenza A, PNA, Decompensated cirrhosis, Hep C+, anemia , Malnutrition, hypernatremia    Relevant Medical History: ETOH misuse     Nutrition-Related Medications:  folic acid, pantoprazole,  thiamine, rocephin, lasix, spironolactone  Calorie Containing IV Medications: no significant kcals from medications at this time    Nutrition-Related Labs:  1/12/24 -- H/H 9.7/29 L, Na 132 L, K  "3.6, Glu 115, BUN 17, Cr 0.64  1/17/24 -- H/H 8.9/25.4 L, K 4.5, BUN 28.4 H, Cr 1, Phos 5.5 H,  H, ALT 80 H,  H    Nutrition Orders:   Diet heart healthy Fluid - 1500mL  Dietary nutrition supplements Boost Plus; TID     Appetite/Oral Intake: fair/NPO    Factors Affecting Nutritional Intake: abdominal pain    Food/Yarsanism/Cultural Preferences: unable to obtain    Food Allergies: unable to obtain    Wound(s):   Right superficial wound to inner buttock healing, Demetrius & Boost added    Last Bowel Movement: 01/16/24    Comments    1/17/24 -- Pt NPO since 1/16, pt with abdominal pain with distention related to intrahepatic mass, pt denies abdominal pain this am; nsing reports diet to resume today per MD; New wt indicates gain, most likely fluid related, on diuretics noted; BUN/Cr/Phos (H) - suggest renal diet & change ONS back to Suplena    1/12/24 --  [id number] used during visit; Diet advanced to Soft & Bite size per SLP on 1/10; pt with ~50% meal intake d/t complaints of diarrhea 10 minutes after eating; pt requesting medicine for his "stomach" - nsing notified, reports call in to MD, lactulose on hold; pt reports drinking ONS, will change to Boost Plus to better meet nutrition needs until po intake > 75%; Demetrius ordered to promote wound healing; new bed weight obtained at visit indicates gain, ? Accuracy of bed weight, pt unsure of UBW but reports weight loss PTA, will continue to monitor    1/9/24 -- Pt pulled out NGT this am for TF; SLP completed bedside swallow with recs to begin FL diet only at this time, will order Suplena TID to supplement meals; Monitor diet tolerance/po intake for need to resume TF; Rectal tube now removed, loose stools improved; no new ammonia level noted, lactulose not ordered at this time noted    (1/5) Pt remains NPO; started on TF yesterday; currently TF infusing @ 40ml/hr; RN reported to achieved goal rate later today; goal rate should meet estimated needs. Labs " "acknolwedged.     () Pt remains NPO x3 days ; AMS/on bipap; on 1/3--ST unable to complete swallow eval 2 pt on Bipap; no new wt. Both TF/PPN recs provided for nutrition support; discussed with RN caring for pt. Noted NH4--elevated to 90; no lactulose ; lowered protein recs until level decreases.     () Received MST for unsure wt loss/ appetite. Pt not alert during rounds; AMS; unable to obtain diet/wt hx at this time; PPN and diet recs provided. Suggest ST swallow eval prior to diet progression. Abnormal labs noted: Tbili (H)--hx ETOH abuse. Suggest continue folic acid/thiamine tx.     Anthropometrics    Height: 5' 4" (162.6 cm) Height Method: Stated  Last Weight: 78 kg (171 lb 15.3 oz) (24 1115) Weight Method: Bed Scale  BMI (Calculated): 29.5  BMI Classification: normal (BMI 18.5-24.9)        Ideal Body Weight (IBW), Male: 130 lb     % Ideal Body Weight, Male (lb): 92.31 %      Usual Body Weight (UBW), kg:  (pt unable to give UBW; no wt hx EMR)        Usual Weight Provided By: EMR weight history    Wt Readings from Last 5 Encounters:   24 78 kg (171 lb 15.3 oz)     Weight Change(s) Since Admission: weight gain  Wt Readings from Last 1 Encounters:   24 1115 78 kg (171 lb 15.3 oz)   24 1232 72.1 kg (158 lb 15.2 oz)   24 1000 54.4 kg (120 lb)   24 0156 54.4 kg (120 lb)   24 1255 54.4 kg (120 lb)   Admit Weight: 54.4 kg (120 lb) (24 1255), Weight Method: Bed Scale  24 -- 78 kg, bed    Estimated Needs    Weight Used For Calorie Calculations: 54.4 kg (119 lb 14.9 oz)  Energy Calorie Requirements (kcal): 1904 kcal/d; 35 federico/kg/wt gain  Energy Need Method: Kcal/kg  Weight Used For Protein Calculations: 54.4 kg (119 lb 14.9 oz)  Protein Requirements: 54 gm protein/d; 1.0 gm /kg --limit protein--NH4 level elevated  Fluid Requirements (mL): 1904 ml/d; 1ml/federico  Temp (24hrs), Av.2 °F (36.2 °C), Min:94.7 °F (34.8 °C), Max:98 °F (36.7 °C)       Enteral " Nutrition    Formula:  None  Rate/Volume: 0ml/hr  Water Flushes:0  Additives/Modulars: none at this time  Route:  none  Method:  none  Total Nutrition Provided by Tube Feeding, Additives, and Flushes:  Calories Provided  0 kcal/d, 0% needs   Protein Provided  0 g/d, 0% needs   Fluid Provided  0 ml/d, 0% needs   Continuous feeding calculations based on estimated 23 hr/d run time unless otherwise stated.    Parenteral Nutrition    Patient not receiving parenteral nutrition support at this time.    Evaluation of Received Nutrient Intake    Calories: not meeting estimated needs  Protein: not meeting estimated needs    Patient Education    Not applicable.    Nutrition Diagnosis     PES: Inadequate oral intake related to acute illness as evidenced by < 50% nutrition intake, abdominal pain. (active)    Interventions/Goals     Intervention(s): general/healthful diet, commercial beverage, multivitamin/mineral supplement therapy, prescription medication, and collaboration with other providers    Goal: Meet greater than 80% of nutritional needs by follow-up. (goal progressing)    Monitoring & Evaluation     Dietitian will monitor food and beverage intake, weight, electrolyte/renal panel, glucose/endocrine profile, and gastrointestinal profile.    Nutrition Risk/Follow-Up: moderate (follow-up in 3-5 days)   Please consult if re-assessment needed sooner.

## 2024-01-17 NOTE — PROGRESS NOTES
[facility] Medicine Wards Progress Note     Resident Team: [facility] Medicine List 4  Attending Physician: Kamryn Estevez MD  Resident: MD Galo  Intern: DO Ulysses       Subjective:      Brief HPI:  Franco Hein is  48 y.o. male with a PMH of ETOH misuse disorder who presented to [facility] ED on 2024 with complaint of jaundice, weakness for 3 weeks, and AMS. Patient was brought in by a friend who states patient is a daily drinker who has not been able to go to a store for the last 2 days, but patient is noted to have a positive ethanol on admission. Patient unable to provide history given his mental status and was only able to say his name and confirm he drinks alcohol but does not do drugs. Patient was tachypneic, tachycardic, and tremulous, on presentation with jaundice. Admitted to ICU given altered mental status and concern for decompensation in the setting of alcohol withdrawal and influenza A, also found to have strep pneumonia bacteremia, on appropriate IV antibiotics.      Interval History:   Patient had eventful day yesterday. CTA abd/pelvis showed R lobe lesion of liver likely HCC with intrahepatic bleeding that led to burst capsule and extravasation of contrast into abdominal compartment after drop in H&H and elevated lactic acid yesterday. Taken to cath lab by [facility] surgery for angio and gelfoam embolization. H&H stable after this and 2 units pRBC yesterday and 1 FFP. Pt's pain nearly resolved, no longer feeling faint. VS stable overnight, no other complaints than abdominal and leg swelling; plan for diagnostic and therapeutic paracentesis today to aid in formal HCC dx. Nursing staff also reported small bloody BM yesterday but none this morning.     Review of Systems:  ROS completed and negative except as indicated above.     Objective:     Last 24 Hour Vital Signs:  BP  Min: 97/66  Max: 127/83  Temp  Av.1 °F (36.2 °C)  Min: 94.7 °F (34.8 °C)  Max: 98 °F (36.7 °C)  Pulse  Av.5  Min: 95   Max: 117  Resp  Av.4  Min: 20  Max: 28  SpO2  Av.2 %  Min: 94 %  Max: 100 %  Weight  Av kg (171 lb 15.3 oz)  Min: 78 kg (171 lb 15.3 oz)  Max: 78 kg (171 lb 15.3 oz)  I/O last 3 completed shifts:  In: 1256.2 [I.V.:34.3; Blood:1129.7; IV Piggyback:92.3]  Out: 601 [Urine:600; Stool:1]    Physical Examination:  General: ill-appearing though non-toxic  Eye: PERRLA, EOMI, scleral icterus noted  HENT: NC/AT, moist mucus membranes  Neck: full range of motion, no thyromegaly or lymphadenopathy  Respiratory: Rhonchi noted bilaterally, satting well on RA  Cardiovascular: regular rate and rhythm without murmurs, gallops or rubs  Gastrointestinal: soft, non-tender, mildly-distended with normal bowel sounds, without masses to palpation  Musculoskeletal: no obvious deformities, full range of motion of all extremities/spine without limitation or discomfort  Integumentary: Jaundice, spider angiomas present on upper chest and abdomen, no caput medusa  Extremities: radial and DP pulses 2+ bilaterally, no LE edema  Neurologic: Pt AAOx3, following commands and conversing appropriately. No focal neuro deficits appreciated, CN II-XII grossly intact.       Laboratory:  Most Recent Data:  CBC:   Lab Results   Component Value Date    WBC 13.52 (H) 2024    HGB 8.9 (L) 2024    HCT 25.4 (L) 2024    PLT 67 (L) 2024    MCV 92.4 2024    RDW 18.7 (H) 2024     WBC Differential:   Recent Labs   Lab 24  0317 24  0529 01/15/24  0629 24  0659 24  2139 24  0332   WBC 15.41* 12.55* 12.56* 13.68*  --  13.52*   HGB 9.0* 8.7* 8.7* 7.5* 8.7* 8.9*   HCT 27.8* 26.9* 25.8* 23.3* 26.2* 25.4*   PLT 69* 69* 80* 99*  --  67*   MCV 92.1 93.1 93.1 95.1*  --  92.4       BMP:   Lab Results   Component Value Date     (L) 2024    K 4.5 2024    CO2 22 2024    BUN 28.4 (H) 2024    CREATININE 1.07 2024    CALCIUM 7.7 (L) 2024    MG 2.50 2024     "PHOS 5.5 (H) 01/17/2024     LFTs:   Lab Results   Component Value Date    ALBUMIN 1.6 (L) 01/17/2024    BILITOT 7.5 (H) 01/17/2024     (H) 01/17/2024    ALKPHOS 190 (H) 01/17/2024    ALT 80 (H) 01/17/2024     Coags:   Lab Results   Component Value Date    INR 1.9 (H) 01/17/2024    PROTIME 21.4 (H) 01/17/2024    PTT 38.6 (H) 01/01/2024     FLP: No results found for: "CHOL", "HDL", "LDLCALC", "TRIG", "CHOLHDL"  DM:   Lab Results   Component Value Date    CREATININE 1.07 01/17/2024     Thyroid: No results found for: "TSH", "FREET4", "D1SUFBN", "Z4UIJHG", "THYROIDAB"  Anemia:   Lab Results   Component Value Date    IRON 39 (L) 01/01/2024    TIBC 203 (L) 01/01/2024    FERRITIN 116.00 01/01/2024    LNNZLHLJ79 >2,000 (H) 01/01/2024    FOLATE 18.3 01/01/2024     Cardiac:   Lab Results   Component Value Date    TROPONINI 0.029 01/01/2024    BNP 89.3 01/01/2024       Microbiology Data Reviewed: yes  Pertinent Findings:  1/1/24: bld cx +strep pneumonia pan sensitive x1 bottle  1/4/24: bld cx x2 negative    Other Results:  TTE 1/4/24: EF 62%, no evidence of vegetation      Radiology:  Imaging Results              CT Chest Abdomen Pelvis Without Contrast (XPD) (Final result)  Result time 01/02/24 09:44:01      Final result by Tika Mcknight MD (01/02/24 09:44:01)                   Impression:      1. Multilobar consolidative opacities within the lungs  2. Cirrhotic morphology of the liver with mass at the dome of the liver.  Evaluation is moderately limited without contrast and due to beam hardening artifact related to patient positioning.  Recommend liver protocol CT abdomen without and with contrast.  3. Ascites  4. Indeterminate right adrenal nodule.  Continued attention recommended on follow-up exams.  5. The preliminary and final reports are concordant.      Electronically signed by: Tika Mcknight  Date:    01/02/2024  Time:    09:44               Narrative:    EXAMINATION:  CT CHEST ABDOMEN PELVIS WITHOUT " CONTRAST(XPD)    CLINICAL HISTORY:  Suspect pneumonia, biliary obstruction;    TECHNIQUE:  Helical acquisition through the chest, abdomen and pelvis without  IV administration of contrast. Axial, sagittal and coronal reformats interpreted.    Automated tube current modulation, weight-based exposure dosing, and/or iterative reconstruction technique utilized to reach lowest reasonably achievable exposure rate.    DLP: 1042 mGy*cm    COMPARISON:  Chest radiograph 01/01/2024    FINDINGS:  BASE OF NECK: No significant abnormality.    HEART: Normal size. There are coronary artery calcifications.  Low-density blood pool with visualization of the interventricular septum compatible with anemia.    THORACIC VASCULATURE: Thoracic aorta is unremarkable.    SIRISHA/MEDIASTINUM: Small nonspecific mediastinal lymph nodes.  Evaluation of hilar lymphadenopathy is limited without contrast.    AIRWAYS: Patent.    LUNGS/PLEURA: Multilobar consolidative opacities within the lungs.    THORACIC SOFT TISSUES: Unremarkable.    LIVER: Limited evaluation the liver due to lack of intravenous contrast and placement of the patient's arms over the abdomen which causes beam hardening artifact.  Heterogeneous attenuation of the liver.  There appears to be a mass at the dome of the liver.  Caudate lobe hypertrophy and changes of cirrhosis.    BILIARY: The gallbladder does not appear to be overly distended.    PANCREAS: Nonspecific retroperitoneal edema.  Unable to assess for changes of pancreatitis given generalized fluid overload.    SPLEEN: Upper limits of normal in size.    ADRENALS: There is a 1.6 cm right adrenal nodule.  Unable to accurately measure internal attenuation due to beam hardening artifact.    KIDNEYS/URETERS: Hyperdense renal pyramids.  This can be related to volume status/dehydration or medullary calcinosis.    GI TRACT/MESENTERY: Evaluation of the bowel is limited without contrast. Bowel is normal in caliber without evidence of  obstruction.    PERITONEUM: Small volume ascites.No free air.    LYMPH NODES: No enlarged lymph nodes by size criteria.    ABDOMINOPELVIC VASCULATURE: Normal caliber abdominal aorta.  Large periumbilical vein.    BLADDER: Normal appearance given degree of distention.    REPRODUCTIVE ORGANS: Normal as visualized.    ABDOMINAL WALL: Small fat containing right inguinal hernia.  Mild body wall edema.    BONES: Anterolisthesis at the lumbosacral junction.                        Preliminary result by Santana Lerma MD (01/01/24 22:56:45)                   Impression:    1. No intrahepatic or extrahepatic biliary duct dilatation is seen.  2. Note of subtle increased attenuation in the medullary portions of the bilateral kidneys which may reflect medullary calcinosis. Correlate with clinical and laboratory findings as regards additional evaluation and follow-up.  3. There is multifocal patchy dense consolidation of both the lungs with predominant involvement of the left upper and right lower lobes. This is consistent with multilobar pneumonia. Correlate with clinical and laboratory findings as regards additional evaluation and follow-up to full resolution.  4. There is a 2.8 x 2 cm soft tissue density nodule in the right adrenal gland (series 2 image 98). This is of indeterminate etiology.  5. The margins of the liver appears somewhat nodular and irregular. This is suggestive of cirrhosis. Correlate with clinical and laboratory findings.  6. Details and other findings as discussed above.               Narrative:    START OF REPORT:  Technique: CT Scan of the chest abdomen and pelvis was performed without intravenous contrast with axial as well as sagittal and, coronal images.    Dosage Information: Automated Exposure Control was utilized 1041.63 mGy.cm.    Comparison: None.    Clinical History: Suspect pneumonia, biliary obstruction.    Findings:  Mediastinum: A few scattered subcentimeter mediastinal lymph nodes are  seen.  Heart: The heart size is within normal limits.  Aorta: Unremarkable appearing aorta.  Pulmonary Arteries: Unremarkable.  Lungs: There is multifocal patchy dense consolidation of both the lungs with predominant involvement of the left upper and right lower lobes.  Pleura: No effusions or pneumothorax are identified.  Abdomen:  Abdominal Wall: There is extensive stranding of the subcutaneous fat as well as the intraabdominal and intrapelvic fat consistent with anasarca edema. Correlate with clinical and laboratory findings.  Liver: The margins of the liver appears somewhat nodular and irregular. There are also prominent portosystemic collaterals consistent with portal hypertension.  Biliary System: No intrahepatic or extrahepatic biliary duct dilatation is seen.  Gallbladder: The gallbladder is not definitely identified on this noncontrast examination and may be contracted.  Pancreas: The pancreas appears unremarkable.  Spleen: The spleen appears unremarkable.  Adrenals: There is a 2.8 x 2 cm soft tissue density nodule in the right adrenal gland (series 2 image 98).  Kidneys: Note of subtle increased attenuation in the medullary portions of the bilateral kidneys which may reflect medullary calcinosis.  Aorta: The abdominal aorta appears unremarkable.  IVC: Unremarkable.  Bowel:  Esophagus: There is a small hiatal hernia.  Stomach: The stomach appears unremarkable.  Duodenum: Unremarkable appearing duodenum.  Small Bowel: The small bowel appears unremarkable.  Colon: Nondistended.  Appendix: The appendix is not identified but no inflammatory changes are seen in the right lower quadrant to suggest appendicitis.  Peritoneum: There is mild ascites. No intraperitoneal free gas is seen.    Pelvis:  Bladder: The bladder appears unremarkable.  Male:  Prostate gland: The prostate gland appears unremarkable.    Bony structures:  Dorsal Spine: There is mild to moderate spondylosis of the visualized dorsal spine.  Bony  Pelvis: The visualized bony structures of the pelvis appear unremarkable.                                         X-Ray Chest 1 View (Final result)  Result time 01/01/24 14:23:11   Procedure changed from X-Ray Chest PA And Lateral     Final result by Josef Sow MD (01/01/24 14:23:11)                   Impression:      Bilateral patchy airspace opacities greatest on the left.  Findings concerning for atypical infectious process.      Electronically signed by: Josef Sow  Date:    01/01/2024  Time:    14:23               Narrative:    EXAMINATION:  XR CHEST 1 VIEW    CLINICAL HISTORY:  cough;  Cough, unspecified    TECHNIQUE:  Single view of the chest    COMPARISON:  No prior imaging available for comparison.    FINDINGS:  Bilateral patchy airspace opacities greatest on the left.    The cardiomediastinal silhouette is within normal limits.    No acute osseous abnormality.                                       RADIOLOGY REPORT (Final result)  Result time 01/04/24 11:39:28      Final result by Unknown User (01/04/24 11:39:28)                                        Current Medications:     Infusions:       Scheduled:   cefTRIAXone (Rocephin) IV (PEDS and ADULTS)  2 g Intravenous Q24H    folic acid  1 mg Oral Daily    furosemide (LASIX) injection  40 mg Intravenous Daily    LIDOcaine HCL 10 mg/ml (1%)  1 mL Other Once    miconazole NITRATE 2 %   Topical (Top) BID    pantoprazole  40 mg Oral Daily    rifAXIMin  550 mg Oral BID    sodium chloride 0.9%  10 mL Intravenous Q6H    spironolactone  100 mg Oral Daily    thiamine  100 mg Oral Daily        PRN:  0.9%  NaCl infusion (for blood administration), 0.9%  NaCl infusion (for blood administration), 0.9%  NaCl infusion (for blood administration), sodium chloride 0.9%, acetaminophen, acetaminophen, benzonatate, dextrose 10%, dextrose 10%, glucagon (human recombinant), glucose, glucose, HYDROcodone-acetaminophen, hydrOXYzine pamoate, lactulose, sodium chloride 0.9%,  Flushing PICC/Midline Protocol **AND** sodium chloride 0.9% **AND** sodium chloride 0.9%    Antibiotics and Day Number of Therapy:  Vanc x3 days  Zosyn/Azithromycin x4 days  Rocephin 2g on D12/14     Lines and Day Number of Therapy:  PIV      Assessment & Plan:     Decompensated Cirrhosis  Hypervolemic Hyponotremia  Liver lesion likely HCC       - suspected etiology ETOH. Pt reportedly had hx of heavy drinking per friend who dropped him off       - Positive hepatitis C antibody; VL negative, no active infection        - MELD-Na: 28->25-> 22 1/9/24; Child Hoang: 13-> 11 1/9/24, class C, Maddrey: 204       - Coagulopathy improving; INR 2.4 1/9/23       - Improvement in encephalopathy; hold lactulose for now as pt having diarrhea       - Continue Rifaxamin for hepatic encephalopathy       - Prednisone taper given Maddrey; 40 mg x1 week then 20 x1 week, then 10, then 5, then stop after 28 days. Currently on D1 of 10 mg dosing       - FeNA 1%, Nina < 20; lower extremity edema; likely hypervolemic hyponotremia       - Continue Lasix 40 mg IV while inpatient with aldactone 100 mg daily       - Hyponatermic Na 127 likely 2/2 mIVF of 1/2 NS post procedure; stopped and will continue to fluid restrict and diurese       - Will perform diagnostic/therapeutic paracentesis today and send labs, cytology to assist in HCC diagnosis    Intrahepatic bleed likely 2/2 tumor- likely resolved       - Pt had slow intrahepatic bleed likely over the past several days as complication from HCC       - Labs in AM showed H&H had continued to drop slowly, lactic acid elevated       - CTA abd/pelvis showed intrahepatic bleeding with lesion in right lobe of liver suspicious for HCC, capsular rupture of liver and some bleeding and contrast noted with ascites       - Taken by [facility] surgery for gelfoam embolization which was successful, appreciate assistance       - Pt transfused 2 unit pRBC and 1 FFP given INR 2.2; H&H stable overnight       - Will  continue to monitor for symptoms       - Lovenox held, will restart tomorrow if H&H stable    Pneumonia 2/2 Influenza A       - Positive on 1/1/24       - Patient now significantly improved, afebrile, mild cough, and decreased O2 requirements; okay to stop isolation today       - Completed Oseltamivir 5 day course       - NC and supplemental O2 to keep sats > 92%       - Possible superimposed Strep PNA given bacteremia and pneumonia; on appropriate Ceftriaxone per below    Strep Pneumoniae Bacteremia       - Blood culture positive x1/2 Streptococcus; [employer] confirmation strep pneumonia bacteremia which is pan-sensitive, resp culture /Gram stain collection pending.       - Repeat blood cultures 1/4/24  negative; TTE did not have any findings consistent with endocarditis.        - s/p Zosyn and Zithromax x4 days; MRSA PCR negative, discontinued vancomycin after D3.        - Continue Rocephin 2g daily (pan-sensitive strep pneumo), will need 14 days from negative repeat blood cultures; D14/14 currently as repeat blood culture from 1/4/24 negative. Will stop after today       - s/p PICC for long term IV Abx    Skin Breakdown to L buttocks-improving  Pruritic, Erythematous Rash to groin       - Skin tear vs Stage II pressure ulcer       - Wound care consulted, appreciate recs       - Continue q2h turning and mepilex dsg        - Pt now with enteral nutrition, hopeful to aid in healing       - Rash appears fungal in nature, will start miconazole powder to groin BID    Malnutrition  Hyponatremia       - Soft mechanical diet ordered, pt working with SLT       - Diet per dietary recs       - Likely 2/2 cirrhosis; FeNA 1% indicated prerenal       - Na 131 this morning, given volume overload noted with lower extremity edema will stop salt tabs, likely hypervolemic hypernatremia. IV lasix 40 mg x 1 dose and will start lasix/aldactone at 20/50 mg dosing daily    CODE STATUS: Full  Access: PIV  Antibiotics: Ceftriaxone;  completing today 1/17/24  Diet: Clears  DVT Prophylaxis: SCDs  GI Prophylaxis: Protonix  Fluids: n/a      Disposition: Patient continues to meet inpatient needs requiring 14 day course of IV abx for strep pneumoniae bacteremia completing today. Self pay, likely undocumented and will need to stay for duration. Continues to recover slowly from decompensated cirrhosis as well, continues to improve slowly but will need close follow up to establish care and go to cirrhosis clinic upon discharge. Hopeful for discharge by end of the week pending no further complications.       Sagrario Rosenthal MD  [facility] Internal Medicine [facility]

## 2024-01-17 NOTE — PT/OT/SLP PROGRESS
Physical Therapy Treatment    Patient Name:  Franco Hein   MRN:  [id number]    Recommendations     Therapy Intensity Recommendations at Discharge: Low Intensity Therapy  Discharge Equipment Recommendations: walker, rolling, shower chair   Barriers to discharge: fall risk, decreased endurance, and medical diagnosis    Assessment     Franco Hein is a 48 y.o. male admitted with a medical diagnosis of Delirium tremens.    He presents with the following impairments/functional limitations:  weakness, impaired balance, impaired endurance, pain, edema, impaired cardiopulmonary response to activity, impaired self care skills, impaired functional mobility, gait instability, decreased lower extremity function.    Rehab Prognosis: Fair.    Patient would benefit from continued skilled acute PT services to: address above listed impairments/functional limitations; receive patient/caregiver education; reduce fall risk; and maximize independency/safety with functional mobility.    Recent Surgery: Procedure(s) (LRB):  ANGIOGRAM-ABDOMINAL (N/A) 1 Day Post-Op    Plan     During this hospitalization, patient to be seen 3 x/week to address the identified impairments/functional limitations via gait training, therapeutic activities, therapeutic exercises and progress toward the established goals.    Plan of Care Expires:  02/08/24    Subjective     Communicated with patient's nurse Milly prior to session.    Patient agreeable to participate in treatment session.    Chief Complaint: edema in abdomen and legs  Patient/Family Comments/goals: get better and go home  Pain/Comfort:  Pain Rating 1: 2/10  Location 1: abdomen  Pain Addressed 1: Nurse notified  Pain Rating Post-Intervention 1: 2/10    Objective     Patient found supine in bed and with HOB elevated with    upon PT entry to room.    General Precautions: Standard, fall, droplet   Orthopedic Precautions:N/A   Braces:    Respiratory Status: room air    Functional  Mobility:    Bed Mobility:  Supine to Sit: supervision    Transfers:  Sit to Stand: contact guard assistance with no assistive device    Gait:  Patient ambulated 400 ft with no assistive device and contact guard assistance.  Patient demonstrates :       unsteady gait.    Other Mobility:  not assessed    Patient left supine in bed and with HOB elevated with all lines intact, call button in reach, tray table at bedside, and patient' nurse notified.    Goals     Multidisciplinary Problems       Physical Therapy Goals          Problem: Physical Therapy    Goal Priority Disciplines Outcome Goal Variances Interventions   Physical Therapy Goal     PT, PT/OT Ongoing, Progressing     Description: Goals to be met by: dc     Patient will increase functional independence with mobility by performin. Supine to sit with Modified [place] met 1/10/2024    2. Sit to stand transfer with Modified [place]  3. Gait  x 130 feet with Supervision using Rolling Walker.   Reviewed 2024                         Time Tracking     PT Received On: 24  PT Start Time: 931     PT Stop Time: 949  PT Total Time (min): 18 min     Billable Minutes: Gait Training 18    2024

## 2024-01-17 NOTE — PROGRESS NOTES
[facility] Surgery   Progress Note  Admit Date: 1/1/2024  HD#16  POD#1 Day Post-Op    Subjective:   Interval history:  NAEON. AF. VSS  No abdominal pain this AM.  R groin angio site clear     Objective:     VITAL SIGNS: 24 HR MIN & MAX LAST   Temp  Min: 94.7 °F (34.8 °C)  Max: 98 °F (36.7 °C)  97.6 °F (36.4 °C)   BP  Min: 97/66  Max: 127/83  124/83    Pulse  Min: 95  Max: 117  97    Resp  Min: 16  Max: 28  (!) 24    SpO2  Min: 96 %  Max: 100 %  98 %      Intake/output:  None recorded     Lines/drains/airway:  Midline  PIV    Physical examination:  GENERAL: NAD, resting comfortably in bed  NEURO: awake, alert, oriented x3  HEENT: Atraumatic  CARDIO: regular rate, regular rhythm on palpation  CHEST: Non-labored breathing, good resp effort, CTAB  ABD:  Soft, mildly distended, Nontender; R groin site without hematoma     Labs:  WBC 13.52  Hgb 8.9  Na 127  K 4.5  BUN 28.4  Cr 1.07  Phos 5.5  Mg 2.5      ALT 80  Lactate 4.9    Imaging:  None new    Assessment & Plan:   48 y.o. male with cirrhosis found to have bleeding intrahepatic mass. Abdominal pain improved     -R groin angio site clear, no evidence of hematoma or aneurysm  -rest of care per primary  -general surgery signing off, please contact for any questions    Dayanara Balderas PGY1  [facility] General Surgery  01/17/2024    7:04 AM

## 2024-01-18 LAB
ABO + RH BLD: NORMAL
ALBUMIN FLD-MCNC: 0.5 GM/DL
ALBUMIN SERPL-MCNC: 1.9 G/DL (ref 3.5–5)
ALBUMIN/GLOB SERPL: 0.5 RATIO (ref 1.1–2)
ALP SERPL-CCNC: 213 UNIT/L (ref 40–150)
ALT SERPL-CCNC: 93 UNIT/L (ref 0–55)
AMYLASE FLD-CCNC: 78 U/L
ANION GAP SERPL CALC-SCNC: 5 MEQ/L
AST SERPL-CCNC: 116 UNIT/L (ref 5–34)
BASOPHILS # BLD AUTO: 0.01 X10(3)/MCL
BASOPHILS NFR BLD AUTO: 0.1 %
BILIRUB SERPL-MCNC: 7.4 MG/DL
BLD PROD TYP BPU: NORMAL
BLOOD UNIT EXPIRATION DATE: NORMAL
BLOOD UNIT TYPE CODE: 5100
BLOOD UNIT TYPE CODE: 5100
BLOOD UNIT TYPE CODE: 6200
BUN SERPL-MCNC: 17.4 MG/DL (ref 8.9–20.6)
BUN SERPL-MCNC: 21.6 MG/DL (ref 8.9–20.6)
CALCIUM SERPL-MCNC: 7.2 MG/DL (ref 8.4–10.2)
CALCIUM SERPL-MCNC: 7.3 MG/DL (ref 8.4–10.2)
CHLORIDE SERPL-SCNC: 96 MMOL/L (ref 98–107)
CHLORIDE SERPL-SCNC: 97 MMOL/L (ref 98–107)
CO2 SERPL-SCNC: 21 MMOL/L (ref 22–29)
CO2 SERPL-SCNC: 21 MMOL/L (ref 22–29)
CREAT FLD-MCNC: 1.13 MG/DL
CREAT SERPL-MCNC: 0.75 MG/DL (ref 0.73–1.18)
CREAT SERPL-MCNC: 0.77 MG/DL (ref 0.73–1.18)
CREAT/UREA NIT SERPL: 23
CROSSMATCH INTERPRETATION: NORMAL
DISPENSE STATUS: NORMAL
EOSINOPHIL # BLD AUTO: 0.25 X10(3)/MCL (ref 0–0.9)
EOSINOPHIL NFR BLD AUTO: 2.1 %
ERYTHROCYTE [DISTWIDTH] IN BLOOD BY AUTOMATED COUNT: 18.5 % (ref 11.5–17)
GFR SERPLBLD CREATININE-BSD FMLA CKD-EPI: >60 MLS/MIN/1.73/M2
GFR SERPLBLD CREATININE-BSD FMLA CKD-EPI: >60 MLS/MIN/1.73/M2
GLOBULIN SER-MCNC: 3.8 GM/DL (ref 2.4–3.5)
GLUCOSE SERPL-MCNC: 119 MG/DL (ref 74–100)
GLUCOSE SERPL-MCNC: 124 MG/DL (ref 74–100)
GROUP & RH: NORMAL
HCT VFR BLD AUTO: 26.4 % (ref 42–52)
HGB BLD-MCNC: 9.1 G/DL (ref 14–18)
HOLD SPECIMEN: NORMAL
IMM GRANULOCYTES # BLD AUTO: 0.15 X10(3)/MCL (ref 0–0.04)
IMM GRANULOCYTES NFR BLD AUTO: 1.3 %
INR PPP: 2
LACTATE SERPL-SCNC: 2.1 MMOL/L (ref 0.5–2.2)
LYMPHOCYTES # BLD AUTO: 1.1 X10(3)/MCL (ref 0.6–4.6)
LYMPHOCYTES NFR BLD AUTO: 9.4 %
MAGNESIUM SERPL-MCNC: 2 MG/DL (ref 1.6–2.6)
MCH RBC QN AUTO: 30.6 PG (ref 27–31)
MCHC RBC AUTO-ENTMCNC: 34.5 G/DL (ref 33–36)
MCV RBC AUTO: 88.9 FL (ref 80–94)
MONOCYTES # BLD AUTO: 1.79 X10(3)/MCL (ref 0.1–1.3)
MONOCYTES NFR BLD AUTO: 15.3 %
NEUTROPHILS # BLD AUTO: 8.41 X10(3)/MCL (ref 2.1–9.2)
NEUTROPHILS NFR BLD AUTO: 71.8 %
NRBC BLD AUTO-RTO: 0.3 %
PHOSPHATE SERPL-MCNC: 2.7 MG/DL (ref 2.3–4.7)
PLATELET # BLD AUTO: 42 X10(3)/MCL (ref 130–400)
PLATELETS.RETICULATED NFR BLD AUTO: 6.7 % (ref 0.9–11.2)
PMV BLD AUTO: 10.9 FL (ref 7.4–10.4)
POCT GLUCOSE: 128 MG/DL (ref 70–110)
POCT GLUCOSE: 134 MG/DL (ref 70–110)
POCT GLUCOSE: 163 MG/DL (ref 70–110)
POTASSIUM SERPL-SCNC: 3.9 MMOL/L (ref 3.5–5.1)
POTASSIUM SERPL-SCNC: 4.1 MMOL/L (ref 3.5–5.1)
PROT SERPL-MCNC: 5.7 GM/DL (ref 6.4–8.3)
PROTHROMBIN TIME: 21.9 SECONDS (ref 11.4–14)
RBC # BLD AUTO: 2.97 X10(6)/MCL (ref 4.7–6.1)
SODIUM SERPL-SCNC: 123 MMOL/L (ref 136–145)
SODIUM SERPL-SCNC: 124 MMOL/L (ref 136–145)
TOTAL BILIRUBIN, BODY FLUID (IN HOUSE): 1.7 UG/DL
UNIT NUMBER: NORMAL
WBC # SPEC AUTO: 11.71 X10(3)/MCL (ref 4.5–11.5)

## 2024-01-18 PROCEDURE — 85025 COMPLETE CBC W/AUTO DIFF WBC: CPT | Performed by: STUDENT IN AN ORGANIZED HEALTH CARE EDUCATION/TRAINING PROGRAM

## 2024-01-18 PROCEDURE — 94761 N-INVAS EAR/PLS OXIMETRY MLT: CPT

## 2024-01-18 PROCEDURE — 92523 SPEECH SOUND LANG COMPREHEN: CPT

## 2024-01-18 PROCEDURE — 97530 THERAPEUTIC ACTIVITIES: CPT

## 2024-01-18 PROCEDURE — P9016 RBC LEUKOCYTES REDUCED: HCPCS | Performed by: STUDENT IN AN ORGANIZED HEALTH CARE EDUCATION/TRAINING PROGRAM

## 2024-01-18 PROCEDURE — 85610 PROTHROMBIN TIME: CPT | Performed by: STUDENT IN AN ORGANIZED HEALTH CARE EDUCATION/TRAINING PROGRAM

## 2024-01-18 PROCEDURE — 83605 ASSAY OF LACTIC ACID: CPT | Performed by: STUDENT IN AN ORGANIZED HEALTH CARE EDUCATION/TRAINING PROGRAM

## 2024-01-18 PROCEDURE — 83735 ASSAY OF MAGNESIUM: CPT | Performed by: STUDENT IN AN ORGANIZED HEALTH CARE EDUCATION/TRAINING PROGRAM

## 2024-01-18 PROCEDURE — 80053 COMPREHEN METABOLIC PANEL: CPT | Performed by: STUDENT IN AN ORGANIZED HEALTH CARE EDUCATION/TRAINING PROGRAM

## 2024-01-18 PROCEDURE — 63600175 PHARM REV CODE 636 W HCPCS: Performed by: STUDENT IN AN ORGANIZED HEALTH CARE EDUCATION/TRAINING PROGRAM

## 2024-01-18 PROCEDURE — 25000003 PHARM REV CODE 250: Performed by: SPECIALIST

## 2024-01-18 PROCEDURE — 25000003 PHARM REV CODE 250

## 2024-01-18 PROCEDURE — 25000003 PHARM REV CODE 250: Performed by: INTERNAL MEDICINE

## 2024-01-18 PROCEDURE — 97535 SELF CARE MNGMENT TRAINING: CPT

## 2024-01-18 PROCEDURE — 86901 BLOOD TYPING SEROLOGIC RH(D): CPT | Performed by: INTERNAL MEDICINE

## 2024-01-18 PROCEDURE — 21400001 HC TELEMETRY ROOM

## 2024-01-18 PROCEDURE — 84100 ASSAY OF PHOSPHORUS: CPT | Performed by: STUDENT IN AN ORGANIZED HEALTH CARE EDUCATION/TRAINING PROGRAM

## 2024-01-18 PROCEDURE — 25000003 PHARM REV CODE 250: Performed by: STUDENT IN AN ORGANIZED HEALTH CARE EDUCATION/TRAINING PROGRAM

## 2024-01-18 PROCEDURE — P9035 PLATELET PHERES LEUKOREDUCED: HCPCS | Performed by: STUDENT IN AN ORGANIZED HEALTH CARE EDUCATION/TRAINING PROGRAM

## 2024-01-18 PROCEDURE — 36430 TRANSFUSION BLD/BLD COMPNT: CPT

## 2024-01-18 PROCEDURE — A4216 STERILE WATER/SALINE, 10 ML: HCPCS

## 2024-01-18 RX ORDER — FUROSEMIDE 20 MG/1
40 TABLET ORAL DAILY
Status: DISCONTINUED | OUTPATIENT
Start: 2024-01-18 | End: 2024-01-19

## 2024-01-18 RX ORDER — HYDROCODONE BITARTRATE AND ACETAMINOPHEN 500; 5 MG/1; MG/1
TABLET ORAL
Status: DISCONTINUED | OUTPATIENT
Start: 2024-01-18 | End: 2024-01-25 | Stop reason: HOSPADM

## 2024-01-18 RX ORDER — SPIRONOLACTONE 25 MG/1
100 TABLET ORAL DAILY
Status: DISCONTINUED | OUTPATIENT
Start: 2024-01-18 | End: 2024-01-19

## 2024-01-18 RX ADMIN — PANTOPRAZOLE SODIUM 40 MG: 40 TABLET, DELAYED RELEASE ORAL at 08:01

## 2024-01-18 RX ADMIN — SODIUM CHLORIDE, PRESERVATIVE FREE 10 ML: 5 INJECTION INTRAVENOUS at 06:01

## 2024-01-18 RX ADMIN — SPIRONOLACTONE 100 MG: 25 TABLET ORAL at 08:01

## 2024-01-18 RX ADMIN — RIFAXIMIN 550 MG: 550 TABLET ORAL at 08:01

## 2024-01-18 RX ADMIN — FUROSEMIDE 40 MG: 20 TABLET ORAL at 08:01

## 2024-01-18 RX ADMIN — Medication 100 MG: at 08:01

## 2024-01-18 RX ADMIN — ACETAMINOPHEN 650 MG: 325 TABLET, FILM COATED ORAL at 01:01

## 2024-01-18 RX ADMIN — FOLIC ACID 1 MG: 1 TABLET ORAL at 08:01

## 2024-01-18 RX ADMIN — MICONAZOLE NITRATE: 20 POWDER TOPICAL at 09:01

## 2024-01-18 RX ADMIN — SODIUM CHLORIDE, PRESERVATIVE FREE 10 ML: 5 INJECTION INTRAVENOUS at 02:01

## 2024-01-18 RX ADMIN — RIFAXIMIN 550 MG: 550 TABLET ORAL at 09:01

## 2024-01-18 RX ADMIN — PREDNISONE 10 MG: 10 TABLET ORAL at 08:01

## 2024-01-18 RX ADMIN — MICONAZOLE NITRATE: 20 POWDER TOPICAL at 08:01

## 2024-01-18 NOTE — PROGRESS NOTES
[facility] Medicine Wards Progress Note     Resident Team: [facility] Medicine List 4  Attending Physician: Kamryn Estevez MD  Resident: MD Galo  Intern: DO Ulysses       Subjective:      Brief HPI:  Franco Hein is  48 y.o. male with a PMH of ETOH misuse disorder who presented to [facility] ED on 2024 with complaint of jaundice, weakness for 3 weeks, and AMS. Patient was brought in by a friend who states patient is a daily drinker who has not been able to go to a store for the last 2 days, but patient is noted to have a positive ethanol on admission. Patient unable to provide history given his mental status and was only able to say his name and confirm he drinks alcohol but does not do drugs. Patient was tachypneic, tachycardic, and tremulous, on presentation with jaundice. Admitted to ICU given altered mental status and concern for decompensation in the setting of alcohol withdrawal and influenza A, also found to have strep pneumonia bacteremia, on appropriate IV antibiotics.      Interval History:   NAEO. Patient tolerated paracentesis yesterday, did have 4.5L of bloody ascitic fluid removed. Felt better and hemodynamically stable after procedure. H&H did fall yesterday from 8.9/25.4 to 7  7/22.8 so he was given an additional 2 units pRBC and 1 of FFP. This morning H&H was 9.1/26.4. Platelets 42, suspect pt still having slow bleed so 1 unit platelets given this morning. He denies any fever, chills, abdominal pain, chest pain, SOB this morning, no bloody or melenic stools either. Sodium continues to drop, 124 this morning from 127 but no confusion or other neurological symptoms noted.     Review of Systems:  ROS completed and negative except as indicated above.     Objective:     Last 24 Hour Vital Signs:  BP  Min: 100/58  Max: 148/79  Temp  Av.4 °F (36.9 °C)  Min: 97.5 °F (36.4 °C)  Max: 99 °F (37.2 °C)  Pulse  Av  Min: 86  Max: 118  Resp  Av  Min: 18  Max: 18  SpO2  Av.9 %  Min: 94 %   Max: 97 %  Weight  Av kg (171 lb 15.3 oz)  Min: 78 kg (171 lb 15.3 oz)  Max: 78 kg (171 lb 15.3 oz)  I/O last 3 completed shifts:  In: 1639.6 [I.V.:834.2; Blood:680; IV Piggyback:125.4]  Out: 1651 [Urine:1650; Stool:1]    Physical Examination:  General: ill-appearing though non-toxic, in no acute distress  Eye: PERRLA, EOMI, scleral icterus noted  HENT: NC/AT, moist mucus membranes  Neck: full range of motion, no thyromegaly or lymphadenopathy  Respiratory: Clear and equal bilateral BS, satting well on RA  Cardiovascular: regular rate and rhythm without murmurs, gallops or rubs  Gastrointestinal: soft, non-tender, mildly-distended with normal bowel sounds, without masses to palpation  Musculoskeletal: no obvious deformities, full range of motion of all extremities/spine without limitation or discomfort  Integumentary: Jaundice, spider angiomas present on upper chest and abdomen, no caput medusa  Extremities: radial and DP pulses 2+ bilaterally, 2+ BLE edema  Neurologic: Pt AAOx3, following commands and conversing appropriately. No focal neuro deficits appreciated, CN II-XII grossly intact.       Laboratory:  Most Recent Data:  CBC:   Lab Results   Component Value Date    WBC 11.71 (H) 2024    HGB 9.1 (L) 2024    HCT 26.4 (L) 2024    PLT 42 (L) 2024    MCV 88.9 2024    RDW 18.5 (H) 2024     WBC Differential:   Recent Labs   Lab 24  0529 01/15/24  0629 24  0659 24  2139 24  0332 24  1616 24  0517   WBC 12.55* 12.56* 13.68*  --  13.52*  --  11.71*   HGB 8.7* 8.7* 7.5* 8.7* 8.9* 7.7* 9.1*   HCT 26.9* 25.8* 23.3* 26.2* 25.4* 22.8* 26.4*   PLT 69* 80* 99*  --  67*  --  42*   MCV 93.1 93.1 95.1*  --  92.4  --  88.9       BMP:   Lab Results   Component Value Date     (L) 2024    K 3.9 2024    CO2 21 (L) 2024    BUN 21.6 (H) 2024    CREATININE 0.77 2024    CALCIUM 7.3 (L) 2024    MG 2.00 2024    PHOS  "2.7 01/18/2024     LFTs:   Lab Results   Component Value Date    ALBUMIN 1.9 (L) 01/18/2024    BILITOT 7.4 (H) 01/18/2024     (H) 01/18/2024    ALKPHOS 213 (H) 01/18/2024    ALT 93 (H) 01/18/2024     Coags:   Lab Results   Component Value Date    INR 1.9 (H) 01/17/2024    PROTIME 21.4 (H) 01/17/2024    PTT 38.6 (H) 01/01/2024     FLP: No results found for: "CHOL", "HDL", "LDLCALC", "TRIG", "CHOLHDL"  DM:   Lab Results   Component Value Date    CREATININE 0.77 01/18/2024     Thyroid: No results found for: "TSH", "FREET4", "C8TZXEK", "Q8SOTAN", "THYROIDAB"  Anemia:   Lab Results   Component Value Date    IRON 39 (L) 01/01/2024    TIBC 203 (L) 01/01/2024    FERRITIN 116.00 01/01/2024    RPLUFGUQ74 >2,000 (H) 01/01/2024    FOLATE 18.3 01/01/2024     Cardiac:   Lab Results   Component Value Date    TROPONINI 0.029 01/01/2024    BNP 89.3 01/01/2024       Microbiology Data Reviewed: yes  Pertinent Findings:  1/1/24: bld cx +strep pneumonia pan sensitive x1 bottle  1/4/24: bld cx x2 negative    Other Results:  TTE 1/4/24: EF 62%, no evidence of vegetation      Radiology:  Imaging Results              CT Chest Abdomen Pelvis Without Contrast (XPD) (Final result)  Result time 01/02/24 09:44:01      Final result by Tika Mcknight MD (01/02/24 09:44:01)                   Impression:      1. Multilobar consolidative opacities within the lungs  2. Cirrhotic morphology of the liver with mass at the dome of the liver.  Evaluation is moderately limited without contrast and due to beam hardening artifact related to patient positioning.  Recommend liver protocol CT abdomen without and with contrast.  3. Ascites  4. Indeterminate right adrenal nodule.  Continued attention recommended on follow-up exams.  5. The preliminary and final reports are concordant.      Electronically signed by: Tika Mcknight  Date:    01/02/2024  Time:    09:44               Narrative:    EXAMINATION:  CT CHEST ABDOMEN PELVIS WITHOUT " CONTRAST(XPD)    CLINICAL HISTORY:  Suspect pneumonia, biliary obstruction;    TECHNIQUE:  Helical acquisition through the chest, abdomen and pelvis without  IV administration of contrast. Axial, sagittal and coronal reformats interpreted.    Automated tube current modulation, weight-based exposure dosing, and/or iterative reconstruction technique utilized to reach lowest reasonably achievable exposure rate.    DLP: 1042 mGy*cm    COMPARISON:  Chest radiograph 01/01/2024    FINDINGS:  BASE OF NECK: No significant abnormality.    HEART: Normal size. There are coronary artery calcifications.  Low-density blood pool with visualization of the interventricular septum compatible with anemia.    THORACIC VASCULATURE: Thoracic aorta is unremarkable.    SIRISHA/MEDIASTINUM: Small nonspecific mediastinal lymph nodes.  Evaluation of hilar lymphadenopathy is limited without contrast.    AIRWAYS: Patent.    LUNGS/PLEURA: Multilobar consolidative opacities within the lungs.    THORACIC SOFT TISSUES: Unremarkable.    LIVER: Limited evaluation the liver due to lack of intravenous contrast and placement of the patient's arms over the abdomen which causes beam hardening artifact.  Heterogeneous attenuation of the liver.  There appears to be a mass at the dome of the liver.  Caudate lobe hypertrophy and changes of cirrhosis.    BILIARY: The gallbladder does not appear to be overly distended.    PANCREAS: Nonspecific retroperitoneal edema.  Unable to assess for changes of pancreatitis given generalized fluid overload.    SPLEEN: Upper limits of normal in size.    ADRENALS: There is a 1.6 cm right adrenal nodule.  Unable to accurately measure internal attenuation due to beam hardening artifact.    KIDNEYS/URETERS: Hyperdense renal pyramids.  This can be related to volume status/dehydration or medullary calcinosis.    GI TRACT/MESENTERY: Evaluation of the bowel is limited without contrast. Bowel is normal in caliber without evidence of  obstruction.    PERITONEUM: Small volume ascites.No free air.    LYMPH NODES: No enlarged lymph nodes by size criteria.    ABDOMINOPELVIC VASCULATURE: Normal caliber abdominal aorta.  Large periumbilical vein.    BLADDER: Normal appearance given degree of distention.    REPRODUCTIVE ORGANS: Normal as visualized.    ABDOMINAL WALL: Small fat containing right inguinal hernia.  Mild body wall edema.    BONES: Anterolisthesis at the lumbosacral junction.                        Preliminary result by Santana Lerma MD (01/01/24 22:56:45)                   Impression:    1. No intrahepatic or extrahepatic biliary duct dilatation is seen.  2. Note of subtle increased attenuation in the medullary portions of the bilateral kidneys which may reflect medullary calcinosis. Correlate with clinical and laboratory findings as regards additional evaluation and follow-up.  3. There is multifocal patchy dense consolidation of both the lungs with predominant involvement of the left upper and right lower lobes. This is consistent with multilobar pneumonia. Correlate with clinical and laboratory findings as regards additional evaluation and follow-up to full resolution.  4. There is a 2.8 x 2 cm soft tissue density nodule in the right adrenal gland (series 2 image 98). This is of indeterminate etiology.  5. The margins of the liver appears somewhat nodular and irregular. This is suggestive of cirrhosis. Correlate with clinical and laboratory findings.  6. Details and other findings as discussed above.               Narrative:    START OF REPORT:  Technique: CT Scan of the chest abdomen and pelvis was performed without intravenous contrast with axial as well as sagittal and, coronal images.    Dosage Information: Automated Exposure Control was utilized 1041.63 mGy.cm.    Comparison: None.    Clinical History: Suspect pneumonia, biliary obstruction.    Findings:  Mediastinum: A few scattered subcentimeter mediastinal lymph nodes are  seen.  Heart: The heart size is within normal limits.  Aorta: Unremarkable appearing aorta.  Pulmonary Arteries: Unremarkable.  Lungs: There is multifocal patchy dense consolidation of both the lungs with predominant involvement of the left upper and right lower lobes.  Pleura: No effusions or pneumothorax are identified.  Abdomen:  Abdominal Wall: There is extensive stranding of the subcutaneous fat as well as the intraabdominal and intrapelvic fat consistent with anasarca edema. Correlate with clinical and laboratory findings.  Liver: The margins of the liver appears somewhat nodular and irregular. There are also prominent portosystemic collaterals consistent with portal hypertension.  Biliary System: No intrahepatic or extrahepatic biliary duct dilatation is seen.  Gallbladder: The gallbladder is not definitely identified on this noncontrast examination and may be contracted.  Pancreas: The pancreas appears unremarkable.  Spleen: The spleen appears unremarkable.  Adrenals: There is a 2.8 x 2 cm soft tissue density nodule in the right adrenal gland (series 2 image 98).  Kidneys: Note of subtle increased attenuation in the medullary portions of the bilateral kidneys which may reflect medullary calcinosis.  Aorta: The abdominal aorta appears unremarkable.  IVC: Unremarkable.  Bowel:  Esophagus: There is a small hiatal hernia.  Stomach: The stomach appears unremarkable.  Duodenum: Unremarkable appearing duodenum.  Small Bowel: The small bowel appears unremarkable.  Colon: Nondistended.  Appendix: The appendix is not identified but no inflammatory changes are seen in the right lower quadrant to suggest appendicitis.  Peritoneum: There is mild ascites. No intraperitoneal free gas is seen.    Pelvis:  Bladder: The bladder appears unremarkable.  Male:  Prostate gland: The prostate gland appears unremarkable.    Bony structures:  Dorsal Spine: There is mild to moderate spondylosis of the visualized dorsal spine.  Bony  Pelvis: The visualized bony structures of the pelvis appear unremarkable.                                         X-Ray Chest 1 View (Final result)  Result time 01/01/24 14:23:11   Procedure changed from X-Ray Chest PA And Lateral     Final result by Josef Sow MD (01/01/24 14:23:11)                   Impression:      Bilateral patchy airspace opacities greatest on the left.  Findings concerning for atypical infectious process.      Electronically signed by: Josef Sow  Date:    01/01/2024  Time:    14:23               Narrative:    EXAMINATION:  XR CHEST 1 VIEW    CLINICAL HISTORY:  cough;  Cough, unspecified    TECHNIQUE:  Single view of the chest    COMPARISON:  No prior imaging available for comparison.    FINDINGS:  Bilateral patchy airspace opacities greatest on the left.    The cardiomediastinal silhouette is within normal limits.    No acute osseous abnormality.                                       RADIOLOGY REPORT (Final result)  Result time 01/04/24 11:39:28      Final result by Unknown User (01/04/24 11:39:28)                                        Current Medications:     Infusions:       Scheduled:   folic acid  1 mg Oral Daily    furosemide  40 mg Oral Daily    LIDOcaine HCL 10 mg/ml (1%)  1 mL Other Once    miconazole NITRATE 2 %   Topical (Top) BID    pantoprazole  40 mg Oral Daily    predniSONE  10 mg Oral Daily    rifAXIMin  550 mg Oral BID    sodium chloride 0.9%  10 mL Intravenous Q6H    spironolactone  100 mg Oral Daily    thiamine  100 mg Oral Daily        PRN:  0.9%  NaCl infusion (for blood administration), 0.9%  NaCl infusion (for blood administration), 0.9%  NaCl infusion (for blood administration), 0.9%  NaCl infusion (for blood administration), 0.9%  NaCl infusion (for blood administration), 0.9%  NaCl infusion (for blood administration), sodium chloride 0.9%, acetaminophen, acetaminophen, benzonatate, dextrose 10%, dextrose 10%, glucagon (human recombinant), glucose,  glucose, HYDROcodone-acetaminophen, hydrOXYzine pamoate, lactulose, sodium chloride 0.9%, Flushing PICC/Midline Protocol **AND** sodium chloride 0.9% **AND** sodium chloride 0.9%    Antibiotics and Day Number of Therapy:  Vanc x3 days  Zosyn/Azithromycin x4 days  Rocephin 2g on D12/14     Lines and Day Number of Therapy:  PIV      Assessment & Plan:     Decompensated Cirrhosis  Hypervolemic Hyponotremia  Liver lesion likely HCC       - suspected etiology ETOH. Pt reportedly had hx of heavy drinking per friend who dropped him off       - Positive hepatitis C antibody; VL negative, no active infection        - MELD-Na: 28->25-> 22--> 29; Child Hoang: 13-> 11--> 11 today, class C, Maddrey: 204 on admission       - Coagulopathy improving; INR 2.4 1/9/23       - Improvement in encephalopathy; hold lactulose for now as pt having diarrhea       - Continue Rifaxamin for hepatic encephalopathy       - Prednisone taper given Antonidrey; 40 mg x1 week then 20 x1 week, then 10, then 5, then stop after 28 days. Currently on D2 of 10 mg dosing       - FeNA 1%, Nina < 20; lower extremity edema; likely hypervolemic hyponotremia       - Continue Lasix 40 mg IV while inpatient with aldactone 100 mg daily       - Hyponatermic Na 124       - Continue Lasix 40/Spironolactone 100 daily       - 4.5 L bloody ascitic fluid removed on 1/17 for therapeutic/diagnostic para, studies pending       - Pt decompensating, will consult GI for assistance    Intrahepatic bleed likely 2/2 tumor       - Pt had slow intrahepatic bleed likely over the past several days as complication from HCC       - Labs in AM showed H&H had continued to drop slowly, lactic acid elevated       - CTA abd/pelvis showed intrahepatic bleeding with lesion in right lobe of liver suspicious for HCC, capsular rupture of liver and some bleeding and contrast noted with ascites       - Taken by [facility] surgery for gelfoam embolization which was successful, appreciate assistance       - Pt  transfused 2 unit pRBC and 1 FFP given INR 2.2; H&H dropped further requiring additional 2 units pRBC, 1 FFP and 1 platelet       - Continue to trend q12h       - Will continue to monitor for symptoms       - Platelets < 50K continue to hold lovenox, use SCDs for DVT ppx    Pneumonia 2/2 Influenza A-resolved       - Positive on 1/1/24; initially ICU with BIPAP for respiratory failure but stable on floor since 1/9/24       - Possible superimposed Strep PNA leading to bacteremia and pneumonia; completed IV abx regimen    Strep Pneumoniae Bacteremia-resolved       - Blood culture positive x1/2 Streptococcus; [employer] confirmation strep pneumonia bacteremia which is pan-sensitive, resp culture /Gram stain collection pending.       - Repeat blood cultures 1/4/24  negative; TTE did not have any findings consistent with endocarditis.        - s/p Zosyn and Zithromax x4 days; MRSA PCR negative, discontinued vancomycin after D3.        - Completed 2 week regimen Ceftriaxone 2g on 1/17/24    Skin Breakdown to L buttocks-improving  Pruritic, Erythematous Rash to groin       - Skin tear vs Stage II pressure ulcer       - Wound care consulted, appreciate recs       - Continue q2h turning and mepilex dsg        - Pt now with enteral nutrition, hopeful to aid in healing       - Rash appears fungal in nature, will start miconazole powder to groin BID    Malnutrition  Hyponatremia       - Soft mechanical diet ordered, pt working with SLT       - Diet per dietary recs       - Likely 2/2 cirrhosis; FeNA 1% indicated prerenal       - Na 131 this morning, given volume overload noted with lower extremity edema will stop salt tabs, likely hypervolemic hypernatremia. IV lasix 40 mg x 1 dose and will start lasix/aldactone at 20/50 mg dosing daily    CODE STATUS: Full  Access: PIV  Antibiotics: Ceftriaxone; completed 1/17/24  Diet: Soft mechanical, low sodium  DVT Prophylaxis: SCDs  GI Prophylaxis: Protonix  Fluids: n/a      Disposition:  Patient completed 2 week Rocephin course for strep pneumoniae bacteremia completing today. Self pay, likely undocumented and will need to stay for duration. Now having complications from bleeding liver lesion suspicious for HCC and worsening labs with decompensated cirrhosis. Consulting GI for assistance, will need to have goals of care discussion with patient if he continues to decompensate.      Sagrario Rosenthal MD  [id number]

## 2024-01-18 NOTE — PT/OT/SLP EVAL
"[facility]  SLP COGNITIVE-LINGUISTIC EVALUATION   Initial           Name: Franco Hein   MRN: [id number]    Therapy Diagnosis:     Physician:   Sagrario Rosenthal MD    Date of Evaluation:  01/18/2024    Speech Start Time:  1350  Speech Stop Time:  1415     Speech Total Time (min):  25 min    Billable Minutes: Eval Cognitive 25     Procedure Min.   Cognitive-linguistic Evaluation  25         Pain:   0/10  Pain Location / Description: no pain reported    Recommendations:    Services: Skilled SLP services recommended   Type of Treatment: Individual   Frequency of Treatment:  2 time(s)/week   Length of Session:  15-30 minutes   Additional SLP Assessments:     Additional Consults:    Next Level of Care Recommendations:  Inpatient rehab     Isolation: No active isolations      HISTORY & PHYSICAL:      Past Medical History:     Active Ambulatory Problems     Diagnosis Date Noted    No Active Ambulatory Problems     Resolved Ambulatory Problems     Diagnosis Date Noted    No Resolved Ambulatory Problems     No Additional Past Medical History        Past Surgical History:    Past Surgical History:   Procedure Laterality Date    ANGIOGRAPHY OF ABDOMEN N/A 1/16/2024    Procedure: ANGIOGRAM-ABDOMINAL;  Surgeon: Uriah Kelly MD;  Location: [facility] CATH LAB;  Service: Peripheral Vascular;  Laterality: N/A;       Per medical record:   "Patient tolerated paracentesis yesterday, did have 4.5L of bloody ascitic fluid removed. Felt better and hemodynamically stable after procedure. H&H did fall yesterday from 8.9/25.4 to 7  7/22.8 so he was given an additional 2 units pRBC and 1 of FFP. This morning H&H was 9.1/26.4. Platelets 42, suspect pt still having slow bleed so 1 unit platelets given this morning. He denies any fever, chills, abdominal pain, chest pain, SOB this morning, no bloody or melenic stools either. Sodium continues to drop, 124 this morning from 127 but no confusion or other " "neurological symptoms noted."      Education & Employment History: Per patient's report, he attended high school however did not graduate. 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     Social History: Lives with friends.          GENERAL INFORMATION:     Hearing: intact bilaterally  Oxygenation:  room air  Behavior:friendly and cooperative  Level of Consciousness: alert  Orientation Level: Ox3   : Florencia [id number]         IMPRESSION:    Patient presented with the [facility]MS ([employer] Mental Status) examination to assess cognitive skills. This assessment serves as a screener to identify deficits of the following areas: orientation, memory, attention, visuospatial and executive functioning skills. Patient's overall score is 9/30 which falls in the severe category. Patient will benefit from continued skilled ST services and further assessments.            PROGNOSIS:     Fair          RECOMMENDATIONS:     Communciation Strategies:      Patient-oriented Strategies: , error awareness    Listener-oriented Strategies: repeat important information and provide in writing, extra processing time          GOALS:     Long Term Goals:     Patient will exhibit adequate memory recall skills with 90% accuracy to maintain independent status.   Patient will demonstrate 100% safety awareness skills during ADLs in current environment.    Short Term Goals:      Patient will complete memory recall tasks with 80% accuracy given min cues in opportunities given.  Patient will complete sequencing tasks with 80% accuracy given min cues  Patient will demonstrate adequate attention skills requiring <3 verbal cues for redirection during functional task.          Patient/Family Goals:        COGNITIVE-LINGUISTIC ASSESSMENT:     Attention:     Alternating: min-mod   Divided: min   Selective: min   Sustained: min   Processing Speed: min-mod      Pragmatics:     min     Memory:     Daily Routines: DNT    Short-term " Memory: mod   Long-term Memory: mod   Working Memory: mod   Prospective Memory: mod     Sequencing:     Min-mod     Problem Solving:     Simple Functional Tasks: min   Complex Functional Tasks: mod   Managing Finances: DNT   Managing Medications: DNT   Performing Discharge Planning: DNT     Reasoning:     Verbal:min   Time: min   Simple Calculations: DNT   Complex Calculations: DNT   Money Management: DNT     Executive Function:     Insight/Awareness: mod   Self-monitoring: min   Mental Flexibility: min-mod   Organization/Planning: min-mod          *If this is the last documented treatment note, then it will signify discharge from acute care prior to discharge from speech services and will serve as the discharge summary.*          EDUCATION:      Patient, RN (Eyal),were educated on the results and recommendations of this evaluation. All expressed understanding. Patient will benefit from ongoing education.           Kain Barlow M.S. CCC-SLP  [facility]

## 2024-01-18 NOTE — PT/OT/SLP PROGRESS
Occupational Therapy   Treatment    Name: Franco Hein  MRN: [id number]  Admitting Diagnosis:  Delirium tremens   Delirium tremens, alcoholic cirrhosis with bleeding intrahepatic mass s/p angiogram with embolization of R hepatic artery (2 days post-op)    Recommendations:     Discharge Recommendations: High Intensity Therapy  Discharge Equipment Recommendations:  walker, rolling  Barriers to discharge:  Decreased caregiver support    Assessment:     Franco Hein is a 48 y.o. male with a medical diagnosis of   Patient Active Problem List   Diagnosis    Delirium tremens    Influenza A     .  He presents with improvement in functional mobility, still limited by mild cognitive deficits, and with increased BLE edema (nurse aware). Performance deficits affecting function are impaired endurance, impaired self care skills, impaired functional mobility, impaired balance, edema, impaired cognition.     Rehab Prognosis:  Good; patient would benefit from acute skilled OT services to address these deficits and reach maximum level of function.       Plan:     Patient to be seen 3 x/week to address the above listed problems via self-care/home management, therapeutic activities, therapeutic exercises  Plan of Care Expires:  (DC)  Plan of Care Reviewed with: patient    Subjective     Chief Complaint: BLE edema and pain at bilateral lower abdomen  Patient/Family Comments/goals: none stated  Pain/Comfort:  Pain Rating 1: 5/10  Location - Side 1: Bilateral  Location - Orientation 1: lower  Location 1: abdomen  Pain Addressed 1: Reposition, Distraction, Nurse notified  Pain Rating Post-Intervention 1: 5/10  Pain Rating 2: 5/10  Location - Side 2: Bilateral  Location - Orientation 2: lower  Location 2:  (legs/feet)  Pain Addressed 2: Reposition, Distraction, Nurse notified  Pain Rating Post-Intervention 2: 5/10    Objective:     Communicated with: nurse Birhc prior to session.  Patient found supine with telemetry upon OT entry to  room.   Florencia performed translation throughout treatment session (did not obtain her ID number).    General Precautions: Standard, fall    Orthopedic Precautions:N/A  Braces: N/A  Respiratory Status: Room air     Occupational Performance:     Bed Mobility:    Patient completed Supine to Sit with modified independence  Patient completed Sit to Supine with stand by assistance for cues to reposition BLE d/t edema    Functional Mobility/Transfers:  Patient completed Sit <> Stand Transfer with stand by assistance  with  rolling walker   Functional Mobility: CGA to ambulate 130 ft in jones using RW, with cues to slow pace and to shorten distance between himself and RW    Activities of Daily Living:  Grooming: stand by assistance standing at sink to brush teeth and wash face with verbal cues to initiate  Upper Body Dressing: stand by assistance to help manage lines, seated EOB    Treatment & Education:  Pt sat EOB for BUE reaching activities, following cues for reaching from waist to o/h level with alternating UE, with no loss of balance and good AROM bilaterally with no c/o increased abdominal pain with reaching.  Pt. educated on updated OT goals, POC, orientation to environment, and continued use of call bell for assist with transfers OOB or for any other needs due to fall risk.  Pt verbalized understanding.      Patient left HOB elevated with all lines intact, call button in reach, nurse notified, and BLE elevated.    GOALS:   Multidisciplinary Problems       Occupational Therapy Goals          Problem: Occupational Therapy    Goal Priority Disciplines Outcome Interventions   Occupational Therapy Goal     OT, PT/OT Ongoing, Progressing    Description: OT reevaluation 1/17, all goals updated    Patient will perform feeding with min A when able to tolerate PO diet. -met    Patient will perform grooming with SBA while standing at sink.-upgraded    Patient will perform toileting with min assist using  BSC.- upgraded    Patient will perform toilet transfer with max assist with use of RW.-met  Pt will complete standard toilet t/f without AD mod I.-new    Patient will perform upper body dressing with SBA while seated EOB.-upgraded   Pt will complete LE Dressing with AE PRN min A.-new                         Time Tracking:     OT Date of Treatment: 01/18/24  OT Start Time: 1358  OT Stop Time: 1429  OT Total Time (min): 31 min    Billable Minutes:Self Care/Home Management 15  Therapeutic Activity 16    OT/[facility]: OT          1/18/2024

## 2024-01-18 NOTE — CONSULTS
Gastroenterology/Hepatology Initial Consult Note    Patient Name: Franco Hein  Age: 48 y.o.  : 1975  MRN: [id number]  Admission Date: 2024    Reason for Consult:      Medical Management  Chief Complaint   Patient presents with    Delirium Tremens (DTS)     C/o tremors, juandice and weakness x3 weeks. Friend reports pt is a daily drinker. .          Self, Aaareferral    HPI:     Franco Hein is a 48 y.o. male with unknown past medical history presented to [facility] ED 2020 for the complaint of jaundice, weakness for 3 weeks, and altered mental status.   was used while interviewing patient.  Patient remembers having diarrhea 3 days before coming to hospital but does not remember being admitted.  Patient reports drinking 12 beers per day since he was 15 years old.  He recently cut down to 2 beers per day 1 month ago.  He reports hematochezia 3 days ago which has now resolved.  He has never had EGD or colonoscopy performed.  He has also never followed up with a doctor.  He denies hematemesis, lightheadedness, dizziness, chest pain, shortness of breath, nausea, or vomiting.            Significant Events  2024:  Patient admitted to ICU on BiPAP on CIWA protocol receiving folic acid and thiamine    2024: hemoglobin level dropped to 7 in patient received 2 units of PRBCs.  Repeat was hemoglobin was 9.9,.  Maddrey score of 200 was calculated and 40 mg of prednisone were started with tapering over course of 28 days.    2024: patient's blood cultures came back positive for strep pneumoniae.    2024: patient still encephalopathic  lactulose started 30 mg t.i.d.    2024: Patient transition from BiPAP 50 percent FiO2 to high-flow nasal cannula.    2024: rifaximin was added for hepatic encephalopathy as there was no improvement while on lactulose.    2024: patient downgraded to floor from ICU.  lactulose was held due to patient having profound  diarrhea requiring a rectal tube.    01/12/2024: patient was found to to be hyponatremic urine studies were obtained.    01/15/2024: Aldactone and Lasix initiated.    01/16/2024: CTA abdomen revealed solid and cystic mass lesion of right hepatic lobe with invasion of intrahepatic left portal vein.  Capsular rupture with active extravasation with moderate amount of blood products.  Indeterminate right adrenal nodule.  Patient received 2 units PRBCs and 1 unit FFP.  Patient taken to surgery with Gelfoam embolization.    01/17/2024: patient hemoglobin level 7.7 receive an additional 2 units of PRBCs and 1 unit FFP. Paracentesis performed with 4.5 L removed    01/18/2024:  Hemoglobin 9.1 platelets levels 42,000. patient received 1 unit of platelets    No, Primary Doctor    History reviewed. No pertinent past medical history.     Past Surgical History:   Procedure Laterality Date    ANGIOGRAPHY OF ABDOMEN N/A 1/16/2024    Procedure: ANGIOGRAM-ABDOMINAL;  Surgeon: Uriah Kelly MD;  Location: [facility] CATH LAB;  Service: Peripheral Vascular;  Laterality: N/A;        History reviewed. No pertinent family history.    Social History     Tobacco Use    Smoking status: Not on file    Smokeless tobacco: Not on file   Substance Use Topics    Alcohol use: Not on file         Review of patient's allergies indicates:  No Known Allergies     No medications prior to admission.         INPATIENT MEDICATIONS    Scheduled Meds:   folic acid  1 mg Oral Daily    furosemide  40 mg Oral Daily    LIDOcaine HCL 10 mg/ml (1%)  1 mL Other Once    miconazole NITRATE 2 %   Topical (Top) BID    pantoprazole  40 mg Oral Daily    rifAXIMin  550 mg Oral BID    sodium chloride 0.9%  10 mL Intravenous Q6H    spironolactone  100 mg Oral Daily    thiamine  100 mg Oral Daily     Continuous Infusions:  PRN Meds:  0.9%  NaCl infusion (for blood administration), 0.9%  NaCl infusion (for blood administration), 0.9%  NaCl infusion (for blood  administration), 0.9%  NaCl infusion (for blood administration), 0.9%  NaCl infusion (for blood administration), 0.9%  NaCl infusion (for blood administration), sodium chloride 0.9%, acetaminophen, acetaminophen, benzonatate, dextrose 10%, dextrose 10%, glucagon (human recombinant), glucose, glucose, HYDROcodone-acetaminophen, hydrOXYzine pamoate, iohexoL, lactulose, sodium chloride 0.9%, Flushing PICC/Midline Protocol **AND** sodium chloride 0.9% **AND** sodium chloride 0.9%          Review of Systems:       Review of Systems     As stated above  Objective:       VITAL SIGNS: 24 HR MIN & MAX LAST    Temp  Min: 97.5 °F (36.4 °C)  Max: 99 °F (37.2 °C)  98.2 °F (36.8 °C)        BP  Min: 100/58  Max: 148/79  125/67     Pulse  Min: 86  Max: 118  (!) 118     Resp  Min: 18  Max: 18  18    SpO2  Min: 94 %  Max: 97 %  (!) 94 %        Physical Exam   General:  no acute respiratory distress  Head: Normocephalic, atraumatic  Eyes: PERRL, EOMI, icteric sclera   CVS:  RRR, S1 and S2 normal, no murmurs, no added heart sounds, rubs, gallops, 2+ peripheral pulses  Resp:  Lungs clear to auscultation bilaterally, no wheezes, rales, or rhonci  GI:  Abdomen soft, non-tender, distended, normoactive bowel spider angiomas present on abdomen  MSK:  No muscle atrophy, cyanosis, peripheral edema, full range of motion  Skin:  Jaundiced  Neuro:  Alert and oriented x3,   Psych:  Appropriate mood and affect         LABS:      Recent Labs   Lab 01/14/24  0529 01/15/24  0629 01/16/24  0659 01/16/24  2139 01/17/24  0332 01/17/24  1616 01/18/24  0517   WBC 12.55* 12.56* 13.68*  --  13.52*  --  11.71*   HGB 8.7* 8.7* 7.5* 8.7* 8.9* 7.7* 9.1*   HCT 26.9* 25.8* 23.3* 26.2* 25.4* 22.8* 26.4*   PLT 69* 80* 99*  --  67*  --  42*   MCV 93.1 93.1 95.1*  --  92.4  --  88.9       Recent Labs   Lab 01/12/24  0410 01/13/24  0317 01/14/24  0529 01/15/24  0629 01/16/24  0659 01/16/24  2139 01/17/24  0332 01/17/24  1616 01/18/24  0517   HGB 9.7* 9.0* 8.7* 8.7*  "7.5* 8.7* 8.9* 7.7* 9.1*   HCT 29.8* 27.8* 26.9* 25.8* 23.3* 26.2* 25.4* 22.8* 26.4*        Recent Labs   Lab 01/15/24  0629 01/16/24  0659 01/17/24  0332 01/18/24  0517 01/18/24  1348   * 130* 127* 124* 123*   K 4.0 3.9 4.5 3.9 4.1   CO2 25 23 22 21* 21*   BUN 15.4 18.8 28.4* 21.6* 17.4   CREATININE 0.60* 0.74 1.07 0.77 0.75   BILITOT 5.3* 5.1* 7.5* 7.4*  --    ALKPHOS 256* 271* 190* 213*  --    AST 83* 81* 102* 116*  --    ALT 69* 71* 80* 93*  --    LABPROT 6.6 6.2* 6.0* 5.7*  --    ALBUMIN 1.4* 1.4* 1.6* 1.9*  --         Recent Labs   Lab 01/16/24  0659 01/17/24  0333 01/18/24  0852   INR 2.1* 1.9* 2.0*   PROTIME 23.2* 21.4* 21.9*        No results for input(s): "IRON", "FERRITIN" in the last 168 hours.       IMAGING:   US Abdomen Limited    Result Date: 1/17/2024  EXAMINATION: US ABDOMEN LIMITED CLINICAL HISTORY: Suspected HCC, cirrhosis; TECHNIQUE: Grayscale and color Doppler images of the abdomen are obtained. COMPARISON: CT angiogram of the abdomen and pelvis 01/16/2024. FINDINGS: The gallbladder is contracted.  The liver contour is nodular consistent with cirrhosis.  The heterogeneous mass lesion in the dome of the right lobe of the liver is again noted better visualized on yesterday's exam.  There is a moderate volume of ascites.  There is echogenic material posterior to the right lobe of the liver consistent with the blood products seen on CT.  The main portal vein is patent.  The CBD is not well visualized. The right kidney measures 11.0 by 5.6 x 5.8 cm.  There is no hydronephrosis or nephrolithiasis of the right kidney.     1. Cirrhosis with a heterogeneous infiltrative mass in the dome of the right lobe of the liver better visualized on yesterday's exam. 2. Moderate volume ascites with echogenic material posterior to the right lobe of the liver consistent with a blood products seen on yesterday's exam. Electronically signed by: Keyon Beck MD Date:    01/17/2024 Time:    10:43    Cardiac " catheterization    Result Date: 1/16/2024  Procedure performed in the Invasive Lab  - See Procedure Log link below for nursing documentation  - See OpNote on Surgeries Tab for physician findings  - See Imaging Tab for radiologist dictation    CTA Abdomen and Pelvis    Result Date: 1/16/2024  EXAMINATION: CTA ABDOMEN AND PELVIS CLINICAL HISTORY: Portal vein thrombosis;Mesenteric ischemia, acute; TECHNIQUE: Axial images of the abdomen and pelvis were obtained with and without  IV contrast administration.  Coronal and sagittal reconstructions were provided.  Three dimensional and MIP images were obtained and evaluated.  Total DLP was 5216.27 mGy-cm. Dose lowering technique and automated exposure control were utilized for this exam. COMPARISON: CT of the chest abdomen and pelvis 01/01/2024. FINDINGS: Vascular findings: The distal descending thoracic aorta is nonaneurysmal. The abdominal aorta is nonaneurysmal.  The celiac axis is widely patent.  There is a normal branching pattern of the celiac. The SMA is widely patent. The single right and 2 left renal arteries are widely patent. The ABHIJIT is widely patent.  There is no significant iliofemoral disease. The main portal vein is patent.  There is tumor thrombus within the left portal vein with nonocclusive thrombosis.  There is recanalization of the umbilical vein.  There is a prominent spleno renal shunt.  The splenic vein is patent.  The superior mesenteric vein is patent. Nonvascular findings: There is atelectasis in the right lung base.  The left lung base is clear. The liver contour is nodular consistent with cirrhosis.  There is a lobulated solid and cystic mass lesion in the dome of the right lobe of the liver measuring 7.4 x 6.3 cm.  There is inferior extension and invasion into the left portal vein.  There is no biliary dilatation.  There is a large volume of ascites.  There is hyperdense material in the posterior right upper lobe with a focal area of contrast  extravasation best visualized on series 8 image 27 and series 7, images 216-218. The gallbladder is contracted.  The spleen, pancreas, and left adrenal gland are normal.  There is a right adrenal nodule which is indeterminate on this exam.  The bilateral kidneys are normal.  The stomach and small bowel are decompressed.  There is no bowel wall thickening.  The colon is normal.  The urinary bladder is normal.  There is no pelvic or retroperitoneal lymphadenopathy.  There is no lytic or blastic osseous lesion.     1. Solid and cystic mass lesion in the superior aspect of the right hepatic lobe with invasion of the intrahepatic left portal vein and sequela of portal hypertension as above.  Additionally there is capsular rupture and active extravasation with a moderate amount of blood products in the posterior right upper quadrant.  Given the portal venous invasion and cirrhotic morphology of the liver this is favored to represent hepatocellular carcinoma although enhancement characteristics are not classic. 2. Indeterminate right adrenal nodule.  Metastatic disease cannot be excluded. 3. Minimal right lower lobe atelectasis. Red Alert: The critical information above was relayed directly by me by telephone to Dr. Rosenthal On 1/16/2024 at 11:52 . Electronically signed by: Keyon Beck MD Date:    01/16/2024 Time:    11:52    X-Ray Abdomen AP 1 View    Result Date: 1/16/2024  EXAMINATION: XR ABDOMEN AP 1 VIEW CLINICAL HISTORY: abdominal pain; TECHNIQUE: AP X-RAY OF THE ABDOMEN: CPT 36729 FINDINGS: Examination reveals residual feces throughout the colon the gas pattern is nonspecific with no clear evidence of ileus or obstruction no abnormal masses or calcifications identified. Slightly more prominent loop of small bowel identified in the left hemiabdomen these might be related to a sentinel loop which my signal an inflammatory process near the area of the 89 make segment of bowel     Loop of small bowel that might  represent a short segment in a 9 larry ileus in represent a sentinel loop indicating the presence of an inflammatory process clinical correlation is suggested Electronically signed by: Cameron Peralta Date:    01/16/2024 Time:    08:50    X-Ray Chest 1 View    Result Date: 1/6/2024  EXAMINATION: XR CHEST 1 VIEW CLINICAL HISTORY: Pneumonia; TECHNIQUE: Frontal view(s) of the chest. COMPARISON: Radiography 01/05/2024 FINDINGS: Enteric tube seen as far as the gastric body.  Lungs remain hypoinflated with stable to slightly improved bilateral consolidation.  No significant pleural fluid.  No pneumothorax.  Stable cardiac silhouette.     Bilateral consolidation stable to slightly improved. Electronically signed by: Gómez Carmona Date:    01/06/2024 Time:    09:35    X-Ray Chest 1 View    Result Date: 1/5/2024  EXAMINATION: XR CHEST 1 VIEW CPT 42392 CLINICAL HISTORY: pneumonia; COMPARISON: January 4, 2024 FINDINGS: Cardiomediastinal silhouette and pleuroparenchymal changes are essentially unchanged as compared with the previous exam There has been interval insertion of a nasogastric tube with the tip below the diaphragm     No significant change Interval insertion of nasogastric Electronically signed by: Cameron Peralta Date:    01/05/2024 Time:    08:16    Echo Saline Bubble? No    Result Date: 1/4/2024    Left Ventricle: The left ventricle is normal in size. Ventricular mass is normal. Normal wall thickness. Normal wall motion. There is normal systolic function. Biplane (2D) method of discs ejection fraction is 62%. There is normal diastolic function.   Right Ventricle: Normal right ventricular cavity size. Systolic function could not be assesed due to poor visualization.   Left Atrium: Normal left atrial size.   Right Atrium: Normal right atrial size.   Aortic Valve: Not well visualized due to poor acoustic window. There is no stenosis. There is no significant regurgitation.   Mitral Valve: The mitral valve is  structurally normal. There is normal leaflet mobility.   Tricuspid Valve: Not well visualized due to poor acoustic window. The tricuspid valve is structurally normal. There is no significant regurgitation.   Pulmonic Valve: Not well visualized due to poor acoustic window. There is no significant regurgitation.   Pulmonary Artery: Pulmonary artery pressure could not be estimated.   IVC/SVC: Intermediate venous pressure at 8 mmHg.   Pericardium: There is no pericardial effusion.     XR Gastric tube check, non-radiologist performed    Result Date: 1/4/2024  EXAMINATION: XR GASTRIC TUBE CHECK, NON-RADIOLOGIST PERFORMED CLINICAL HISTORY: NG tube placement verification; COMPARISON: 3 January 2024 FINDINGS: Frontal image of the abdomen. Enteric tube extends well into the stomach.  The tip overlies the expected area of the gastric antrum.     As above. Electronically signed by: Aamir Phillip Date:    01/04/2024 Time:    15:03    X-Ray Chest 1 View    Result Date: 1/4/2024  EXAMINATION: XR CHEST 1 VIEW CPT 25540 CLINICAL HISTORY: tachypnea; COMPARISON: January 3, 2024 FINDINGS: Examination reveals cardiomediastinal silhouette improved parenchymal changes to be essentially unchanged as compared with the previous exam     No significant change Electronically signed by: Cameron Peralta Date:    01/04/2024 Time:    08:22    X-Ray Abdomen AP 1 View    Result Date: 1/3/2024  EXAMINATION: XR ABDOMEN AP 1 VIEW CLINICAL HISTORY: distended abdomen; TECHNIQUE: Single-view of the abdomen COMPARISON: No prior. FINDINGS: No acute osseous abnormality.  Nonobstructive bowel gas pattern.     Nonobstructive bowel gas pattern. Electronically signed by: Josef Sow Date:    01/03/2024 Time:    09:46    X-Ray Chest 1 View    Result Date: 1/3/2024  EXAMINATION: XR CHEST 1 VIEW CLINICAL HISTORY: PNA; TECHNIQUE: Single view of the chest COMPARISON: 01/02/2024 FINDINGS: Left upper lobe opacification remains slightly improved in the interval.   The lungs are hypoexpanded.  The cardiomediastinal silhouette is unchanged.     No adverse interval change.  Improved aeration of the left upper lobe. Electronically signed by: Josef Sow Date:    01/03/2024 Time:    09:46    X-Ray Chest 1 View    Result Date: 1/2/2024  EXAMINATION: XR CHEST 1 VIEW CPT 99402 CLINICAL HISTORY: increased oxygen demand; COMPARISON: January 1, 2024 FINDINGS: Examination reveals cardiomediastinal silhouette to be unchanged as compared with the previous exam. Worsening confluent airspace opacities specially in the right perihilar region and right upper lobe with some confluent opacities also seen in the right upper lobe some of the changes might be related to poor inspiratory effort, however, the bulk of the changes are related to worsening infiltrative changes specially in the left     Worsening consolidative changes in the left lung as above Electronically signed by: Cameron Peralta Date:    01/02/2024 Time:    12:51    CT Chest Abdomen Pelvis Without Contrast (XPD)    Result Date: 1/2/2024  EXAMINATION: CT CHEST ABDOMEN PELVIS WITHOUT CONTRAST(XPD) CLINICAL HISTORY: Suspect pneumonia, biliary obstruction; TECHNIQUE: Helical acquisition through the chest, abdomen and pelvis without  IV administration of contrast. Axial, sagittal and coronal reformats interpreted. Automated tube current modulation, weight-based exposure dosing, and/or iterative reconstruction technique utilized to reach lowest reasonably achievable exposure rate. DLP: 1042 mGy*cm COMPARISON: Chest radiograph 01/01/2024 FINDINGS: BASE OF NECK: No significant abnormality. HEART: Normal size. There are coronary artery calcifications.  Low-density blood pool with visualization of the interventricular septum compatible with anemia. THORACIC VASCULATURE: Thoracic aorta is unremarkable. SIRISHA/MEDIASTINUM: Small nonspecific mediastinal lymph nodes.  Evaluation of hilar lymphadenopathy is limited without contrast. AIRWAYS:  Patent. LUNGS/PLEURA: Multilobar consolidative opacities within the lungs. THORACIC SOFT TISSUES: Unremarkable. LIVER: Limited evaluation the liver due to lack of intravenous contrast and placement of the patient's arms over the abdomen which causes beam hardening artifact.  Heterogeneous attenuation of the liver.  There appears to be a mass at the dome of the liver.  Caudate lobe hypertrophy and changes of cirrhosis. BILIARY: The gallbladder does not appear to be overly distended. PANCREAS: Nonspecific retroperitoneal edema.  Unable to assess for changes of pancreatitis given generalized fluid overload. SPLEEN: Upper limits of normal in size. ADRENALS: There is a 1.6 cm right adrenal nodule.  Unable to accurately measure internal attenuation due to beam hardening artifact. KIDNEYS/URETERS: Hyperdense renal pyramids.  This can be related to volume status/dehydration or medullary calcinosis. GI TRACT/MESENTERY: Evaluation of the bowel is limited without contrast. Bowel is normal in caliber without evidence of obstruction. PERITONEUM: Small volume ascites.No free air. LYMPH NODES: No enlarged lymph nodes by size criteria. ABDOMINOPELVIC VASCULATURE: Normal caliber abdominal aorta.  Large periumbilical vein. BLADDER: Normal appearance given degree of distention. REPRODUCTIVE ORGANS: Normal as visualized. ABDOMINAL WALL: Small fat containing right inguinal hernia.  Mild body wall edema. BONES: Anterolisthesis at the lumbosacral junction.     1. Multilobar consolidative opacities within the lungs 2. Cirrhotic morphology of the liver with mass at the dome of the liver.  Evaluation is moderately limited without contrast and due to beam hardening artifact related to patient positioning.  Recommend liver protocol CT abdomen without and with contrast. 3. Ascites 4. Indeterminate right adrenal nodule.  Continued attention recommended on follow-up exams. 5. The preliminary and final reports are concordant. Electronically signed  by: Tika Mcknight Date:    01/02/2024 Time:    09:44    X-Ray Chest 1 View    Result Date: 1/1/2024  EXAMINATION: XR CHEST 1 VIEW CLINICAL HISTORY: cough;  Cough, unspecified TECHNIQUE: Single view of the chest COMPARISON: No prior imaging available for comparison. FINDINGS: Bilateral patchy airspace opacities greatest on the left. The cardiomediastinal silhouette is within normal limits. No acute osseous abnormality.     Bilateral patchy airspace opacities greatest on the left.  Findings concerning for atypical infectious process. Electronically signed by: Josef Sow Date:    01/01/2024 Time:    14:23        Assessment / Plan:     Franco Hein is a 48 y.o. male  with unknown past medical history presented to [facility] ED 01/01/2020 for the complaint of jaundice, weakness for 3 weeks, and altered mental status.  Gastroenterology was consulted for recommendations patient worsening labs with alcoholic liver disease.      Alcoholic liver disease with ascites  Hepatic encephalopathy  Alcohol abuse  [facility] discriminant function score: 72.8   [place] alcoholic hepatitis score: 9  Meld 17.1  Child Hoang class C score:  15   [place] score .82:  Non responder  - discontinue steroids as patient is a nonresponder to steroid treatment.  - discontinue Lasix and spironolactone and monitor sodium levels for improvement  - continue rifaximin  -continue CIWA protocol   -continue thiamine and folic acid      Right lobe hepatic mass with portal vein invasion  High suspicion for HCC  - ordered AFP    Intrahepatic bleeding s/p Gelfoam embolization  - Gelfoam embolization performed 01/16/2024    Hyponatremia  - discontinue diuretics for worsening hyponatremia   -monitor labs for improvement off diuretics    Thrombocytopenia   - do not administer platelets unless patient is actively bleeding  - once hemoglobin    Strep pneumoniae bacteremia  - being treated with  Rocephin 2g for 14 days.      Timmy Sánchez MD,  Internal Medicine  PGY-3  [facility]

## 2024-01-19 LAB
AFP-TM SERPL-MCNC: 1142.8 NG/ML
ALBUMIN SERPL-MCNC: 1.9 G/DL (ref 3.5–5)
ALBUMIN/GLOB SERPL: 0.5 RATIO (ref 1.1–2)
ALP SERPL-CCNC: 249 UNIT/L (ref 40–150)
ALT SERPL-CCNC: 78 UNIT/L (ref 0–55)
ANION GAP SERPL CALC-SCNC: 5 MEQ/L
AST SERPL-CCNC: 95 UNIT/L (ref 5–34)
BASOPHILS # BLD AUTO: 0.01 X10(3)/MCL
BASOPHILS NFR BLD AUTO: 0.1 %
BILIRUB SERPL-MCNC: 8 MG/DL
BILIRUBIN DIRECT+TOT PNL SERPL-MCNC: 4.9 MG/DL (ref 0–?)
BUN SERPL-MCNC: 12.5 MG/DL (ref 8.9–20.6)
BUN SERPL-MCNC: 15.1 MG/DL (ref 8.9–20.6)
CALCIUM SERPL-MCNC: 7.3 MG/DL (ref 8.4–10.2)
CALCIUM SERPL-MCNC: 7.4 MG/DL (ref 8.4–10.2)
CHLORIDE SERPL-SCNC: 98 MMOL/L (ref 98–107)
CHLORIDE SERPL-SCNC: 98 MMOL/L (ref 98–107)
CO2 SERPL-SCNC: 22 MMOL/L (ref 22–29)
CO2 SERPL-SCNC: 23 MMOL/L (ref 22–29)
CREAT SERPL-MCNC: 0.7 MG/DL (ref 0.73–1.18)
CREAT SERPL-MCNC: 0.7 MG/DL (ref 0.73–1.18)
CREAT/UREA NIT SERPL: 18
EOSINOPHIL # BLD AUTO: 0.24 X10(3)/MCL (ref 0–0.9)
EOSINOPHIL NFR BLD AUTO: 2.6 %
ERYTHROCYTE [DISTWIDTH] IN BLOOD BY AUTOMATED COUNT: 19.4 % (ref 11.5–17)
ESTROGEN SERPL-MCNC: ABNORMAL PG/ML
FACT VIII ACT/NOR PPP: >300 % (ref 59–191)
GFR SERPLBLD CREATININE-BSD FMLA CKD-EPI: >60 MLS/MIN/1.73/M2
GFR SERPLBLD CREATININE-BSD FMLA CKD-EPI: >60 MLS/MIN/1.73/M2
GLOBULIN SER-MCNC: 4.1 GM/DL (ref 2.4–3.5)
GLUCOSE SERPL-MCNC: 147 MG/DL (ref 74–100)
GLUCOSE SERPL-MCNC: 95 MG/DL (ref 74–100)
HCT VFR BLD AUTO: 25.8 % (ref 42–52)
HGB BLD-MCNC: 9 G/DL (ref 14–18)
HOLD SPECIMEN: NORMAL
IMM GRANULOCYTES # BLD AUTO: 0.08 X10(3)/MCL (ref 0–0.04)
IMM GRANULOCYTES NFR BLD AUTO: 0.9 %
INR PPP: 2
INSULIN SERPL-ACNC: ABNORMAL U[IU]/ML
LAB AP CLINICAL INFORMATION: ABNORMAL
LAB AP GROSS DESCRIPTION: ABNORMAL
LAB AP NON-GYN INTERPRETATION SPECIMEN 1 (MULTI CAT): ABNORMAL
LAB AP NON-GYN SPECIMEN 1 ADEQUACY: ABNORMAL
LYMPHOCYTES # BLD AUTO: 0.85 X10(3)/MCL (ref 0.6–4.6)
LYMPHOCYTES NFR BLD AUTO: 9.2 %
MAGNESIUM SERPL-MCNC: 1.7 MG/DL (ref 1.6–2.6)
MCH RBC QN AUTO: 31.6 PG (ref 27–31)
MCHC RBC AUTO-ENTMCNC: 34.9 G/DL (ref 33–36)
MCV RBC AUTO: 90.5 FL (ref 80–94)
MONOCYTES # BLD AUTO: 1.31 X10(3)/MCL (ref 0.1–1.3)
MONOCYTES NFR BLD AUTO: 14.2 %
NEUTROPHILS # BLD AUTO: 6.74 X10(3)/MCL (ref 2.1–9.2)
NEUTROPHILS NFR BLD AUTO: 73 %
NRBC BLD AUTO-RTO: 0.2 %
PHOSPHATE SERPL-MCNC: 2.3 MG/DL (ref 2.3–4.7)
PLATELET # BLD AUTO: 64 X10(3)/MCL (ref 130–400)
PLATELETS.RETICULATED NFR BLD AUTO: 5.4 % (ref 0.9–11.2)
PMV BLD AUTO: 10.8 FL (ref 7.4–10.4)
POCT GLUCOSE: 115 MG/DL (ref 70–110)
POCT GLUCOSE: 132 MG/DL (ref 70–110)
POCT GLUCOSE: 138 MG/DL (ref 70–110)
POCT GLUCOSE: 175 MG/DL (ref 70–110)
POTASSIUM SERPL-SCNC: 3.9 MMOL/L (ref 3.5–5.1)
POTASSIUM SERPL-SCNC: 4.2 MMOL/L (ref 3.5–5.1)
PROT SERPL-MCNC: 6 GM/DL (ref 6.4–8.3)
PROTHROMBIN TIME: 21.9 SECONDS (ref 11.4–14)
RBC # BLD AUTO: 2.85 X10(6)/MCL (ref 4.7–6.1)
SODIUM SERPL-SCNC: 125 MMOL/L (ref 136–145)
SODIUM SERPL-SCNC: 126 MMOL/L (ref 136–145)
WBC # SPEC AUTO: 9.23 X10(3)/MCL (ref 4.5–11.5)

## 2024-01-19 PROCEDURE — 97116 GAIT TRAINING THERAPY: CPT

## 2024-01-19 PROCEDURE — 85610 PROTHROMBIN TIME: CPT | Performed by: STUDENT IN AN ORGANIZED HEALTH CARE EDUCATION/TRAINING PROGRAM

## 2024-01-19 PROCEDURE — 21400001 HC TELEMETRY ROOM

## 2024-01-19 PROCEDURE — 94760 N-INVAS EAR/PLS OXIMETRY 1: CPT

## 2024-01-19 PROCEDURE — 85240 CLOT FACTOR VIII AHG 1 STAGE: CPT

## 2024-01-19 PROCEDURE — 82248 BILIRUBIN DIRECT: CPT | Performed by: STUDENT IN AN ORGANIZED HEALTH CARE EDUCATION/TRAINING PROGRAM

## 2024-01-19 PROCEDURE — 85025 COMPLETE CBC W/AUTO DIFF WBC: CPT | Performed by: STUDENT IN AN ORGANIZED HEALTH CARE EDUCATION/TRAINING PROGRAM

## 2024-01-19 PROCEDURE — 84100 ASSAY OF PHOSPHORUS: CPT | Performed by: STUDENT IN AN ORGANIZED HEALTH CARE EDUCATION/TRAINING PROGRAM

## 2024-01-19 PROCEDURE — 25000003 PHARM REV CODE 250: Performed by: STUDENT IN AN ORGANIZED HEALTH CARE EDUCATION/TRAINING PROGRAM

## 2024-01-19 PROCEDURE — 83735 ASSAY OF MAGNESIUM: CPT | Performed by: STUDENT IN AN ORGANIZED HEALTH CARE EDUCATION/TRAINING PROGRAM

## 2024-01-19 PROCEDURE — 94761 N-INVAS EAR/PLS OXIMETRY MLT: CPT

## 2024-01-19 PROCEDURE — 25000003 PHARM REV CODE 250

## 2024-01-19 PROCEDURE — 97110 THERAPEUTIC EXERCISES: CPT

## 2024-01-19 PROCEDURE — 80053 COMPREHEN METABOLIC PANEL: CPT | Performed by: STUDENT IN AN ORGANIZED HEALTH CARE EDUCATION/TRAINING PROGRAM

## 2024-01-19 PROCEDURE — A4216 STERILE WATER/SALINE, 10 ML: HCPCS

## 2024-01-19 PROCEDURE — 82105 ALPHA-FETOPROTEIN SERUM: CPT

## 2024-01-19 PROCEDURE — 63600175 PHARM REV CODE 636 W HCPCS: Performed by: STUDENT IN AN ORGANIZED HEALTH CARE EDUCATION/TRAINING PROGRAM

## 2024-01-19 PROCEDURE — 25000003 PHARM REV CODE 250: Performed by: INTERNAL MEDICINE

## 2024-01-19 RX ORDER — MAGNESIUM SULFATE HEPTAHYDRATE 40 MG/ML
2 INJECTION, SOLUTION INTRAVENOUS ONCE
Status: COMPLETED | OUTPATIENT
Start: 2024-01-19 | End: 2024-01-19

## 2024-01-19 RX ADMIN — FOLIC ACID 1 MG: 1 TABLET ORAL at 09:01

## 2024-01-19 RX ADMIN — SODIUM CHLORIDE, PRESERVATIVE FREE 10 ML: 5 INJECTION INTRAVENOUS at 11:01

## 2024-01-19 RX ADMIN — Medication 100 MG: at 09:01

## 2024-01-19 RX ADMIN — SODIUM CHLORIDE, PRESERVATIVE FREE 10 ML: 5 INJECTION INTRAVENOUS at 01:01

## 2024-01-19 RX ADMIN — MICONAZOLE NITRATE: 20 POWDER TOPICAL at 08:01

## 2024-01-19 RX ADMIN — RIFAXIMIN 550 MG: 550 TABLET ORAL at 08:01

## 2024-01-19 RX ADMIN — MAGNESIUM SULFATE HEPTAHYDRATE 2 G: 40 INJECTION, SOLUTION INTRAVENOUS at 09:01

## 2024-01-19 RX ADMIN — MICONAZOLE NITRATE: 20 POWDER TOPICAL at 09:01

## 2024-01-19 RX ADMIN — PANTOPRAZOLE SODIUM 40 MG: 40 TABLET, DELAYED RELEASE ORAL at 09:01

## 2024-01-19 RX ADMIN — RIFAXIMIN 550 MG: 550 TABLET ORAL at 09:01

## 2024-01-19 RX ADMIN — SODIUM CHLORIDE, PRESERVATIVE FREE 10 ML: 5 INJECTION INTRAVENOUS at 05:01

## 2024-01-19 NOTE — PROGRESS NOTES
[facility] Medicine Wards Progress Note     Resident Team: [facility] Medicine List 4  Attending Physician: Kamryn Estevez MD  Resident: MD Galo  Intern: DO Ulysses       Subjective:      Brief HPI:  Franco Hein is  48 y.o. male with a PMH of ETOH misuse disorder who presented to [facility] ED on 2024 with complaint of jaundice, weakness for 3 weeks, and AMS. Patient was brought in by a friend who states patient is a daily drinker who has not been able to go to a store for the last 2 days, but patient is noted to have a positive ethanol on admission. Patient unable to provide history given his mental status and was only able to say his name and confirm he drinks alcohol but does not do drugs. Patient was tachypneic, tachycardic, and tremulous, on presentation with jaundice. Admitted to ICU given altered mental status and concern for decompensation in the setting of alcohol withdrawal and influenza A, also found to have strep pneumonia bacteremia, on appropriate IV antibiotics.     Hospital Course:  23: Paracentesis performed w/ removal of 4.5L of bloody ascitic fluid w/ symptomatic improvement & hemodynamic stability thereafter.     Interval History:   NAEO. S/p paracentesis performed () w/ symptomatic/hemodynamic improvement thereafter. Patient also administered x1u platelets () 2/2 platelets (42) with improvement to (64) without any acute bleeding episodes overnight. This AM, patients only concern was regarding B/L lower abdominal discomfort. Currently denies any fever, chills, abdominal pain, chest pain, SOB. Also denies any bloody or melenic stools. Sodium improved (123 --> 126) overnight; pending repeat BMP. GI consulted w/ recs to discontinue lasix, aldactone, & steroids at this time. Pending AFP.    Review of Systems:  ROS completed and negative except as indicated above.     Objective:     Last 24 Hour Vital Signs:  BP  Min: 99/70  Max: 128/81  Temp  Av.5 °F (36.9 °C)  Min: 97.5 °F  (36.4 °C)  Max: 100.3 °F (37.9 °C)  Pulse  Av  Min: 60  Max: 115  Resp  Av.5  Min: 16  Max: 20  SpO2  Av %  Min: 94 %  Max: 99 %  I/O last 3 completed shifts:  In: 1536.3 [P.O.:580; Blood:956.3]  Out: 2727 [Urine:2726; Stool:1]    Physical Examination:  General: ill-appearing though non-toxic, in no acute distress  Eye: PERRLA, EOMI, scleral icterus noted  HENT: NC/AT, moist mucus membranes  Neck: full range of motion, no thyromegaly or lymphadenopathy  Respiratory: Clear and equal bilateral BS, satting well on RA  Cardiovascular: regular rate and rhythm without murmurs, gallops or rubs  Gastrointestinal: soft, non-tender, mildly-distended with normal bowel sounds, without masses to palpation  Musculoskeletal: no obvious deformities, full range of motion of all extremities/spine without limitation or discomfort  Integumentary: Jaundice, spider angiomas present on upper chest and abdomen, no caput medusa  Extremities: radial and DP pulses 2+ bilaterally, 2+ BLE edema  Neurologic: Pt AAOx3, following commands and conversing appropriately. No focal neuro deficits appreciated, CN II-XII grossly intact.       Laboratory:  Most Recent Data:  CBC:   Lab Results   Component Value Date    WBC 9.23 2024    HGB 9.0 (L) 2024    HCT 25.8 (L) 2024    PLT 64 (L) 2024    MCV 90.5 2024    RDW 19.4 (H) 2024     WBC Differential:   Recent Labs   Lab 01/15/24  0629 24  0659 24  2139 24  0332 24  1616 24  0517 24  0408   WBC 12.56* 13.68*  --  13.52*  --  11.71* 9.23   HGB 8.7* 7.5* 8.7* 8.9* 7.7* 9.1* 9.0*   HCT 25.8* 23.3* 26.2* 25.4* 22.8* 26.4* 25.8*   PLT 80* 99*  --  67*  --  42* 64*   MCV 93.1 95.1*  --  92.4  --  88.9 90.5     BMP:   Lab Results   Component Value Date     (L) 2024    K 3.9 2024    CO2 23 2024    BUN 15.1 2024    CREATININE 0.70 (L) 2024    CALCIUM 7.4 (L) 2024    MG 1.70 2024     "PHOS 2.3 01/19/2024     LFTs:   Lab Results   Component Value Date    ALBUMIN 1.9 (L) 01/19/2024    BILITOT 8.0 (H) 01/19/2024    AST 95 (H) 01/19/2024    ALKPHOS 249 (H) 01/19/2024    ALT 78 (H) 01/19/2024     Coags:   Lab Results   Component Value Date    INR 2.0 (H) 01/19/2024    PROTIME 21.9 (H) 01/19/2024    PTT 38.6 (H) 01/01/2024     FLP: No results found for: "CHOL", "HDL", "LDLCALC", "TRIG", "CHOLHDL"  DM:   Lab Results   Component Value Date    CREATININE 0.70 (L) 01/19/2024     Thyroid: No results found for: "TSH", "FREET4", "O0YLBGQ", "W5DEFUN", "THYROIDAB"  Anemia:   Lab Results   Component Value Date    IRON 39 (L) 01/01/2024    TIBC 203 (L) 01/01/2024    FERRITIN 116.00 01/01/2024    [id number] >2,000 (H) 01/01/2024    FOLATE 18.3 01/01/2024     Cardiac:   Lab Results   Component Value Date    TROPONINI 0.029 01/01/2024    BNP 89.3 01/01/2024       Microbiology Data Reviewed: yes  Pertinent Findings:  1/1/24: bld cx +strep pneumonia pan sensitive x1 bottle  1/4/24: bld cx x2 negative    Other Results:  TTE 1/4/24: EF 62%, no evidence of vegetation      Radiology:  Imaging Results              CT Chest Abdomen Pelvis Without Contrast (XPD) (Final result)  Result time 01/02/24 09:44:01      Final result by Tika Mcknight MD (01/02/24 09:44:01)                   Impression:      1. Multilobar consolidative opacities within the lungs  2. Cirrhotic morphology of the liver with mass at the dome of the liver.  Evaluation is moderately limited without contrast and due to beam hardening artifact related to patient positioning.  Recommend liver protocol CT abdomen without and with contrast.  3. Ascites  4. Indeterminate right adrenal nodule.  Continued attention recommended on follow-up exams.  5. The preliminary and final reports are concordant.      Electronically signed by: Tika Mcknight  Date:    01/02/2024  Time:    09:44               Narrative:    EXAMINATION:  CT CHEST ABDOMEN PELVIS WITHOUT " CONTRAST(XPD)    CLINICAL HISTORY:  Suspect pneumonia, biliary obstruction;    TECHNIQUE:  Helical acquisition through the chest, abdomen and pelvis without  IV administration of contrast. Axial, sagittal and coronal reformats interpreted.    Automated tube current modulation, weight-based exposure dosing, and/or iterative reconstruction technique utilized to reach lowest reasonably achievable exposure rate.    DLP: 1042 mGy*cm    COMPARISON:  Chest radiograph 01/01/2024    FINDINGS:  BASE OF NECK: No significant abnormality.    HEART: Normal size. There are coronary artery calcifications.  Low-density blood pool with visualization of the interventricular septum compatible with anemia.    THORACIC VASCULATURE: Thoracic aorta is unremarkable.    SIRISHA/MEDIASTINUM: Small nonspecific mediastinal lymph nodes.  Evaluation of hilar lymphadenopathy is limited without contrast.    AIRWAYS: Patent.    LUNGS/PLEURA: Multilobar consolidative opacities within the lungs.    THORACIC SOFT TISSUES: Unremarkable.    LIVER: Limited evaluation the liver due to lack of intravenous contrast and placement of the patient's arms over the abdomen which causes beam hardening artifact.  Heterogeneous attenuation of the liver.  There appears to be a mass at the dome of the liver.  Caudate lobe hypertrophy and changes of cirrhosis.    BILIARY: The gallbladder does not appear to be overly distended.    PANCREAS: Nonspecific retroperitoneal edema.  Unable to assess for changes of pancreatitis given generalized fluid overload.    SPLEEN: Upper limits of normal in size.    ADRENALS: There is a 1.6 cm right adrenal nodule.  Unable to accurately measure internal attenuation due to beam hardening artifact.    KIDNEYS/URETERS: Hyperdense renal pyramids.  This can be related to volume status/dehydration or medullary calcinosis.    GI TRACT/MESENTERY: Evaluation of the bowel is limited without contrast. Bowel is normal in caliber without evidence of  obstruction.    PERITONEUM: Small volume ascites.No free air.    LYMPH NODES: No enlarged lymph nodes by size criteria.    ABDOMINOPELVIC VASCULATURE: Normal caliber abdominal aorta.  Large periumbilical vein.    BLADDER: Normal appearance given degree of distention.    REPRODUCTIVE ORGANS: Normal as visualized.    ABDOMINAL WALL: Small fat containing right inguinal hernia.  Mild body wall edema.    BONES: Anterolisthesis at the lumbosacral junction.                        Preliminary result by Santana Lerma MD (01/01/24 22:56:45)                   Impression:    1. No intrahepatic or extrahepatic biliary duct dilatation is seen.  2. Note of subtle increased attenuation in the medullary portions of the bilateral kidneys which may reflect medullary calcinosis. Correlate with clinical and laboratory findings as regards additional evaluation and follow-up.  3. There is multifocal patchy dense consolidation of both the lungs with predominant involvement of the left upper and right lower lobes. This is consistent with multilobar pneumonia. Correlate with clinical and laboratory findings as regards additional evaluation and follow-up to full resolution.  4. There is a 2.8 x 2 cm soft tissue density nodule in the right adrenal gland (series 2 image 98). This is of indeterminate etiology.  5. The margins of the liver appears somewhat nodular and irregular. This is suggestive of cirrhosis. Correlate with clinical and laboratory findings.  6. Details and other findings as discussed above.               Narrative:    START OF REPORT:  Technique: CT Scan of the chest abdomen and pelvis was performed without intravenous contrast with axial as well as sagittal and, coronal images.    Dosage Information: Automated Exposure Control was utilized 1041.63 mGy.cm.    Comparison: None.    Clinical History: Suspect pneumonia, biliary obstruction.    Findings:  Mediastinum: A few scattered subcentimeter mediastinal lymph nodes are  seen.  Heart: The heart size is within normal limits.  Aorta: Unremarkable appearing aorta.  Pulmonary Arteries: Unremarkable.  Lungs: There is multifocal patchy dense consolidation of both the lungs with predominant involvement of the left upper and right lower lobes.  Pleura: No effusions or pneumothorax are identified.  Abdomen:  Abdominal Wall: There is extensive stranding of the subcutaneous fat as well as the intraabdominal and intrapelvic fat consistent with anasarca edema. Correlate with clinical and laboratory findings.  Liver: The margins of the liver appears somewhat nodular and irregular. There are also prominent portosystemic collaterals consistent with portal hypertension.  Biliary System: No intrahepatic or extrahepatic biliary duct dilatation is seen.  Gallbladder: The gallbladder is not definitely identified on this noncontrast examination and may be contracted.  Pancreas: The pancreas appears unremarkable.  Spleen: The spleen appears unremarkable.  Adrenals: There is a 2.8 x 2 cm soft tissue density nodule in the right adrenal gland (series 2 image 98).  Kidneys: Note of subtle increased attenuation in the medullary portions of the bilateral kidneys which may reflect medullary calcinosis.  Aorta: The abdominal aorta appears unremarkable.  IVC: Unremarkable.  Bowel:  Esophagus: There is a small hiatal hernia.  Stomach: The stomach appears unremarkable.  Duodenum: Unremarkable appearing duodenum.  Small Bowel: The small bowel appears unremarkable.  Colon: Nondistended.  Appendix: The appendix is not identified but no inflammatory changes are seen in the right lower quadrant to suggest appendicitis.  Peritoneum: There is mild ascites. No intraperitoneal free gas is seen.    Pelvis:  Bladder: The bladder appears unremarkable.  Male:  Prostate gland: The prostate gland appears unremarkable.    Bony structures:  Dorsal Spine: There is mild to moderate spondylosis of the visualized dorsal spine.  Bony  Pelvis: The visualized bony structures of the pelvis appear unremarkable.                                         X-Ray Chest 1 View (Final result)  Result time 01/01/24 14:23:11   Procedure changed from X-Ray Chest PA And Lateral     Final result by Josef Sow MD (01/01/24 14:23:11)                   Impression:      Bilateral patchy airspace opacities greatest on the left.  Findings concerning for atypical infectious process.      Electronically signed by: Josef Sow  Date:    01/01/2024  Time:    14:23               Narrative:    EXAMINATION:  XR CHEST 1 VIEW    CLINICAL HISTORY:  cough;  Cough, unspecified    TECHNIQUE:  Single view of the chest    COMPARISON:  No prior imaging available for comparison.    FINDINGS:  Bilateral patchy airspace opacities greatest on the left.    The cardiomediastinal silhouette is within normal limits.    No acute osseous abnormality.                                       RADIOLOGY REPORT (Final result)  Result time 01/04/24 11:39:28      Final result by Unknown User (01/04/24 11:39:28)                                        Current Medications:     Infusions:       Scheduled:   folic acid  1 mg Oral Daily    LIDOcaine HCL 10 mg/ml (1%)  1 mL Other Once    miconazole NITRATE 2 %   Topical (Top) BID    pantoprazole  40 mg Oral Daily    rifAXIMin  550 mg Oral BID    sodium chloride 0.9%  10 mL Intravenous Q6H    thiamine  100 mg Oral Daily        PRN:  0.9%  NaCl infusion (for blood administration), 0.9%  NaCl infusion (for blood administration), 0.9%  NaCl infusion (for blood administration), 0.9%  NaCl infusion (for blood administration), 0.9%  NaCl infusion (for blood administration), 0.9%  NaCl infusion (for blood administration), sodium chloride 0.9%, acetaminophen, acetaminophen, benzonatate, dextrose 10%, dextrose 10%, glucagon (human recombinant), glucose, glucose, HYDROcodone-acetaminophen, hydrOXYzine pamoate, iohexoL, lactulose, sodium chloride 0.9%, Flushing  PICC/Midline Protocol **AND** sodium chloride 0.9% **AND** sodium chloride 0.9%    Antibiotics and Day Number of Therapy:  Vanc x3 days  Zosyn/Azithromycin x4 days  Rocephin 2g on D12/14     Lines and Day Number of Therapy:  PIV      Assessment & Plan:     Decompensated Cirrhosis  Hypervolemic Hyponotremia  Liver lesion likely HCC  Bilirubinemia       - suspected etiology ETOH. Pt reportedly had hx of heavy drinking per friend who dropped him off       - Positive hepatitis C antibody; VL negative, no active infection         -- MELD-Na: 28 -->25 --> 22--> 29 --> 28 (1/19)    -- Child Hoang: 13 --> 11 --> 11 --> 11 (1/19) class C  -- Maddrey: 204 on admission       - INR trend (1.9, 2.0, 2.0)       - GI consulted w/ recs to discontinue Lasix/Aldactone/Steroids at this time; appreciate assistance in care       - Continued improvement In encephalopathy; hold lactulose at this time w/ prior diarrhea       - C/w Rifaxamin for hepatic encephalopathy       - FeNA 1%, Nina < 20; lower extremity edema; likely hypervolemic hyponotremia       - 4.5 L bloody ascitic fluid removed on 1/17 for therapeutic/diagnostic para, studies pending       - Pending AFP       - Pending fractionated bilirubin    Intrahepatic bleed likely 2/2 tumor       - Pt had slow intrahepatic bleed likely over the past several days as complication from HCC       - Labs in AM showed H&H had continued to drop slowly, lactic acid elevated       - CTA abd/pelvis showed intrahepatic bleeding with lesion in right lobe of liver suspicious for HCC, capsular rupture of liver and some bleeding and contrast noted with ascites       - Taken by [facility] surgery for gelfoam embolization which was successful, appreciate assistance       - Pt transfused 2 unit pRBC and 1 FFP given INR 2.2; H&H dropped further requiring additional 2 units pRBC, 1 FFP and 1 platelet       - Continue to trend q12h       - Will continue to monitor for symptoms       - C/w SCDs for DVT ppx in  setting of recent thrombocytopenia requiring x1u platelets (1/18)       - Pending factor VIII level     Pneumonia 2/2 Influenza A-resolved       - Positive on 1/1/24; initially ICU with BIPAP for respiratory failure but stable on floor since 1/9/24       - Possible superimposed Strep PNA leading to bacteremia and pneumonia; completed IV abx regimen    Strep Pneumoniae Bacteremia-resolved       - Blood culture positive x1/2 Streptococcus; [employer] confirmation strep pneumonia bacteremia which is pan-sensitive, resp culture /Gram stain collection pending.       - Repeat blood cultures 1/4/24  negative; TTE did not have any findings consistent with endocarditis.        - s/p Zosyn and Zithromax x4 days; MRSA PCR negative, discontinued vancomycin after D3.        - Completed 2 week regimen Ceftriaxone 2g on 1/17/24    Skin Breakdown to L buttocks-improving  Pruritic, Erythematous Rash to groin       - Skin tear vs Stage II pressure ulcer       - Wound care consulted, appreciate recs       - Continue q2h turning and mepilex dsg        - Pt now with enteral nutrition, hopeful to aid in healing       - Rash appears fungal in nature, will start miconazole powder to groin BID    Malnutrition  Hyponatremia       - Soft mechanical diet ordered, pt working with SLT       - Diet per dietary recs       - Likely 2/2 cirrhosis; FeNA 1% indicated prerenal       - Na 126 this morning,       - Per GI recs, holding further administration of Lasix/Aldactone at this time in addition to fluid restriciton       - Pending BMP for further evaluation & hopeful continued correction of hyponatremia    CODE STATUS: Full  Access: PIV  Antibiotics: Ceftriaxone; completed 1/17/24  Diet: Soft mechanical, low sodium  DVT Prophylaxis: SCDs  GI Prophylaxis: Protonix  Fluids: n/a      Disposition: Patient completed 2 week Rocephin course for strep pneumoniae bacteremia completing today. Self pay, likely undocumented and will need to stay for duration.  Now having complications from bleeding liver lesion suspicious for HCC and worsening labs with decompensated cirrhosis. Consulting GI for assistance, will need to have goals of care discussion with patient if he continues to decompensate.      Mian Yuan MD  [facility] Internal Medicine HO-[id number]

## 2024-01-19 NOTE — PT/OT/SLP PROGRESS
Physical Therapy Treatment    Patient Name:  Franco Hein   MRN:  [id number]    Recommendations     Therapy Intensity Recommendations at Discharge: Moderate Intensity Therapy  Discharge Equipment Recommendations: walker, rolling   Barriers to discharge: fall risk and level of skilled assistance required    Assessment     Franco Hein is a 48 y.o. male admitted with a medical diagnosis of Delirium tremens.    He presents with the following impairments/functional limitations:  weakness, impaired endurance, impaired functional mobility, decreased lower extremity function, decreased safety awareness, pain, impaired cardiopulmonary response to activity.    Rehab Prognosis: Good.    Patient would benefit from continued skilled acute PT services to: address above listed impairments/functional limitations; receive patient/caregiver education; reduce fall risk; and maximize independency/safety with functional mobility.    Recent Surgery: Procedure(s) (LRB):  ANGIOGRAM-ABDOMINAL (N/A) 3 Days Post-Op    Plan     During this hospitalization, patient to be seen 3 x/week to address the identified impairments/functional limitations via gait training, therapeutic activities, therapeutic exercises, neuromuscular re-education and progress toward the established goals.    Plan of Care Expires:   (Discharge)    Subjective     Communicated with patient's nurse Eve prior to session.    Patient agreeable to participate in treatment session.    Chief Complaint: R abdominal pain  Patient/Family Comments/goals: None stated   Pain/Comfort:  Pain Rating 1:  (Does not give pain score)  Location - Side 1: Right  Location 1: abdomen  Pain Addressed 1: Reposition, Nurse notified  Pain Rating Post-Intervention 1:  (Does not give pain score)    Objective     Patient found supine in bed with telemetry, peripheral IV  upon PT entry to room.    General Precautions: Standard, fall, droplet   Orthopedic Precautions:N/A   Braces:    Respiratory  Status: room air    Functional Mobility:    Bed Mobility:  Rolling Left: contact guard assistance  Supine to Sit: contact guard assistance  Sit to Supine: contact guard assistance    Transfers:  Sit to Stand: stand by assistance with rolling walker x 2 and without RW x 4.    Gait:  Patient ambulated 260ft with  RW for first 130 ft and with HHS for second 130 ft  and contact guard assistance.  Patient demonstrates :       no loss of balance       occasional unsteady gait       wide base of support.  Gait training performed in room, Staggered taps x 20 each, marching with cues for improved hip flexion x 20 each     Other Mobility:  Therapeutic Exercises performed:        -seated exercises:              heel slides  LAQ, marches  2x10    Patient left supine in bed with all lines intact.    Goals     Multidisciplinary Problems       Physical Therapy Goals          Problem: Physical Therapy    Goal Priority Disciplines Outcome Goal Variances Interventions   Physical Therapy Goal     PT, PT/OT Ongoing, Progressing     Description: Goals to be met by: dc     Patient will increase functional independence with mobility by performin. Supine to sit with Modified [place] met 1/10/2024    2. Sit to stand transfer with Modified [place]  3. Gait  x 130 feet with Supervision using LRAD    Reviewed 2024                         Time Tracking     PT Received On: 24  PT Start Time: 1338     PT Stop Time: 1403  PT Total Time (min): 25 min     Billable Minutes: Gait Training 15 mins  and Therapeutic Exercise 10 mins     2024

## 2024-01-20 PROBLEM — K70.30 ALCOHOLIC CIRRHOSIS: Status: ACTIVE | Noted: 2024-01-20

## 2024-01-20 PROBLEM — C22.0 HCC (HEPATOCELLULAR CARCINOMA): Status: ACTIVE | Noted: 2024-01-20

## 2024-01-20 LAB
ALBUMIN SERPL-MCNC: 1.8 G/DL (ref 3.5–5)
ALBUMIN/GLOB SERPL: 0.4 RATIO (ref 1.1–2)
ALP SERPL-CCNC: 226 UNIT/L (ref 40–150)
ALT SERPL-CCNC: 67 UNIT/L (ref 0–55)
AST SERPL-CCNC: 81 UNIT/L (ref 5–34)
BASOPHILS # BLD AUTO: 0.02 X10(3)/MCL
BASOPHILS NFR BLD AUTO: 0.2 %
BILIRUB SERPL-MCNC: 7.6 MG/DL
BUN SERPL-MCNC: 12.5 MG/DL (ref 8.9–20.6)
CALCIUM SERPL-MCNC: 7.2 MG/DL (ref 8.4–10.2)
CHLORIDE SERPL-SCNC: 97 MMOL/L (ref 98–107)
CO2 SERPL-SCNC: 21 MMOL/L (ref 22–29)
CREAT SERPL-MCNC: 0.69 MG/DL (ref 0.73–1.18)
EOSINOPHIL # BLD AUTO: 0.17 X10(3)/MCL (ref 0–0.9)
EOSINOPHIL NFR BLD AUTO: 2 %
ERYTHROCYTE [DISTWIDTH] IN BLOOD BY AUTOMATED COUNT: 19.5 % (ref 11.5–17)
GFR SERPLBLD CREATININE-BSD FMLA CKD-EPI: >60 MLS/MIN/1.73/M2
GLOBULIN SER-MCNC: 4.3 GM/DL (ref 2.4–3.5)
GLUCOSE SERPL-MCNC: 120 MG/DL (ref 74–100)
HCT VFR BLD AUTO: 24.1 % (ref 42–52)
HGB BLD-MCNC: 8.3 G/DL (ref 14–18)
HOLD SPECIMEN: NORMAL
IMM GRANULOCYTES # BLD AUTO: 0.06 X10(3)/MCL (ref 0–0.04)
IMM GRANULOCYTES NFR BLD AUTO: 0.7 %
INR PPP: 2
LYMPHOCYTES # BLD AUTO: 0.83 X10(3)/MCL (ref 0.6–4.6)
LYMPHOCYTES NFR BLD AUTO: 9.8 %
MAGNESIUM SERPL-MCNC: 1.8 MG/DL (ref 1.6–2.6)
MCH RBC QN AUTO: 31.8 PG (ref 27–31)
MCHC RBC AUTO-ENTMCNC: 34.4 G/DL (ref 33–36)
MCV RBC AUTO: 92.3 FL (ref 80–94)
MONOCYTES # BLD AUTO: 1.29 X10(3)/MCL (ref 0.1–1.3)
MONOCYTES NFR BLD AUTO: 15.3 %
NEUTROPHILS # BLD AUTO: 6.06 X10(3)/MCL (ref 2.1–9.2)
NEUTROPHILS NFR BLD AUTO: 72 %
NRBC BLD AUTO-RTO: 0.2 %
PHOSPHATE SERPL-MCNC: 2.9 MG/DL (ref 2.3–4.7)
PLATELET # BLD AUTO: 60 X10(3)/MCL (ref 130–400)
PLATELETS.RETICULATED NFR BLD AUTO: 5.5 % (ref 0.9–11.2)
PMV BLD AUTO: 11.2 FL (ref 7.4–10.4)
POCT GLUCOSE: 114 MG/DL (ref 70–110)
POCT GLUCOSE: 146 MG/DL (ref 70–110)
POCT GLUCOSE: 157 MG/DL (ref 70–110)
POCT GLUCOSE: 191 MG/DL (ref 70–110)
POTASSIUM SERPL-SCNC: 4 MMOL/L (ref 3.5–5.1)
PROT SERPL-MCNC: 6.1 GM/DL (ref 6.4–8.3)
PROTHROMBIN TIME: 21.9 SECONDS (ref 11.4–14)
RBC # BLD AUTO: 2.61 X10(6)/MCL (ref 4.7–6.1)
SODIUM SERPL-SCNC: 124 MMOL/L (ref 136–145)
WBC # SPEC AUTO: 8.43 X10(3)/MCL (ref 4.5–11.5)

## 2024-01-20 PROCEDURE — 85025 COMPLETE CBC W/AUTO DIFF WBC: CPT | Performed by: STUDENT IN AN ORGANIZED HEALTH CARE EDUCATION/TRAINING PROGRAM

## 2024-01-20 PROCEDURE — 97116 GAIT TRAINING THERAPY: CPT

## 2024-01-20 PROCEDURE — A4216 STERILE WATER/SALINE, 10 ML: HCPCS

## 2024-01-20 PROCEDURE — 25000003 PHARM REV CODE 250: Performed by: INTERNAL MEDICINE

## 2024-01-20 PROCEDURE — 25000003 PHARM REV CODE 250

## 2024-01-20 PROCEDURE — 84100 ASSAY OF PHOSPHORUS: CPT | Performed by: STUDENT IN AN ORGANIZED HEALTH CARE EDUCATION/TRAINING PROGRAM

## 2024-01-20 PROCEDURE — 85610 PROTHROMBIN TIME: CPT | Performed by: STUDENT IN AN ORGANIZED HEALTH CARE EDUCATION/TRAINING PROGRAM

## 2024-01-20 PROCEDURE — 80053 COMPREHEN METABOLIC PANEL: CPT | Performed by: STUDENT IN AN ORGANIZED HEALTH CARE EDUCATION/TRAINING PROGRAM

## 2024-01-20 PROCEDURE — 25000003 PHARM REV CODE 250: Performed by: STUDENT IN AN ORGANIZED HEALTH CARE EDUCATION/TRAINING PROGRAM

## 2024-01-20 PROCEDURE — 21400001 HC TELEMETRY ROOM

## 2024-01-20 PROCEDURE — 99900035 HC TECH TIME PER 15 MIN (STAT)

## 2024-01-20 PROCEDURE — 94761 N-INVAS EAR/PLS OXIMETRY MLT: CPT

## 2024-01-20 PROCEDURE — 83735 ASSAY OF MAGNESIUM: CPT | Performed by: STUDENT IN AN ORGANIZED HEALTH CARE EDUCATION/TRAINING PROGRAM

## 2024-01-20 RX ORDER — IPRATROPIUM BROMIDE AND ALBUTEROL SULFATE 2.5; .5 MG/3ML; MG/3ML
3 SOLUTION RESPIRATORY (INHALATION) EVERY 6 HOURS PRN
Status: DISCONTINUED | OUTPATIENT
Start: 2024-01-20 | End: 2024-01-25 | Stop reason: HOSPADM

## 2024-01-20 RX ADMIN — SODIUM CHLORIDE, PRESERVATIVE FREE 10 ML: 5 INJECTION INTRAVENOUS at 11:01

## 2024-01-20 RX ADMIN — SODIUM CHLORIDE, PRESERVATIVE FREE 10 ML: 5 INJECTION INTRAVENOUS at 12:01

## 2024-01-20 RX ADMIN — MICONAZOLE NITRATE: 20 POWDER TOPICAL at 09:01

## 2024-01-20 RX ADMIN — RIFAXIMIN 550 MG: 550 TABLET ORAL at 09:01

## 2024-01-20 RX ADMIN — HYDROXYZINE PAMOATE 50 MG: 25 CAPSULE ORAL at 12:01

## 2024-01-20 RX ADMIN — SODIUM CHLORIDE, PRESERVATIVE FREE 10 ML: 5 INJECTION INTRAVENOUS at 06:01

## 2024-01-20 RX ADMIN — Medication 100 MG: at 09:01

## 2024-01-20 RX ADMIN — FOLIC ACID 1 MG: 1 TABLET ORAL at 09:01

## 2024-01-20 RX ADMIN — PANTOPRAZOLE SODIUM 40 MG: 40 TABLET, DELAYED RELEASE ORAL at 09:01

## 2024-01-20 NOTE — PROGRESS NOTES
Gastroenterology/Hepatology Progress Note    Patient Name: Franco Hein  Age: 48 y.o.  : 1975  MRN: [id number]  Admission Date: 2024      Self, Aaareferral    SUBJECTIVE:      No acute events overnight.  Bowels are still loose with 2 bowel movements.  Patient was thoroughly educated his diagnosis hepatocellular carcinoma.  Patient under stood that he needs to stop drinking for any chance of improvement of his liver.  He also reports that he is not eating as much.  Recommended that appropriate caloric intake in order to get better.  Goals of care then we will need to be done by primary team.  Patient denies lightheadedness, dizziness, chest pain, shortness O a breath, abdominal pain, nausea, vomiting, hematemesis, hematochezia or melenic stool.    Review of patient's allergies indicates:  No Known Allergies       INPATIENT MEDICATIONS    Scheduled Meds:   folic acid  1 mg Oral Daily    LIDOcaine HCL 10 mg/ml (1%)  1 mL Other Once    miconazole NITRATE 2 %   Topical (Top) BID    pantoprazole  40 mg Oral Daily    rifAXIMin  550 mg Oral BID    sodium chloride 0.9%  10 mL Intravenous Q6H    thiamine  100 mg Oral Daily     Continuous Infusions:  PRN Meds:  0.9%  NaCl infusion (for blood administration), 0.9%  NaCl infusion (for blood administration), 0.9%  NaCl infusion (for blood administration), 0.9%  NaCl infusion (for blood administration), 0.9%  NaCl infusion (for blood administration), 0.9%  NaCl infusion (for blood administration), sodium chloride 0.9%, acetaminophen, acetaminophen, benzonatate, dextrose 10%, dextrose 10%, glucagon (human recombinant), glucose, glucose, HYDROcodone-acetaminophen, hydrOXYzine pamoate, iohexoL, lactulose, sodium chloride 0.9%, Flushing PICC/Midline Protocol **AND** sodium chloride 0.9% **AND** sodium chloride 0.9%          Review of Systems:       Review of Systems     As stated above  Objective:       VITAL SIGNS: 24 HR MIN & MAX LAST    Temp  Min: 97.5 °F (36.4 °C)   Max: 100.3 °F (37.9 °C)  98.6 °F (37 °C)        BP  Min: 118/75  Max: 128/81  126/73     Pulse  Min: 86  Max: 115  (!) 115     Resp  Min: 18  Max: 18  18    SpO2  Min: 94 %  Max: 99 %  (!) 94 %        Physical Exam   General:  Well developed, well nourished, no acute respiratory distress  Head: Normocephalic, atraumatic  Eyes: PERRL, EOMI, anicteric sclera  Throat: No posterior pharyngeal erythema or exudate, no tonsillar exudate  Neck: supple, normal ROM, no thyromegaly   CVS:  RRR, S1 and S2 normal, no murmurs, no added heart sounds, rubs, gallops, 2+ peripheral pulses  Resp:  Lungs clear to auscultation bilaterally, no wheezes, rales, or rhonci  GI:  Abdomen soft, non-tender, distended, fluid wave positive, normoactive bowel sounds.  Spider angiomas on abdomen  MSK:  No muscle atrophy, cyanosis, 2 to 3+ pitting edema, full range of motion  Skin:  Edema on the hip  Neuro:  Alert and oriented x3,     LABS:    Recent Labs   Lab 01/15/24  0629 01/16/24 0659 01/16/24 2139 01/17/24 0332 01/17/24 1616 01/18/24 0517 01/19/24  0408   WBC 12.56* 13.68*  --  13.52*  --  11.71* 9.23   HGB 8.7* 7.5* 8.7* 8.9* 7.7* 9.1* 9.0*   HCT 25.8* 23.3* 26.2* 25.4* 22.8* 26.4* 25.8*   PLT 80* 99*  --  67*  --  42* 64*   MCV 93.1 95.1*  --  92.4  --  88.9 90.5       Recent Labs   Lab 01/13/24 0317 01/14/24  0529 01/15/24  0629 01/16/24  0659 01/16/24 2139 01/17/24 0332 01/17/24 1616 01/18/24  0517 01/19/24  0408   HGB 9.0* 8.7* 8.7* 7.5* 8.7* 8.9* 7.7* 9.1* 9.0*   HCT 27.8* 26.9* 25.8* 23.3* 26.2* 25.4* 22.8* 26.4* 25.8*        Recent Labs   Lab 01/17/24  0332 01/18/24  0517 01/18/24  1348 01/19/24  0408 01/19/24  1742   * 124* 123* 126* 125*   K 4.5 3.9 4.1 3.9 4.2   CO2 22 21* 21* 23 22   BUN 28.4* 21.6* 17.4 15.1 12.5   CREATININE 1.07 0.77 0.75 0.70* 0.70*   BILITOT 7.5* 7.4*  --  8.0*  --    BILIDIR  --   --   --  4.9*  --    ALKPHOS 190* 213*  --  249*  --    * 116*  --  95*  --    ALT 80* 93*  --  78*  --   "  LABPROT 6.0* 5.7*  --  6.0*  --    ALBUMIN 1.6* 1.9*  --  1.9*  --         Recent Labs   Lab 01/16/24  0659 01/17/24  0333 01/18/24  0852 01/19/24  0408   INR 2.1* 1.9* 2.0* 2.0*   PROTIME 23.2* 21.4* 21.9* 21.9*        No results for input(s): "IRON", "FERRITIN" in the last 168 hours.         IMAGING:   US Abdomen Limited    Result Date: 1/17/2024  EXAMINATION: US ABDOMEN LIMITED CLINICAL HISTORY: Suspected HCC, cirrhosis; TECHNIQUE: Grayscale and color Doppler images of the abdomen are obtained. COMPARISON: CT angiogram of the abdomen and pelvis 01/16/2024. FINDINGS: The gallbladder is contracted.  The liver contour is nodular consistent with cirrhosis.  The heterogeneous mass lesion in the dome of the right lobe of the liver is again noted better visualized on yesterday's exam.  There is a moderate volume of ascites.  There is echogenic material posterior to the right lobe of the liver consistent with the blood products seen on CT.  The main portal vein is patent.  The CBD is not well visualized. The right kidney measures 11.0 by 5.6 x 5.8 cm.  There is no hydronephrosis or nephrolithiasis of the right kidney.     1. Cirrhosis with a heterogeneous infiltrative mass in the dome of the right lobe of the liver better visualized on yesterday's exam. 2. Moderate volume ascites with echogenic material posterior to the right lobe of the liver consistent with a blood products seen on yesterday's exam. Electronically signed by: Keyon Beck MD Date:    01/17/2024 Time:    10:43    Cardiac catheterization    Result Date: 1/16/2024  Procedure performed in the Invasive Lab  - See Procedure Log link below for nursing documentation  - See OpNote on Surgeries Tab for physician findings  - See Imaging Tab for radiologist dictation    CTA Abdomen and Pelvis    Result Date: 1/16/2024  EXAMINATION: CTA ABDOMEN AND PELVIS CLINICAL HISTORY: Portal vein thrombosis;Mesenteric ischemia, acute; TECHNIQUE: Axial images of the abdomen and " pelvis were obtained with and without  IV contrast administration.  Coronal and sagittal reconstructions were provided.  Three dimensional and MIP images were obtained and evaluated.  Total DLP was 5216.27 mGy-cm. Dose lowering technique and automated exposure control were utilized for this exam. COMPARISON: CT of the chest abdomen and pelvis 01/01/2024. FINDINGS: Vascular findings: The distal descending thoracic aorta is nonaneurysmal. The abdominal aorta is nonaneurysmal.  The celiac axis is widely patent.  There is a normal branching pattern of the celiac. The SMA is widely patent. The single right and 2 left renal arteries are widely patent. The ABHIJIT is widely patent.  There is no significant iliofemoral disease. The main portal vein is patent.  There is tumor thrombus within the left portal vein with nonocclusive thrombosis.  There is recanalization of the umbilical vein.  There is a prominent spleno renal shunt.  The splenic vein is patent.  The superior mesenteric vein is patent. Nonvascular findings: There is atelectasis in the right lung base.  The left lung base is clear. The liver contour is nodular consistent with cirrhosis.  There is a lobulated solid and cystic mass lesion in the dome of the right lobe of the liver measuring 7.4 x 6.3 cm.  There is inferior extension and invasion into the left portal vein.  There is no biliary dilatation.  There is a large volume of ascites.  There is hyperdense material in the posterior right upper lobe with a focal area of contrast extravasation best visualized on series 8 image 27 and series 7, images 216-218. The gallbladder is contracted.  The spleen, pancreas, and left adrenal gland are normal.  There is a right adrenal nodule which is indeterminate on this exam.  The bilateral kidneys are normal.  The stomach and small bowel are decompressed.  There is no bowel wall thickening.  The colon is normal.  The urinary bladder is normal.  There is no pelvic or  retroperitoneal lymphadenopathy.  There is no lytic or blastic osseous lesion.     1. Solid and cystic mass lesion in the superior aspect of the right hepatic lobe with invasion of the intrahepatic left portal vein and sequela of portal hypertension as above.  Additionally there is capsular rupture and active extravasation with a moderate amount of blood products in the posterior right upper quadrant.  Given the portal venous invasion and cirrhotic morphology of the liver this is favored to represent hepatocellular carcinoma although enhancement characteristics are not classic. 2. Indeterminate right adrenal nodule.  Metastatic disease cannot be excluded. 3. Minimal right lower lobe atelectasis. Red Alert: The critical information above was relayed directly by me by telephone to Dr. Rosenthal On 1/16/2024 at 11:52 . Electronically signed by: Keyon Beck MD Date:    01/16/2024 Time:    11:52    X-Ray Abdomen AP 1 View    Result Date: 1/16/2024  EXAMINATION: XR ABDOMEN AP 1 VIEW CLINICAL HISTORY: abdominal pain; TECHNIQUE: AP X-RAY OF THE ABDOMEN: CPT 67551 FINDINGS: Examination reveals residual feces throughout the colon the gas pattern is nonspecific with no clear evidence of ileus or obstruction no abnormal masses or calcifications identified. Slightly more prominent loop of small bowel identified in the left hemiabdomen these might be related to a sentinel loop which my signal an inflammatory process near the area of the 89 make segment of bowel     Loop of small bowel that might represent a short segment in a 9 larry ileus in represent a sentinel loop indicating the presence of an inflammatory process clinical correlation is suggested Electronically signed by: Cameron Peralta Date:    01/16/2024 Time:    08:50    X-Ray Chest 1 View    Result Date: 1/6/2024  EXAMINATION: XR CHEST 1 VIEW CLINICAL HISTORY: Pneumonia; TECHNIQUE: Frontal view(s) of the chest. COMPARISON: Radiography 01/05/2024 FINDINGS: Enteric tube  seen as far as the gastric body.  Lungs remain hypoinflated with stable to slightly improved bilateral consolidation.  No significant pleural fluid.  No pneumothorax.  Stable cardiac silhouette.     Bilateral consolidation stable to slightly improved. Electronically signed by: Gómez Carmona Date:    01/06/2024 Time:    09:35    X-Ray Chest 1 View    Result Date: 1/5/2024  EXAMINATION: XR CHEST 1 VIEW CPT 20743 CLINICAL HISTORY: pneumonia; COMPARISON: January 4, 2024 FINDINGS: Cardiomediastinal silhouette and pleuroparenchymal changes are essentially unchanged as compared with the previous exam There has been interval insertion of a nasogastric tube with the tip below the diaphragm     No significant change Interval insertion of nasogastric Electronically signed by: Cameron Peralta Date:    01/05/2024 Time:    08:16    Echo Saline Bubble? No    Result Date: 1/4/2024    Left Ventricle: The left ventricle is normal in size. Ventricular mass is normal. Normal wall thickness. Normal wall motion. There is normal systolic function. Biplane (2D) method of discs ejection fraction is 62%. There is normal diastolic function.   Right Ventricle: Normal right ventricular cavity size. Systolic function could not be assesed due to poor visualization.   Left Atrium: Normal left atrial size.   Right Atrium: Normal right atrial size.   Aortic Valve: Not well visualized due to poor acoustic window. There is no stenosis. There is no significant regurgitation.   Mitral Valve: The mitral valve is structurally normal. There is normal leaflet mobility.   Tricuspid Valve: Not well visualized due to poor acoustic window. The tricuspid valve is structurally normal. There is no significant regurgitation.   Pulmonic Valve: Not well visualized due to poor acoustic window. There is no significant regurgitation.   Pulmonary Artery: Pulmonary artery pressure could not be estimated.   IVC/SVC: Intermediate venous pressure at 8 mmHg.   Pericardium:  There is no pericardial effusion.     XR Gastric tube check, non-radiologist performed    Result Date: 1/4/2024  EXAMINATION: XR GASTRIC TUBE CHECK, NON-RADIOLOGIST PERFORMED CLINICAL HISTORY: NG tube placement verification; COMPARISON: 3 January 2024 FINDINGS: Frontal image of the abdomen. Enteric tube extends well into the stomach.  The tip overlies the expected area of the gastric antrum.     As above. Electronically signed by: Aamir Phillip Date:    01/04/2024 Time:    15:03    X-Ray Chest 1 View    Result Date: 1/4/2024  EXAMINATION: XR CHEST 1 VIEW CPT 35753 CLINICAL HISTORY: tachypnea; COMPARISON: January 3, 2024 FINDINGS: Examination reveals cardiomediastinal silhouette improved parenchymal changes to be essentially unchanged as compared with the previous exam     No significant change Electronically signed by: Cameron Peralta Date:    01/04/2024 Time:    08:22    X-Ray Abdomen AP 1 View    Result Date: 1/3/2024  EXAMINATION: XR ABDOMEN AP 1 VIEW CLINICAL HISTORY: distended abdomen; TECHNIQUE: Single-view of the abdomen COMPARISON: No prior. FINDINGS: No acute osseous abnormality.  Nonobstructive bowel gas pattern.     Nonobstructive bowel gas pattern. Electronically signed by: Josef Sow Date:    01/03/2024 Time:    09:46    X-Ray Chest 1 View    Result Date: 1/3/2024  EXAMINATION: XR CHEST 1 VIEW CLINICAL HISTORY: PNA; TECHNIQUE: Single view of the chest COMPARISON: 01/02/2024 FINDINGS: Left upper lobe opacification remains slightly improved in the interval.  The lungs are hypoexpanded.  The cardiomediastinal silhouette is unchanged.     No adverse interval change.  Improved aeration of the left upper lobe. Electronically signed by: Josef Sow Date:    01/03/2024 Time:    09:46    X-Ray Chest 1 View    Result Date: 1/2/2024  EXAMINATION: XR CHEST 1 VIEW CPT 07957 CLINICAL HISTORY: increased oxygen demand; COMPARISON: January 1, 2024 FINDINGS: Examination reveals cardiomediastinal silhouette to be  unchanged as compared with the previous exam. Worsening confluent airspace opacities specially in the right perihilar region and right upper lobe with some confluent opacities also seen in the right upper lobe some of the changes might be related to poor inspiratory effort, however, the bulk of the changes are related to worsening infiltrative changes specially in the left     Worsening consolidative changes in the left lung as above Electronically signed by: Cameron Peralta Date:    01/02/2024 Time:    12:51    CT Chest Abdomen Pelvis Without Contrast (XPD)    Result Date: 1/2/2024  EXAMINATION: CT CHEST ABDOMEN PELVIS WITHOUT CONTRAST(XPD) CLINICAL HISTORY: Suspect pneumonia, biliary obstruction; TECHNIQUE: Helical acquisition through the chest, abdomen and pelvis without  IV administration of contrast. Axial, sagittal and coronal reformats interpreted. Automated tube current modulation, weight-based exposure dosing, and/or iterative reconstruction technique utilized to reach lowest reasonably achievable exposure rate. DLP: 1042 mGy*cm COMPARISON: Chest radiograph 01/01/2024 FINDINGS: BASE OF NECK: No significant abnormality. HEART: Normal size. There are coronary artery calcifications.  Low-density blood pool with visualization of the interventricular septum compatible with anemia. THORACIC VASCULATURE: Thoracic aorta is unremarkable. SIRISHA/MEDIASTINUM: Small nonspecific mediastinal lymph nodes.  Evaluation of hilar lymphadenopathy is limited without contrast. AIRWAYS: Patent. LUNGS/PLEURA: Multilobar consolidative opacities within the lungs. THORACIC SOFT TISSUES: Unremarkable. LIVER: Limited evaluation the liver due to lack of intravenous contrast and placement of the patient's arms over the abdomen which causes beam hardening artifact.  Heterogeneous attenuation of the liver.  There appears to be a mass at the dome of the liver.  Caudate lobe hypertrophy and changes of cirrhosis. BILIARY: The gallbladder does  not appear to be overly distended. PANCREAS: Nonspecific retroperitoneal edema.  Unable to assess for changes of pancreatitis given generalized fluid overload. SPLEEN: Upper limits of normal in size. ADRENALS: There is a 1.6 cm right adrenal nodule.  Unable to accurately measure internal attenuation due to beam hardening artifact. KIDNEYS/URETERS: Hyperdense renal pyramids.  This can be related to volume status/dehydration or medullary calcinosis. GI TRACT/MESENTERY: Evaluation of the bowel is limited without contrast. Bowel is normal in caliber without evidence of obstruction. PERITONEUM: Small volume ascites.No free air. LYMPH NODES: No enlarged lymph nodes by size criteria. ABDOMINOPELVIC VASCULATURE: Normal caliber abdominal aorta.  Large periumbilical vein. BLADDER: Normal appearance given degree of distention. REPRODUCTIVE ORGANS: Normal as visualized. ABDOMINAL WALL: Small fat containing right inguinal hernia.  Mild body wall edema. BONES: Anterolisthesis at the lumbosacral junction.     1. Multilobar consolidative opacities within the lungs 2. Cirrhotic morphology of the liver with mass at the dome of the liver.  Evaluation is moderately limited without contrast and due to beam hardening artifact related to patient positioning.  Recommend liver protocol CT abdomen without and with contrast. 3. Ascites 4. Indeterminate right adrenal nodule.  Continued attention recommended on follow-up exams. 5. The preliminary and final reports are concordant. Electronically signed by: Tika Mcknight Date:    01/02/2024 Time:    09:44    X-Ray Chest 1 View    Result Date: 1/1/2024  EXAMINATION: XR CHEST 1 VIEW CLINICAL HISTORY: cough;  Cough, unspecified TECHNIQUE: Single view of the chest COMPARISON: No prior imaging available for comparison. FINDINGS: Bilateral patchy airspace opacities greatest on the left. The cardiomediastinal silhouette is within normal limits. No acute osseous abnormality.     Bilateral patchy  airspace opacities greatest on the left.  Findings concerning for atypical infectious process. Electronically signed by: Josef Sow Date:    01/01/2024 Time:    14:23        Assessment / Plan:     Franco Hein is a 48 y.o. male       Franco Hein is a 48 y.o. male  with unknown past medical history presented to [facility] ED 01/01/2020 for the complaint of jaundice, weakness for 3 weeks, and altered mental status.  Gastroenterology was consulted for recommendations patient worsening labs with alcoholic liver disease.        Alcoholic liver disease with ascites  Hepatic encephalopathy (resolved)  Alcohol abuse  [facility] discriminant function score: 72.8   [place] alcoholic hepatitis score: 9  [place] 17.1  Child Hoang class C score:  15   [place] score .82:  Non responder  - continue rifaximin.  Will eventually need to be placed on  lactulose when stools as loose for 2-3 bowel movements per day.  - can restart diuretics as long as sodium levels remain greater than 125  -continue CIWA protocol   -continue thiamine and folic acid     HCC  - AFP elevated at 1400  -advised alcohol cessation  -patient will need outpatient Oncology referral for systemic chemotherapy  -goals of care discussion to be had by primary team     Hyponatremia (improving)  -monitor labs for improvement off diuretics  -can restart diuretics as long as sodium levels remained greater than 125    Thrombocytopenia (improving)  - do not administer platelets unless patient is actively bleeding  - once hemoglobin     Strep pneumoniae bacteremia (resolved)  - being treated with  Rocephin 2g for 14 days.    Intrahepatic bleeding s/p Gelfoam embolization (resolved)  - Gelfoam embolization performed 01/16/2024    Timmy Sánchez MD,   Internal Medicine PGY-3  [facility]

## 2024-01-20 NOTE — PLAN OF CARE
Problem: Adult Inpatient Plan of Care  Goal: Plan of Care Review  Outcome: Ongoing, Progressing  Goal: Patient-Specific Goal (Individualized)  Outcome: Ongoing, Progressing  Goal: Absence of Hospital-Acquired Illness or Injury  Outcome: Ongoing, Progressing  Goal: Optimal Comfort and Wellbeing  Outcome: Ongoing, Progressing  Goal: Readiness for Transition of Care  Outcome: Ongoing, Progressing     Problem: Infection  Goal: Absence of Infection Signs and Symptoms  Outcome: Ongoing, Progressing     Problem: Infection  Goal: Absence of Infection Signs and Symptoms  Outcome: Ongoing, Progressing     Problem: Skin Injury Risk Increased  Goal: Skin Health and Integrity  Outcome: Ongoing, Progressing     Problem: Impaired Wound Healing  Goal: Optimal Wound Healing  Outcome: Ongoing, Progressing

## 2024-01-20 NOTE — PROGRESS NOTES
[facility] Medicine Wards Progress Note     Resident Team: [facility] Medicine List 4  Attending Physician: Kamryn Estevez MD  Resident: MD Galo  Intern: DO Ulysses       Subjective:      Brief HPI:  Franco Hein is  48 y.o. male with a PMH of ETOH misuse disorder who presented to [facility] ED on 2024 with complaint of jaundice, weakness for 3 weeks, and AMS. Patient was brought in by a friend who states patient is a daily drinker who has not been able to go to a store for the last 2 days, but patient is noted to have a positive ethanol on admission. Patient unable to provide history given his mental status and was only able to say his name and confirm he drinks alcohol but does not do drugs. Patient was tachypneic, tachycardic, and tremulous, on presentation with jaundice. Admitted to ICU given altered mental status and concern for decompensation in the setting of alcohol withdrawal and influenza A, also found to have strep pneumonia bacteremia, on appropriate IV antibiotics.     Hospital Course:  23: Paracentesis performed w/ removal of 4.5L of bloody ascitic fluid w/ symptomatic improvement & hemodynamic stability thereafter.     Interval History:   NAEO. Patient complaining of cough and mild 2/10 abdominal pain and leg swelling today; otherwise denies any other complaints notably fever, chills, chest pain, SOB. Also denies any bloody or melenic stools. AFP significantly elevated 1143. Sodium 124 this morning, T Bili and liver enzymes minimally decreased. H&H 8.3/24.1, platelets 60.     Review of Systems:  ROS completed and negative except as indicated above.     Objective:     Last 24 Hour Vital Signs:  BP  Min: 90/50  Max: 127/80  Temp  Av.1 °F (37.3 °C)  Min: 98 °F (36.7 °C)  Max: 100.3 °F (37.9 °C)  Pulse  Av.8  Min: 104  Max: 117  Resp  Av  Min: 18  Max: 18  SpO2  Av.3 %  Min: 94 %  Max: 100 %  I/O last 3 completed shifts:  In: 1162 [P.O.:1162]  Out: 1452 [Urine:1451;  Stool:1]    Physical Examination:  General: ill-appearing though non-toxic, in no acute distress  Eye: PERRLA, EOMI, scleral icterus noted  HENT: NC/AT, moist mucus membranes  Neck: full range of motion, no thyromegaly or lymphadenopathy  Respiratory: Clear and equal bilateral BS, satting well on RA  Cardiovascular: regular rate and rhythm without murmurs, gallops or rubs  Gastrointestinal: soft, non-tender, mildly-distended with normal bowel sounds, without masses to palpation  Musculoskeletal: no obvious deformities, full range of motion of all extremities/spine without limitation or discomfort  Integumentary: Jaundice, spider angiomas present on upper chest and abdomen, no caput medusa. Petechiae noted to right lateral LQ, and small scattered bruising on arms and legs.   Extremities: radial and DP pulses 2+ bilaterally, 2+ BLE edema  Neurologic: Pt AAOx3, following commands and conversing appropriately. No focal neuro deficits appreciated, CN II-XII grossly intact.       Laboratory:  Most Recent Data:  CBC:   Lab Results   Component Value Date    WBC 8.43 01/20/2024    HGB 8.3 (L) 01/20/2024    HCT 24.1 (L) 01/20/2024    PLT 60 (L) 01/20/2024    MCV 92.3 01/20/2024    RDW 19.5 (H) 01/20/2024     WBC Differential:   Recent Labs   Lab 01/16/24  0659 01/16/24  2139 01/17/24  0332 01/17/24  1616 01/18/24  0517 01/19/24  0408 01/20/24  0338   WBC 13.68*  --  13.52*  --  11.71* 9.23 8.43   HGB 7.5*   < > 8.9* 7.7* 9.1* 9.0* 8.3*   HCT 23.3*   < > 25.4* 22.8* 26.4* 25.8* 24.1*   PLT 99*  --  67*  --  42* 64* 60*   MCV 95.1*  --  92.4  --  88.9 90.5 92.3    < > = values in this interval not displayed.       BMP:   Lab Results   Component Value Date     (L) 01/20/2024    K 4.0 01/20/2024    CO2 21 (L) 01/20/2024    BUN 12.5 01/20/2024    CREATININE 0.69 (L) 01/20/2024    CALCIUM 7.2 (L) 01/20/2024    MG 1.80 01/20/2024    PHOS 2.9 01/20/2024     LFTs:   Lab Results   Component Value Date    ALBUMIN 1.8 (L)  "01/20/2024    BILITOT 7.6 (H) 01/20/2024    AST 81 (H) 01/20/2024    ALKPHOS 226 (H) 01/20/2024    ALT 67 (H) 01/20/2024     Coags:   Lab Results   Component Value Date    INR 2.0 (H) 01/20/2024    PROTIME 21.9 (H) 01/20/2024    PTT 38.6 (H) 01/01/2024     FLP: No results found for: "CHOL", "HDL", "LDLCALC", "TRIG", "CHOLHDL"  DM:   Lab Results   Component Value Date    CREATININE 0.69 (L) 01/20/2024     Thyroid: No results found for: "TSH", "FREET4", "A2XHNLK", "G5EMWDU", "THYROIDAB"  Anemia:   Lab Results   Component Value Date    IRON 39 (L) 01/01/2024    TIBC 203 (L) 01/01/2024    FERRITIN 116.00 01/01/2024    JVMXHXQX14 >2,000 (H) 01/01/2024    FOLATE 18.3 01/01/2024     Cardiac:   Lab Results   Component Value Date    TROPONINI 0.029 01/01/2024    BNP 89.3 01/01/2024       Microbiology Data Reviewed: yes  Pertinent Findings:  1/1/24: bld cx +strep pneumonia pan sensitive x1 bottle  1/4/24: bld cx x2 negative    Other Results:  TTE 1/4/24: EF 62%, no evidence of vegetation      Radiology:  Imaging Results              CT Chest Abdomen Pelvis Without Contrast (XPD) (Final result)  Result time 01/02/24 09:44:01      Final result by Tika Mcknight MD (01/02/24 09:44:01)                   Impression:      1. Multilobar consolidative opacities within the lungs  2. Cirrhotic morphology of the liver with mass at the dome of the liver.  Evaluation is moderately limited without contrast and due to beam hardening artifact related to patient positioning.  Recommend liver protocol CT abdomen without and with contrast.  3. Ascites  4. Indeterminate right adrenal nodule.  Continued attention recommended on follow-up exams.  5. The preliminary and final reports are concordant.      Electronically signed by: Tika Mcknight  Date:    01/02/2024  Time:    09:44               Narrative:    EXAMINATION:  CT CHEST ABDOMEN PELVIS WITHOUT CONTRAST(XPD)    CLINICAL HISTORY:  Suspect pneumonia, biliary " obstruction;    TECHNIQUE:  Helical acquisition through the chest, abdomen and pelvis without  IV administration of contrast. Axial, sagittal and coronal reformats interpreted.    Automated tube current modulation, weight-based exposure dosing, and/or iterative reconstruction technique utilized to reach lowest reasonably achievable exposure rate.    DLP: 1042 mGy*cm    COMPARISON:  Chest radiograph 01/01/2024    FINDINGS:  BASE OF NECK: No significant abnormality.    HEART: Normal size. There are coronary artery calcifications.  Low-density blood pool with visualization of the interventricular septum compatible with anemia.    THORACIC VASCULATURE: Thoracic aorta is unremarkable.    SIRISHA/MEDIASTINUM: Small nonspecific mediastinal lymph nodes.  Evaluation of hilar lymphadenopathy is limited without contrast.    AIRWAYS: Patent.    LUNGS/PLEURA: Multilobar consolidative opacities within the lungs.    THORACIC SOFT TISSUES: Unremarkable.    LIVER: Limited evaluation the liver due to lack of intravenous contrast and placement of the patient's arms over the abdomen which causes beam hardening artifact.  Heterogeneous attenuation of the liver.  There appears to be a mass at the dome of the liver.  Caudate lobe hypertrophy and changes of cirrhosis.    BILIARY: The gallbladder does not appear to be overly distended.    PANCREAS: Nonspecific retroperitoneal edema.  Unable to assess for changes of pancreatitis given generalized fluid overload.    SPLEEN: Upper limits of normal in size.    ADRENALS: There is a 1.6 cm right adrenal nodule.  Unable to accurately measure internal attenuation due to beam hardening artifact.    KIDNEYS/URETERS: Hyperdense renal pyramids.  This can be related to volume status/dehydration or medullary calcinosis.    GI TRACT/MESENTERY: Evaluation of the bowel is limited without contrast. Bowel is normal in caliber without evidence of obstruction.    PERITONEUM: Small volume ascites.No free  air.    LYMPH NODES: No enlarged lymph nodes by size criteria.    ABDOMINOPELVIC VASCULATURE: Normal caliber abdominal aorta.  Large periumbilical vein.    BLADDER: Normal appearance given degree of distention.    REPRODUCTIVE ORGANS: Normal as visualized.    ABDOMINAL WALL: Small fat containing right inguinal hernia.  Mild body wall edema.    BONES: Anterolisthesis at the lumbosacral junction.                        Preliminary result by Santana Lerma MD (01/01/24 22:56:45)                   Impression:    1. No intrahepatic or extrahepatic biliary duct dilatation is seen.  2. Note of subtle increased attenuation in the medullary portions of the bilateral kidneys which may reflect medullary calcinosis. Correlate with clinical and laboratory findings as regards additional evaluation and follow-up.  3. There is multifocal patchy dense consolidation of both the lungs with predominant involvement of the left upper and right lower lobes. This is consistent with multilobar pneumonia. Correlate with clinical and laboratory findings as regards additional evaluation and follow-up to full resolution.  4. There is a 2.8 x 2 cm soft tissue density nodule in the right adrenal gland (series 2 image 98). This is of indeterminate etiology.  5. The margins of the liver appears somewhat nodular and irregular. This is suggestive of cirrhosis. Correlate with clinical and laboratory findings.  6. Details and other findings as discussed above.               Narrative:    START OF REPORT:  Technique: CT Scan of the chest abdomen and pelvis was performed without intravenous contrast with axial as well as sagittal and, coronal images.    Dosage Information: Automated Exposure Control was utilized 1041.63 mGy.cm.    Comparison: None.    Clinical History: Suspect pneumonia, biliary obstruction.    Findings:  Mediastinum: A few scattered subcentimeter mediastinal lymph nodes are seen.  Heart: The heart size is within normal limits.  Aorta:  Unremarkable appearing aorta.  Pulmonary Arteries: Unremarkable.  Lungs: There is multifocal patchy dense consolidation of both the lungs with predominant involvement of the left upper and right lower lobes.  Pleura: No effusions or pneumothorax are identified.  Abdomen:  Abdominal Wall: There is extensive stranding of the subcutaneous fat as well as the intraabdominal and intrapelvic fat consistent with anasarca edema. Correlate with clinical and laboratory findings.  Liver: The margins of the liver appears somewhat nodular and irregular. There are also prominent portosystemic collaterals consistent with portal hypertension.  Biliary System: No intrahepatic or extrahepatic biliary duct dilatation is seen.  Gallbladder: The gallbladder is not definitely identified on this noncontrast examination and may be contracted.  Pancreas: The pancreas appears unremarkable.  Spleen: The spleen appears unremarkable.  Adrenals: There is a 2.8 x 2 cm soft tissue density nodule in the right adrenal gland (series 2 image 98).  Kidneys: Note of subtle increased attenuation in the medullary portions of the bilateral kidneys which may reflect medullary calcinosis.  Aorta: The abdominal aorta appears unremarkable.  IVC: Unremarkable.  Bowel:  Esophagus: There is a small hiatal hernia.  Stomach: The stomach appears unremarkable.  Duodenum: Unremarkable appearing duodenum.  Small Bowel: The small bowel appears unremarkable.  Colon: Nondistended.  Appendix: The appendix is not identified but no inflammatory changes are seen in the right lower quadrant to suggest appendicitis.  Peritoneum: There is mild ascites. No intraperitoneal free gas is seen.    Pelvis:  Bladder: The bladder appears unremarkable.  Male:  Prostate gland: The prostate gland appears unremarkable.    Bony structures:  Dorsal Spine: There is mild to moderate spondylosis of the visualized dorsal spine.  Bony Pelvis: The visualized bony structures of the pelvis appear  unremarkable.                                         X-Ray Chest 1 View (Final result)  Result time 01/01/24 14:23:11   Procedure changed from X-Ray Chest PA And Lateral     Final result by Josef Sow MD (01/01/24 14:23:11)                   Impression:      Bilateral patchy airspace opacities greatest on the left.  Findings concerning for atypical infectious process.      Electronically signed by: Josef Sow  Date:    01/01/2024  Time:    14:23               Narrative:    EXAMINATION:  XR CHEST 1 VIEW    CLINICAL HISTORY:  cough;  Cough, unspecified    TECHNIQUE:  Single view of the chest    COMPARISON:  No prior imaging available for comparison.    FINDINGS:  Bilateral patchy airspace opacities greatest on the left.    The cardiomediastinal silhouette is within normal limits.    No acute osseous abnormality.                                       RADIOLOGY REPORT (Final result)  Result time 01/04/24 11:39:28      Final result by Unknown User (01/04/24 11:39:28)                                        Current Medications:     Infusions:       Scheduled:   folic acid  1 mg Oral Daily    LIDOcaine HCL 10 mg/ml (1%)  1 mL Other Once    miconazole NITRATE 2 %   Topical (Top) BID    pantoprazole  40 mg Oral Daily    rifAXIMin  550 mg Oral BID    sodium chloride 0.9%  10 mL Intravenous Q6H    thiamine  100 mg Oral Daily        PRN:  0.9%  NaCl infusion (for blood administration), 0.9%  NaCl infusion (for blood administration), 0.9%  NaCl infusion (for blood administration), 0.9%  NaCl infusion (for blood administration), 0.9%  NaCl infusion (for blood administration), 0.9%  NaCl infusion (for blood administration), sodium chloride 0.9%, acetaminophen, acetaminophen, albuterol-ipratropium, benzonatate, dextrose 10%, dextrose 10%, glucagon (human recombinant), glucose, glucose, HYDROcodone-acetaminophen, hydrOXYzine pamoate, iohexoL, lactulose, sodium chloride 0.9%, Flushing PICC/Midline Protocol **AND** sodium  chloride 0.9% **AND** sodium chloride 0.9%    Antibiotics and Day Number of Therapy:  Vanc x3 days  Zosyn/Azithromycin x4 days  Rocephin 2g on D12/14     Lines and Day Number of Therapy:  PIV      Assessment & Plan:     Decompensated Cirrhosis  Hypervolemic Hyponotremia  Liver lesion likely HCC  Bilirubinemia       - suspected etiology ETOH. Pt reportedly had hx of heavy drinking per friend who dropped him off       - Positive hepatitis C antibody; VL negative, no active infection         -- MELD-Na: 28 -->25 --> 22--> 29 --> 28 (1/19)    -- Child Hoang: 13 --> 11 --> 11 --> 11 (1/19) class C  -- Maddrey: 204 on admission       - INR flat, 2 today       - GI consulted w/ recs to discontinue Lasix/Aldactone/Steroids at this time; appreciate assistance in care       - Continued improvement In encephalopathy; hold lactulose at this time w/ prior diarrhea       - C/w Rifaxamin for hepatic encephalopathy       - FeNA 1%, Nina < 20; lower extremity edema; likely hypervolemic hyponotremia       - 4.5 L bloody ascitic fluid removed on 1/17 for therapeutic/diagnostic para, studies pending       - AFP 1143       - Pending fractionated bilirubin    Intrahepatic bleed likely 2/2 tumor       - Pt had slow intrahepatic bleed likely over the past several days as complication from HCC       - Labs in AM showed H&H had continued to drop slowly, lactic acid elevated       - CTA abd/pelvis showed intrahepatic bleeding with lesion in right lobe of liver suspicious for HCC, capsular rupture of liver and some bleeding and contrast noted with ascites       - Taken by Vasc surgery for gelfoam embolization which was successful, appreciate assistance       - Pt transfused 4 units pRBC, 2 FFP, 1 platelet; no need to transfuse unless actively bleeding       - Trend CBC daily       - Will continue to monitor for symptoms       - C/w SCDs for DVT ppx in setting of recent thrombocytopenia requiring x1u platelets (1/18)       -   Factor VIII level  elevated > 300    Pneumonia 2/2 Influenza A-resolved       - Positive on 1/1/24; initially ICU with BIPAP for respiratory failure but stable on floor since 1/9/24       - Possible superimposed Strep PNA leading to bacteremia and pneumonia; completed IV abx regimen    Strep Pneumoniae Bacteremia-resolved       - Blood culture positive x1/2 Streptococcus; [employer] confirmation strep pneumonia bacteremia which is pan-sensitive, resp culture /Gram stain collection pending.       - Repeat blood cultures 1/4/24  negative; TTE did not have any findings consistent with endocarditis.        - s/p Zosyn and Zithromax x4 days; MRSA PCR negative, discontinued vancomycin after D3.        - Completed 2 week regimen Ceftriaxone 2g on 1/17/24    Skin Breakdown to L buttocks-improving  Pruritic, Erythematous Rash to groin       - Skin tear vs Stage II pressure ulcer       - Wound care consulted, appreciate recs       - Continue q2h turning and mepilex dsg        - Pt now with enteral nutrition, hopeful to aid in healing       - Rash appears fungal in nature, will start miconazole powder to groin BID    Malnutrition  Hyponatremia       - Soft mechanical diet ordered, pt working with SLT       - Diet per dietary recs       - Likely 2/2 cirrhosis; FeNA 1% indicated prerenal       - Na 124 this morning, continue to hold diuretics unless Na > 125       - Per GI recs, holding further administration of Lasix/Aldactone at this time in addition to fluid restriciton       - Pending BMP for further evaluation & hopeful continued correction of hyponatremia    CODE STATUS: Full  Access: PIV  Antibiotics: Ceftriaxone; completed 1/17/24  Diet: Soft mechanical, low sodium  DVT Prophylaxis: SCDs  GI Prophylaxis: Protonix  Fluids: n/a      Disposition: Patient completed 2 week Rocephin course for strep pneumoniae bacteremia completing today. Decompensated cirrhosis with liver lesion likely HCC from ETOH cirrhosis, unstable for discharge at this time.  GI consulted, appreciate assistance.     Sagrario Rosenthal MD  [facility] Internal Medicine [facility]

## 2024-01-20 NOTE — PT/OT/SLP PROGRESS
Physical Therapy Treatment    Patient Name:  Franco Hein   MRN:  [id number]    Recommendations     Therapy Intensity Recommendations at Discharge: Moderate Intensity Therapy  Discharge Equipment Recommendations: walker, rolling   Barriers to discharge: severity of deficits    Assessment     Franco Hein is a 48 y.o. male admitted with a medical diagnosis of Delirium tremens.    He presents with the following impairments/functional limitations:  weakness, impaired endurance, impaired functional mobility, gait instability, impaired balance.    Rehab Prognosis: Good.    Patient would benefit from continued skilled acute PT services to: address above listed impairments/functional limitations; receive patient/caregiver education; reduce fall risk; and maximize independency/safety with functional mobility.    Recent Surgery: Procedure(s) (LRB):  ANGIOGRAM-ABDOMINAL (N/A) 4 Days Post-Op    Plan     During this hospitalization, patient to be seen 3 x/week to address the identified impairments/functional limitations via gait training, therapeutic activities, therapeutic exercises and progress toward the established goals.    Plan of Care Expires:   (Discharge)    Subjective     Communicated with patient's nurse  prior to session.    Patient agreeable to participate in treatment session.    Chief Complaint: Patient without complaints  Patient/Family Comments/goals: to return home  Pain/Comfort:  Pain Rating 1: 0/10    Objective     Patient found supine in bed with    upon PT entry to room.    General Precautions: Standard, fall, droplet   Orthopedic Precautions:N/A   Braces:    Respiratory Status: room air    Functional Mobility:    Bed Mobility:  Sit to Supine: supervision    Transfers:  Sit to Stand: supervision with rolling walker    Gait:  Patient ambulated 260 ft with rolling walker and supervision.  Patient demonstrates :       unsteady gait.    Other Mobility:  not assessed    Patient left supine in bed with all  lines intact, call button in reach, and tray table at bedside.    Goals     Multidisciplinary Problems       Physical Therapy Goals          Problem: Physical Therapy    Goal Priority Disciplines Outcome Goal Variances Interventions   Physical Therapy Goal     PT, PT/OT Ongoing, Progressing     Description: Goals to be met by: dc     Patient will increase functional independence with mobility by performin. Supine to sit with Modified [place] met 1/10/2024    2. Sit to stand transfer with Modified [place]  3. Gait  x 130 feet with Supervision using LRAD    Reviewed 2024                         Time Tracking     PT Received On: 24  PT Start Time: 1310     PT Stop Time: 1325  PT Total Time (min): 15 min     Billable Minutes: Gait Training 15    2024

## 2024-01-21 LAB
ABO + RH BLD: NORMAL
ABO + RH BLD: NORMAL
ALBUMIN SERPL-MCNC: 1.6 G/DL (ref 3.5–5)
ALBUMIN/GLOB SERPL: 0.4 RATIO (ref 1.1–2)
ALP SERPL-CCNC: 197 UNIT/L (ref 40–150)
ALT SERPL-CCNC: 62 UNIT/L (ref 0–55)
AST SERPL-CCNC: 75 UNIT/L (ref 5–34)
BASOPHILS # BLD AUTO: 0.01 X10(3)/MCL
BASOPHILS # BLD AUTO: 0.03 X10(3)/MCL
BASOPHILS NFR BLD AUTO: 0.1 %
BASOPHILS NFR BLD AUTO: 0.2 %
BILIRUB SERPL-MCNC: 9.1 MG/DL
BLD PROD TYP BPU: NORMAL
BLD PROD TYP BPU: NORMAL
BLOOD UNIT EXPIRATION DATE: NORMAL
BLOOD UNIT EXPIRATION DATE: NORMAL
BLOOD UNIT TYPE CODE: 5100
BLOOD UNIT TYPE CODE: 5100
BUN SERPL-MCNC: 24.8 MG/DL (ref 8.9–20.6)
CALCIUM SERPL-MCNC: 7.1 MG/DL (ref 8.4–10.2)
CHLORIDE SERPL-SCNC: 96 MMOL/L (ref 98–107)
CO2 SERPL-SCNC: 19 MMOL/L (ref 22–29)
CREAT SERPL-MCNC: 1.36 MG/DL (ref 0.73–1.18)
CROSSMATCH INTERPRETATION: NORMAL
CROSSMATCH INTERPRETATION: NORMAL
DISPENSE STATUS: NORMAL
DISPENSE STATUS: NORMAL
EOSINOPHIL # BLD AUTO: 0.27 X10(3)/MCL (ref 0–0.9)
EOSINOPHIL # BLD AUTO: 0.39 X10(3)/MCL (ref 0–0.9)
EOSINOPHIL NFR BLD AUTO: 3.2 %
EOSINOPHIL NFR BLD AUTO: 3.5 %
ERYTHROCYTE [DISTWIDTH] IN BLOOD BY AUTOMATED COUNT: 17.6 % (ref 11.5–17)
ERYTHROCYTE [DISTWIDTH] IN BLOOD BY AUTOMATED COUNT: 19.6 % (ref 11.5–17)
GFR SERPLBLD CREATININE-BSD FMLA CKD-EPI: >60 MLS/MIN/1.73/M2
GLOBULIN SER-MCNC: 4.2 GM/DL (ref 2.4–3.5)
GLUCOSE SERPL-MCNC: 135 MG/DL (ref 74–100)
GROUP & RH: NORMAL
HCT VFR BLD AUTO: 19.2 % (ref 42–52)
HCT VFR BLD AUTO: 25.8 % (ref 42–52)
HGB BLD-MCNC: 6.5 G/DL (ref 14–18)
HGB BLD-MCNC: 8.9 G/DL (ref 14–18)
HOLD SPECIMEN: NORMAL
IMM GRANULOCYTES # BLD AUTO: 0.06 X10(3)/MCL (ref 0–0.04)
IMM GRANULOCYTES # BLD AUTO: 0.11 X10(3)/MCL (ref 0–0.04)
IMM GRANULOCYTES NFR BLD AUTO: 0.8 %
IMM GRANULOCYTES NFR BLD AUTO: 0.9 %
INDIRECT COOMBS: NORMAL
INR PPP: 2.1
LACTATE SERPL-SCNC: 2.2 MMOL/L (ref 0.5–2.2)
LYMPHOCYTES # BLD AUTO: 0.61 X10(3)/MCL (ref 0.6–4.6)
LYMPHOCYTES # BLD AUTO: 0.92 X10(3)/MCL (ref 0.6–4.6)
LYMPHOCYTES NFR BLD AUTO: 11.8 %
LYMPHOCYTES NFR BLD AUTO: 5 %
MAGNESIUM SERPL-MCNC: 1.9 MG/DL (ref 1.6–2.6)
MCH RBC QN AUTO: 31.6 PG (ref 27–31)
MCH RBC QN AUTO: 32.1 PG (ref 27–31)
MCHC RBC AUTO-ENTMCNC: 33.9 G/DL (ref 33–36)
MCHC RBC AUTO-ENTMCNC: 34.8 G/DL (ref 33–36)
MCV RBC AUTO: 92.4 FL (ref 80–94)
MCV RBC AUTO: 93.2 FL (ref 80–94)
MONOCYTES # BLD AUTO: 1.1 X10(3)/MCL (ref 0.1–1.3)
MONOCYTES # BLD AUTO: 1.1 X10(3)/MCL (ref 0.1–1.3)
MONOCYTES NFR BLD AUTO: 14.1 %
MONOCYTES NFR BLD AUTO: 9 %
NEUTROPHILS # BLD AUTO: 10.04 X10(3)/MCL (ref 2.1–9.2)
NEUTROPHILS # BLD AUTO: 5.44 X10(3)/MCL (ref 2.1–9.2)
NEUTROPHILS NFR BLD AUTO: 69.7 %
NEUTROPHILS NFR BLD AUTO: 81.7 %
NRBC BLD AUTO-RTO: 0 %
NRBC BLD AUTO-RTO: 0 %
PHOSPHATE SERPL-MCNC: 4.4 MG/DL (ref 2.3–4.7)
PLATELET # BLD AUTO: 59 X10(3)/MCL (ref 130–400)
PLATELET # BLD AUTO: 61 X10(3)/MCL (ref 130–400)
PLATELETS.RETICULATED NFR BLD AUTO: 5.3 % (ref 0.9–11.2)
PLATELETS.RETICULATED NFR BLD AUTO: 5.5 % (ref 0.9–11.2)
PMV BLD AUTO: 10.9 FL (ref 7.4–10.4)
PMV BLD AUTO: 12.2 FL (ref 7.4–10.4)
POCT GLUCOSE: 134 MG/DL (ref 70–110)
POCT GLUCOSE: 137 MG/DL (ref 70–110)
POCT GLUCOSE: 138 MG/DL (ref 70–110)
POCT GLUCOSE: 144 MG/DL (ref 70–110)
POTASSIUM SERPL-SCNC: 4.5 MMOL/L (ref 3.5–5.1)
PROT SERPL-MCNC: 5.8 GM/DL (ref 6.4–8.3)
PROTHROMBIN TIME: 23.4 SECONDS (ref 11.4–14)
RBC # BLD AUTO: 2.06 X10(6)/MCL (ref 4.7–6.1)
RBC # BLD AUTO: 2.77 X10(6)/MCL (ref 4.7–6.1)
SODIUM SERPL-SCNC: 122 MMOL/L (ref 136–145)
SPECIMEN OUTDATE: NORMAL
UNIT NUMBER: NORMAL
UNIT NUMBER: NORMAL
WBC # SPEC AUTO: 12.28 X10(3)/MCL (ref 4.5–11.5)
WBC # SPEC AUTO: 7.8 X10(3)/MCL (ref 4.5–11.5)

## 2024-01-21 PROCEDURE — 25000003 PHARM REV CODE 250

## 2024-01-21 PROCEDURE — 80053 COMPREHEN METABOLIC PANEL: CPT | Performed by: STUDENT IN AN ORGANIZED HEALTH CARE EDUCATION/TRAINING PROGRAM

## 2024-01-21 PROCEDURE — 83605 ASSAY OF LACTIC ACID: CPT

## 2024-01-21 PROCEDURE — 25500020 PHARM REV CODE 255

## 2024-01-21 PROCEDURE — P9016 RBC LEUKOCYTES REDUCED: HCPCS

## 2024-01-21 PROCEDURE — 21400001 HC TELEMETRY ROOM

## 2024-01-21 PROCEDURE — 25000003 PHARM REV CODE 250: Performed by: INTERNAL MEDICINE

## 2024-01-21 PROCEDURE — 85025 COMPLETE CBC W/AUTO DIFF WBC: CPT

## 2024-01-21 PROCEDURE — 85610 PROTHROMBIN TIME: CPT | Performed by: STUDENT IN AN ORGANIZED HEALTH CARE EDUCATION/TRAINING PROGRAM

## 2024-01-21 PROCEDURE — 99900035 HC TECH TIME PER 15 MIN (STAT)

## 2024-01-21 PROCEDURE — 25000003 PHARM REV CODE 250: Performed by: STUDENT IN AN ORGANIZED HEALTH CARE EDUCATION/TRAINING PROGRAM

## 2024-01-21 PROCEDURE — 94760 N-INVAS EAR/PLS OXIMETRY 1: CPT

## 2024-01-21 PROCEDURE — 83735 ASSAY OF MAGNESIUM: CPT | Performed by: STUDENT IN AN ORGANIZED HEALTH CARE EDUCATION/TRAINING PROGRAM

## 2024-01-21 PROCEDURE — 86923 COMPATIBILITY TEST ELECTRIC: CPT | Mod: 91

## 2024-01-21 PROCEDURE — 94640 AIRWAY INHALATION TREATMENT: CPT

## 2024-01-21 PROCEDURE — 36430 TRANSFUSION BLD/BLD COMPNT: CPT

## 2024-01-21 PROCEDURE — 84100 ASSAY OF PHOSPHORUS: CPT | Performed by: STUDENT IN AN ORGANIZED HEALTH CARE EDUCATION/TRAINING PROGRAM

## 2024-01-21 PROCEDURE — 85025 COMPLETE CBC W/AUTO DIFF WBC: CPT | Performed by: STUDENT IN AN ORGANIZED HEALTH CARE EDUCATION/TRAINING PROGRAM

## 2024-01-21 PROCEDURE — A4216 STERILE WATER/SALINE, 10 ML: HCPCS

## 2024-01-21 PROCEDURE — 94761 N-INVAS EAR/PLS OXIMETRY MLT: CPT

## 2024-01-21 PROCEDURE — 86850 RBC ANTIBODY SCREEN: CPT

## 2024-01-21 RX ORDER — HYDROCODONE BITARTRATE AND ACETAMINOPHEN 500; 5 MG/1; MG/1
TABLET ORAL
Status: DISCONTINUED | OUTPATIENT
Start: 2024-01-21 | End: 2024-01-25 | Stop reason: HOSPADM

## 2024-01-21 RX ORDER — SODIUM CHLORIDE 9 MG/ML
INJECTION, SOLUTION INTRAVENOUS CONTINUOUS
Status: DISCONTINUED | OUTPATIENT
Start: 2024-01-21 | End: 2024-01-22

## 2024-01-21 RX ADMIN — MICONAZOLE NITRATE: 20 POWDER TOPICAL at 08:01

## 2024-01-21 RX ADMIN — SODIUM CHLORIDE, PRESERVATIVE FREE 10 ML: 5 INJECTION INTRAVENOUS at 05:01

## 2024-01-21 RX ADMIN — RIFAXIMIN 550 MG: 550 TABLET ORAL at 09:01

## 2024-01-21 RX ADMIN — FOLIC ACID 1 MG: 1 TABLET ORAL at 09:01

## 2024-01-21 RX ADMIN — RIFAXIMIN 550 MG: 550 TABLET ORAL at 08:01

## 2024-01-21 RX ADMIN — SODIUM CHLORIDE: 9 INJECTION, SOLUTION INTRAVENOUS at 06:01

## 2024-01-21 RX ADMIN — Medication 100 MG: at 09:01

## 2024-01-21 RX ADMIN — IOHEXOL 100 ML: 350 INJECTION, SOLUTION INTRAVENOUS at 08:01

## 2024-01-21 RX ADMIN — PANTOPRAZOLE SODIUM 40 MG: 40 TABLET, DELAYED RELEASE ORAL at 09:01

## 2024-01-21 NOTE — CONSULTS
[facility]  ADMIT / CONSULT / H&P NOTE  Date: 1/21/2024     CC:   Consulted for acute drop in H/H s/p R hepatic artery gelfoam embolization     HPI:   Mr. Markel Hein is a 47 yo male with pmhx ETOH misuse disorder who presented to [facility] on 1/1/24 with jaundice, weakness X 3 weeks, and AMS. Pt was found to have intrahepatic bleeding from lesion suspicious for HCC. He is now POD 5 s/p R hepatic artery gelfoam embolization, surgery re-engaged today for evaluation in the setting of acute drop in Hgb (6.5 from 8.3).     Patient was admitted to ICU for AMS and concern for decompensation and associated alcohol withdrawal, influenza A, and strep PNA bacteremia. CT abdomen 1/16/24 showed capsular rupture and active extravasation with moderate amount of blood products in the posterior RUQ for which he was taken for embolization of the right hepatic artery and therapeutic paracentesis yielding 4.5 L of bloody ascitic fluid. Patient tolerated both procedures well. He required 2 U pRBCs and 1 FFP for Hgb of 7 on 1/18/24. H/H stabilized until today, when CBC revealed H/H of 6.5/19.2 from 8.3/24.1 yesterday.      PMH:   History reviewed. No pertinent past medical history.     PSH:         Past Surgical History:   Procedure Laterality Date    ANGIOGRAPHY OF ABDOMEN N/A 1/16/2024     Procedure: ANGIOGRAM-ABDOMINAL;  Surgeon: Uriah Kelly MD;  Location: [facility] CATH LAB;  Service: Peripheral Vascular;  Laterality: N/A;         FamHx:   History reviewed. No pertinent family history.     SocHx:  Social History              Socioeconomic History    Marital status: Single      Social Determinants of Health           Financial Resource Strain: Low Risk  (1/10/2024)     Overall Financial Resource Strain (CARDIA)      Difficulty of Paying Living Expenses: Not hard at all   Food Insecurity: No Food Insecurity (1/10/2024)     Hunger Vital Sign      Worried About Running Out of Food in the Last Year: Never true      Ran  Out of Food in the Last Year: Never true   Transportation Needs: No Transportation Needs (1/10/2024)     PRAPARE - Transportation      Lack of Transportation (Medical): No      Lack of Transportation (Non-Medical): No   Physical Activity: Inactive (1/10/2024)     Exercise Vital Sign      Days of Exercise per Week: 0 days      Minutes of Exercise per Session: 0 min   Stress: No Stress Concern Present (1/10/2024)     [employer] [place] - Occupational Stress Questionnaire      Feeling of Stress : Not at all   Social Connections: Socially Isolated (1/10/2024)     Social Connection and Isolation Panel [NHANES]      Frequency of Communication with Friends and Family: Once a week      Frequency of Social Gatherings with Friends and Family: Once a week      Attends Synagogue Services: Never      Active Member of Clubs or Organizations: No      Attends Club or Organization Meetings: Never      Marital Status: Never    Housing Stability: Unknown (1/10/2024)     Housing Stability Vital Sign      Unable to Pay for Housing in the Last Year: No      Unstable Housing in the Last Year: No            Allergies:   Review of patient's allergies indicates:  No Known Allergies     Medications:  No current facility-administered medications on file prior to encounter.      No current outpatient medications on file prior to encounter.            Review of Systems   Constitutional:  Negative for chills and fever.   Respiratory:  Negative for hemoptysis.    Cardiovascular:  Negative for chest pain and orthopnea.   Gastrointestinal:  Positive for abdominal pain (R flank and suprapubic pain) and diarrhea. Negative for blood in stool, nausea and vomiting.            Objective:     VITAL SIGNS: 24 HR MIN & MAX LAST    Temp  Min: 97.4 °F (36.3 °C)  Max: 98.2 °F (36.8 °C)  97.7 °F (36.5 °C)        BP  Min: 93/66  Max: 107/71  107/71     Pulse  Min: 102  Max: 119  102     Resp  Min: 18  Max: 18  18    SpO2  Min: 95 %   "Max: 99 %  97 %       HT: 5' 4" (162.6 cm)  WT: 78 kg (171 lb 15.3 oz)  BMI: 29.5         Physical Exam:  Physical Exam  Eyes:      General: Scleral icterus present.   Abdominal:      General: There is distension.      Palpations: There is no fluid wave.      Tenderness: There is abdominal tenderness in the right upper quadrant and suprapubic area. There is no guarding or rebound.   Skin:     Coloration: Skin is jaundiced.      Findings: Bruising present.         Results             Recent Labs   Lab 01/18/24  0517 01/18/24  0852 01/18/24  1348 01/19/24  0408 01/19/24  1742 01/20/24  0338 01/21/24  0326 01/21/24  0526   WBC 11.71*  --   --  9.23  --  8.43  --  7.80   HGB 9.1*  --   --  9.0*  --  8.3*  --  6.5*   HCT 26.4*  --   --  25.8*  --  24.1*  --  19.2*   PLT 42*  --   --  64*  --  60*  --  61*   INR  --  2.0*  --  2.0*  --  2.0* 2.1*  --    *  --    < > 126* 125* 124* 122*  --    CHLORIDE 96*  --    < > 98 98 97* 96*  --    CO2 21*  --    < > 23 22 21* 19*  --    BUN 21.6*  --    < > 15.1 12.5 12.5 24.8*  --    CREATININE 0.77  --    < > 0.70* 0.70* 0.69* 1.36*  --    GLUCOSE 119*  --    < > 95 147* 120* 135*  --    CALCIUM 7.3*  --    < > 7.4* 7.3* 7.2* 7.1*  --    MG 2.00  --   --  1.70  --  1.80 1.90  --    PHOS 2.7  --   --  2.3  --  2.9 4.4  --    ALKPHOS 213*  --   --  249*  --  226* 197*  --     < > = values in this interval not displayed.      Imaging  Impression:     1. No evidence of active extravasation.  2. Cirrhosis with no change in the infiltrative lesion in the dome of the right lobe of the liver with portal venous invasion.  This remains suspicious for hepatocellular carcinoma.  3. Moderate volume ascites.  4. Right adrenal nodule, unchanged from prior exam.  This remains indeterminate and metastatic disease cannot be excluded.  5. Bibasilar atelectasis.        A/P:   48 y.o. male with pmhx alcohol use disorder and decompensated liver cirrhosis found to have intrahepatic bleeding from " lesion suspicious for HCC s/p right hepatic artery embolization and therapeutic paracentesis, surgery reengage to evaluate for c/f intrahepatic bleeding in the setting of acute drop in H/H.     Plan  -CTA Abdomen and pelvis no active extrav noted  -transfuse as needed  -repeat labs ordered, check post-transfusion CBC  -rest of care per primary  -General surgery signing off, please call with any questions     Anne Garcia, MS4    Dayanara Balderas PGY1  [facility] General Surgery  01/21/2024

## 2024-01-21 NOTE — MEDICAL/APP STUDENT
[facility]   Consult Note  General Surgery    Patient Name: Franco Hein  YOB: 1975  Date of Admission: 1/1/2024  Date: 01/21/2024 8:06 AM  Reason for Consult:   HD#20  POD#5 Days Post-Op    PRESENTING HISTORY     Chief Complaint/Reason for Admission: Delirium tremens    History of Present Illness:  Franco Hein is  48 y.o. male with a PMH of ETOH misuse disorder who presented to [facility] ED on 1/1/2024 with complaint of jaundice, weakness for 3 weeks, and AMS. Patient was tachypneic, tachycardic, and tremulous, on presentation with jaundice. Admitted to ICU given altered mental status and concern for decompensation in the setting of alcohol withdrawal and influenza A, also found to have strep pneumonia bacteremia, on appropriate IV antibiotics. Paracentesis performed with removal of a 4.5 L of bloody ascitic fluid with symptomatic improvement on 1/17. Since, patient complaining of cough, mild abdominal pain, and leg swelling. Denies fever, chills, chest pain, SOB, melena.     Review of Systems:  12 point ROS negative except as stated in HPI    PAST HISTORY:   Past medical history:  History reviewed. No pertinent past medical history.    Past surgical history:  Past Surgical History:   Procedure Laterality Date    ANGIOGRAPHY OF ABDOMEN N/A 1/16/2024    Procedure: ANGIOGRAM-ABDOMINAL;  Surgeon: Uriah Kelly MD;  Location: [facility] CATH LAB;  Service: Peripheral Vascular;  Laterality: N/A;       Family history:  History reviewed. No pertinent family history.    Social history:  Social History     Socioeconomic History    Marital status: Single     Social Determinants of Health     Financial Resource Strain: Low Risk  (1/10/2024)    Overall Financial Resource Strain (CARDIA)     Difficulty of Paying Living Expenses: Not hard at all   Food Insecurity: No Food Insecurity (1/10/2024)    Hunger Vital Sign     Worried About Running Out of Food in the Last Year: Never true      Ran Out of Food in the Last Year: Never true   Transportation Needs: No Transportation Needs (1/10/2024)    PRAPARE - Transportation     Lack of Transportation (Medical): No     Lack of Transportation (Non-Medical): No   Physical Activity: Inactive (1/10/2024)    Exercise Vital Sign     Days of Exercise per Week: 0 days     Minutes of Exercise per Session: 0 min   Stress: No Stress Concern Present (1/10/2024)    [employer] [place] [employer] - Occupational Stress Questionnaire     Feeling of Stress : Not at all   Social Connections: Socially Isolated (1/10/2024)    Social Connection and Isolation Panel [NHANES]     Frequency of Communication with Friends and Family: Once a week     Frequency of Social Gatherings with Friends and Family: Once a week     Attends Protestant Services: Never     Active Member of Clubs or Organizations: No     Attends Club or Organization Meetings: Never     Marital Status: Never    Housing Stability: Unknown (1/10/2024)    Housing Stability Vital Sign     Unable to Pay for Housing in the Last Year: No     Unstable Housing in the Last Year: No     Social History     Tobacco Use   Smoking Status Not on file   Smokeless Tobacco Not on file         MEDICATIONS & ALLERGIES:   Allergies: Review of patient's allergies indicates:  No Known Allergies    No current facility-administered medications on file prior to encounter.     No current outpatient medications on file prior to encounter.       Scheduled Meds:   folic acid  1 mg Oral Daily    LIDOcaine HCL 10 mg/ml (1%)  1 mL Other Once    miconazole NITRATE 2 %   Topical (Top) BID    pantoprazole  40 mg Oral Daily    rifAXIMin  550 mg Oral BID    sodium chloride 0.9%  10 mL Intravenous Q6H    thiamine  100 mg Oral Daily       Continuous Infusions:    PRN Meds:0.9%  NaCl infusion (for blood administration), 0.9%  NaCl infusion (for blood administration), 0.9%  NaCl infusion (for blood administration), 0.9%  NaCl infusion (for  blood administration), 0.9%  NaCl infusion (for blood administration), 0.9%  NaCl infusion (for blood administration), 0.9%  NaCl infusion (for blood administration), sodium chloride 0.9%, acetaminophen, acetaminophen, albuterol-ipratropium, benzonatate, dextrose 10%, dextrose 10%, glucagon (human recombinant), glucose, glucose, HYDROcodone-acetaminophen, hydrOXYzine pamoate, iohexoL, lactulose, sodium chloride 0.9%, Flushing PICC/Midline Protocol **AND** sodium chloride 0.9% **AND** sodium chloride 0.9%    OBJECTIVE:     Vital Signs:  Temp:  [97.4 °F (36.3 °C)-98.2 °F (36.8 °C)] 97.7 °F (36.5 °C)  Pulse:  [102-119] 102  Resp:  [18] 18  SpO2:  [95 %-99 %] 97 %  BP: ()/(50-71) 107/71  Body mass index is 29.52 kg/m².     I/O last 3 completed shifts:  In: 180 [P.O.:180]  Out: 200 [Urine:200]  No intake/output data recorded.    Physical Exam:  General:  Well developed, well nourished, no acute distress***  HEENT:  Normocephalic, atraumatic, PERRL, EOMI, clear sclera, ears normal, neck supple, throat clear without erythema or exudates***  CVS:  RRR***  Resp:  Normal work of breathing on RA***, equal rise and fall of the chest***  GI: Abdomen soft, non-tender, non-distended, no masses, no guarding, no rebound***  :  Deferred***  MSK:  No muscle atrophy, cyanosis, peripheral edema, moving all extremities spontaneously***  Vascular: *2+ radial pulses bilaterally*. All extremities WWP***  Skin:  No rashes, ulcers, erythema***  Neuro:  CNII-XII grossly intact, alert and oriented to person, place, and time***    Laboratory:  Recent Labs     01/18/24  0852 01/19/24  0408 01/20/24  0338 01/21/24  0326 01/21/24  0526   WBC  --  9.23 8.43  --  7.80   HGB  --  9.0* 8.3*  --  6.5*   HCT  --  25.8* 24.1*  --  19.2*   PLT  --  64* 60*  --  61*   INR 2.0* 2.0* 2.0* 2.1*  --      Recent Labs     01/19/24  0408 01/19/24  1742 01/20/24  0338 01/21/24  0326   * 125* 124* 122*   K 3.9 4.2 4.0 4.5   CO2 23 22 21* 19*   BUN 15.1  "12.5 12.5 24.8*   CREATININE 0.70* 0.70* 0.69* 1.36*   CALCIUM 7.4* 7.3* 7.2* 7.1*   MG 1.70  --  1.80 1.90   PHOS 2.3  --  2.9 4.4   ALBUMIN 1.9*  --  1.8* 1.6*   BILITOT 8.0*  --  7.6* 9.1*   AST 95*  --  81* 75*   ALKPHOS 249*  --  226* 197*   ALT 78*  --  67* 62*     Troponin:  No results for input(s): "TROPONINI" in the last 72 hours.  CBC:  Recent Labs     01/19/24  0408 01/20/24  0338 01/21/24  0526   WBC 9.23 8.43 7.80   RBC 2.85* 2.61* 2.06*   HGB 9.0* 8.3* 6.5*   HCT 25.8* 24.1* 19.2*   PLT 64* 60* 61*   MCV 90.5 92.3 93.2   MCH 31.6* 31.8* 31.6*   MCHC 34.9 34.4 33.9     CMP:  Recent Labs     01/19/24  0408 01/19/24  1742 01/20/24  0338 01/21/24  0326   CALCIUM 7.4*   < > 7.2* 7.1*   ALBUMIN 1.9*  --  1.8* 1.6*   *   < > 124* 122*   K 3.9   < > 4.0 4.5   CO2 23   < > 21* 19*   BUN 15.1   < > 12.5 24.8*   CREATININE 0.70*   < > 0.69* 1.36*   ALKPHOS 249*  --  226* 197*   ALT 78*  --  67* 62*   AST 95*  --  81* 75*   BILITOT 8.0*  --  7.6* 9.1*    < > = values in this interval not displayed.     Lactic Acid:  No results for input(s): "LACTATE" in the last 72 hours.  Etoh:  No results for input(s): "ALCOHOLMEDIC" in the last 72 hours.  Drug Screen:  No results for input(s): "PCDSCOMETHA", "COCAINEMETAB", "OPIATESCREEN", "BARBITURATES", "AMPHETAMINES", "MARIJUANATHC", "PCDSOPHENCYN", "CREATRANDUR", "TOXINFO" in the last 72 hours.    ABG:  No results for input(s): "PH", "PCO2", "PO2", "HCO3", "BE", "POCSATURATED" in the last 72 hours.    Diagnostic Results:  No results found in the last 24 hours.  US Abdomen Limited   Final Result      1. Cirrhosis with a heterogeneous infiltrative mass in the dome of the right lobe of the liver better visualized on yesterday's exam.   2. Moderate volume ascites with echogenic material posterior to the right lobe of the liver consistent with a blood products seen on yesterday's exam.         Electronically signed by: Keyon Beck MD   Date:    01/17/2024   Time:    10:43 "      CTA Abdomen and Pelvis   Final Result      1. Solid and cystic mass lesion in the superior aspect of the right hepatic lobe with invasion of the intrahepatic left portal vein and sequela of portal hypertension as above.  Additionally there is capsular rupture and active extravasation with a moderate amount of blood products in the posterior right upper quadrant.  Given the portal venous invasion and cirrhotic morphology of the liver this is favored to represent hepatocellular carcinoma although enhancement characteristics are not classic.   2. Indeterminate right adrenal nodule.  Metastatic disease cannot be excluded.   3. Minimal right lower lobe atelectasis.   Red Alert:      The critical information above was relayed directly by me by telephone to Dr. Rosenthal On 1/16/2024 at 11:52 .         Electronically signed by: Keyon Beck MD   Date:    01/16/2024   Time:    11:52      X-Ray Abdomen AP 1 View   Final Result      Loop of small bowel that might represent a short segment in a 9 larry ileus in represent a sentinel loop indicating the presence of an inflammatory process clinical correlation is suggested         Electronically signed by: Cameron Peralta   Date:    01/16/2024   Time:    08:50      X-Ray Chest 1 View   Final Result      Bilateral consolidation stable to slightly improved.         Electronically signed by: Gómez Carmona   Date:    01/06/2024   Time:    09:35      X-Ray Chest 1 View   Final Result      No significant change      Interval insertion of nasogastric         Electronically signed by: Cameron Peralta   Date:    01/05/2024   Time:    08:16      XR Gastric tube check, non-radiologist performed   Final Result      As above.         Electronically signed by: Aamir Phillip   Date:    01/04/2024   Time:    15:03      X-Ray Chest 1 View   Final Result      No significant change         Electronically signed by: Cameron Peralta   Date:    01/04/2024   Time:    08:22      X-Ray Abdomen AP  1 View   Final Result      Nonobstructive bowel gas pattern.         Electronically signed by: Josef Sow   Date:    01/03/2024   Time:    09:46      X-Ray Chest 1 View   Final Result      No adverse interval change.  Improved aeration of the left upper lobe.         Electronically signed by: Josef Sow   Date:    01/03/2024   Time:    09:46      X-Ray Chest 1 View   Final Result      Worsening consolidative changes in the left lung as above         Electronically signed by: Cameron Peralta   Date:    01/02/2024   Time:    12:51      CT Chest Abdomen Pelvis Without Contrast (XPD)   Final Result      1. Multilobar consolidative opacities within the lungs   2. Cirrhotic morphology of the liver with mass at the dome of the liver.  Evaluation is moderately limited without contrast and due to beam hardening artifact related to patient positioning.  Recommend liver protocol CT abdomen without and with contrast.   3. Ascites   4. Indeterminate right adrenal nodule.  Continued attention recommended on follow-up exams.   5. The preliminary and final reports are concordant.         Electronically signed by: Tika Mcknight   Date:    01/02/2024   Time:    09:44      X-Ray Chest 1 View   Final Result      Bilateral patchy airspace opacities greatest on the left.  Findings concerning for atypical infectious process.         Electronically signed by: Josef Sow   Date:    01/01/2024   Time:    14:23      RADIOLOGY REPORT   Final Result      CTA Abdomen and Pelvis    (Results Pending)       Microbiology:  Microbiology Results (last 7 days)       Procedure Component Value Units Date/Time    Gram Stain [[id number]]     Order Status: Sent Specimen: Peritoneal Fluid from Abdomen     Stool Culture [[id number]]  (Normal) Collected: 01/13/24 1530    Order Status: Completed Specimen: Stool Updated: 01/15/24 2029     Stool Culture Negative for Salmonella, Shigella, Campylobacter, Vibrio, Aeromonas, Pleisiomonas,Yersinia, or  Shiga Toxin 1 and 2.             ASSESSMENT & PLAN:   ***    Plan:    ***  1/21/2024  8:06 AM

## 2024-01-21 NOTE — PROGRESS NOTES
[facility] Medicine Wards Progress Note     Resident Team: [facility] Medicine List 4  Attending Physician: Kamryn Estevez MD  Resident: MD Galo  Intern: DO Ulysses       Subjective:      Brief HPI:  Franco Hein is  48 y.o. male with a PMH of ETOH misuse disorder who presented to [facility] ED on 1/1/2024 with complaint of jaundice, weakness for 3 weeks, and AMS. Patient was brought in by a friend who states patient is a daily drinker who has not been able to go to a store for the last 2 days, but patient is noted to have a positive ethanol on admission. Patient unable to provide history given his mental status and was only able to say his name and confirm he drinks alcohol but does not do drugs. Patient was tachypneic, tachycardic, and tremulous, on presentation with jaundice. Admitted to ICU given altered mental status and concern for decompensation in the setting of alcohol withdrawal and influenza A, also found to have strep pneumonia bacteremia, on appropriate IV antibiotics.     Hospital Course:  1/17/23: Paracentesis performed w/ removal of 4.5L of bloody ascitic fluid w/ symptomatic improvement & hemodynamic stability thereafter.     Interval History:   NAEO. Patient complaining of cough and mild 2/10 abdominal pain and leg swelling today; otherwise denies any other complaints notably fever, chills, chest pain, SOB. Also denies any bloody or melenic stools. AFP significantly elevated 1143.  Hemoglobin 6.5 and patient tachycardic this morning.   Surgery was consulted with concern the patient could be bleeding again after having Gel-Foam embolization earlier this week.  CTA abdomen and pelvis was ordered and did not show evidence active bleeding.  We ared  administering 2 units of PRBCs and will follow-up labs when patient finish his transfusion..    Review of Systems:  ROS completed and negative except as indicated above.     Objective:     Last 24 Hour Vital Signs:  BP  Min: 93/66  Max: 116/75  Temp  Avg:  97.8 °F (36.6 °C)  Min: 97.4 °F (36.3 °C)  Max: 98.2 °F (36.8 °C)  Pulse  Av.7  Min: 102  Max: 119  Resp  Av.4  Min: 18  Max: 22  SpO2  Av.3 %  Min: 95 %  Max: 99 %  I/O last 3 completed shifts:  In: 180 [P.O.:180]  Out: 200 [Urine:200]    Physical Examination:  General: no acute distress  Eye: PERRLA, EOMI, scleral icterus noted  HENT: NC/AT, moist mucus membranes  Neck: full range of motion, no thyromegaly or lymphadenopathy  Respiratory: Clear and equal bilateral BS, satting well on RA  Cardiovascular:  Tachycardic Gastrointestinal: soft, non-tender, mildly-distended with normal bowel sounds, without masses to palpation  Musculoskeletal: no obvious deformities, full range of motion of all extremities/spine without limitation or discomfort  Integumentary: Jaundice, spider angiomas present on upper chest and abdomen, no caput medusa. Petechiae noted to right lateral LQ, and small scattered bruising on arms and legs.   Extremities: radial and DP pulses 2+ bilaterally, 2+ BLE edema  Neurologic: Pt AAOx3, following commands and conversing appropriately. No focal neuro deficits appreciated,    Laboratory:  Most Recent Data:  CBC:   Lab Results   Component Value Date    WBC 7.80 2024    HGB 6.5 (L) 2024    HCT 19.2 (LL) 2024    PLT 61 (L) 2024    MCV 93.2 2024    RDW 19.6 (H) 2024     WBC Differential:   Recent Labs   Lab 24  0332 24  1616 24  0517 24  0408 24  0338 24  0526   WBC 13.52*  --  11.71* 9.23 8.43 7.80   HGB 8.9* 7.7* 9.1* 9.0* 8.3* 6.5*   HCT 25.4* 22.8* 26.4* 25.8* 24.1* 19.2*   PLT 67*  --  42* 64* 60* 61*   MCV 92.4  --  88.9 90.5 92.3 93.2       BMP:   Lab Results   Component Value Date     (L) 2024    K 4.5 2024    CO2 19 (L) 2024    BUN 24.8 (H) 2024    CREATININE 1.36 (H) 2024    CALCIUM 7.1 (L) 2024    MG 1.90 2024    PHOS 4.4 2024     LFTs:   Lab Results  "  Component Value Date    ALBUMIN 1.6 (L) 01/21/2024    BILITOT 9.1 (H) 01/21/2024    AST 75 (H) 01/21/2024    ALKPHOS 197 (H) 01/21/2024    ALT 62 (H) 01/21/2024     Coags:   Lab Results   Component Value Date    INR 2.1 (H) 01/21/2024    PROTIME 23.4 (H) 01/21/2024    PTT 38.6 (H) 01/01/2024     FLP: No results found for: "CHOL", "HDL", "LDLCALC", "TRIG", "CHOLHDL"  DM:   Lab Results   Component Value Date    CREATININE 1.36 (H) 01/21/2024     Thyroid: No results found for: "TSH", "FREET4", "L4WVOZA", "K7NODYD", "THYROIDAB"  Anemia:   Lab Results   Component Value Date    IRON 39 (L) 01/01/2024    TIBC 203 (L) 01/01/2024    FERRITIN 116.00 01/01/2024    VILXPRTY44 >2,000 (H) 01/01/2024    FOLATE 18.3 01/01/2024     Cardiac:   Lab Results   Component Value Date    TROPONINI 0.029 01/01/2024    BNP 89.3 01/01/2024       Microbiology Data Reviewed: yes  Pertinent Findings:  1/1/24: bld cx +strep pneumonia pan sensitive x1 bottle  1/4/24: bld cx x2 negative    Other Results:  TTE 1/4/24: EF 62%, no evidence of vegetation      Radiology:  Imaging Results              CT Chest Abdomen Pelvis Without Contrast (XPD) (Final result)  Result time 01/02/24 09:44:01      Final result by Tika Mcknight MD (01/02/24 09:44:01)                   Impression:      1. Multilobar consolidative opacities within the lungs  2. Cirrhotic morphology of the liver with mass at the dome of the liver.  Evaluation is moderately limited without contrast and due to beam hardening artifact related to patient positioning.  Recommend liver protocol CT abdomen without and with contrast.  3. Ascites  4. Indeterminate right adrenal nodule.  Continued attention recommended on follow-up exams.  5. The preliminary and final reports are concordant.      Electronically signed by: Tika Mcknight  Date:    01/02/2024  Time:    09:44               Narrative:    EXAMINATION:  CT CHEST ABDOMEN PELVIS WITHOUT CONTRAST(XPD)    CLINICAL HISTORY:  Suspect " pneumonia, biliary obstruction;    TECHNIQUE:  Helical acquisition through the chest, abdomen and pelvis without  IV administration of contrast. Axial, sagittal and coronal reformats interpreted.    Automated tube current modulation, weight-based exposure dosing, and/or iterative reconstruction technique utilized to reach lowest reasonably achievable exposure rate.    DLP: 1042 mGy*cm    COMPARISON:  Chest radiograph 01/01/2024    FINDINGS:  BASE OF NECK: No significant abnormality.    HEART: Normal size. There are coronary artery calcifications.  Low-density blood pool with visualization of the interventricular septum compatible with anemia.    THORACIC VASCULATURE: Thoracic aorta is unremarkable.    SIRISHA/MEDIASTINUM: Small nonspecific mediastinal lymph nodes.  Evaluation of hilar lymphadenopathy is limited without contrast.    AIRWAYS: Patent.    LUNGS/PLEURA: Multilobar consolidative opacities within the lungs.    THORACIC SOFT TISSUES: Unremarkable.    LIVER: Limited evaluation the liver due to lack of intravenous contrast and placement of the patient's arms over the abdomen which causes beam hardening artifact.  Heterogeneous attenuation of the liver.  There appears to be a mass at the dome of the liver.  Caudate lobe hypertrophy and changes of cirrhosis.    BILIARY: The gallbladder does not appear to be overly distended.    PANCREAS: Nonspecific retroperitoneal edema.  Unable to assess for changes of pancreatitis given generalized fluid overload.    SPLEEN: Upper limits of normal in size.    ADRENALS: There is a 1.6 cm right adrenal nodule.  Unable to accurately measure internal attenuation due to beam hardening artifact.    KIDNEYS/URETERS: Hyperdense renal pyramids.  This can be related to volume status/dehydration or medullary calcinosis.    GI TRACT/MESENTERY: Evaluation of the bowel is limited without contrast. Bowel is normal in caliber without evidence of obstruction.    PERITONEUM: Small volume  ascites.No free air.    LYMPH NODES: No enlarged lymph nodes by size criteria.    ABDOMINOPELVIC VASCULATURE: Normal caliber abdominal aorta.  Large periumbilical vein.    BLADDER: Normal appearance given degree of distention.    REPRODUCTIVE ORGANS: Normal as visualized.    ABDOMINAL WALL: Small fat containing right inguinal hernia.  Mild body wall edema.    BONES: Anterolisthesis at the lumbosacral junction.                        Preliminary result by Santana Lerma MD (01/01/24 22:56:45)                   Impression:    1. No intrahepatic or extrahepatic biliary duct dilatation is seen.  2. Note of subtle increased attenuation in the medullary portions of the bilateral kidneys which may reflect medullary calcinosis. Correlate with clinical and laboratory findings as regards additional evaluation and follow-up.  3. There is multifocal patchy dense consolidation of both the lungs with predominant involvement of the left upper and right lower lobes. This is consistent with multilobar pneumonia. Correlate with clinical and laboratory findings as regards additional evaluation and follow-up to full resolution.  4. There is a 2.8 x 2 cm soft tissue density nodule in the right adrenal gland (series 2 image 98). This is of indeterminate etiology.  5. The margins of the liver appears somewhat nodular and irregular. This is suggestive of cirrhosis. Correlate with clinical and laboratory findings.  6. Details and other findings as discussed above.               Narrative:    START OF REPORT:  Technique: CT Scan of the chest abdomen and pelvis was performed without intravenous contrast with axial as well as sagittal and, coronal images.    Dosage Information: Automated Exposure Control was utilized 1041.63 mGy.cm.    Comparison: None.    Clinical History: Suspect pneumonia, biliary obstruction.    Findings:  Mediastinum: A few scattered subcentimeter mediastinal lymph nodes are seen.  Heart: The heart size is within normal  limits.  Aorta: Unremarkable appearing aorta.  Pulmonary Arteries: Unremarkable.  Lungs: There is multifocal patchy dense consolidation of both the lungs with predominant involvement of the left upper and right lower lobes.  Pleura: No effusions or pneumothorax are identified.  Abdomen:  Abdominal Wall: There is extensive stranding of the subcutaneous fat as well as the intraabdominal and intrapelvic fat consistent with anasarca edema. Correlate with clinical and laboratory findings.  Liver: The margins of the liver appears somewhat nodular and irregular. There are also prominent portosystemic collaterals consistent with portal hypertension.  Biliary System: No intrahepatic or extrahepatic biliary duct dilatation is seen.  Gallbladder: The gallbladder is not definitely identified on this noncontrast examination and may be contracted.  Pancreas: The pancreas appears unremarkable.  Spleen: The spleen appears unremarkable.  Adrenals: There is a 2.8 x 2 cm soft tissue density nodule in the right adrenal gland (series 2 image 98).  Kidneys: Note of subtle increased attenuation in the medullary portions of the bilateral kidneys which may reflect medullary calcinosis.  Aorta: The abdominal aorta appears unremarkable.  IVC: Unremarkable.  Bowel:  Esophagus: There is a small hiatal hernia.  Stomach: The stomach appears unremarkable.  Duodenum: Unremarkable appearing duodenum.  Small Bowel: The small bowel appears unremarkable.  Colon: Nondistended.  Appendix: The appendix is not identified but no inflammatory changes are seen in the right lower quadrant to suggest appendicitis.  Peritoneum: There is mild ascites. No intraperitoneal free gas is seen.    Pelvis:  Bladder: The bladder appears unremarkable.  Male:  Prostate gland: The prostate gland appears unremarkable.    Bony structures:  Dorsal Spine: There is mild to moderate spondylosis of the visualized dorsal spine.  Bony Pelvis: The visualized bony structures of the  pelvis appear unremarkable.                                         X-Ray Chest 1 View (Final result)  Result time 01/01/24 14:23:11   Procedure changed from X-Ray Chest PA And Lateral     Final result by Josef Sow MD (01/01/24 14:23:11)                   Impression:      Bilateral patchy airspace opacities greatest on the left.  Findings concerning for atypical infectious process.      Electronically signed by: Josef Sow  Date:    01/01/2024  Time:    14:23               Narrative:    EXAMINATION:  XR CHEST 1 VIEW    CLINICAL HISTORY:  cough;  Cough, unspecified    TECHNIQUE:  Single view of the chest    COMPARISON:  No prior imaging available for comparison.    FINDINGS:  Bilateral patchy airspace opacities greatest on the left.    The cardiomediastinal silhouette is within normal limits.    No acute osseous abnormality.                                       RADIOLOGY REPORT (Final result)  Result time 01/04/24 11:39:28      Final result by Unknown User (01/04/24 11:39:28)                                        Current Medications:     Infusions:       Scheduled:   folic acid  1 mg Oral Daily    LIDOcaine HCL 10 mg/ml (1%)  1 mL Other Once    miconazole NITRATE 2 %   Topical (Top) BID    pantoprazole  40 mg Oral Daily    rifAXIMin  550 mg Oral BID    sodium chloride 0.9%  10 mL Intravenous Q6H    thiamine  100 mg Oral Daily        PRN:  0.9%  NaCl infusion (for blood administration), 0.9%  NaCl infusion (for blood administration), 0.9%  NaCl infusion (for blood administration), 0.9%  NaCl infusion (for blood administration), 0.9%  NaCl infusion (for blood administration), 0.9%  NaCl infusion (for blood administration), 0.9%  NaCl infusion (for blood administration), sodium chloride 0.9%, acetaminophen, acetaminophen, albuterol-ipratropium, benzonatate, dextrose 10%, dextrose 10%, glucagon (human recombinant), glucose, glucose, HYDROcodone-acetaminophen, hydrOXYzine pamoate, iohexoL, lactulose, sodium  chloride 0.9%, Flushing PICC/Midline Protocol **AND** sodium chloride 0.9% **AND** sodium chloride 0.9%    Antibiotics and Day Number of Therapy:  Vanc x3 days  Zosyn/Azithromycin x4 days  Rocephin 2g on D12/14     Lines and Day Number of Therapy:  PIV      Assessment & Plan:     Decompensated Cirrhosis  Hypervolemic Hyponotremia  Liver lesion likely HCC  Bilirubinemia       - suspected etiology ETOH. Pt reportedly had hx of heavy drinking per friend who dropped him off       - Positive hepatitis C antibody; VL negative, no active infection         -- MELD-Na: 28 -->25 --> 22--> 29 --> 28 (1/19)    -- Child Hoang: 13 --> 11 --> 11 --> 11 (1/19) class C  -- Maddrey: 204 on admission       - INR flat, 2 today       - GI consulted w/ recs to discontinue Lasix/Aldactone/Steroids at this time; appreciate assistance in care       - Continued improvement In encephalopathy; hold lactulose at this time w/ prior diarrhea       - C/w Rifaxamin for hepatic encephalopathy       - FeNA 1%, Nina < 20; lower extremity edema; likely hypervolemic hyponotremia       - 4.5 L bloody ascitic fluid removed on 1/17 for therapeutic/diagnostic para, studies pending       - AFP 1143       - patient will need to be referred to Oncology upon discharge    Intrahepatic bleed likely 2/2 tumor       - Pt had slow intrahepatic bleed likely over the past several days as complication from HCC       - Labs in AM showed H&H had continued to drop slowly, lactic acid elevated       - CTA abd/pelvis showed intrahepatic bleeding with lesion in right lobe of liver suspicious for HCC, capsular rupture of liver and some bleeding and contrast noted with ascites       - Taken by [facility] surgery for gelfoam embolization which was successful, appreciate assistance       - Pt transfused 4 units pRBC, 2 FFP, 1 platelet; no need to transfuse unless actively bleeding       - Trend CBC daily       - Will continue to monitor for symptoms       - C/w SCDs for DVT ppx in  setting of recent thrombocytopenia requiring x1u platelets (1/18)       -   Factor VIII level elevated > 300   - hemoglobin level 01/21/2024 was 6.5.  Patient was administered 2 units of PRBCs.  Will recheck H&H 1 hour after transfusion of 2nd unit.    Pneumonia 2/2 Influenza A-resolved       - Positive on 1/1/24; initially ICU with BIPAP for respiratory failure but stable on floor since 1/9/24       - Possible superimposed Strep PNA leading to bacteremia and pneumonia; completed IV abx regimen    Strep Pneumoniae Bacteremia-resolved       - Blood culture positive x1/2 Streptococcus; [employer] confirmation strep pneumonia bacteremia which is pan-sensitive, resp culture /Gram stain collection pending.       - Repeat blood cultures 1/4/24  negative; TTE did not have any findings consistent with endocarditis.        - s/p Zosyn and Zithromax x4 days; MRSA PCR negative, discontinued vancomycin after D3.        - Completed 2 week regimen Ceftriaxone 2g on 1/17/24    Skin Breakdown to L buttocks-improving  Pruritic, Erythematous Rash to groin       - Skin tear vs Stage II pressure ulcer       - Wound care consulted, appreciate recs       - Continue q2h turning and mepilex dsg        - Pt now with enteral nutrition, hopeful to aid in healing       - Rash appears fungal in nature, will start miconazole powder to groin BID    Malnutrition  Hyponatremia       - Soft mechanical diet ordered, pt working with SLT       - Diet per dietary recs       - Likely 2/2 cirrhosis; FeNA 1% indicated prerenal       - Na 122 this morning, continue to hold diuretics unless Na > 125.    - Started patient on normal saline 75 cc/hour       - Per GI recs, holding further administration of Lasix/Aldactone at this time in addition to fluid restriciton       - Pending BMP for further evaluation & hopeful continued correction of hyponatremia    CODE STATUS: Full  Access: PIV  Antibiotics: Ceftriaxone; completed 1/17/24  Diet: Soft mechanical, low  sodium  DVT Prophylaxis: SCDs  GI Prophylaxis: Protonix  Fluids:  Normal saline at 75 cc/hour      Disposition: Decompensated cirrhosis with liver lesion likely HCC from ETOH cirrhosis, unstable for discharge at this time. GI consulted, appreciate assistance.  Hemoglobin 6.5 today.  2 units PRBCs has been administered.  Will need to follow up on repeat H&H will discuss with long call team.    Timmy Sánchez MD  [facility] Internal Medicine -III

## 2024-01-22 LAB
ALBUMIN SERPL-MCNC: 1.8 G/DL (ref 3.5–5)
ALBUMIN/GLOB SERPL: 0.4 RATIO (ref 1.1–2)
ALP SERPL-CCNC: 259 UNIT/L (ref 40–150)
ALT SERPL-CCNC: 61 UNIT/L (ref 0–55)
AST SERPL-CCNC: 77 UNIT/L (ref 5–34)
BASOPHILS # BLD AUTO: 0.03 X10(3)/MCL
BASOPHILS NFR BLD AUTO: 0.3 %
BILIRUB SERPL-MCNC: 10.3 MG/DL
BUN SERPL-MCNC: 32.5 MG/DL (ref 8.9–20.6)
CALCIUM SERPL-MCNC: 7.6 MG/DL (ref 8.4–10.2)
CHLORIDE SERPL-SCNC: 97 MMOL/L (ref 98–107)
CO2 SERPL-SCNC: 17 MMOL/L (ref 22–29)
CREAT SERPL-MCNC: 1.46 MG/DL (ref 0.73–1.18)
EOSINOPHIL # BLD AUTO: 0.38 X10(3)/MCL (ref 0–0.9)
EOSINOPHIL NFR BLD AUTO: 3.9 %
ERYTHROCYTE [DISTWIDTH] IN BLOOD BY AUTOMATED COUNT: 18.2 % (ref 11.5–17)
GFR SERPLBLD CREATININE-BSD FMLA CKD-EPI: 59 MLS/MIN/1.73/M2
GLOBULIN SER-MCNC: 4.9 GM/DL (ref 2.4–3.5)
GLUCOSE SERPL-MCNC: 104 MG/DL (ref 74–100)
HCT VFR BLD AUTO: 27.7 % (ref 42–52)
HGB BLD-MCNC: 9.6 G/DL (ref 14–18)
IMM GRANULOCYTES # BLD AUTO: 0.12 X10(3)/MCL (ref 0–0.04)
IMM GRANULOCYTES NFR BLD AUTO: 1.2 %
INR PPP: 1.9
LYMPHOCYTES # BLD AUTO: 0.76 X10(3)/MCL (ref 0.6–4.6)
LYMPHOCYTES NFR BLD AUTO: 7.7 %
MAGNESIUM SERPL-MCNC: 2.2 MG/DL (ref 1.6–2.6)
MCH RBC QN AUTO: 31.7 PG (ref 27–31)
MCHC RBC AUTO-ENTMCNC: 34.7 G/DL (ref 33–36)
MCV RBC AUTO: 91.4 FL (ref 80–94)
MONOCYTES # BLD AUTO: 0.89 X10(3)/MCL (ref 0.1–1.3)
MONOCYTES NFR BLD AUTO: 9 %
NEUTROPHILS # BLD AUTO: 7.68 X10(3)/MCL (ref 2.1–9.2)
NEUTROPHILS NFR BLD AUTO: 77.9 %
NRBC BLD AUTO-RTO: 0 %
PHOSPHATE SERPL-MCNC: 4.7 MG/DL (ref 2.3–4.7)
PLATELET # BLD AUTO: 64 X10(3)/MCL (ref 130–400)
PMV BLD AUTO: 12.4 FL (ref 7.4–10.4)
POCT GLUCOSE: 109 MG/DL (ref 70–110)
POCT GLUCOSE: 130 MG/DL (ref 70–110)
POCT GLUCOSE: 152 MG/DL (ref 70–110)
POCT GLUCOSE: 83 MG/DL (ref 70–110)
POTASSIUM SERPL-SCNC: 4.9 MMOL/L (ref 3.5–5.1)
PROT SERPL-MCNC: 6.7 GM/DL (ref 6.4–8.3)
PROTHROMBIN TIME: 21.5 SECONDS (ref 11.4–14)
RBC # BLD AUTO: 3.03 X10(6)/MCL (ref 4.7–6.1)
RBCS: NORMAL
SODIUM SERPL-SCNC: 123 MMOL/L (ref 136–145)
WBC # SPEC AUTO: 9.86 X10(3)/MCL (ref 4.5–11.5)

## 2024-01-22 PROCEDURE — 25000003 PHARM REV CODE 250

## 2024-01-22 PROCEDURE — 99900035 HC TECH TIME PER 15 MIN (STAT)

## 2024-01-22 PROCEDURE — 84100 ASSAY OF PHOSPHORUS: CPT | Performed by: STUDENT IN AN ORGANIZED HEALTH CARE EDUCATION/TRAINING PROGRAM

## 2024-01-22 PROCEDURE — 21400001 HC TELEMETRY ROOM

## 2024-01-22 PROCEDURE — P9047 ALBUMIN (HUMAN), 25%, 50ML: HCPCS | Mod: JZ,JG

## 2024-01-22 PROCEDURE — 25000003 PHARM REV CODE 250: Performed by: SPECIALIST

## 2024-01-22 PROCEDURE — 85610 PROTHROMBIN TIME: CPT | Performed by: STUDENT IN AN ORGANIZED HEALTH CARE EDUCATION/TRAINING PROGRAM

## 2024-01-22 PROCEDURE — 94760 N-INVAS EAR/PLS OXIMETRY 1: CPT

## 2024-01-22 PROCEDURE — 97116 GAIT TRAINING THERAPY: CPT

## 2024-01-22 PROCEDURE — 80053 COMPREHEN METABOLIC PANEL: CPT | Performed by: STUDENT IN AN ORGANIZED HEALTH CARE EDUCATION/TRAINING PROGRAM

## 2024-01-22 PROCEDURE — 63600175 PHARM REV CODE 636 W HCPCS: Mod: JZ,JG

## 2024-01-22 PROCEDURE — 85025 COMPLETE CBC W/AUTO DIFF WBC: CPT | Performed by: STUDENT IN AN ORGANIZED HEALTH CARE EDUCATION/TRAINING PROGRAM

## 2024-01-22 PROCEDURE — 25000003 PHARM REV CODE 250: Performed by: STUDENT IN AN ORGANIZED HEALTH CARE EDUCATION/TRAINING PROGRAM

## 2024-01-22 PROCEDURE — 83735 ASSAY OF MAGNESIUM: CPT | Performed by: STUDENT IN AN ORGANIZED HEALTH CARE EDUCATION/TRAINING PROGRAM

## 2024-01-22 PROCEDURE — A4216 STERILE WATER/SALINE, 10 ML: HCPCS

## 2024-01-22 PROCEDURE — 25000003 PHARM REV CODE 250: Performed by: INTERNAL MEDICINE

## 2024-01-22 RX ORDER — SODIUM BICARBONATE 650 MG/1
650 TABLET ORAL DAILY
Status: DISCONTINUED | OUTPATIENT
Start: 2024-01-22 | End: 2024-01-22

## 2024-01-22 RX ORDER — ALBUMIN HUMAN 250 G/1000ML
25 SOLUTION INTRAVENOUS 2 TIMES DAILY
Status: DISCONTINUED | OUTPATIENT
Start: 2024-01-22 | End: 2024-01-23

## 2024-01-22 RX ADMIN — RIFAXIMIN 550 MG: 550 TABLET ORAL at 08:01

## 2024-01-22 RX ADMIN — SODIUM BICARBONATE 650 MG TABLET 650 MG: at 01:01

## 2024-01-22 RX ADMIN — FOLIC ACID 1 MG: 1 TABLET ORAL at 08:01

## 2024-01-22 RX ADMIN — MICONAZOLE NITRATE: 20 POWDER TOPICAL at 08:01

## 2024-01-22 RX ADMIN — SODIUM CHLORIDE: 9 INJECTION, SOLUTION INTRAVENOUS at 09:01

## 2024-01-22 RX ADMIN — Medication 100 MG: at 08:01

## 2024-01-22 RX ADMIN — SODIUM CHLORIDE, PRESERVATIVE FREE 10 ML: 5 INJECTION INTRAVENOUS at 12:01

## 2024-01-22 RX ADMIN — HYDROCODONE BITARTRATE AND ACETAMINOPHEN 1 TABLET: 5; 325 TABLET ORAL at 05:01

## 2024-01-22 RX ADMIN — ALBUMIN HUMAN 25 G: 250 SOLUTION INTRAVENOUS at 09:01

## 2024-01-22 NOTE — PROGRESS NOTES
have reviewed and concur with the resident's history, physical, assessment, and plan.  I have discussed with him all issues related to the diagnosis, workup and treatment plan.Care provided as reasonable and necessary.48 ur wHCC and hepatorenal syndrme. Comfort care. Fluid restriction for hyponatremai. Add salt tablelt    Jason Hanson MD  [facility]

## 2024-01-22 NOTE — PT/OT/SLP PROGRESS
Physical Therapy Treatment    Patient Name:  Franco Hein   MRN:  [id number]    Recommendations     Therapy Intensity Recommendations at Discharge: Moderate Intensity Therapy  Discharge Equipment Recommendations: walker, rolling   Barriers to discharge: decreased endurance    Assessment     Franco Hein is a 48 y.o. male admitted with a medical diagnosis of Delirium tremens.    He presents with the following impairments/functional limitations:  weakness, impaired balance, impaired endurance, pain, edema, impaired cardiopulmonary response to activity, impaired self care skills, impaired functional mobility, gait instability, decreased lower extremity function.    Rehab Prognosis: Fair.    Patient would benefit from continued skilled acute PT services to: address above listed impairments/functional limitations; receive patient/caregiver education; reduce fall risk; and maximize independency/safety with functional mobility.    Recent Surgery: Procedure(s) (LRB):  ANGIOGRAM-ABDOMINAL (N/A) 6 Days Post-Op    Plan     During this hospitalization, patient to be seen 3 x/week to address the identified impairments/functional limitations via gait training, therapeutic activities, therapeutic exercises and progress toward the established goals.    Plan of Care Expires:   (Discharge)    Subjective     Communicated with patient's nurse Camila prior to session.    Patient agreeable to participate in treatment session.    Chief Complaint: edema in legs and abdomen  Patient/Family Comments/goals: none stated this session  Pain/Comfort:  Pain Rating 1: 3/10  Location 1: abdomen  Pain Addressed 1: Nurse notified  Pain Rating Post-Intervention 1: 3/10    Objective     Patient found sitting edge of bed with    upon PT entry to room.    General Precautions: Standard, fall, droplet   Orthopedic Precautions:N/A   Braces:    Respiratory Status: room air    Functional Mobility:    Bed Mobility:  Seated at start of  session    Transfers:  Sit to Stand: stand by assistance with rolling walker    Gait:  Patient ambulated 400 ft with rolling walker and contact guard assistance.  Patient demonstrates :       no loss of balance       occasional unsteady gait       ambulates outside [place] of RW       flexed posture.    Other Mobility:  not assessed    Patient left supine in bed and with HOB elevated with all lines intact, call button in reach, tray table at bedside, patient' nurse notified, and nurse present.    Goals     Multidisciplinary Problems       Physical Therapy Goals          Problem: Physical Therapy    Goal Priority Disciplines Outcome Goal Variances Interventions   Physical Therapy Goal     PT, PT/OT Ongoing, Progressing     Description: Goals to be met by: dc     Patient will increase functional independence with mobility by performin. Supine to sit with Modified [place] met 1/10/2024    2. Sit to stand transfer with Modified [place]  3. Gait  x 130 feet with Supervision using LRAD    Reviewed 2024                         Time Tracking     PT Received On: 24  PT Start Time: 1010     PT Stop Time: 1030  PT Total Time (min): 20 min     Billable Minutes: Gait Training 20    2024

## 2024-01-22 NOTE — CONSULTS
Spoke with pt's friend Margarito, [phone], ok for hospice to speak with the pt, no preference indicated. Referral submitted to [facility] via [facility].

## 2024-01-22 NOTE — PROGRESS NOTES
Gastroenterology/Hepatology Progress Note    Patient Name: Franco Hein  Age: 48 y.o.  : 1975  MRN: [id number]  Admission Date: 2024      Self, Aaareferral    SUBJECTIVE:      No acute events over the weekend. Today, he does complain of swelling of his knees, and his shortness of breath remains the same. Currently on Rifaximin 500 mg BID, and is currently having 4 formed bowel movements/day, without hematochezia. Continues to have poor PO intake. Complains of abdominal pain 2/2 distention, which was alleviated following previous paracentesis. Recommend continuing goals of care discussion by primary team..  Patient denies lightheadedness, dizziness, chest pain, nausea, vomiting, hematemesis, hematochezia or melenic stool.    Review of patient's allergies indicates:  No Known Allergies       INPATIENT MEDICATIONS    Scheduled Meds:   folic acid  1 mg Oral Daily    LIDOcaine HCL 10 mg/ml (1%)  1 mL Other Once    miconazole NITRATE 2 %   Topical (Top) BID    pantoprazole  40 mg Oral Daily    rifAXIMin  550 mg Oral BID    sodium bicarbonate  650 mg Oral Daily    sodium chloride 0.9%  10 mL Intravenous Q6H    thiamine  100 mg Oral Daily     Continuous Infusions:  PRN Meds:  0.9%  NaCl infusion (for blood administration), 0.9%  NaCl infusion (for blood administration), 0.9%  NaCl infusion (for blood administration), 0.9%  NaCl infusion (for blood administration), 0.9%  NaCl infusion (for blood administration), 0.9%  NaCl infusion (for blood administration), 0.9%  NaCl infusion (for blood administration), sodium chloride 0.9%, acetaminophen, acetaminophen, albuterol-ipratropium, benzonatate, dextrose 10%, dextrose 10%, glucagon (human recombinant), glucose, glucose, HYDROcodone-acetaminophen, hydrOXYzine pamoate, iohexoL, lactulose, sodium chloride 0.9%, Flushing PICC/Midline Protocol **AND** sodium chloride 0.9% **AND** sodium chloride 0.9%          Review of Systems:       Review of Systems     As stated  above  Objective:       VITAL SIGNS: 24 HR MIN & MAX LAST    Temp  Min: 97.4 °F (36.3 °C)  Max: 98.1 °F (36.7 °C)  97.4 °F (36.3 °C)        BP  Min: 108/68  Max: 127/89  111/75     Pulse  Min: 103  Max: 115  105     Resp  Min: 19  Max: 22  20    SpO2  Min: 96 %  Max: 98 %  98 %        Physical Exam   General:  Well developed, well nourished, no acute respiratory distress, appears slightly short of breath  Head: Normocephalic, atraumatic  Eyes: PERRL, EOMI, anicteric sclera  Throat: No posterior pharyngeal erythema or exudate, no tonsillar exudate  Neck: supple, normal ROM, no thyromegaly   CVS:  RRR, S1 and S2 normal, no murmurs, no added heart sounds, rubs, gallops, 2+ peripheral pulses  Resp:  Lungs clear to auscultation bilaterally, no wheezes, rales, or rhonci  GI:  Abdomen firm and distended, slightly tender to palpation, normoactive bowel sounds.  Spider angiomas on abdomen  MSK:  No muscle atrophy, cyanosis, 3+ pitting edema on posterior aspect, up to lower back, full range of motion  Skin:  Edema on the hip  Neuro:  Alert and oriented x3     LABS:    Recent Labs   Lab 01/19/24  0408 01/20/24  0338 01/21/24  0526 01/21/24  1729 01/22/24  0320   WBC 9.23 8.43 7.80 12.28* 9.86   HGB 9.0* 8.3* 6.5* 8.9* 9.6*   HCT 25.8* 24.1* 19.2* 25.8* 27.7*   PLT 64* 60* 61* 59* 64*   MCV 90.5 92.3 93.2 92.4 91.4         Recent Labs   Lab 01/16/24  0659 01/16/24  2139 01/17/24  0332 01/17/24  1616 01/18/24  0517 01/19/24  0408 01/20/24  0338 01/21/24  0526 01/21/24  1729 01/22/24  0320   HGB 7.5* 8.7* 8.9* 7.7* 9.1* 9.0* 8.3* 6.5* 8.9* 9.6*   HCT 23.3* 26.2* 25.4* 22.8* 26.4* 25.8* 24.1* 19.2* 25.8* 27.7*          Recent Labs   Lab 01/19/24  0408 01/19/24  1742 01/20/24  0338 01/21/24  0326 01/22/24  0321   * 125* 124* 122* 123*   K 3.9 4.2 4.0 4.5 4.9   CO2 23 22 21* 19* 17*   BUN 15.1 12.5 12.5 24.8* 32.5*   CREATININE 0.70* 0.70* 0.69* 1.36* 1.46*   BILITOT 8.0*  --  7.6* 9.1* 10.3*   BILIDIR 4.9*  --   --   --   --   "  ALKPHOS 249*  --  226* 197* 259*   AST 95*  --  81* 75* 77*   ALT 78*  --  67* 62* 61*   LABPROT 6.0*  --  6.1* 5.8* 6.7   ALBUMIN 1.9*  --  1.8* 1.6* 1.8*          Recent Labs   Lab 01/18/24  0852 01/19/24  0408 01/20/24  0338 01/21/24  0326 01/22/24  0321   INR 2.0* 2.0* 2.0* 2.1* 1.9*   PROTIME 21.9* 21.9* 21.9* 23.4* 21.5*          No results for input(s): "IRON", "FERRITIN" in the last 168 hours.         IMAGING:   US Abdomen Limited    Result Date: 1/17/2024  EXAMINATION: US ABDOMEN LIMITED CLINICAL HISTORY: Suspected HCC, cirrhosis; TECHNIQUE: Grayscale and color Doppler images of the abdomen are obtained. COMPARISON: CT angiogram of the abdomen and pelvis 01/16/2024. FINDINGS: The gallbladder is contracted.  The liver contour is nodular consistent with cirrhosis.  The heterogeneous mass lesion in the dome of the right lobe of the liver is again noted better visualized on yesterday's exam.  There is a moderate volume of ascites.  There is echogenic material posterior to the right lobe of the liver consistent with the blood products seen on CT.  The main portal vein is patent.  The CBD is not well visualized. The right kidney measures 11.0 by 5.6 x 5.8 cm.  There is no hydronephrosis or nephrolithiasis of the right kidney.     1. Cirrhosis with a heterogeneous infiltrative mass in the dome of the right lobe of the liver better visualized on yesterday's exam. 2. Moderate volume ascites with echogenic material posterior to the right lobe of the liver consistent with a blood products seen on yesterday's exam. Electronically signed by: Keyon Beck MD Date:    01/17/2024 Time:    10:43    Cardiac catheterization    Result Date: 1/16/2024  Procedure performed in the Invasive Lab  - See Procedure Log link below for nursing documentation  - See OpNote on Surgeries Tab for physician findings  - See Imaging Tab for radiologist dictation    CTA Abdomen and Pelvis    Result Date: 1/16/2024  EXAMINATION: CTA ABDOMEN AND " PELVIS CLINICAL HISTORY: Portal vein thrombosis;Mesenteric ischemia, acute; TECHNIQUE: Axial images of the abdomen and pelvis were obtained with and without  IV contrast administration.  Coronal and sagittal reconstructions were provided.  Three dimensional and MIP images were obtained and evaluated.  Total DLP was 5216.27 mGy-cm. Dose lowering technique and automated exposure control were utilized for this exam. COMPARISON: CT of the chest abdomen and pelvis 01/01/2024. FINDINGS: Vascular findings: The distal descending thoracic aorta is nonaneurysmal. The abdominal aorta is nonaneurysmal.  The celiac axis is widely patent.  There is a normal branching pattern of the celiac. The SMA is widely patent. The single right and 2 left renal arteries are widely patent. The ABHIJIT is widely patent.  There is no significant iliofemoral disease. The main portal vein is patent.  There is tumor thrombus within the left portal vein with nonocclusive thrombosis.  There is recanalization of the umbilical vein.  There is a prominent spleno renal shunt.  The splenic vein is patent.  The superior mesenteric vein is patent. Nonvascular findings: There is atelectasis in the right lung base.  The left lung base is clear. The liver contour is nodular consistent with cirrhosis.  There is a lobulated solid and cystic mass lesion in the dome of the right lobe of the liver measuring 7.4 x 6.3 cm.  There is inferior extension and invasion into the left portal vein.  There is no biliary dilatation.  There is a large volume of ascites.  There is hyperdense material in the posterior right upper lobe with a focal area of contrast extravasation best visualized on series 8 image 27 and series 7, images 216-218. The gallbladder is contracted.  The spleen, pancreas, and left adrenal gland are normal.  There is a right adrenal nodule which is indeterminate on this exam.  The bilateral kidneys are normal.  The stomach and small bowel are decompressed.   There is no bowel wall thickening.  The colon is normal.  The urinary bladder is normal.  There is no pelvic or retroperitoneal lymphadenopathy.  There is no lytic or blastic osseous lesion.     1. Solid and cystic mass lesion in the superior aspect of the right hepatic lobe with invasion of the intrahepatic left portal vein and sequela of portal hypertension as above.  Additionally there is capsular rupture and active extravasation with a moderate amount of blood products in the posterior right upper quadrant.  Given the portal venous invasion and cirrhotic morphology of the liver this is favored to represent hepatocellular carcinoma although enhancement characteristics are not classic. 2. Indeterminate right adrenal nodule.  Metastatic disease cannot be excluded. 3. Minimal right lower lobe atelectasis. Red Alert: The critical information above was relayed directly by me by telephone to Dr. Rosenthal On 1/16/2024 at 11:52 . Electronically signed by: Keyon Beck MD Date:    01/16/2024 Time:    11:52    X-Ray Abdomen AP 1 View    Result Date: 1/16/2024  EXAMINATION: XR ABDOMEN AP 1 VIEW CLINICAL HISTORY: abdominal pain; TECHNIQUE: AP X-RAY OF THE ABDOMEN: CPT 77039 FINDINGS: Examination reveals residual feces throughout the colon the gas pattern is nonspecific with no clear evidence of ileus or obstruction no abnormal masses or calcifications identified. Slightly more prominent loop of small bowel identified in the left hemiabdomen these might be related to a sentinel loop which my signal an inflammatory process near the area of the 89 make segment of bowel     Loop of small bowel that might represent a short segment in a 9 larry ileus in represent a sentinel loop indicating the presence of an inflammatory process clinical correlation is suggested Electronically signed by: Cameron Peralta Date:    01/16/2024 Time:    08:50    X-Ray Chest 1 View    Result Date: 1/6/2024  EXAMINATION: XR CHEST 1 VIEW CLINICAL HISTORY:  Pneumonia; TECHNIQUE: Frontal view(s) of the chest. COMPARISON: Radiography 01/05/2024 FINDINGS: Enteric tube seen as far as the gastric body.  Lungs remain hypoinflated with stable to slightly improved bilateral consolidation.  No significant pleural fluid.  No pneumothorax.  Stable cardiac silhouette.     Bilateral consolidation stable to slightly improved. Electronically signed by: Gómez Cramona Date:    01/06/2024 Time:    09:35    X-Ray Chest 1 View    Result Date: 1/5/2024  EXAMINATION: XR CHEST 1 VIEW CPT 83532 CLINICAL HISTORY: pneumonia; COMPARISON: January 4, 2024 FINDINGS: Cardiomediastinal silhouette and pleuroparenchymal changes are essentially unchanged as compared with the previous exam There has been interval insertion of a nasogastric tube with the tip below the diaphragm     No significant change Interval insertion of nasogastric Electronically signed by: Cameron Peralta Date:    01/05/2024 Time:    08:16    Echo Saline Bubble? No    Result Date: 1/4/2024    Left Ventricle: The left ventricle is normal in size. Ventricular mass is normal. Normal wall thickness. Normal wall motion. There is normal systolic function. Biplane (2D) method of discs ejection fraction is 62%. There is normal diastolic function.   Right Ventricle: Normal right ventricular cavity size. Systolic function could not be assesed due to poor visualization.   Left Atrium: Normal left atrial size.   Right Atrium: Normal right atrial size.   Aortic Valve: Not well visualized due to poor acoustic window. There is no stenosis. There is no significant regurgitation.   Mitral Valve: The mitral valve is structurally normal. There is normal leaflet mobility.   Tricuspid Valve: Not well visualized due to poor acoustic window. The tricuspid valve is structurally normal. There is no significant regurgitation.   Pulmonic Valve: Not well visualized due to poor acoustic window. There is no significant regurgitation.   Pulmonary Artery:  Pulmonary artery pressure could not be estimated.   IVC/SVC: Intermediate venous pressure at 8 mmHg.   Pericardium: There is no pericardial effusion.     XR Gastric tube check, non-radiologist performed    Result Date: 1/4/2024  EXAMINATION: XR GASTRIC TUBE CHECK, NON-RADIOLOGIST PERFORMED CLINICAL HISTORY: NG tube placement verification; COMPARISON: 3 January 2024 FINDINGS: Frontal image of the abdomen. Enteric tube extends well into the stomach.  The tip overlies the expected area of the gastric antrum.     As above. Electronically signed by: Aamir Phillip Date:    01/04/2024 Time:    15:03    X-Ray Chest 1 View    Result Date: 1/4/2024  EXAMINATION: XR CHEST 1 VIEW CPT 50087 CLINICAL HISTORY: tachypnea; COMPARISON: January 3, 2024 FINDINGS: Examination reveals cardiomediastinal silhouette improved parenchymal changes to be essentially unchanged as compared with the previous exam     No significant change Electronically signed by: Cameron Peralta Date:    01/04/2024 Time:    08:22    X-Ray Abdomen AP 1 View    Result Date: 1/3/2024  EXAMINATION: XR ABDOMEN AP 1 VIEW CLINICAL HISTORY: distended abdomen; TECHNIQUE: Single-view of the abdomen COMPARISON: No prior. FINDINGS: No acute osseous abnormality.  Nonobstructive bowel gas pattern.     Nonobstructive bowel gas pattern. Electronically signed by: Josef Sow Date:    01/03/2024 Time:    09:46    X-Ray Chest 1 View    Result Date: 1/3/2024  EXAMINATION: XR CHEST 1 VIEW CLINICAL HISTORY: PNA; TECHNIQUE: Single view of the chest COMPARISON: 01/02/2024 FINDINGS: Left upper lobe opacification remains slightly improved in the interval.  The lungs are hypoexpanded.  The cardiomediastinal silhouette is unchanged.     No adverse interval change.  Improved aeration of the left upper lobe. Electronically signed by: Josef Sow Date:    01/03/2024 Time:    09:46    X-Ray Chest 1 View    Result Date: 1/2/2024  EXAMINATION: XR CHEST 1 VIEW CPT 14922 CLINICAL HISTORY:  increased oxygen demand; COMPARISON: January 1, 2024 FINDINGS: Examination reveals cardiomediastinal silhouette to be unchanged as compared with the previous exam. Worsening confluent airspace opacities specially in the right perihilar region and right upper lobe with some confluent opacities also seen in the right upper lobe some of the changes might be related to poor inspiratory effort, however, the bulk of the changes are related to worsening infiltrative changes specially in the left     Worsening consolidative changes in the left lung as above Electronically signed by: Cameron Peralta Date:    01/02/2024 Time:    12:51    CT Chest Abdomen Pelvis Without Contrast (XPD)    Result Date: 1/2/2024  EXAMINATION: CT CHEST ABDOMEN PELVIS WITHOUT CONTRAST(XPD) CLINICAL HISTORY: Suspect pneumonia, biliary obstruction; TECHNIQUE: Helical acquisition through the chest, abdomen and pelvis without  IV administration of contrast. Axial, sagittal and coronal reformats interpreted. Automated tube current modulation, weight-based exposure dosing, and/or iterative reconstruction technique utilized to reach lowest reasonably achievable exposure rate. DLP: 1042 mGy*cm COMPARISON: Chest radiograph 01/01/2024 FINDINGS: BASE OF NECK: No significant abnormality. HEART: Normal size. There are coronary artery calcifications.  Low-density blood pool with visualization of the interventricular septum compatible with anemia. THORACIC VASCULATURE: Thoracic aorta is unremarkable. SIRISHA/MEDIASTINUM: Small nonspecific mediastinal lymph nodes.  Evaluation of hilar lymphadenopathy is limited without contrast. AIRWAYS: Patent. LUNGS/PLEURA: Multilobar consolidative opacities within the lungs. THORACIC SOFT TISSUES: Unremarkable. LIVER: Limited evaluation the liver due to lack of intravenous contrast and placement of the patient's arms over the abdomen which causes beam hardening artifact.  Heterogeneous attenuation of the liver.  There appears to  be a mass at the dome of the liver.  Caudate lobe hypertrophy and changes of cirrhosis. BILIARY: The gallbladder does not appear to be overly distended. PANCREAS: Nonspecific retroperitoneal edema.  Unable to assess for changes of pancreatitis given generalized fluid overload. SPLEEN: Upper limits of normal in size. ADRENALS: There is a 1.6 cm right adrenal nodule.  Unable to accurately measure internal attenuation due to beam hardening artifact. KIDNEYS/URETERS: Hyperdense renal pyramids.  This can be related to volume status/dehydration or medullary calcinosis. GI TRACT/MESENTERY: Evaluation of the bowel is limited without contrast. Bowel is normal in caliber without evidence of obstruction. PERITONEUM: Small volume ascites.No free air. LYMPH NODES: No enlarged lymph nodes by size criteria. ABDOMINOPELVIC VASCULATURE: Normal caliber abdominal aorta.  Large periumbilical vein. BLADDER: Normal appearance given degree of distention. REPRODUCTIVE ORGANS: Normal as visualized. ABDOMINAL WALL: Small fat containing right inguinal hernia.  Mild body wall edema. BONES: Anterolisthesis at the lumbosacral junction.     1. Multilobar consolidative opacities within the lungs 2. Cirrhotic morphology of the liver with mass at the dome of the liver.  Evaluation is moderately limited without contrast and due to beam hardening artifact related to patient positioning.  Recommend liver protocol CT abdomen without and with contrast. 3. Ascites 4. Indeterminate right adrenal nodule.  Continued attention recommended on follow-up exams. 5. The preliminary and final reports are concordant. Electronically signed by: Tika Mcknight Date:    01/02/2024 Time:    09:44    X-Ray Chest 1 View    Result Date: 1/1/2024  EXAMINATION: XR CHEST 1 VIEW CLINICAL HISTORY: cough;  Cough, unspecified TECHNIQUE: Single view of the chest COMPARISON: No prior imaging available for comparison. FINDINGS: Bilateral patchy airspace opacities greatest on the  left. The cardiomediastinal silhouette is within normal limits. No acute osseous abnormality.     Bilateral patchy airspace opacities greatest on the left.  Findings concerning for atypical infectious process. Electronically signed by: Josef Sow Date:    01/01/2024 Time:    14:23        Assessment / Plan:     Franco Hein is a 48 y.o. male       Franco Hein is a 48 y.o. male  with unknown past medical history presented to [facility] ED 01/01/2020 for the complaint of jaundice, weakness for 3 weeks, and altered mental status.  Gastroenterology was consulted for recommendations patient worsening labs with alcoholic liver disease.        Alcoholic liver disease with ascites  Hepatic encephalopathy (resolved)  Alcohol abuse  [facility] discriminant function score: 72.8   [place] alcoholic hepatitis score: 9  [place] 17.1  Child Hoang class C score:  15   [place] score .82:  Non responder  CORBY  - continue rifaximin.  Will eventually need to be placed on  lactulose when stools as loose for 2-3 bowel movements per day.  - can restart diuretics as long as sodium levels remain greater than 125  -continue CIWA protocol   -continue thiamine and folic acid  -With worsening renal indices over the past few days, will initiate albumin 25g BID. If renal indices improve, can consider lasix/albumin infusion to improve edema.     HCC  - AFP elevated at 1400  -advised alcohol cessation  -patient currently unlikely to be a candidate for systemic chemotherapy, given decompensated status. However, with improvement, could become a candidate  -goals of care discussion to be had by primary team     Hyponatremia (improving)  -monitor labs for improvement off diuretics  -can restart diuretics as long as sodium levels remained greater than 125  -Recommend discontinuing sodium bicarb tablets, as this is contributing to edema  -Hyponatremia is 2/2 severe liver disease    Thrombocytopenia (improving), Normocytic anemia  - do not administer platelets  unless patient is actively bleeding  - Platelets stable at 64  - Hgb stable 9.6/27.7     Strep pneumoniae bacteremia (resolved)  - being treated with  Rocephin 2g for 14 days.    Intrahepatic bleeding s/p Gelfoam embolization (resolved)  - Gelfoam embolization performed 01/16/2024    Praneeth Arndt DO  [facility] Internal Medicine, PGY-II  [facility] Gastroenterology

## 2024-01-22 NOTE — PLAN OF CARE
[facility] General Surgery Service  Plan of Care  Date: 1/21/2024     CC:   Consulted for acute drop in H/H s/p R hepatic artery gelfoam embolization     HPI:   Mr. Markel Hein is a 49 yo male with pmhx ETOH misuse disorder who presented to [facility] on 1/1/24 with jaundice, weakness X 3 weeks, and AMS. Pt was found to have intrahepatic bleeding from lesion suspicious for HCC. He is now POD 5 s/p R hepatic artery gelfoam embolization, surgery re-engaged today for evaluation in the setting of acute drop in Hgb (6.5 from 8.3).     Patient was admitted to ICU for AMS and concern for decompensation and associated alcohol withdrawal, influenza A, and strep PNA bacteremia. CT abdomen 1/16/24 showed capsular rupture and active extravasation with moderate amount of blood products in the posterior RUQ for which he was taken for embolization of the right hepatic artery and therapeutic paracentesis yielding 4.5 L of bloody ascitic fluid. Patient tolerated both procedures well. He required 2 U pRBCs and 1 FFP for Hgb of 7 on 1/18/24. H/H stabilized until today, when CBC revealed H/H of 6.5/19.2 from 8.3/24.1 yesterday.      Review of Systems   Constitutional:  Negative for chills and fever.   Respiratory:  Negative for hemoptysis.    Cardiovascular:  Negative for chest pain and orthopnea.   Gastrointestinal:  Positive for abdominal pain (R flank and suprapubic pain) and diarrhea. Negative for blood in stool, nausea and vomiting.      Physical Exam  Eyes:      General: Scleral icterus present.   Abdominal:      General: There is distension.      Palpations: There is no fluid wave.      Tenderness: There is abdominal tenderness in the right upper quadrant and suprapubic area. There is no guarding or rebound.   Skin:     Coloration: Skin is jaundiced.      Findings: Bruising present.     Imaging  Impression:     1. No evidence of active extravasation.  2. Cirrhosis with no change in the infiltrative lesion in the dome of the right lobe of the  liver with portal venous invasion.  This remains suspicious for hepatocellular carcinoma.  3. Moderate volume ascites.  4. Right adrenal nodule, unchanged from prior exam.  This remains indeterminate and metastatic disease cannot be excluded.  5. Bibasilar atelectasis.        A/P:   48 y.o. male with pmhx alcohol use disorder and decompensated liver cirrhosis found to have intrahepatic bleeding from lesion suspicious for HCC s/p right hepatic artery embolization and therapeutic paracentesis, surgery reengage to evaluate for c/f intrahepatic bleeding in the setting of acute drop in H/H.     Plan  -CTA Abdomen and pelvis no active extrav noted  -transfuse as needed  -repeat labs ordered, check post-transfusion CBC  -rest of care per primary  -General surgery signing off, please call with any questions     Anne Garcia, MS4    Dayanara Balderas PGY1  [facility] General Surgery  01/21/2024

## 2024-01-22 NOTE — PROGRESS NOTES
Inpatient Nutrition Assessment    Admit Date: 1/1/2024   Total duration of encounter: 21 days   Patient Age: 48 y.o.    Nutrition Recommendation/Prescription     Renal diet with Soft & Bite size consistency per SLP   Novasource Renal (provides 475 kcal, 22 g protein per serving) once daily d/t out of stock of Suplena  Demetrius (provides 90 kcal, 2.5 g protein per serving) BID for wound healing  Biweekly wt    Communication of Recommendations: reviewed with nurse and reviewed with patient    Nutrition Assessment     Malnutrition Assessment/Nutrition-Focused Physical Exam    Malnutrition Context: acute illness or injury (01/04/24 1119)  Malnutrition Level: moderate (01/04/24 1119)  Energy Intake (Malnutrition):  (Usnure of oral intake PTA, On TF up until 1/9; Po diet 1/9 with ~50% intake) (01/12/24 1233)  Weight Loss (Malnutrition): other (see comments) (unable to obtain wt hx; pt with AMS) (01/04/24 1119)  Subcutaneous Fat (Malnutrition): mild depletion (01/04/24 1119)  Orbital Region (Subcutaneous Fat Loss): mild depletion  Upper Arm Region (Subcutaneous Fat Loss): mild depletion     Muscle Mass (Malnutrition): mild depletion (01/04/24 1119)     Clavicle Bone Region (Muscle Loss): mild depletion         A minimum of two characteristics is recommended for diagnosis of either severe or non-severe malnutrition.    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    Relevant Medical History: ETOH misuse     Nutrition-Related Medications:  folic acid, pantoprazole, thiamine  Calorie Containing IV Medications: no significant kcals from medications at this  "time    Nutrition-Related Labs:  1/17/24 -- H/H 8.9/25.4 L, K 4.5, BUN 28.4 H, Cr 1, Phos 5.5 H,  H, ALT 80 H,  H  1/22/24 -- H/H 9.6/27 L, K 4.9, BUN 32 H, Cr 1.4 H, Glu 109, AST 77 H, ALT 61 H,  H    Nutrition Orders:   Diet Soft & Bite Sized (IDDSI Level 6) 60/2/2 Renal Restriction, 1 gm Sodium; Fluid - 1500mL  Dietary nutrition supplements Novasource Renal; BID     Appetite/Oral Intake: good/% of meals    Factors Affecting Nutritional Intake: abdominal pain    Food/Catholic/Cultural Preferences: none reported    Food Allergies: no known food allergies    Wound(s):   Right superficial wound to inner buttock healing    Last Bowel Movement: 01/21/24    Comments    1/22/24 -- Pt eating breakfast at time of visit, noted 75% consumed; pt with 100% po intake per EMR meal intake record; Currently out of stock of Suplena, will change to Nov Renal once daily & monitor renal/liver function; most recent weight elevated compared to admit weight, 3-4+ edema present    1/17/24 -- Pt NPO since 1/16, pt with abdominal pain with distention related to intrahepatic mass, pt denies abdominal pain this am; nsing reports diet to resume today per MD; New wt indicates gain, most likely fluid related, on diuretics noted; BUN/Cr/Phos (H) - suggest renal diet & change ONS back to Suplena    1/12/24 --  [id number] used during visit; Diet advanced to Soft & Bite size per SLP on 1/10; pt with ~50% meal intake d/t complaints of diarrhea 10 minutes after eating; pt requesting medicine for his "stomach" - nsing notified, reports call in to MD, lactulose on hold; pt reports drinking ONS, will change to Boost Plus to better meet nutrition needs until po intake > 75%; Demetrius ordered to promote wound healing; new bed weight obtained at visit indicates gain, ? Accuracy of bed weight, pt unsure of UBW but reports weight loss PTA, will continue to monitor    1/9/24 -- Pt pulled out NGT this am for TF; SLP completed " "bedside swallow with recs to begin FL diet only at this time, will order Suplena TID to supplement meals; Monitor diet tolerance/po intake for need to resume TF; Rectal tube now removed, loose stools improved; no new ammonia level noted, lactulose not ordered at this time noted    (1/5) Pt remains NPO; started on TF yesterday; currently TF infusing @ 40ml/hr; RN reported to achieved goal rate later today; goal rate should meet estimated needs. Labs acknolwedged.     (1/4) Pt remains NPO x3 days ; AMS/on bipap; on 1/3--ST unable to complete swallow eval 2 pt on Bipap; no new wt. Both TF/PPN recs provided for nutrition support; discussed with RN caring for pt. Noted NH4--elevated to 90; no lactulose ; lowered protein recs until level decreases.     (1/2) Received MST for unsure wt loss/ appetite. Pt not alert during rounds; AMS; unable to obtain diet/wt hx at this time; PPN and diet recs provided. Suggest ST swallow eval prior to diet progression. Abnormal labs noted: Tbili (H)--hx ETOH abuse. Suggest continue folic acid/thiamine tx.     Anthropometrics    Height: 5' 4" (162.6 cm) Height Method: Stated  Last Weight: 78 kg (171 lb 15.3 oz) (01/17/24 1115) Weight Method: Bed Scale  BMI (Calculated): 29.5  BMI Classification: normal (BMI 18.5-24.9)        Ideal Body Weight (IBW), Male: 130 lb     % Ideal Body Weight, Male (lb): 92.31 %      Usual Body Weight (UBW), kg:  (pt unable to give UBW; no wt hx EMR)        Usual Weight Provided By: EMR weight history    Wt Readings from Last 5 Encounters:   01/17/24 78 kg (171 lb 15.3 oz)     Weight Change(s) Since Admission: weight gain  Wt Readings from Last 1 Encounters:   01/17/24 1115 78 kg (171 lb 15.3 oz)   01/12/24 1232 72.1 kg (158 lb 15.2 oz)   01/04/24 1000 54.4 kg (120 lb)   01/02/24 0156 54.4 kg (120 lb)   01/01/24 1255 54.4 kg (120 lb)   Admit Weight: 54.4 kg (120 lb) (01/01/24 1255), Weight Method: Bed Scale  1/17/24 -- 78 kg, bed    Estimated Needs    Weight Used " For Calorie Calculations: 54.4 kg (119 lb 14.9 oz)  Energy Calorie Requirements (kcal): 1904 kcal/d; 35 federico/kg/wt gain  Energy Need Method: Kcal/kg  Weight Used For Protein Calculations: 54.4 kg (119 lb 14.9 oz)  Protein Requirements: 54 gm protein/d; 1.0 gm /kg --limit protein--NH4 level elevated  Fluid Requirements (mL): 1904 ml/d; 1ml/federico  Temp (24hrs), Av.8 °F (36.6 °C), Min:97.3 °F (36.3 °C), Max:98.3 °F (36.8 °C)       Enteral Nutrition    Formula:  None  Rate/Volume: 0ml/hr  Water Flushes:0  Additives/Modulars: none at this time  Route:  none  Method:  none  Total Nutrition Provided by Tube Feeding, Additives, and Flushes:  Calories Provided  0 kcal/d, 0% needs   Protein Provided  0 g/d, 0% needs   Fluid Provided  0 ml/d, 0% needs   Continuous feeding calculations based on estimated 23 hr/d run time unless otherwise stated.    Parenteral Nutrition    Patient not receiving parenteral nutrition support at this time.    Evaluation of Received Nutrient Intake    Calories: meeting estimated needs  Protein: meeting estimated needs    Patient Education    Not applicable.    Nutrition Diagnosis     PES: Inadequate oral intake related to acute illness as evidenced by < 50% nutrition intake, abdominal pain. (resolved)    Interventions/Goals     Intervention(s): general/healthful diet, commercial beverage, multivitamin/mineral supplement therapy, prescription medication, and collaboration with other providers    Goal: Meet greater than 80% of nutritional needs by follow-up. (goal progressing)    Monitoring & Evaluation     Dietitian will monitor food and beverage intake, weight, electrolyte/renal panel, glucose/endocrine profile, and gastrointestinal profile.    Nutrition Risk/Follow-Up: moderate (follow-up in 3-5 days)   Please consult if re-assessment needed sooner.

## 2024-01-22 NOTE — PROGRESS NOTES
[facility] W[facility] Progress Note     Resident Team: [facility] Medicine List 4  Attending Physician: Evelin  Resident: MD Galo  Intern: DO Ulysses       Subjective:      Brief HPI:  Franco Hein is  48 y.o. male with a PMH of ETOH misuse disorder who presented to [facility] ED on 2024 with complaint of jaundice, weakness for 3 weeks, and AMS. Patient was brought in by a friend who states patient is a daily drinker who has not been able to go to a store for the last 2 days, but patient is noted to have a positive ethanol on admission. Patient unable to provide history given his mental status and was only able to say his name and confirm he drinks alcohol but does not do drugs. Patient was tachypneic, tachycardic, and tremulous, on presentation with jaundice. Admitted to ICU given altered mental status and concern for decompensation in the setting of alcohol withdrawal and influenza A, also found to have strep pneumonia bacteremia, on appropriate IV antibiotics.     Hospital Course:  23: Paracentesis performed w/ removal of 4.5L of bloody ascitic fluid w/ symptomatic improvement & hemodynamic stability thereafter.     Interval History:   Patient had an episode of confusion last night. Per nursing he pulled out his IV lines and was getting dressed and ready to leave. This morning patient was oriented to person, place, and time. He does complain of abdominal pain and leg swelling today; otherwise denies any other complaints notably fever, chills, chest pain, SOB. Also denies any bloody or melenic stools. Yesterday administered 2 units of PRBCs. Is urinating.  Current Hb 9.6. will contact case management for hospice      Review of Systems:  ROS completed and negative except as indicated above.     Objective:     Last 24 Hour Vital Signs:  BP  Min: 101/67  Max: 127/89  Temp  Av.8 °F (36.6 °C)  Min: 97.3 °F (36.3 °C)  Max: 98.3 °F (36.8 °C)  Pulse  Av  Min: 103  Max: 115  Resp  Av  Min: 12  Max:  22  SpO2  Av.1 %  Min: 96 %  Max: 99 %  I/O last 3 completed shifts:  In: 603.8 [P.O.:180; Blood:423.8]  Out: 300 [Urine:300]    Physical Examination:  General: no acute distress  Eye: PERRLA, EOMI, scleral icterus noted  HENT: NC/AT, moist mucus membranes  Neck: full range of motion, no thyromegaly or lymphadenopathy  Respiratory: Clear and equal bilateral BS, satting well on RA  Cardiovascular:  Tachycardic   Gastrointestinal: soft, non-tender, mildly-distended with normal bowel sounds, without masses to palpation  Musculoskeletal: no obvious deformities, full range of motion of all extremities/spine without limitation or discomfort  Integumentary: Jaundice, spider angiomas present on upper chest and abdomen, no caput medusa. Petechiae noted to right lateral LQ, and small scattered bruising on arms and legs.   Extremities: radial and DP pulses 2+ bilaterally, 2+ BLE edema  Neurologic: Pt AAOx3, following commands and conversing appropriately. No focal neuro deficits appreciated,    Laboratory:  Most Recent Data:  CBC:   Lab Results   Component Value Date    WBC 9.86 2024    HGB 9.6 (L) 2024    HCT 27.7 (L) 2024    PLT 64 (L) 2024    MCV 91.4 2024    RDW 18.2 (H) 2024     WBC Differential:   Recent Labs   Lab 24  0408 24  0338 24  0526 24  1729 24  0320   WBC 9.23 8.43 7.80 12.28* 9.86   HGB 9.0* 8.3* 6.5* 8.9* 9.6*   HCT 25.8* 24.1* 19.2* 25.8* 27.7*   PLT 64* 60* 61* 59* 64*   MCV 90.5 92.3 93.2 92.4 91.4       BMP:   Lab Results   Component Value Date     (L) 2024    K 4.9 2024    CO2 17 (L) 2024    BUN 32.5 (H) 2024    CREATININE 1.46 (H) 2024    CALCIUM 7.6 (L) 2024    MG 2.20 2024    PHOS 4.7 2024     LFTs:   Lab Results   Component Value Date    ALBUMIN 1.8 (L) 2024    BILITOT 10.3 (H) 2024    AST 77 (H) 2024    ALKPHOS 259 (H) 2024    ALT 61 (H) 2024  "    Coags:   Lab Results   Component Value Date    INR 1.9 (H) 01/22/2024    PROTIME 21.5 (H) 01/22/2024    PTT 38.6 (H) 01/01/2024     FLP: No results found for: "CHOL", "HDL", "LDLCALC", "TRIG", "CHOLHDL"  DM:   Lab Results   Component Value Date    CREATININE 1.46 (H) 01/22/2024     Thyroid: No results found for: "TSH", "FREET4", "E5ZUNPN", "N5FZXRN", "THYROIDAB"  Anemia:   Lab Results   Component Value Date    IRON 39 (L) 01/01/2024    TIBC 203 (L) 01/01/2024    FERRITIN 116.00 01/01/2024    JTTUKXVH29 >2,000 (H) 01/01/2024    FOLATE 18.3 01/01/2024     Cardiac:   Lab Results   Component Value Date    TROPONINI 0.029 01/01/2024    BNP 89.3 01/01/2024       Microbiology Data Reviewed: yes  Pertinent Findings:  1/1/24: bld cx +strep pneumonia pan sensitive x1 bottle  1/4/24: bld cx x2 negative    Other Results:  TTE 1/4/24: EF 62%, no evidence of vegetation      Radiology:  Imaging Results              CT Chest Abdomen Pelvis Without Contrast (XPD) (Final result)  Result time 01/02/24 09:44:01      Final result by Tika Mcknight MD (01/02/24 09:44:01)                   Impression:      1. Multilobar consolidative opacities within the lungs  2. Cirrhotic morphology of the liver with mass at the dome of the liver.  Evaluation is moderately limited without contrast and due to beam hardening artifact related to patient positioning.  Recommend liver protocol CT abdomen without and with contrast.  3. Ascites  4. Indeterminate right adrenal nodule.  Continued attention recommended on follow-up exams.  5. The preliminary and final reports are concordant.      Electronically signed by: Tika Mcknight  Date:    01/02/2024  Time:    09:44               Narrative:    EXAMINATION:  CT CHEST ABDOMEN PELVIS WITHOUT CONTRAST(XPD)    CLINICAL HISTORY:  Suspect pneumonia, biliary obstruction;    TECHNIQUE:  Helical acquisition through the chest, abdomen and pelvis without  IV administration of contrast. Axial, sagittal and " coronal reformats interpreted.    Automated tube current modulation, weight-based exposure dosing, and/or iterative reconstruction technique utilized to reach lowest reasonably achievable exposure rate.    DLP: 1042 mGy*cm    COMPARISON:  Chest radiograph 01/01/2024    FINDINGS:  BASE OF NECK: No significant abnormality.    HEART: Normal size. There are coronary artery calcifications.  Low-density blood pool with visualization of the interventricular septum compatible with anemia.    THORACIC VASCULATURE: Thoracic aorta is unremarkable.    SIRISHA/MEDIASTINUM: Small nonspecific mediastinal lymph nodes.  Evaluation of hilar lymphadenopathy is limited without contrast.    AIRWAYS: Patent.    LUNGS/PLEURA: Multilobar consolidative opacities within the lungs.    THORACIC SOFT TISSUES: Unremarkable.    LIVER: Limited evaluation the liver due to lack of intravenous contrast and placement of the patient's arms over the abdomen which causes beam hardening artifact.  Heterogeneous attenuation of the liver.  There appears to be a mass at the dome of the liver.  Caudate lobe hypertrophy and changes of cirrhosis.    BILIARY: The gallbladder does not appear to be overly distended.    PANCREAS: Nonspecific retroperitoneal edema.  Unable to assess for changes of pancreatitis given generalized fluid overload.    SPLEEN: Upper limits of normal in size.    ADRENALS: There is a 1.6 cm right adrenal nodule.  Unable to accurately measure internal attenuation due to beam hardening artifact.    KIDNEYS/URETERS: Hyperdense renal pyramids.  This can be related to volume status/dehydration or medullary calcinosis.    GI TRACT/MESENTERY: Evaluation of the bowel is limited without contrast. Bowel is normal in caliber without evidence of obstruction.    PERITONEUM: Small volume ascites.No free air.    LYMPH NODES: No enlarged lymph nodes by size criteria.    ABDOMINOPELVIC VASCULATURE: Normal caliber abdominal aorta.  Large periumbilical  vein.    BLADDER: Normal appearance given degree of distention.    REPRODUCTIVE ORGANS: Normal as visualized.    ABDOMINAL WALL: Small fat containing right inguinal hernia.  Mild body wall edema.    BONES: Anterolisthesis at the lumbosacral junction.                        Preliminary result by Santana Lerma MD (01/01/24 22:56:45)                   Impression:    1. No intrahepatic or extrahepatic biliary duct dilatation is seen.  2. Note of subtle increased attenuation in the medullary portions of the bilateral kidneys which may reflect medullary calcinosis. Correlate with clinical and laboratory findings as regards additional evaluation and follow-up.  3. There is multifocal patchy dense consolidation of both the lungs with predominant involvement of the left upper and right lower lobes. This is consistent with multilobar pneumonia. Correlate with clinical and laboratory findings as regards additional evaluation and follow-up to full resolution.  4. There is a 2.8 x 2 cm soft tissue density nodule in the right adrenal gland (series 2 image 98). This is of indeterminate etiology.  5. The margins of the liver appears somewhat nodular and irregular. This is suggestive of cirrhosis. Correlate with clinical and laboratory findings.  6. Details and other findings as discussed above.               Narrative:    START OF REPORT:  Technique: CT Scan of the chest abdomen and pelvis was performed without intravenous contrast with axial as well as sagittal and, coronal images.    Dosage Information: Automated Exposure Control was utilized 1041.63 mGy.cm.    Comparison: None.    Clinical History: Suspect pneumonia, biliary obstruction.    Findings:  Mediastinum: A few scattered subcentimeter mediastinal lymph nodes are seen.  Heart: The heart size is within normal limits.  Aorta: Unremarkable appearing aorta.  Pulmonary Arteries: Unremarkable.  Lungs: There is multifocal patchy dense consolidation of both the lungs with  predominant involvement of the left upper and right lower lobes.  Pleura: No effusions or pneumothorax are identified.  Abdomen:  Abdominal Wall: There is extensive stranding of the subcutaneous fat as well as the intraabdominal and intrapelvic fat consistent with anasarca edema. Correlate with clinical and laboratory findings.  Liver: The margins of the liver appears somewhat nodular and irregular. There are also prominent portosystemic collaterals consistent with portal hypertension.  Biliary System: No intrahepatic or extrahepatic biliary duct dilatation is seen.  Gallbladder: The gallbladder is not definitely identified on this noncontrast examination and may be contracted.  Pancreas: The pancreas appears unremarkable.  Spleen: The spleen appears unremarkable.  Adrenals: There is a 2.8 x 2 cm soft tissue density nodule in the right adrenal gland (series 2 image 98).  Kidneys: Note of subtle increased attenuation in the medullary portions of the bilateral kidneys which may reflect medullary calcinosis.  Aorta: The abdominal aorta appears unremarkable.  IVC: Unremarkable.  Bowel:  Esophagus: There is a small hiatal hernia.  Stomach: The stomach appears unremarkable.  Duodenum: Unremarkable appearing duodenum.  Small Bowel: The small bowel appears unremarkable.  Colon: Nondistended.  Appendix: The appendix is not identified but no inflammatory changes are seen in the right lower quadrant to suggest appendicitis.  Peritoneum: There is mild ascites. No intraperitoneal free gas is seen.    Pelvis:  Bladder: The bladder appears unremarkable.  Male:  Prostate gland: The prostate gland appears unremarkable.    Bony structures:  Dorsal Spine: There is mild to moderate spondylosis of the visualized dorsal spine.  Bony Pelvis: The visualized bony structures of the pelvis appear unremarkable.                                         X-Ray Chest 1 View (Final result)  Result time 01/01/24 14:23:11   Procedure changed from  X-Ray Chest PA And Lateral     Final result by Josef Sow MD (01/01/24 14:23:11)                   Impression:      Bilateral patchy airspace opacities greatest on the left.  Findings concerning for atypical infectious process.      Electronically signed by: Josef Sow  Date:    01/01/2024  Time:    14:23               Narrative:    EXAMINATION:  XR CHEST 1 VIEW    CLINICAL HISTORY:  cough;  Cough, unspecified    TECHNIQUE:  Single view of the chest    COMPARISON:  No prior imaging available for comparison.    FINDINGS:  Bilateral patchy airspace opacities greatest on the left.    The cardiomediastinal silhouette is within normal limits.    No acute osseous abnormality.                                       RADIOLOGY REPORT (Final result)  Result time 01/04/24 11:39:28      Final result by Unknown User (01/04/24 11:39:28)                                        Current Medications:     Infusions:   sodium chloride 0.9% 75 mL/hr at 01/22/24 0908        Scheduled:   folic acid  1 mg Oral Daily    LIDOcaine HCL 10 mg/ml (1%)  1 mL Other Once    miconazole NITRATE 2 %   Topical (Top) BID    pantoprazole  40 mg Oral Daily    rifAXIMin  550 mg Oral BID    sodium chloride 0.9%  10 mL Intravenous Q6H    thiamine  100 mg Oral Daily        PRN:  0.9%  NaCl infusion (for blood administration), 0.9%  NaCl infusion (for blood administration), 0.9%  NaCl infusion (for blood administration), 0.9%  NaCl infusion (for blood administration), 0.9%  NaCl infusion (for blood administration), 0.9%  NaCl infusion (for blood administration), 0.9%  NaCl infusion (for blood administration), sodium chloride 0.9%, acetaminophen, acetaminophen, albuterol-ipratropium, benzonatate, dextrose 10%, dextrose 10%, glucagon (human recombinant), glucose, glucose, HYDROcodone-acetaminophen, hydrOXYzine pamoate, iohexoL, lactulose, sodium chloride 0.9%, Flushing PICC/Midline Protocol **AND** sodium chloride 0.9% **AND** sodium chloride  0.9%    Antibiotics and Day Number of Therapy:  Vanc x3 days  Zosyn/Azithromycin x4 days  Rocephin 2g on D12/14     Lines and Day Number of Therapy:  PIV      Assessment & Plan:     Decompensated Cirrhosis  Hypervolemic Hyponotremia  Liver lesion likely HCC  Bilirubinemia       - suspected etiology ETOH. Pt reportedly had hx of heavy drinking per friend who dropped him off       - Positive hepatitis C antibody; VL negative, no active infection         -- MELD-Na: 28 -->25 --> 22--> 29 --> 28 (1/19)    -- Child Hoang: 13 --> 11 --> 11 --> 11 (1/19) class C  -- Maddrey: 204 on admission       - INR flat, 2 today       - GI consulted w/ recs to discontinue Lasix/Aldactone/Steroids at this time; appreciate assistance in care       - Continued improvement In encephalopathy; hold lactulose at this time w/ prior diarrhea       - C/w Rifaxamin for hepatic encephalopathy       - FeNA 1%, Nina < 20; lower extremity edema; likely hypervolemic hyponotremia       - 4.5 L bloody ascitic fluid removed on 1/17 for therapeutic/diagnostic para, studies pending       - AFP 1143       - patient will need to be referred to Oncology upon discharge      Intrahepatic bleed likely 2/2 tumor       - Pt had slow intrahepatic bleed likely over the past several days as complication from HCC       - Labs in AM showed H&H had continued to drop slowly, lactic acid elevated       - CTA abd/pelvis showed intrahepatic bleeding with lesion in right lobe of liver suspicious for HCC, capsular rupture of liver and some bleeding and contrast noted with ascites       - Taken by Vasc surgery for gelfoam embolization which was successful, appreciate assistance       - Pt transfused 4 units pRBC, 2 FFP, 1 platelet; no need to transfuse unless actively bleeding       - Trend CBC daily       - Will continue to monitor for symptoms       - C/w SCDs for DVT ppx in setting of recent thrombocytopenia requiring x1u platelets (1/18)       -   Factor VIII level elevated  > 300   - hemoglobin level 01/21/2024 was 6.5.  Patient was administered 2 units of PRBCs. Rechecked hemoglobin. Currently it is 9.6.       - patient will require a hospice consult. Albumin and diueretics likely a temporary solution. Not considering at this time. Will re evaluate at a later time.       Pneumonia 2/2 Influenza A-resolved       - Positive on 1/1/24; initially ICU with BIPAP for respiratory failure but stable on floor since 1/9/24       - Possible superimposed Strep PNA leading to bacteremia and pneumonia; completed IV abx regimen    Strep Pneumoniae Bacteremia-resolved       - Blood culture positive x1/2 Streptococcus; [employer] confirmation strep pneumonia bacteremia which is pan-sensitive, resp culture /Gram stain collection pending.       - Repeat blood cultures 1/4/24  negative; TTE did not have any findings consistent with endocarditis.        - s/p Zosyn and Zithromax x4 days; MRSA PCR negative, discontinued vancomycin after D3.        - Completed 2 week regimen Ceftriaxone 2g on 1/17/24    Skin Breakdown to L buttocks-improving  Pruritic, Erythematous Rash to groin       - Skin tear vs Stage II pressure ulcer       - Wound care consulted, appreciate recs       - Continue q2h turning and mepilex dsg        - Pt now with enteral nutrition, hopeful to aid in healing       - Rash appears fungal in nature, will start miconazole powder to groin BID    Malnutrition  Hyponatremia       - Soft mechanical diet ordered, pt working with SLT       - Diet per dietary recs       - Likely 2/2 cirrhosis; FeNA 1% indicated prerenal       - Na 122 this morning, continue to hold diuretics unless Na > 125.    - will bolus fluids as needed. Fluid restriction to 1 L       - Per GI recs, holding further administration of Lasix/Aldactone at this time in addition to fluid restriciton       - sodium bicarb 650mg  daily.     CODE STATUS: Full  Access: PIV  Antibiotics: Ceftriaxone; completed 1/17/24  Diet: Soft mechanical,  low sodium  DVT Prophylaxis: SCDs  GI Prophylaxis: Protonix  Fluids:  Normal saline at 75 cc/hour      Disposition: Decompensated cirrhosis with liver lesion likely HCC from ETOH cirrhosis, unstable for discharge at this time. GI consulted, appreciate assistance. Patient will require a hospice consult.       Ilana Mayfield M.D  [facility] PGY-1

## 2024-01-23 LAB
ALBUMIN SERPL-MCNC: 2.1 G/DL (ref 3.5–5)
ALBUMIN/GLOB SERPL: 0.5 RATIO (ref 1.1–2)
ALP SERPL-CCNC: 232 UNIT/L (ref 40–150)
ALT SERPL-CCNC: 48 UNIT/L (ref 0–55)
AST SERPL-CCNC: 59 UNIT/L (ref 5–34)
BASOPHILS # BLD AUTO: 0.02 X10(3)/MCL
BASOPHILS NFR BLD AUTO: 0.2 %
BILIRUB SERPL-MCNC: 10.1 MG/DL
BUN SERPL-MCNC: 43.5 MG/DL (ref 8.9–20.6)
CALCIUM SERPL-MCNC: 7.7 MG/DL (ref 8.4–10.2)
CHLORIDE SERPL-SCNC: 96 MMOL/L (ref 98–107)
CO2 SERPL-SCNC: 18 MMOL/L (ref 22–29)
CREAT SERPL-MCNC: 2.01 MG/DL (ref 0.73–1.18)
EOSINOPHIL # BLD AUTO: 0.54 X10(3)/MCL (ref 0–0.9)
EOSINOPHIL NFR BLD AUTO: 4.7 %
ERYTHROCYTE [DISTWIDTH] IN BLOOD BY AUTOMATED COUNT: 18.3 % (ref 11.5–17)
GFR SERPLBLD CREATININE-BSD FMLA CKD-EPI: 40 MLS/MIN/1.73/M2
GLOBULIN SER-MCNC: 4.1 GM/DL (ref 2.4–3.5)
GLUCOSE SERPL-MCNC: 120 MG/DL (ref 74–100)
HCT VFR BLD AUTO: 21.4 % (ref 42–52)
HGB BLD-MCNC: 7.5 G/DL (ref 14–18)
IMM GRANULOCYTES # BLD AUTO: 0.08 X10(3)/MCL (ref 0–0.04)
IMM GRANULOCYTES NFR BLD AUTO: 0.7 %
LYMPHOCYTES # BLD AUTO: 0.87 X10(3)/MCL (ref 0.6–4.6)
LYMPHOCYTES NFR BLD AUTO: 7.6 %
MAGNESIUM SERPL-MCNC: 2.3 MG/DL (ref 1.6–2.6)
MCH RBC QN AUTO: 32.2 PG (ref 27–31)
MCHC RBC AUTO-ENTMCNC: 35 G/DL (ref 33–36)
MCV RBC AUTO: 91.8 FL (ref 80–94)
MONOCYTES # BLD AUTO: 0.9 X10(3)/MCL (ref 0.1–1.3)
MONOCYTES NFR BLD AUTO: 7.9 %
NEUTROPHILS # BLD AUTO: 9.04 X10(3)/MCL (ref 2.1–9.2)
NEUTROPHILS NFR BLD AUTO: 78.9 %
NRBC BLD AUTO-RTO: 0.2 %
PHOSPHATE SERPL-MCNC: 5.3 MG/DL (ref 2.3–4.7)
PLATELET # BLD AUTO: 53 X10(3)/MCL (ref 130–400)
PLATELETS.RETICULATED NFR BLD AUTO: 4.9 % (ref 0.9–11.2)
PMV BLD AUTO: 11 FL (ref 7.4–10.4)
POCT GLUCOSE: 117 MG/DL (ref 70–110)
POCT GLUCOSE: 126 MG/DL (ref 70–110)
POCT GLUCOSE: 128 MG/DL (ref 70–110)
POCT GLUCOSE: 152 MG/DL (ref 70–110)
POTASSIUM SERPL-SCNC: 4.5 MMOL/L (ref 3.5–5.1)
PROT SERPL-MCNC: 6.2 GM/DL (ref 6.4–8.3)
RBC # BLD AUTO: 2.33 X10(6)/MCL (ref 4.7–6.1)
SODIUM SERPL-SCNC: 123 MMOL/L (ref 136–145)
WBC # SPEC AUTO: 11.45 X10(3)/MCL (ref 4.5–11.5)

## 2024-01-23 PROCEDURE — 83735 ASSAY OF MAGNESIUM: CPT | Performed by: STUDENT IN AN ORGANIZED HEALTH CARE EDUCATION/TRAINING PROGRAM

## 2024-01-23 PROCEDURE — 97116 GAIT TRAINING THERAPY: CPT

## 2024-01-23 PROCEDURE — 25000003 PHARM REV CODE 250

## 2024-01-23 PROCEDURE — P9047 ALBUMIN (HUMAN), 25%, 50ML: HCPCS | Mod: JZ,JG | Performed by: INTERNAL MEDICINE

## 2024-01-23 PROCEDURE — 21400001 HC TELEMETRY ROOM

## 2024-01-23 PROCEDURE — 25000003 PHARM REV CODE 250: Performed by: INTERNAL MEDICINE

## 2024-01-23 PROCEDURE — 99900035 HC TECH TIME PER 15 MIN (STAT)

## 2024-01-23 PROCEDURE — P9047 ALBUMIN (HUMAN), 25%, 50ML: HCPCS | Mod: JZ,JG

## 2024-01-23 PROCEDURE — 63600175 PHARM REV CODE 636 W HCPCS: Mod: JZ,JG

## 2024-01-23 PROCEDURE — A4216 STERILE WATER/SALINE, 10 ML: HCPCS

## 2024-01-23 PROCEDURE — 85025 COMPLETE CBC W/AUTO DIFF WBC: CPT | Performed by: STUDENT IN AN ORGANIZED HEALTH CARE EDUCATION/TRAINING PROGRAM

## 2024-01-23 PROCEDURE — 25000003 PHARM REV CODE 250: Performed by: STUDENT IN AN ORGANIZED HEALTH CARE EDUCATION/TRAINING PROGRAM

## 2024-01-23 PROCEDURE — 80053 COMPREHEN METABOLIC PANEL: CPT | Performed by: STUDENT IN AN ORGANIZED HEALTH CARE EDUCATION/TRAINING PROGRAM

## 2024-01-23 PROCEDURE — 63600175 PHARM REV CODE 636 W HCPCS: Mod: JZ,JG | Performed by: INTERNAL MEDICINE

## 2024-01-23 PROCEDURE — 94761 N-INVAS EAR/PLS OXIMETRY MLT: CPT

## 2024-01-23 PROCEDURE — 84100 ASSAY OF PHOSPHORUS: CPT | Performed by: STUDENT IN AN ORGANIZED HEALTH CARE EDUCATION/TRAINING PROGRAM

## 2024-01-23 RX ORDER — ALBUMIN HUMAN 250 G/1000ML
50 SOLUTION INTRAVENOUS 2 TIMES DAILY
Status: DISCONTINUED | OUTPATIENT
Start: 2024-01-23 | End: 2024-01-24

## 2024-01-23 RX ADMIN — FOLIC ACID 1 MG: 1 TABLET ORAL at 08:01

## 2024-01-23 RX ADMIN — ALBUMIN HUMAN 25 G: 250 SOLUTION INTRAVENOUS at 08:01

## 2024-01-23 RX ADMIN — RIFAXIMIN 550 MG: 550 TABLET ORAL at 08:01

## 2024-01-23 RX ADMIN — Medication 100 MG: at 08:01

## 2024-01-23 RX ADMIN — SODIUM CHLORIDE, PRESERVATIVE FREE 10 ML: 5 INJECTION INTRAVENOUS at 06:01

## 2024-01-23 RX ADMIN — ALBUMIN (HUMAN) 50 G: 0.25 INJECTION, SOLUTION INTRAVENOUS at 10:01

## 2024-01-23 RX ADMIN — MICONAZOLE NITRATE: 20 POWDER TOPICAL at 09:01

## 2024-01-23 RX ADMIN — RIFAXIMIN 550 MG: 550 TABLET ORAL at 09:01

## 2024-01-23 RX ADMIN — PANTOPRAZOLE SODIUM 40 MG: 40 TABLET, DELAYED RELEASE ORAL at 09:01

## 2024-01-23 RX ADMIN — SODIUM CHLORIDE, PRESERVATIVE FREE 10 ML: 5 INJECTION INTRAVENOUS at 12:01

## 2024-01-23 RX ADMIN — PANTOPRAZOLE SODIUM 40 MG: 40 TABLET, DELAYED RELEASE ORAL at 08:01

## 2024-01-23 RX ADMIN — SODIUM CHLORIDE, PRESERVATIVE FREE 10 ML: 5 INJECTION INTRAVENOUS at 05:01

## 2024-01-23 RX ADMIN — MICONAZOLE NITRATE: 20 POWDER TOPICAL at 08:01

## 2024-01-23 NOTE — PROGRESS NOTES
01/23/24 1112   Missed Time Reason   Missed Treatment Reason Pain;Patient fatigue     Pt requested attempt again tomorrow d/t fatigue after PT session just prior to OT attempt.  Pt also c/o pain at lower abdomen and BLE d/t swelling (nurse aware).  OT assessed BLE positioning and replaced socks with larger size d/t noted tightness around ankles.    OT to f/u tomorrow as tolerated.

## 2024-01-23 NOTE — PLAN OF CARE
Problem: Adult Inpatient Plan of Care  Goal: Plan of Care Review  Outcome: Ongoing, Not Progressing  Goal: Patient-Specific Goal (Individualized)  Outcome: Ongoing, Not Progressing  Goal: Absence of Hospital-Acquired Illness or Injury  Outcome: Ongoing, Not Progressing  Goal: Optimal Comfort and Wellbeing  Outcome: Ongoing, Not Progressing  Goal: Readiness for Transition of Care  Outcome: Ongoing, Not Progressing     Problem: Infection  Goal: Absence of Infection Signs and Symptoms  Outcome: Ongoing, Not Progressing     Problem: Skin Injury Risk Increased  Goal: Skin Health and Integrity  Outcome: Ongoing, Not Progressing     Problem: Impaired Wound Healing  Goal: Optimal Wound Healing  Outcome: Ongoing, Not Progressing

## 2024-01-23 NOTE — PROGRESS NOTES
[facility] Medicine Wards Progress Note     Resident Team: [facility] Medicine List 4  Attending Physician: Evelin  Resident: MD Galo  Intern: DO Ulysses       Subjective:      Brief HPI:  Franco Hein is  48 y.o. male with a PMH of ETOH misuse disorder who presented to [facility] ED on 2024 with complaint of jaundice, weakness for 3 weeks, and AMS. Patient was brought in by a friend who states patient is a daily drinker who has not been able to go to a store for the last 2 days, but patient is noted to have a positive ethanol on admission. Patient unable to provide history given his mental status and was only able to say his name and confirm he drinks alcohol but does not do drugs. Patient was tachypneic, tachycardic, and tremulous, on presentation with jaundice. Admitted to ICU given altered mental status and concern for decompensation in the setting of alcohol withdrawal and influenza A, also found to have strep pneumonia bacteremia, on appropriate IV antibiotics.     Hospital Course:  23: Paracentesis performed w/ removal of 4.5L of bloody ascitic fluid w/ symptomatic improvement & hemodynamic stability thereafter.     Interval History:   Had a conversation with patient for more than 40 minutes via  about goals of treatment. Explained to patient the extent of his disease. He is agreeable to hospice. Would like to stay full code. Today he does not complain of abdominal pain. It is slightly distended. Will evaluate with US for drainable pocket. He does complain of leg swelling today; otherwise denies any other complaints notably fever, chills, chest pain, SOB. Also denies any bloody or melenic stools. Yesterday administered 2 units of PRBCs. Is urinating.  Current Hb 9.6. will contact case management for hospice      Review of Systems:  ROS completed and negative except as indicated above.     Objective:     Last 24 Hour Vital Signs:  BP  Min: 108/69  Max: 119/78  Temp  Av.6 °F (36.4 °C)  Min:  97.3 °F (36.3 °C)  Max: 98.2 °F (36.8 °C)  Pulse  Av.8  Min: 91  Max: 110  Resp  Av.6  Min: 18  Max: 20  SpO2  Av.5 %  Min: 96 %  Max: 98 %  I/O last 3 completed shifts:  In: 1380 [P.O.:480; I.V.:900]  Out: 425 [Urine:425]    Physical Examination:  General: no acute distress  Eye: PERRLA, EOMI, scleral icterus noted  HENT: NC/AT, moist mucus membranes  Neck: full range of motion, no thyromegaly or lymphadenopathy  Respiratory: Clear and equal bilateral BS, satting well on RA  Cardiovascular:  Tachycardic   Gastrointestinal: soft, non-tender, mildly-distended with normal bowel sounds, without masses to palpation  Musculoskeletal: no obvious deformities, full range of motion of all extremities/spine without limitation or discomfort  Integumentary: Jaundice, spider angiomas present on upper chest and abdomen, no caput medusa. Petechiae noted to right lateral LQ, and small scattered bruising on arms and legs.   Extremities: radial and DP pulses 2+ bilaterally, 2+ BLE edema  Neurologic: Pt AAOx3, following commands and conversing appropriately. No focal neuro deficits appreciated,    Laboratory:  Most Recent Data:  CBC:   Lab Results   Component Value Date    WBC 11.45 2024    HGB 7.5 (L) 2024    HCT 21.4 (L) 2024    PLT 53 (L) 2024    MCV 91.8 2024    RDW 18.3 (H) 2024     WBC Differential:   Recent Labs   Lab 24  0338 24  0526 24  1729 24  0320 24  0411   WBC 8.43 7.80 12.28* 9.86 11.45   HGB 8.3* 6.5* 8.9* 9.6* 7.5*   HCT 24.1* 19.2* 25.8* 27.7* 21.4*   PLT 60* 61* 59* 64* 53*   MCV 92.3 93.2 92.4 91.4 91.8       BMP:   Lab Results   Component Value Date     (L) 2024    K 4.5 2024    CO2 18 (L) 2024    BUN 43.5 (H) 2024    CREATININE 2.01 (H) 2024    CALCIUM 7.7 (L) 2024    MG 2.30 2024    PHOS 5.3 (H) 2024     LFTs:   Lab Results   Component Value Date    ALBUMIN 2.1 (L) 2024  "   BILITOT 10.1 (H) 01/23/2024    AST 59 (H) 01/23/2024    ALKPHOS 232 (H) 01/23/2024    ALT 48 01/23/2024     Coags:   Lab Results   Component Value Date    INR 1.9 (H) 01/22/2024    PROTIME 21.5 (H) 01/22/2024    PTT 38.6 (H) 01/01/2024     FLP: No results found for: "CHOL", "HDL", "LDLCALC", "TRIG", "CHOLHDL"  DM:   Lab Results   Component Value Date    CREATININE 2.01 (H) 01/23/2024     Thyroid: No results found for: "TSH", "FREET4", "M4QESLK", "L4NVGGZ", "THYROIDAB"  Anemia:   Lab Results   Component Value Date    IRON 39 (L) 01/01/2024    TIBC 203 (L) 01/01/2024    FERRITIN 116.00 01/01/2024    PFILWIIP75 >2,000 (H) 01/01/2024    FOLATE 18.3 01/01/2024     Cardiac:   Lab Results   Component Value Date    TROPONINI 0.029 01/01/2024    BNP 89.3 01/01/2024       Microbiology Data Reviewed: yes  Pertinent Findings:  1/1/24: bld cx +strep pneumonia pan sensitive x1 bottle  1/4/24: bld cx x2 negative    Other Results:  TTE 1/4/24: EF 62%, no evidence of vegetation      Radiology:  Imaging Results              CT Chest Abdomen Pelvis Without Contrast (XPD) (Final result)  Result time 01/02/24 09:44:01      Final result by Tika Mcknight MD (01/02/24 09:44:01)                   Impression:      1. Multilobar consolidative opacities within the lungs  2. Cirrhotic morphology of the liver with mass at the dome of the liver.  Evaluation is moderately limited without contrast and due to beam hardening artifact related to patient positioning.  Recommend liver protocol CT abdomen without and with contrast.  3. Ascites  4. Indeterminate right adrenal nodule.  Continued attention recommended on follow-up exams.  5. The preliminary and final reports are concordant.      Electronically signed by: Tika Mcknight  Date:    01/02/2024  Time:    09:44               Narrative:    EXAMINATION:  CT CHEST ABDOMEN PELVIS WITHOUT CONTRAST(XPD)    CLINICAL HISTORY:  Suspect pneumonia, biliary obstruction;    TECHNIQUE:  Helical " acquisition through the chest, abdomen and pelvis without  IV administration of contrast. Axial, sagittal and coronal reformats interpreted.    Automated tube current modulation, weight-based exposure dosing, and/or iterative reconstruction technique utilized to reach lowest reasonably achievable exposure rate.    DLP: 1042 mGy*cm    COMPARISON:  Chest radiograph 01/01/2024    FINDINGS:  BASE OF NECK: No significant abnormality.    HEART: Normal size. There are coronary artery calcifications.  Low-density blood pool with visualization of the interventricular septum compatible with anemia.    THORACIC VASCULATURE: Thoracic aorta is unremarkable.    SIRISHA/MEDIASTINUM: Small nonspecific mediastinal lymph nodes.  Evaluation of hilar lymphadenopathy is limited without contrast.    AIRWAYS: Patent.    LUNGS/PLEURA: Multilobar consolidative opacities within the lungs.    THORACIC SOFT TISSUES: Unremarkable.    LIVER: Limited evaluation the liver due to lack of intravenous contrast and placement of the patient's arms over the abdomen which causes beam hardening artifact.  Heterogeneous attenuation of the liver.  There appears to be a mass at the dome of the liver.  Caudate lobe hypertrophy and changes of cirrhosis.    BILIARY: The gallbladder does not appear to be overly distended.    PANCREAS: Nonspecific retroperitoneal edema.  Unable to assess for changes of pancreatitis given generalized fluid overload.    SPLEEN: Upper limits of normal in size.    ADRENALS: There is a 1.6 cm right adrenal nodule.  Unable to accurately measure internal attenuation due to beam hardening artifact.    KIDNEYS/URETERS: Hyperdense renal pyramids.  This can be related to volume status/dehydration or medullary calcinosis.    GI TRACT/MESENTERY: Evaluation of the bowel is limited without contrast. Bowel is normal in caliber without evidence of obstruction.    PERITONEUM: Small volume ascites.No free air.    LYMPH NODES: No enlarged lymph nodes  by size criteria.    ABDOMINOPELVIC VASCULATURE: Normal caliber abdominal aorta.  Large periumbilical vein.    BLADDER: Normal appearance given degree of distention.    REPRODUCTIVE ORGANS: Normal as visualized.    ABDOMINAL WALL: Small fat containing right inguinal hernia.  Mild body wall edema.    BONES: Anterolisthesis at the lumbosacral junction.                        Preliminary result by Santana Lerma MD (01/01/24 22:56:45)                   Impression:    1. No intrahepatic or extrahepatic biliary duct dilatation is seen.  2. Note of subtle increased attenuation in the medullary portions of the bilateral kidneys which may reflect medullary calcinosis. Correlate with clinical and laboratory findings as regards additional evaluation and follow-up.  3. There is multifocal patchy dense consolidation of both the lungs with predominant involvement of the left upper and right lower lobes. This is consistent with multilobar pneumonia. Correlate with clinical and laboratory findings as regards additional evaluation and follow-up to full resolution.  4. There is a 2.8 x 2 cm soft tissue density nodule in the right adrenal gland (series 2 image 98). This is of indeterminate etiology.  5. The margins of the liver appears somewhat nodular and irregular. This is suggestive of cirrhosis. Correlate with clinical and laboratory findings.  6. Details and other findings as discussed above.               Narrative:    START OF REPORT:  Technique: CT Scan of the chest abdomen and pelvis was performed without intravenous contrast with axial as well as sagittal and, coronal images.    Dosage Information: Automated Exposure Control was utilized 1041.63 mGy.cm.    Comparison: None.    Clinical History: Suspect pneumonia, biliary obstruction.    Findings:  Mediastinum: A few scattered subcentimeter mediastinal lymph nodes are seen.  Heart: The heart size is within normal limits.  Aorta: Unremarkable appearing aorta.  Pulmonary  Arteries: Unremarkable.  Lungs: There is multifocal patchy dense consolidation of both the lungs with predominant involvement of the left upper and right lower lobes.  Pleura: No effusions or pneumothorax are identified.  Abdomen:  Abdominal Wall: There is extensive stranding of the subcutaneous fat as well as the intraabdominal and intrapelvic fat consistent with anasarca edema. Correlate with clinical and laboratory findings.  Liver: The margins of the liver appears somewhat nodular and irregular. There are also prominent portosystemic collaterals consistent with portal hypertension.  Biliary System: No intrahepatic or extrahepatic biliary duct dilatation is seen.  Gallbladder: The gallbladder is not definitely identified on this noncontrast examination and may be contracted.  Pancreas: The pancreas appears unremarkable.  Spleen: The spleen appears unremarkable.  Adrenals: There is a 2.8 x 2 cm soft tissue density nodule in the right adrenal gland (series 2 image 98).  Kidneys: Note of subtle increased attenuation in the medullary portions of the bilateral kidneys which may reflect medullary calcinosis.  Aorta: The abdominal aorta appears unremarkable.  IVC: Unremarkable.  Bowel:  Esophagus: There is a small hiatal hernia.  Stomach: The stomach appears unremarkable.  Duodenum: Unremarkable appearing duodenum.  Small Bowel: The small bowel appears unremarkable.  Colon: Nondistended.  Appendix: The appendix is not identified but no inflammatory changes are seen in the right lower quadrant to suggest appendicitis.  Peritoneum: There is mild ascites. No intraperitoneal free gas is seen.    Pelvis:  Bladder: The bladder appears unremarkable.  Male:  Prostate gland: The prostate gland appears unremarkable.    Bony structures:  Dorsal Spine: There is mild to moderate spondylosis of the visualized dorsal spine.  Bony Pelvis: The visualized bony structures of the pelvis appear unremarkable.                                          X-Ray Chest 1 View (Final result)  Result time 01/01/24 14:23:11   Procedure changed from X-Ray Chest PA And Lateral     Final result by Josef Sow MD (01/01/24 14:23:11)                   Impression:      Bilateral patchy airspace opacities greatest on the left.  Findings concerning for atypical infectious process.      Electronically signed by: Josef Sow  Date:    01/01/2024  Time:    14:23               Narrative:    EXAMINATION:  XR CHEST 1 VIEW    CLINICAL HISTORY:  cough;  Cough, unspecified    TECHNIQUE:  Single view of the chest    COMPARISON:  No prior imaging available for comparison.    FINDINGS:  Bilateral patchy airspace opacities greatest on the left.    The cardiomediastinal silhouette is within normal limits.    No acute osseous abnormality.                                       RADIOLOGY REPORT (Final result)  Result time 01/04/24 11:39:28      Final result by Unknown User (01/04/24 11:39:28)                                        Current Medications:     Infusions:         Scheduled:   albumin human 25%  25 g Intravenous BID    folic acid  1 mg Oral Daily    LIDOcaine HCL 10 mg/ml (1%)  1 mL Other Once    miconazole NITRATE 2 %   Topical (Top) BID    pantoprazole  40 mg Oral Daily    rifAXIMin  550 mg Oral BID    sodium chloride 0.9%  10 mL Intravenous Q6H    thiamine  100 mg Oral Daily        PRN:  0.9%  NaCl infusion (for blood administration), 0.9%  NaCl infusion (for blood administration), 0.9%  NaCl infusion (for blood administration), 0.9%  NaCl infusion (for blood administration), 0.9%  NaCl infusion (for blood administration), 0.9%  NaCl infusion (for blood administration), 0.9%  NaCl infusion (for blood administration), sodium chloride 0.9%, acetaminophen, acetaminophen, albuterol-ipratropium, benzonatate, dextrose 10%, dextrose 10%, glucagon (human recombinant), glucose, glucose, HYDROcodone-acetaminophen, hydrOXYzine pamoate, iohexoL, lactulose, sodium chloride 0.9%,  Flushing PICC/Midline Protocol **AND** sodium chloride 0.9% **AND** sodium chloride 0.9%    Antibiotics and Day Number of Therapy:  Vanc x3 days  Zosyn/Azithromycin x4 days  Rocephin 2g on D12/14     Lines and Day Number of Therapy:  PIV      Assessment & Plan:     Decompensated Cirrhosis  Hypervolemic Hyponotremia  Liver lesion likely HCC  Bilirubinemia       - suspected etiology ETOH. Pt reportedly had hx of heavy drinking per friend who dropped him off       - Positive hepatitis C antibody; VL negative, no active infection         -- MELD-Na: 28 -->25 --> 22--> 29 --> 28 (1/19)    -- Child Hoang: 13 --> 11 --> 11 --> 11 (1/19) class C  -- Maddrey: 204 on admission       - INR flat, 2 today       - GI consulted w/ recs to discontinue Lasix/Aldactone/Steroids at this time; appreciate assistance in care       - Continued improvement In encephalopathy; hold lactulose at this time w/ prior diarrhea       - C/w Rifaxamin for hepatic encephalopathy       - FeNA 1%, Nina < 20; lower extremity edema; likely hypervolemic hyponotremia       - 4.5 L bloody ascitic fluid removed on 1/17 for therapeutic/diagnostic para, studies pending       - AFP 1143       - patient will need to be referred to Oncology upon discharge       -Patient agreeable to hospice.       Intrahepatic bleed likely 2/2 tumor       - Pt had slow intrahepatic bleed likely over the past several days as complication from HCC       - Labs in AM showed H&H had continued to drop slowly, lactic acid elevated       - CTA abd/pelvis showed intrahepatic bleeding with lesion in right lobe of liver suspicious for HCC, capsular rupture of liver and some bleeding and contrast noted with ascites       - Taken by [facility] surgery for gelfoam embolization which was successful, appreciate assistance       - Pt transfused 4 units pRBC, 2 FFP, 1 platelet; no need to transfuse unless actively bleeding       - Trend CBC daily       - Will continue to monitor for symptoms       - C/w  SCDs for DVT ppx in setting of recent thrombocytopenia requiring x1u platelets (1/18)       -   Factor VIII level elevated > 300   - hemoglobin level 01/21/2024 was 6.5.  Patient was administered 2 units of PRBCs. Rechecked hemoglobin. Currently it is 9.6.       - patient will require a hospice consult. Albumin and diueretics likely a temporary solution. Not considering at this time. Will re evaluate at a later time.       Pneumonia 2/2 Influenza A-resolved       - Positive on 1/1/24; initially ICU with BIPAP for respiratory failure but stable on floor since 1/9/24       - Possible superimposed Strep PNA leading to bacteremia and pneumonia; completed IV abx regimen    Strep Pneumoniae Bacteremia-resolved       - Blood culture positive x1/2 Streptococcus; [employer] confirmation strep pneumonia bacteremia which is pan-sensitive, resp culture /Gram stain collection pending.       - Repeat blood cultures 1/4/24  negative; TTE did not have any findings consistent with endocarditis.        - s/p Zosyn and Zithromax x4 days; MRSA PCR negative, discontinued vancomycin after D3.        - Completed 2 week regimen Ceftriaxone 2g on 1/17/24    Skin Breakdown to L buttocks-improving  Pruritic, Erythematous Rash to groin       - Skin tear vs Stage II pressure ulcer       - Wound care consulted, appreciate recs       - Continue q2h turning and mepilex dsg        - Pt now with enteral nutrition, hopeful to aid in healing       - Rash appears fungal in nature, will start miconazole powder to groin BID    Malnutrition  Hyponatremia       - Soft mechanical diet ordered, pt working with SLT       - Diet per dietary recs       - Likely 2/2 cirrhosis; FeNA 1% indicated prerenal. Renal indicies worsening will consider nephro consult.       - Na 123 this morning, continue to hold diuretics unless Na > 125.    - will bolus fluids as needed. Fluid restriction to 1 L       - Per GI recs, holding further administration of Lasix/Aldactone at this  time in addition to fluid restriciton       - sodium bicarb 650mg  discontinued. Start albumin 25 mg BID.  If renal indices improve, will consider lasix/albumin infusion to improve edema     CODE STATUS: Full  Access: PIV  Antibiotics: Ceftriaxone; completed 1/17/24  Diet: Soft mechanical, low sodium  DVT Prophylaxis: SCDs  GI Prophylaxis: Protonix  Fluids:  Normal saline at 75 cc/hour      Disposition: Decompensated cirrhosis with liver lesion likely HCC from ETOH cirrhosis, unstable for discharge at this time. GI consulted, appreciate assistance. Patient will require a hospice consult.       Ilana Mayfield M.D  [facility] PGY-1

## 2024-01-23 NOTE — PT/OT/SLP PROGRESS
Physical Therapy Treatment    Patient Name:  Franco Hein   MRN:  [id number]    Recommendations     Therapy Intensity Recommendations at Discharge: Moderate Intensity Therapy  Discharge Equipment Recommendations: walker, rolling   Barriers to discharge: fall risk, level of skilled assistance required, and decreased endurance    Assessment     Franco Hein is a 48 y.o. male admitted with a medical diagnosis of Delirium tremens.    He presents with the following impairments/functional limitations:  weakness, impaired balance, impaired endurance, pain, edema, impaired cardiopulmonary response to activity, impaired self care skills, impaired functional mobility, gait instability, impaired joint extensibility.    Rehab Prognosis: Fair.    Patient would benefit from continued skilled acute PT services to: address above listed impairments/functional limitations; receive patient/caregiver education; reduce fall risk; and maximize independency/safety with functional mobility.    Recent Surgery: Procedure(s) (LRB):  ANGIOGRAM-ABDOMINAL (N/A) 7 Days Post-Op    Plan     During this hospitalization, patient to be seen 3 x/week to address the identified impairments/functional limitations via gait training, therapeutic activities, therapeutic exercises and progress toward the established goals.    Plan of Care Expires:   (Discharge)    Subjective     Communicated with patient's nurse Maria Antonia prior to session.    Patient agreeable to participate in treatment session.    Chief Complaint: swelling in legs  Patient/Family Comments/goals: get better  Pain/Comfort:  Pain Rating 1: 2/10  Location 1: flank  Pain Addressed 1: Nurse notified  Pain Rating Post-Intervention 1: 3/10    Objective     Patient found sitting edge of bed with    upon PT entry to room.    General Precautions: Standard, fall, droplet   Orthopedic Precautions:N/A   Braces:    Respiratory Status: room air    Functional Mobility:    Bed Mobility:  Seated at  beginning of session    Transfers:  Sit to Stand: minimum assistance with rolling walker    Gait:  Patient ambulated 60 ft, rested then 70 ft with rolling walker and contact guard assistance.  Patient demonstrates :       occasional unsteady gait       decreased step length       flexed posture.    Other Mobility:  not assessed    Patient left supine in bed and with HOB elevated with all lines intact, call button in reach, tray table at bedside, and patient' nurse notified.    Goals     Multidisciplinary Problems       Physical Therapy Goals          Problem: Physical Therapy    Goal Priority Disciplines Outcome Goal Variances Interventions   Physical Therapy Goal     PT, PT/OT Ongoing, Progressing     Description: Goals to be met by: dc     Patient will increase functional independence with mobility by performin. Supine to sit with Modified [place] met 1/10/2024    2. Sit to stand transfer with Modified [place]  3. Gait  x 130 feet with Supervision using LRAD    Reviewed 2024                         Time Tracking     PT Received On: 24  PT Start Time: 926     PT Stop Time: 943  PT Total Time (min): 17 min     Billable Minutes: Gait Training 17    2024

## 2024-01-23 NOTE — PROGRESS NOTES
Gastroenterology/Hepatology Progress Note    Patient Name: Franco Hein  Age: 48 y.o.  : 1975  MRN: [id number]  Admission Date: 2024      Self, Aaareferral    SUBJECTIVE:     NAEON. Abdominal pain improved today, well-controlled with Norco 5. Currently on Rifaximin 550 mg BID, and had 3 Bowel movements over the past 24 hours, scant blood present. Was able to tolerate PO intake slightly better this morning. Continues to have edema up to lumbar region, posteriorly. Primary team continuing conversation with hospice. Patient denies lightheadedness, dizziness, chest pain, nausea, vomiting, hematemesis, hematochezia or melanotic stool.    Review of patient's allergies indicates:  No Known Allergies       INPATIENT MEDICATIONS    Scheduled Meds:   albumin human 25%  50 g Intravenous BID    folic acid  1 mg Oral Daily    LIDOcaine HCL 10 mg/ml (1%)  1 mL Other Once    miconazole NITRATE 2 %   Topical (Top) BID    pantoprazole  40 mg Oral Daily    rifAXIMin  550 mg Oral BID    sodium chloride 0.9%  10 mL Intravenous Q6H    thiamine  100 mg Oral Daily     Continuous Infusions:  PRN Meds:  0.9%  NaCl infusion (for blood administration), 0.9%  NaCl infusion (for blood administration), 0.9%  NaCl infusion (for blood administration), 0.9%  NaCl infusion (for blood administration), 0.9%  NaCl infusion (for blood administration), 0.9%  NaCl infusion (for blood administration), 0.9%  NaCl infusion (for blood administration), sodium chloride 0.9%, acetaminophen, acetaminophen, albuterol-ipratropium, benzonatate, dextrose 10%, dextrose 10%, glucagon (human recombinant), glucose, glucose, HYDROcodone-acetaminophen, hydrOXYzine pamoate, iohexoL, lactulose, sodium chloride 0.9%, Flushing PICC/Midline Protocol **AND** sodium chloride 0.9% **AND** sodium chloride 0.9%          Review of Systems:       Review of Systems   Respiratory:  Positive for shortness of breath.    Cardiovascular:  Negative for chest pain.    Gastrointestinal:  Positive for abdominal distention and constipation. Negative for abdominal pain, anal bleeding, blood in stool, diarrhea, nausea, rectal pain, vomiting, reflux and fecal incontinence.        As stated above  Objective:       VITAL SIGNS: 24 HR MIN & MAX LAST    Temp  Min: 97.3 °F (36.3 °C)  Max: 98.2 °F (36.8 °C)  97.9 °F (36.6 °C)        BP  Min: 108/69  Max: 119/78  113/73     Pulse  Min: 91  Max: 110  102     Resp  Min: 18  Max: 20  20    SpO2  Min: 95 %  Max: 97 %  95 %        Physical Exam   General:  Well developed, well nourished, no acute respiratory distress, appears slightly short of breath  Head: Normocephalic, atraumatic  Eyes: PERRL, EOMI, anicteric sclera  Throat: No posterior pharyngeal erythema or exudate, no tonsillar exudate  Neck: supple, normal ROM, no thyromegaly   CVS:  RRR, S1 and S2 normal, no murmurs, no added heart sounds, rubs, gallops, 2+ peripheral pulses  Resp:  Lungs clear to auscultation bilaterally, no wheezes, rales, or rhonci  Abdomen:  Abdomen firm and distended, slightly tender to palpation, normoactive bowel sounds.  Spider angiomas on abdomen  MSK:  No muscle atrophy, cyanosis, 3+ pitting edema on posterior aspect, up to lumbar region, full range of motion  Skin:  Edema on the hip  Neuro:  Alert and oriented x3     LABS:    Recent Labs   Lab 01/20/24  0338 01/21/24  0526 01/21/24  1729 01/22/24  0320 01/23/24  0411   WBC 8.43 7.80 12.28* 9.86 11.45   HGB 8.3* 6.5* 8.9* 9.6* 7.5*   HCT 24.1* 19.2* 25.8* 27.7* 21.4*   PLT 60* 61* 59* 64* 53*   MCV 92.3 93.2 92.4 91.4 91.8         Recent Labs   Lab 01/16/24  2139 01/17/24  0332 01/17/24  1616 01/18/24  0517 01/19/24  0408 01/20/24  0338 01/21/24  0526 01/21/24  1729 01/22/24  0320 01/23/24  0411   HGB 8.7* 8.9* 7.7* 9.1* 9.0* 8.3* 6.5* 8.9* 9.6* 7.5*   HCT 26.2* 25.4* 22.8* 26.4* 25.8* 24.1* 19.2* 25.8* 27.7* 21.4*          Recent Labs   Lab 01/19/24  0408 01/19/24  1742 01/20/24  0338 01/21/24  0326  "01/22/24  0321 01/23/24  0411   * 125* 124* 122* 123* 123*   K 3.9 4.2 4.0 4.5 4.9 4.5   CO2 23 22 21* 19* 17* 18*   BUN 15.1 12.5 12.5 24.8* 32.5* 43.5*   CREATININE 0.70* 0.70* 0.69* 1.36* 1.46* 2.01*   BILITOT 8.0*  --  7.6* 9.1* 10.3* 10.1*   BILIDIR 4.9*  --   --   --   --   --    ALKPHOS 249*  --  226* 197* 259* 232*   AST 95*  --  81* 75* 77* 59*   ALT 78*  --  67* 62* 61* 48   LABPROT 6.0*  --  6.1* 5.8* 6.7 6.2*   ALBUMIN 1.9*  --  1.8* 1.6* 1.8* 2.1*          Recent Labs   Lab 01/18/24  0852 01/19/24  0408 01/20/24  0338 01/21/24  0326 01/22/24  0321   INR 2.0* 2.0* 2.0* 2.1* 1.9*   PROTIME 21.9* 21.9* 21.9* 23.4* 21.5*          No results for input(s): "IRON", "FERRITIN" in the last 168 hours.         IMAGING:   US Abdomen Limited    Result Date: 1/17/2024  EXAMINATION: US ABDOMEN LIMITED CLINICAL HISTORY: Suspected HCC, cirrhosis; TECHNIQUE: Grayscale and color Doppler images of the abdomen are obtained. COMPARISON: CT angiogram of the abdomen and pelvis 01/16/2024. FINDINGS: The gallbladder is contracted.  The liver contour is nodular consistent with cirrhosis.  The heterogeneous mass lesion in the dome of the right lobe of the liver is again noted better visualized on yesterday's exam.  There is a moderate volume of ascites.  There is echogenic material posterior to the right lobe of the liver consistent with the blood products seen on CT.  The main portal vein is patent.  The CBD is not well visualized. The right kidney measures 11.0 by 5.6 x 5.8 cm.  There is no hydronephrosis or nephrolithiasis of the right kidney.     1. Cirrhosis with a heterogeneous infiltrative mass in the dome of the right lobe of the liver better visualized on yesterday's exam. 2. Moderate volume ascites with echogenic material posterior to the right lobe of the liver consistent with a blood products seen on yesterday's exam. Electronically signed by: Keyon Beck MD Date:    01/17/2024 Time:    10:43    Cardiac " catheterization    Result Date: 1/16/2024  Procedure performed in the Invasive Lab  - See Procedure Log link below for nursing documentation  - See OpNote on Surgeries Tab for physician findings  - See Imaging Tab for radiologist dictation    CTA Abdomen and Pelvis    Result Date: 1/16/2024  EXAMINATION: CTA ABDOMEN AND PELVIS CLINICAL HISTORY: Portal vein thrombosis;Mesenteric ischemia, acute; TECHNIQUE: Axial images of the abdomen and pelvis were obtained with and without  IV contrast administration.  Coronal and sagittal reconstructions were provided.  Three dimensional and MIP images were obtained and evaluated.  Total DLP was 5216.27 mGy-cm. Dose lowering technique and automated exposure control were utilized for this exam. COMPARISON: CT of the chest abdomen and pelvis 01/01/2024. FINDINGS: Vascular findings: The distal descending thoracic aorta is nonaneurysmal. The abdominal aorta is nonaneurysmal.  The celiac axis is widely patent.  There is a normal branching pattern of the celiac. The SMA is widely patent. The single right and 2 left renal arteries are widely patent. The ABHIJIT is widely patent.  There is no significant iliofemoral disease. The main portal vein is patent.  There is tumor thrombus within the left portal vein with nonocclusive thrombosis.  There is recanalization of the umbilical vein.  There is a prominent spleno renal shunt.  The splenic vein is patent.  The superior mesenteric vein is patent. Nonvascular findings: There is atelectasis in the right lung base.  The left lung base is clear. The liver contour is nodular consistent with cirrhosis.  There is a lobulated solid and cystic mass lesion in the dome of the right lobe of the liver measuring 7.4 x 6.3 cm.  There is inferior extension and invasion into the left portal vein.  There is no biliary dilatation.  There is a large volume of ascites.  There is hyperdense material in the posterior right upper lobe with a focal area of contrast  extravasation best visualized on series 8 image 27 and series 7, images 216-218. The gallbladder is contracted.  The spleen, pancreas, and left adrenal gland are normal.  There is a right adrenal nodule which is indeterminate on this exam.  The bilateral kidneys are normal.  The stomach and small bowel are decompressed.  There is no bowel wall thickening.  The colon is normal.  The urinary bladder is normal.  There is no pelvic or retroperitoneal lymphadenopathy.  There is no lytic or blastic osseous lesion.     1. Solid and cystic mass lesion in the superior aspect of the right hepatic lobe with invasion of the intrahepatic left portal vein and sequela of portal hypertension as above.  Additionally there is capsular rupture and active extravasation with a moderate amount of blood products in the posterior right upper quadrant.  Given the portal venous invasion and cirrhotic morphology of the liver this is favored to represent hepatocellular carcinoma although enhancement characteristics are not classic. 2. Indeterminate right adrenal nodule.  Metastatic disease cannot be excluded. 3. Minimal right lower lobe atelectasis. Red Alert: The critical information above was relayed directly by me by telephone to Dr. Rosenthal On 1/16/2024 at 11:52 . Electronically signed by: Keyon Beck MD Date:    01/16/2024 Time:    11:52    X-Ray Abdomen AP 1 View    Result Date: 1/16/2024  EXAMINATION: XR ABDOMEN AP 1 VIEW CLINICAL HISTORY: abdominal pain; TECHNIQUE: AP X-RAY OF THE ABDOMEN: CPT 37349 FINDINGS: Examination reveals residual feces throughout the colon the gas pattern is nonspecific with no clear evidence of ileus or obstruction no abnormal masses or calcifications identified. Slightly more prominent loop of small bowel identified in the left hemiabdomen these might be related to a sentinel loop which my signal an inflammatory process near the area of the 89 make segment of bowel     Loop of small bowel that might  represent a short segment in a 9 larry ileus in represent a sentinel loop indicating the presence of an inflammatory process clinical correlation is suggested Electronically signed by: Cameron Peralta Date:    01/16/2024 Time:    08:50    X-Ray Chest 1 View    Result Date: 1/6/2024  EXAMINATION: XR CHEST 1 VIEW CLINICAL HISTORY: Pneumonia; TECHNIQUE: Frontal view(s) of the chest. COMPARISON: Radiography 01/05/2024 FINDINGS: Enteric tube seen as far as the gastric body.  Lungs remain hypoinflated with stable to slightly improved bilateral consolidation.  No significant pleural fluid.  No pneumothorax.  Stable cardiac silhouette.     Bilateral consolidation stable to slightly improved. Electronically signed by: Gómez Carmona Date:    01/06/2024 Time:    09:35    X-Ray Chest 1 View    Result Date: 1/5/2024  EXAMINATION: XR CHEST 1 VIEW CPT 45388 CLINICAL HISTORY: pneumonia; COMPARISON: January 4, 2024 FINDINGS: Cardiomediastinal silhouette and pleuroparenchymal changes are essentially unchanged as compared with the previous exam There has been interval insertion of a nasogastric tube with the tip below the diaphragm     No significant change Interval insertion of nasogastric Electronically signed by: Cameron Peralta Date:    01/05/2024 Time:    08:16    Echo Saline Bubble? No    Result Date: 1/4/2024    Left Ventricle: The left ventricle is normal in size. Ventricular mass is normal. Normal wall thickness. Normal wall motion. There is normal systolic function. Biplane (2D) method of discs ejection fraction is 62%. There is normal diastolic function.   Right Ventricle: Normal right ventricular cavity size. Systolic function could not be assesed due to poor visualization.   Left Atrium: Normal left atrial size.   Right Atrium: Normal right atrial size.   Aortic Valve: Not well visualized due to poor acoustic window. There is no stenosis. There is no significant regurgitation.   Mitral Valve: The mitral valve is  structurally normal. There is normal leaflet mobility.   Tricuspid Valve: Not well visualized due to poor acoustic window. The tricuspid valve is structurally normal. There is no significant regurgitation.   Pulmonic Valve: Not well visualized due to poor acoustic window. There is no significant regurgitation.   Pulmonary Artery: Pulmonary artery pressure could not be estimated.   IVC/SVC: Intermediate venous pressure at 8 mmHg.   Pericardium: There is no pericardial effusion.     XR Gastric tube check, non-radiologist performed    Result Date: 1/4/2024  EXAMINATION: XR GASTRIC TUBE CHECK, NON-RADIOLOGIST PERFORMED CLINICAL HISTORY: NG tube placement verification; COMPARISON: 3 January 2024 FINDINGS: Frontal image of the abdomen. Enteric tube extends well into the stomach.  The tip overlies the expected area of the gastric antrum.     As above. Electronically signed by: Aamir Phillip Date:    01/04/2024 Time:    15:03    X-Ray Chest 1 View    Result Date: 1/4/2024  EXAMINATION: XR CHEST 1 VIEW CPT 08203 CLINICAL HISTORY: tachypnea; COMPARISON: January 3, 2024 FINDINGS: Examination reveals cardiomediastinal silhouette improved parenchymal changes to be essentially unchanged as compared with the previous exam     No significant change Electronically signed by: Cameron Peralta Date:    01/04/2024 Time:    08:22    X-Ray Abdomen AP 1 View    Result Date: 1/3/2024  EXAMINATION: XR ABDOMEN AP 1 VIEW CLINICAL HISTORY: distended abdomen; TECHNIQUE: Single-view of the abdomen COMPARISON: No prior. FINDINGS: No acute osseous abnormality.  Nonobstructive bowel gas pattern.     Nonobstructive bowel gas pattern. Electronically signed by: Josef Sow Date:    01/03/2024 Time:    09:46    X-Ray Chest 1 View    Result Date: 1/3/2024  EXAMINATION: XR CHEST 1 VIEW CLINICAL HISTORY: PNA; TECHNIQUE: Single view of the chest COMPARISON: 01/02/2024 FINDINGS: Left upper lobe opacification remains slightly improved in the interval.   The lungs are hypoexpanded.  The cardiomediastinal silhouette is unchanged.     No adverse interval change.  Improved aeration of the left upper lobe. Electronically signed by: Josef Sow Date:    01/03/2024 Time:    09:46    X-Ray Chest 1 View    Result Date: 1/2/2024  EXAMINATION: XR CHEST 1 VIEW CPT 74563 CLINICAL HISTORY: increased oxygen demand; COMPARISON: January 1, 2024 FINDINGS: Examination reveals cardiomediastinal silhouette to be unchanged as compared with the previous exam. Worsening confluent airspace opacities specially in the right perihilar region and right upper lobe with some confluent opacities also seen in the right upper lobe some of the changes might be related to poor inspiratory effort, however, the bulk of the changes are related to worsening infiltrative changes specially in the left     Worsening consolidative changes in the left lung as above Electronically signed by: Cameron Peralta Date:    01/02/2024 Time:    12:51    CT Chest Abdomen Pelvis Without Contrast (XPD)    Result Date: 1/2/2024  EXAMINATION: CT CHEST ABDOMEN PELVIS WITHOUT CONTRAST(XPD) CLINICAL HISTORY: Suspect pneumonia, biliary obstruction; TECHNIQUE: Helical acquisition through the chest, abdomen and pelvis without  IV administration of contrast. Axial, sagittal and coronal reformats interpreted. Automated tube current modulation, weight-based exposure dosing, and/or iterative reconstruction technique utilized to reach lowest reasonably achievable exposure rate. DLP: 1042 mGy*cm COMPARISON: Chest radiograph 01/01/2024 FINDINGS: BASE OF NECK: No significant abnormality. HEART: Normal size. There are coronary artery calcifications.  Low-density blood pool with visualization of the interventricular septum compatible with anemia. THORACIC VASCULATURE: Thoracic aorta is unremarkable. SIRISHA/MEDIASTINUM: Small nonspecific mediastinal lymph nodes.  Evaluation of hilar lymphadenopathy is limited without contrast. AIRWAYS:  Patent. LUNGS/PLEURA: Multilobar consolidative opacities within the lungs. THORACIC SOFT TISSUES: Unremarkable. LIVER: Limited evaluation the liver due to lack of intravenous contrast and placement of the patient's arms over the abdomen which causes beam hardening artifact.  Heterogeneous attenuation of the liver.  There appears to be a mass at the dome of the liver.  Caudate lobe hypertrophy and changes of cirrhosis. BILIARY: The gallbladder does not appear to be overly distended. PANCREAS: Nonspecific retroperitoneal edema.  Unable to assess for changes of pancreatitis given generalized fluid overload. SPLEEN: Upper limits of normal in size. ADRENALS: There is a 1.6 cm right adrenal nodule.  Unable to accurately measure internal attenuation due to beam hardening artifact. KIDNEYS/URETERS: Hyperdense renal pyramids.  This can be related to volume status/dehydration or medullary calcinosis. GI TRACT/MESENTERY: Evaluation of the bowel is limited without contrast. Bowel is normal in caliber without evidence of obstruction. PERITONEUM: Small volume ascites.No free air. LYMPH NODES: No enlarged lymph nodes by size criteria. ABDOMINOPELVIC VASCULATURE: Normal caliber abdominal aorta.  Large periumbilical vein. BLADDER: Normal appearance given degree of distention. REPRODUCTIVE ORGANS: Normal as visualized. ABDOMINAL WALL: Small fat containing right inguinal hernia.  Mild body wall edema. BONES: Anterolisthesis at the lumbosacral junction.     1. Multilobar consolidative opacities within the lungs 2. Cirrhotic morphology of the liver with mass at the dome of the liver.  Evaluation is moderately limited without contrast and due to beam hardening artifact related to patient positioning.  Recommend liver protocol CT abdomen without and with contrast. 3. Ascites 4. Indeterminate right adrenal nodule.  Continued attention recommended on follow-up exams. 5. The preliminary and final reports are concordant. Electronically signed  by: Tika Mcknight Date:    01/02/2024 Time:    09:44    X-Ray Chest 1 View    Result Date: 1/1/2024  EXAMINATION: XR CHEST 1 VIEW CLINICAL HISTORY: cough;  Cough, unspecified TECHNIQUE: Single view of the chest COMPARISON: No prior imaging available for comparison. FINDINGS: Bilateral patchy airspace opacities greatest on the left. The cardiomediastinal silhouette is within normal limits. No acute osseous abnormality.     Bilateral patchy airspace opacities greatest on the left.  Findings concerning for atypical infectious process. Electronically signed by: Josef Sow Date:    01/01/2024 Time:    14:23        Assessment / Plan:     Franco Hein is a 48 y.o. male       Franco Hein is a 48 y.o. male  with unknown past medical history presented [facility] ED 01/01/2020 for the complaint of jaundice, weakness for 3 weeks, and altered mental status.  Gastroenterology was consulted for recommendations patient worsening labs with alcoholic liver disease.        Alcoholic liver disease with ascites  Hepatic encephalopathy (resolved)  Alcohol abuse  [facility] discriminant function score: 72.8   Aimee alcoholic hepatitis score: 9  Meld 17.1  Child Hoang class C score:  15   Lille score .82:  Non responder  CORBY  - continue rifaximin.  Will eventually need to be placed on  lactulose when stools as loose for 2-3 bowel movements per day.  - can restart diuretics as long as sodium levels remain greater than 125  -continue CIWA protocol   -continue thiamine and folic acid  -Renal indices acutely worsened from 32.5/1.46 to 43.5/2.01. Was started on albumin 25g BID, but will increase to 50g BID. If renal indices improve, can consider lasix/albumin infusion to improve edema.     HCC  - AFP elevated at 1400  -advised alcohol cessation  -patient currently unlikely to be a candidate for systemic chemotherapy, given decompensated status. However, with improvement, could become a candidate  -goals of care discussion to be had by  primary team     Hyponatremia (improving)  -monitor labs for improvement off diuretics  -can restart diuretics as long as sodium levels remained greater than 125  -Serum Sodium 123 today, stable  -Hyponatremia is 2/2 severe liver disease    Thrombocytopenia (improving), Normocytic anemia  - do not administer platelets unless patient is actively bleeding  - Platelets stable at 53  - H/H slight drop to 7.5/21.4     Strep pneumoniae bacteremia (resolved)  - being treated with  Rocephin 2g for 14 days.    Intrahepatic bleeding s/p Gelfoam embolization (resolved)  - Gelfoam embolization performed 01/16/2024    Praneeth Arndt DO  [facility] Internal Medicine, PGY-II  [facility] Gastroenterology

## 2024-01-24 LAB
ABO + RH BLD: NORMAL
ABO + RH BLD: NORMAL
ALBUMIN SERPL-MCNC: 3.1 G/DL (ref 3.5–5)
ALBUMIN/GLOB SERPL: 0.9 RATIO (ref 1.1–2)
ALP SERPL-CCNC: 194 UNIT/L (ref 40–150)
ALT SERPL-CCNC: 39 UNIT/L (ref 0–55)
AST SERPL-CCNC: 52 UNIT/L (ref 5–34)
BASOPHILS # BLD AUTO: 0.02 X10(3)/MCL
BASOPHILS NFR BLD AUTO: 0.2 %
BILIRUB SERPL-MCNC: 10.3 MG/DL
BLD PROD TYP BPU: NORMAL
BLD PROD TYP BPU: NORMAL
BLOOD UNIT EXPIRATION DATE: NORMAL
BLOOD UNIT EXPIRATION DATE: NORMAL
BLOOD UNIT TYPE CODE: 5100
BLOOD UNIT TYPE CODE: 5100
BUN SERPL-MCNC: 48.2 MG/DL (ref 8.9–20.6)
CALCIUM SERPL-MCNC: 7.7 MG/DL (ref 8.4–10.2)
CHLORIDE SERPL-SCNC: 95 MMOL/L (ref 98–107)
CO2 SERPL-SCNC: 17 MMOL/L (ref 22–29)
CREAT SERPL-MCNC: 1.67 MG/DL (ref 0.73–1.18)
CROSSMATCH INTERPRETATION: NORMAL
CROSSMATCH INTERPRETATION: NORMAL
DISPENSE STATUS: NORMAL
DISPENSE STATUS: NORMAL
EOSINOPHIL # BLD AUTO: 0.43 X10(3)/MCL (ref 0–0.9)
EOSINOPHIL NFR BLD AUTO: 5 %
ERYTHROCYTE [DISTWIDTH] IN BLOOD BY AUTOMATED COUNT: 18.9 % (ref 11.5–17)
GFR SERPLBLD CREATININE-BSD FMLA CKD-EPI: 50 MLS/MIN/1.73/M2
GLOBULIN SER-MCNC: 3.3 GM/DL (ref 2.4–3.5)
GLUCOSE SERPL-MCNC: 99 MG/DL (ref 74–100)
HCT VFR BLD AUTO: 17.8 % (ref 42–52)
HCT VFR BLD AUTO: 22 % (ref 42–52)
HGB BLD-MCNC: 6.4 G/DL (ref 14–18)
HGB BLD-MCNC: 7.8 G/DL (ref 14–18)
HOLD SPECIMEN: NORMAL
IMM GRANULOCYTES # BLD AUTO: 0.1 X10(3)/MCL (ref 0–0.04)
IMM GRANULOCYTES NFR BLD AUTO: 1.2 %
INR PPP: 2.2
LYMPHOCYTES # BLD AUTO: 0.69 X10(3)/MCL (ref 0.6–4.6)
LYMPHOCYTES NFR BLD AUTO: 8 %
MAGNESIUM SERPL-MCNC: 2.6 MG/DL (ref 1.6–2.6)
MCH RBC QN AUTO: 33 PG (ref 27–31)
MCHC RBC AUTO-ENTMCNC: 36 G/DL (ref 33–36)
MCV RBC AUTO: 91.8 FL (ref 80–94)
MONOCYTES # BLD AUTO: 0.87 X10(3)/MCL (ref 0.1–1.3)
MONOCYTES NFR BLD AUTO: 10.1 %
NEUTROPHILS # BLD AUTO: 6.54 X10(3)/MCL (ref 2.1–9.2)
NEUTROPHILS NFR BLD AUTO: 75.5 %
NRBC BLD AUTO-RTO: 0 %
PHOSPHATE SERPL-MCNC: 4.5 MG/DL (ref 2.3–4.7)
PLATELET # BLD AUTO: 51 X10(3)/MCL (ref 130–400)
PLATELETS.RETICULATED NFR BLD AUTO: 4 % (ref 0.9–11.2)
PMV BLD AUTO: 10.2 FL (ref 7.4–10.4)
POCT GLUCOSE: 104 MG/DL (ref 70–110)
POCT GLUCOSE: 128 MG/DL (ref 70–110)
POCT GLUCOSE: 132 MG/DL (ref 70–110)
POCT GLUCOSE: 136 MG/DL (ref 70–110)
POTASSIUM SERPL-SCNC: 4.4 MMOL/L (ref 3.5–5.1)
PROT SERPL-MCNC: 6.4 GM/DL (ref 6.4–8.3)
PROTHROMBIN TIME: 24.2 SECONDS (ref 11.4–14)
RBC # BLD AUTO: 1.94 X10(6)/MCL (ref 4.7–6.1)
SODIUM SERPL-SCNC: 123 MMOL/L (ref 136–145)
UNIT NUMBER: NORMAL
UNIT NUMBER: NORMAL
WBC # SPEC AUTO: 8.65 X10(3)/MCL (ref 4.5–11.5)

## 2024-01-24 PROCEDURE — 25000003 PHARM REV CODE 250

## 2024-01-24 PROCEDURE — P9047 ALBUMIN (HUMAN), 25%, 50ML: HCPCS | Mod: JZ,JG | Performed by: INTERNAL MEDICINE

## 2024-01-24 PROCEDURE — P9016 RBC LEUKOCYTES REDUCED: HCPCS

## 2024-01-24 PROCEDURE — 21400001 HC TELEMETRY ROOM

## 2024-01-24 PROCEDURE — 83735 ASSAY OF MAGNESIUM: CPT | Performed by: STUDENT IN AN ORGANIZED HEALTH CARE EDUCATION/TRAINING PROGRAM

## 2024-01-24 PROCEDURE — 80053 COMPREHEN METABOLIC PANEL: CPT | Performed by: STUDENT IN AN ORGANIZED HEALTH CARE EDUCATION/TRAINING PROGRAM

## 2024-01-24 PROCEDURE — 63600175 PHARM REV CODE 636 W HCPCS: Mod: JZ,JG | Performed by: INTERNAL MEDICINE

## 2024-01-24 PROCEDURE — 85610 PROTHROMBIN TIME: CPT

## 2024-01-24 PROCEDURE — 36430 TRANSFUSION BLD/BLD COMPNT: CPT

## 2024-01-24 PROCEDURE — 85018 HEMOGLOBIN: CPT

## 2024-01-24 PROCEDURE — 86923 COMPATIBILITY TEST ELECTRIC: CPT | Mod: 91

## 2024-01-24 PROCEDURE — A4216 STERILE WATER/SALINE, 10 ML: HCPCS

## 2024-01-24 PROCEDURE — 94761 N-INVAS EAR/PLS OXIMETRY MLT: CPT

## 2024-01-24 PROCEDURE — 99900035 HC TECH TIME PER 15 MIN (STAT)

## 2024-01-24 PROCEDURE — 84100 ASSAY OF PHOSPHORUS: CPT | Performed by: STUDENT IN AN ORGANIZED HEALTH CARE EDUCATION/TRAINING PROGRAM

## 2024-01-24 PROCEDURE — 25000003 PHARM REV CODE 250: Performed by: INTERNAL MEDICINE

## 2024-01-24 PROCEDURE — 25000003 PHARM REV CODE 250: Performed by: STUDENT IN AN ORGANIZED HEALTH CARE EDUCATION/TRAINING PROGRAM

## 2024-01-24 PROCEDURE — 85025 COMPLETE CBC W/AUTO DIFF WBC: CPT | Performed by: STUDENT IN AN ORGANIZED HEALTH CARE EDUCATION/TRAINING PROGRAM

## 2024-01-24 RX ORDER — LACTULOSE 10 G/15ML
20 SOLUTION ORAL DAILY
Status: DISCONTINUED | OUTPATIENT
Start: 2024-01-24 | End: 2024-01-25 | Stop reason: HOSPADM

## 2024-01-24 RX ORDER — HYDROCODONE BITARTRATE AND ACETAMINOPHEN 500; 5 MG/1; MG/1
TABLET ORAL
Status: DISCONTINUED | OUTPATIENT
Start: 2024-01-24 | End: 2024-01-25 | Stop reason: HOSPADM

## 2024-01-24 RX ORDER — ALBUMIN HUMAN 250 G/1000ML
25 SOLUTION INTRAVENOUS 2 TIMES DAILY
Status: DISCONTINUED | OUTPATIENT
Start: 2024-01-24 | End: 2024-01-25 | Stop reason: HOSPADM

## 2024-01-24 RX ADMIN — RIFAXIMIN 550 MG: 550 TABLET ORAL at 08:01

## 2024-01-24 RX ADMIN — MICONAZOLE NITRATE: 20 POWDER TOPICAL at 09:01

## 2024-01-24 RX ADMIN — MICONAZOLE NITRATE: 20 POWDER TOPICAL at 08:01

## 2024-01-24 RX ADMIN — SODIUM CHLORIDE, PRESERVATIVE FREE 10 ML: 5 INJECTION INTRAVENOUS at 12:01

## 2024-01-24 RX ADMIN — Medication 100 MG: at 08:01

## 2024-01-24 RX ADMIN — RIFAXIMIN 550 MG: 550 TABLET ORAL at 09:01

## 2024-01-24 RX ADMIN — ALBUMIN (HUMAN) 50 G: 0.25 INJECTION, SOLUTION INTRAVENOUS at 09:01

## 2024-01-24 RX ADMIN — SODIUM CHLORIDE, PRESERVATIVE FREE 10 ML: 5 INJECTION INTRAVENOUS at 05:01

## 2024-01-24 RX ADMIN — ALBUMIN (HUMAN) 25 G: 5 SOLUTION INTRAVENOUS at 09:01

## 2024-01-24 RX ADMIN — LACTULOSE 20 G: 20 SOLUTION ORAL at 08:01

## 2024-01-24 RX ADMIN — FOLIC ACID 1 MG: 1 TABLET ORAL at 08:01

## 2024-01-24 RX ADMIN — PANTOPRAZOLE SODIUM 40 MG: 40 TABLET, DELAYED RELEASE ORAL at 08:01

## 2024-01-24 NOTE — PROGRESS NOTES
Gastroenterology/Hepatology Progress Note    Patient Name: Franco Hein  Age: 48 y.o.  : 1975  MRN: [id number]  Admission Date: 2024      Self, Aaareferral    SUBJECTIVE:     NAEON. Abdominal pain stable, well-controlled with Norco 5. Abdomen still distended. Currently on Rifaximin 550 mg BID, and had 2 Bowel movements over the past 24 hours, small, without blood. Suboptimal PO intake at this time. Continues to have edema up to lumbar region, posteriorly. Primary team continuing conversation with hospice. Patient denies lightheadedness, dizziness, chest pain, nausea, vomiting, hematemesis, hematochezia or melanotic stool.    Review of patient's allergies indicates:  No Known Allergies       INPATIENT MEDICATIONS    Scheduled Meds:   albumin human 25%  50 g Intravenous BID    folic acid  1 mg Oral Daily    iohexoL        lactulose  20 g Oral Daily    LIDOcaine HCL 10 mg/ml (1%)  1 mL Other Once    miconazole NITRATE 2 %   Topical (Top) BID    pantoprazole  40 mg Oral Daily    rifAXIMin  550 mg Oral BID    sodium chloride 0.9%  10 mL Intravenous Q6H    thiamine  100 mg Oral Daily     Continuous Infusions:  PRN Meds:  0.9%  NaCl infusion (for blood administration), 0.9%  NaCl infusion (for blood administration), 0.9%  NaCl infusion (for blood administration), 0.9%  NaCl infusion (for blood administration), 0.9%  NaCl infusion (for blood administration), 0.9%  NaCl infusion (for blood administration), 0.9%  NaCl infusion (for blood administration), 0.9%  NaCl infusion (for blood administration), 0.9%  NaCl infusion (for blood administration), 0.9%  NaCl infusion (for blood administration), sodium chloride 0.9%, acetaminophen, acetaminophen, albuterol-ipratropium, benzonatate, dextrose 10%, dextrose 10%, glucagon (human recombinant), glucose, glucose, HYDROcodone-acetaminophen, hydrOXYzine pamoate, iohexoL, iohexoL, sodium chloride 0.9%, Flushing PICC/Midline Protocol **AND** sodium chloride 0.9% **AND**  sodium chloride 0.9%          Review of Systems:       Review of Systems   Respiratory:  Positive for shortness of breath.    Cardiovascular:  Negative for chest pain.   Gastrointestinal:  Positive for abdominal distention and constipation. Negative for abdominal pain, anal bleeding, blood in stool, diarrhea, nausea, rectal pain, vomiting, reflux and fecal incontinence.        As stated above  Objective:       VITAL SIGNS: 24 HR MIN & MAX LAST    Temp  Min: 97.3 °F (36.3 °C)  Max: 98.5 °F (36.9 °C)  98.3 °F (36.8 °C)        BP  Min: 104/57  Max: 143/82  (!) 143/82     Pulse  Min: 101  Max: 108  103     Resp  Min: 17  Max: 24  (!) 24    SpO2  Min: 95 %  Max: 97 %  96 %        Physical Exam   General:  Well developed, well nourished, no acute respiratory distress, appears slightly short of breath  Head: Normocephalic, atraumatic  Eyes: PERRL, EOMI, anicteric sclera  Throat: No posterior pharyngeal erythema or exudate, no tonsillar exudate  Neck: supple, normal ROM, no thyromegaly   CVS:  RRR, S1 and S2 normal, no murmurs, no added heart sounds, rubs, gallops, 2+ peripheral pulses  Resp:  Lungs clear to auscultation bilaterally, no wheezes, rales, or rhonci  Abdomen:  Abdomen firm and distended, slightly tender to palpation, normoactive bowel sounds.  Spider angiomas on abdomen  MSK:  No muscle atrophy, cyanosis, 3+ pitting edema on posterior aspect, up to lumbar region, full range of motion  Skin:  Edema on the hip  Neuro:  Alert and oriented x3     LABS:    Recent Labs   Lab 01/21/24  0526 01/21/24  1729 01/22/24  0320 01/23/24  0411 01/24/24  0339   WBC 7.80 12.28* 9.86 11.45 8.65   HGB 6.5* 8.9* 9.6* 7.5* 6.4*   HCT 19.2* 25.8* 27.7* 21.4* 17.8*   PLT 61* 59* 64* 53* 51*   MCV 93.2 92.4 91.4 91.8 91.8         Recent Labs   Lab 01/17/24  1616 01/18/24  0517 01/19/24  0408 01/20/24  0338 01/21/24  0526 01/21/24  1729 01/22/24  0320 01/23/24  0411 01/24/24  0339   HGB 7.7* 9.1* 9.0* 8.3* 6.5* 8.9* 9.6* 7.5* 6.4*   HCT  "22.8* 26.4* 25.8* 24.1* 19.2* 25.8* 27.7* 21.4* 17.8*          Recent Labs   Lab 01/19/24 0408 01/19/24  1742 01/20/24 0338 01/21/24 0326 01/22/24 0321 01/23/24  0411 01/24/24  0339   *   < > 124* 122* 123* 123* 123*   K 3.9   < > 4.0 4.5 4.9 4.5 4.4   CO2 23   < > 21* 19* 17* 18* 17*   BUN 15.1   < > 12.5 24.8* 32.5* 43.5* 48.2*   CREATININE 0.70*   < > 0.69* 1.36* 1.46* 2.01* 1.67*   BILITOT 8.0*  --  7.6* 9.1* 10.3* 10.1* 10.3*   BILIDIR 4.9*  --   --   --   --   --   --    ALKPHOS 249*  --  226* 197* 259* 232* 194*   AST 95*  --  81* 75* 77* 59* 52*   ALT 78*  --  67* 62* 61* 48 39   LABPROT 6.0*  --  6.1* 5.8* 6.7 6.2* 6.4   ALBUMIN 1.9*  --  1.8* 1.6* 1.8* 2.1* 3.1*    < > = values in this interval not displayed.          Recent Labs   Lab 01/19/24 0408 01/20/24 0338 01/21/24 0326 01/22/24 0321 01/24/24  0549   INR 2.0* 2.0* 2.1* 1.9* 2.2*   PROTIME 21.9* 21.9* 23.4* 21.5* 24.2*          No results for input(s): "IRON", "FERRITIN" in the last 168 hours.         IMAGING:   US Abdomen Limited    Result Date: 1/17/2024  EXAMINATION: US ABDOMEN LIMITED CLINICAL HISTORY: Suspected HCC, cirrhosis; TECHNIQUE: Grayscale and color Doppler images of the abdomen are obtained. COMPARISON: CT angiogram of the abdomen and pelvis 01/16/2024. FINDINGS: The gallbladder is contracted.  The liver contour is nodular consistent with cirrhosis.  The heterogeneous mass lesion in the dome of the right lobe of the liver is again noted better visualized on yesterday's exam.  There is a moderate volume of ascites.  There is echogenic material posterior to the right lobe of the liver consistent with the blood products seen on CT.  The main portal vein is patent.  The CBD is not well visualized. The right kidney measures 11.0 by 5.6 x 5.8 cm.  There is no hydronephrosis or nephrolithiasis of the right kidney.     1. Cirrhosis with a heterogeneous infiltrative mass in the dome of the right lobe of the liver better visualized " on yesterday's exam. 2. Moderate volume ascites with echogenic material posterior to the right lobe of the liver consistent with a blood products seen on yesterday's exam. Electronically signed by: Keyon Beck MD Date:    01/17/2024 Time:    10:43    Cardiac catheterization    Result Date: 1/16/2024  Procedure performed in the Invasive Lab  - See Procedure Log link below for nursing documentation  - See OpNote on Surgeries Tab for physician findings  - See Imaging Tab for radiologist dictation    CTA Abdomen and Pelvis    Result Date: 1/16/2024  EXAMINATION: CTA ABDOMEN AND PELVIS CLINICAL HISTORY: Portal vein thrombosis;Mesenteric ischemia, acute; TECHNIQUE: Axial images of the abdomen and pelvis were obtained with and without  IV contrast administration.  Coronal and sagittal reconstructions were provided.  Three dimensional and MIP images were obtained and evaluated.  Total DLP was 5216.27 mGy-cm. Dose lowering technique and automated exposure control were utilized for this exam. COMPARISON: CT of the chest abdomen and pelvis 01/01/2024. FINDINGS: Vascular findings: The distal descending thoracic aorta is nonaneurysmal. The abdominal aorta is nonaneurysmal.  The celiac axis is widely patent.  There is a normal branching pattern of the celiac. The SMA is widely patent. The single right and 2 left renal arteries are widely patent. The ABHIJIT is widely patent.  There is no significant iliofemoral disease. The main portal vein is patent.  There is tumor thrombus within the left portal vein with nonocclusive thrombosis.  There is recanalization of the umbilical vein.  There is a prominent spleno renal shunt.  The splenic vein is patent.  The superior mesenteric vein is patent. Nonvascular findings: There is atelectasis in the right lung base.  The left lung base is clear. The liver contour is nodular consistent with cirrhosis.  There is a lobulated solid and cystic mass lesion in the dome of the right lobe of the liver  measuring 7.4 x 6.3 cm.  There is inferior extension and invasion into the left portal vein.  There is no biliary dilatation.  There is a large volume of ascites.  There is hyperdense material in the posterior right upper lobe with a focal area of contrast extravasation best visualized on series 8 image 27 and series 7, images 216-218. The gallbladder is contracted.  The spleen, pancreas, and left adrenal gland are normal.  There is a right adrenal nodule which is indeterminate on this exam.  The bilateral kidneys are normal.  The stomach and small bowel are decompressed.  There is no bowel wall thickening.  The colon is normal.  The urinary bladder is normal.  There is no pelvic or retroperitoneal lymphadenopathy.  There is no lytic or blastic osseous lesion.     1. Solid and cystic mass lesion in the superior aspect of the right hepatic lobe with invasion of the intrahepatic left portal vein and sequela of portal hypertension as above.  Additionally there is capsular rupture and active extravasation with a moderate amount of blood products in the posterior right upper quadrant.  Given the portal venous invasion and cirrhotic morphology of the liver this is favored to represent hepatocellular carcinoma although enhancement characteristics are not classic. 2. Indeterminate right adrenal nodule.  Metastatic disease cannot be excluded. 3. Minimal right lower lobe atelectasis. Red Alert: The critical information above was relayed directly by me by telephone to Dr. Rosenthal On 1/16/2024 at 11:52 . Electronically signed by: Keyon Beck MD Date:    01/16/2024 Time:    11:52    X-Ray Abdomen AP 1 View    Result Date: 1/16/2024  EXAMINATION: XR ABDOMEN AP 1 VIEW CLINICAL HISTORY: abdominal pain; TECHNIQUE: AP X-RAY OF THE ABDOMEN: CPT 84083 FINDINGS: Examination reveals residual feces throughout the colon the gas pattern is nonspecific with no clear evidence of ileus or obstruction no abnormal masses or calcifications  identified. Slightly more prominent loop of small bowel identified in the left hemiabdomen these might be related to a sentinel loop which my signal an inflammatory process near the area of the 89 make segment of bowel     Loop of small bowel that might represent a short segment in a 9 larry ileus in represent a sentinel loop indicating the presence of an inflammatory process clinical correlation is suggested Electronically signed by: Cameron Peralta Date:    01/16/2024 Time:    08:50    X-Ray Chest 1 View    Result Date: 1/6/2024  EXAMINATION: XR CHEST 1 VIEW CLINICAL HISTORY: Pneumonia; TECHNIQUE: Frontal view(s) of the chest. COMPARISON: Radiography 01/05/2024 FINDINGS: Enteric tube seen as far as the gastric body.  Lungs remain hypoinflated with stable to slightly improved bilateral consolidation.  No significant pleural fluid.  No pneumothorax.  Stable cardiac silhouette.     Bilateral consolidation stable to slightly improved. Electronically signed by: Gómez Carmona Date:    01/06/2024 Time:    09:35    X-Ray Chest 1 View    Result Date: 1/5/2024  EXAMINATION: XR CHEST 1 VIEW CPT 33209 CLINICAL HISTORY: pneumonia; COMPARISON: January 4, 2024 FINDINGS: Cardiomediastinal silhouette and pleuroparenchymal changes are essentially unchanged as compared with the previous exam There has been interval insertion of a nasogastric tube with the tip below the diaphragm     No significant change Interval insertion of nasogastric Electronically signed by: Cameron Peralta Date:    01/05/2024 Time:    08:16    Echo Saline Bubble? No    Result Date: 1/4/2024    Left Ventricle: The left ventricle is normal in size. Ventricular mass is normal. Normal wall thickness. Normal wall motion. There is normal systolic function. Biplane (2D) method of discs ejection fraction is 62%. There is normal diastolic function.   Right Ventricle: Normal right ventricular cavity size. Systolic function could not be assesed due to poor  visualization.   Left Atrium: Normal left atrial size.   Right Atrium: Normal right atrial size.   Aortic Valve: Not well visualized due to poor acoustic window. There is no stenosis. There is no significant regurgitation.   Mitral Valve: The mitral valve is structurally normal. There is normal leaflet mobility.   Tricuspid Valve: Not well visualized due to poor acoustic window. The tricuspid valve is structurally normal. There is no significant regurgitation.   Pulmonic Valve: Not well visualized due to poor acoustic window. There is no significant regurgitation.   Pulmonary Artery: Pulmonary artery pressure could not be estimated.   IVC/SVC: Intermediate venous pressure at 8 mmHg.   Pericardium: There is no pericardial effusion.     XR Gastric tube check, non-radiologist performed    Result Date: 1/4/2024  EXAMINATION: XR GASTRIC TUBE CHECK, NON-RADIOLOGIST PERFORMED CLINICAL HISTORY: NG tube placement verification; COMPARISON: 3 January 2024 FINDINGS: Frontal image of the abdomen. Enteric tube extends well into the stomach.  The tip overlies the expected area of the gastric antrum.     As above. Electronically signed by: Aamir Phillip Date:    01/04/2024 Time:    15:03    X-Ray Chest 1 View    Result Date: 1/4/2024  EXAMINATION: XR CHEST 1 VIEW CPT 38560 CLINICAL HISTORY: tachypnea; COMPARISON: January 3, 2024 FINDINGS: Examination reveals cardiomediastinal silhouette improved parenchymal changes to be essentially unchanged as compared with the previous exam     No significant change Electronically signed by: Cameron Peralta Date:    01/04/2024 Time:    08:22    X-Ray Abdomen AP 1 View    Result Date: 1/3/2024  EXAMINATION: XR ABDOMEN AP 1 VIEW CLINICAL HISTORY: distended abdomen; TECHNIQUE: Single-view of the abdomen COMPARISON: No prior. FINDINGS: No acute osseous abnormality.  Nonobstructive bowel gas pattern.     Nonobstructive bowel gas pattern. Electronically signed by: Josef Sow Date:    01/03/2024  Time:    09:46    X-Ray Chest 1 View    Result Date: 1/3/2024  EXAMINATION: XR CHEST 1 VIEW CLINICAL HISTORY: PNA; TECHNIQUE: Single view of the chest COMPARISON: 01/02/2024 FINDINGS: Left upper lobe opacification remains slightly improved in the interval.  The lungs are hypoexpanded.  The cardiomediastinal silhouette is unchanged.     No adverse interval change.  Improved aeration of the left upper lobe. Electronically signed by: Josef Sow Date:    01/03/2024 Time:    09:46    X-Ray Chest 1 View    Result Date: 1/2/2024  EXAMINATION: XR CHEST 1 VIEW CPT 34449 CLINICAL HISTORY: increased oxygen demand; COMPARISON: January 1, 2024 FINDINGS: Examination reveals cardiomediastinal silhouette to be unchanged as compared with the previous exam. Worsening confluent airspace opacities specially in the right perihilar region and right upper lobe with some confluent opacities also seen in the right upper lobe some of the changes might be related to poor inspiratory effort, however, the bulk of the changes are related to worsening infiltrative changes specially in the left     Worsening consolidative changes in the left lung as above Electronically signed by: Cameron Peralta Date:    01/02/2024 Time:    12:51    CT Chest Abdomen Pelvis Without Contrast (XPD)    Result Date: 1/2/2024  EXAMINATION: CT CHEST ABDOMEN PELVIS WITHOUT CONTRAST(XPD) CLINICAL HISTORY: Suspect pneumonia, biliary obstruction; TECHNIQUE: Helical acquisition through the chest, abdomen and pelvis without  IV administration of contrast. Axial, sagittal and coronal reformats interpreted. Automated tube current modulation, weight-based exposure dosing, and/or iterative reconstruction technique utilized to reach lowest reasonably achievable exposure rate. DLP: 1042 mGy*cm COMPARISON: Chest radiograph 01/01/2024 FINDINGS: BASE OF NECK: No significant abnormality. HEART: Normal size. There are coronary artery calcifications.  Low-density blood pool with  visualization of the interventricular septum compatible with anemia. THORACIC VASCULATURE: Thoracic aorta is unremarkable. SIRISHA/MEDIASTINUM: Small nonspecific mediastinal lymph nodes.  Evaluation of hilar lymphadenopathy is limited without contrast. AIRWAYS: Patent. LUNGS/PLEURA: Multilobar consolidative opacities within the lungs. THORACIC SOFT TISSUES: Unremarkable. LIVER: Limited evaluation the liver due to lack of intravenous contrast and placement of the patient's arms over the abdomen which causes beam hardening artifact.  Heterogeneous attenuation of the liver.  There appears to be a mass at the dome of the liver.  Caudate lobe hypertrophy and changes of cirrhosis. BILIARY: The gallbladder does not appear to be overly distended. PANCREAS: Nonspecific retroperitoneal edema.  Unable to assess for changes of pancreatitis given generalized fluid overload. SPLEEN: Upper limits of normal in size. ADRENALS: There is a 1.6 cm right adrenal nodule.  Unable to accurately measure internal attenuation due to beam hardening artifact. KIDNEYS/URETERS: Hyperdense renal pyramids.  This can be related to volume status/dehydration or medullary calcinosis. GI TRACT/MESENTERY: Evaluation of the bowel is limited without contrast. Bowel is normal in caliber without evidence of obstruction. PERITONEUM: Small volume ascites.No free air. LYMPH NODES: No enlarged lymph nodes by size criteria. ABDOMINOPELVIC VASCULATURE: Normal caliber abdominal aorta.  Large periumbilical vein. BLADDER: Normal appearance given degree of distention. REPRODUCTIVE ORGANS: Normal as visualized. ABDOMINAL WALL: Small fat containing right inguinal hernia.  Mild body wall edema. BONES: Anterolisthesis at the lumbosacral junction.     1. Multilobar consolidative opacities within the lungs 2. Cirrhotic morphology of the liver with mass at the dome of the liver.  Evaluation is moderately limited without contrast and due to beam hardening artifact related to  patient positioning.  Recommend liver protocol CT abdomen without and with contrast. 3. Ascites 4. Indeterminate right adrenal nodule.  Continued attention recommended on follow-up exams. 5. The preliminary and final reports are concordant. Electronically signed by: Tika Mcknight Date:    01/02/2024 Time:    09:44    X-Ray Chest 1 View    Result Date: 1/1/2024  EXAMINATION: XR CHEST 1 VIEW CLINICAL HISTORY: cough;  Cough, unspecified TECHNIQUE: Single view of the chest COMPARISON: No prior imaging available for comparison. FINDINGS: Bilateral patchy airspace opacities greatest on the left. The cardiomediastinal silhouette is within normal limits. No acute osseous abnormality.     Bilateral patchy airspace opacities greatest on the left.  Findings concerning for atypical infectious process. Electronically signed by: Josef Sow Date:    01/01/2024 Time:    14:23        Assessment / Plan:     Franco Hein is a 48 y.o. male with unknown past medical history presented to [facility] ED 01/01/2020 for the complaint of jaundice, weakness for 3 weeks, and altered mental status.  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        Alcoholic liver disease with ascites  Hepatic encephalopathy (resolved)  Alcohol abuse  [facility] discriminant function score: 72.8   [place] alcoholic hepatitis score: 9  [place] 17.1  Child Hoang class C score:  15   [place] score .82:  Non responder  CORBY  - continue rifaximin.  Will eventually need to be placed on  lactulose when stools as loose for 2-3 bowel movements per day.  - can restart diuretics as long as sodium levels remain greater than 125  -continue CIWA protocol   -continue thiamine and folic acid  -Renal indices slightly improved from 43.5/2.01 to 48.2/1.67. Currently on Albumin 50 g BID. Hold diuretics at this time  -start midodrine if MAP < 70     HCC  - AFP elevated at 1400  -advised alcohol cessation  -patient currently unlikely to be a candidate for  systemic chemotherapy, given decompensated status. However, with improvement, could become a candidate  -goals of care discussion to be had by primary team     Hyponatremia (improving)  -monitor labs for improvement off diuretics  -can restart diuretics as long as sodium levels remained greater than 125  -Serum Sodium 123 today, stable  -Hyponatremia is 2/2 severe liver disease    Thrombocytopenia (improving), Normocytic anemia  - do not administer platelets unless patient is actively bleeding  - Platelets stable at 53  - H/H 6.4/17.8, will transfuse 2 units pRBCs     Strep pneumoniae bacteremia (resolved)  - being treated with  Rocephin 2g for 14 days.    Intrahepatic bleeding s/p Gelfoam embolization (resolved)  - Gelfoam embolization performed 01/16/2024    Praneeth Arndt DO  [facility] Internal Medicine, PGY-II  [facility] Gastroenterology

## 2024-01-24 NOTE — PROGRESS NOTES
01/24/24 1035   Missed Time Reason   Missed Treatment Reason Blood transfusion;Therapist assessment     Pt receiving blood transfusion.  Multiple doctors in room along with nurse and multiple friends of pt discussing possible AMA to return to [place] to be with family d/t poor prognosis.    OT to f/u for eval as schedule allows if pt is amenable.

## 2024-01-24 NOTE — PLAN OF CARE
Problem: Adult Inpatient Plan of Care  Goal: Plan of Care Review  1/23/2024 2122 by Radha Lucio LPN  Outcome: Ongoing, Progressing  1/23/2024 2117 by Radha Lucio LPN  Outcome: Ongoing, Progressing  Goal: Patient-Specific Goal (Individualized)  1/23/2024 2122 by Radha Lucio LPN  Outcome: Ongoing, Progressing  1/23/2024 2117 by Radha Lucio LPN  Outcome: Ongoing, Progressing  Goal: Absence of Hospital-Acquired Illness or Injury  1/23/2024 2122 by Radha Lucio LPN  Outcome: Ongoing, Progressing  1/23/2024 2117 by Rahda Lucio LPN  Outcome: Ongoing, Progressing  Goal: Optimal Comfort and Wellbeing  1/23/2024 2122 by Radha Lucio LPN  Outcome: Ongoing, Progressing  1/23/2024 2117 by Radha Lucio LPN  Outcome: Ongoing, Progressing  Goal: Readiness for Transition of Care  1/23/2024 2122 by Radha Lucio LPN  Outcome: Ongoing, Progressing  1/23/2024 2117 by Radha Lucio LPN  Outcome: Ongoing, Progressing     Problem: Infection  Goal: Absence of Infection Signs and Symptoms  1/23/2024 2122 by Radha Lucio LPN  Outcome: Ongoing, Progressing  1/23/2024 2117 by Radha Lucio LPN  Outcome: Ongoing, Progressing     Problem: Skin Injury Risk Increased  Goal: Skin Health and Integrity  1/23/2024 2122 by Radha Lucio LPN  Outcome: Ongoing, Progressing  1/23/2024 2117 by Radha Lucio LPN  Outcome: Ongoing, Progressing     Problem: Impaired Wound Healing  Goal: Optimal Wound Healing  1/23/2024 2122 by Radha Lucio LPN  Outcome: Ongoing, Progressing  1/23/2024 2117 by Radha Lucio LPN  Outcome: Ongoing, Progressing

## 2024-01-24 NOTE — PT/OT/SLP PROGRESS
[facility]  SPEECH LANGUAGE PATHOLOGY  MISSED VISIT NOTE      PATIENT:  Franco Hein    : 1975    MRN: [id number]    DATE: 2024          HISTORY & PHYSICAL:    Active Ambulatory Problems     Diagnosis Date Noted    No Active Ambulatory Problems     Resolved Ambulatory Problems     Diagnosis Date Noted    No Resolved Ambulatory Problems     No Additional Past Medical History         Patient POC possible hospice. Will discontinue ST order at this time as he is tolerating current PO diet. Please re-consult as indicated.               Kain Barlow M.S. CCC-SLP  [facility]

## 2024-01-24 NOTE — PT/OT/SLP PROGRESS
Physical Therapy    Missed Treatment Session    Patient Name:  Franco Hein   MRN:  [id number]      Patient not seen at this time secondary to Patient/Family/MD discussing plan to leave AMA.    Will follow-up as patient is appropriate/available/agreeable to participate and as therapists' schedule allows.

## 2024-01-24 NOTE — PLAN OF CARE
Re-contacted this morning given patient's drop in hemoglobin.  Patient is status post embolization of his right hepatic artery at the takeoff.  He has also a Child-Hoang C cirrhotic, giving him a greater than 80% intraoperative mortality for stable case, approaches 100% for unstable patient.  Neither vascular surgery nor general surgery have any available treatment options for this patient.  Agree with hospice placement.    Igor Carballo MD  PGY-6, [employer] General Surgery  01/24/2024

## 2024-01-24 NOTE — PROGRESS NOTES
[facility] Wards Progress Note     Resident Team: [facility] Medicine List 4  Attending Physician: Evelin  Resident: MD Galo  Intern: DO Ulysses       Subjective:      Brief HPI:  Franco Hein is  48 y.o. male with a PMH of ETOH misuse disorder who presented to [facility] ED on 1/1/2024 with complaint of jaundice, weakness for 3 weeks, and AMS. Patient was brought in by a friend who states patient is a daily drinker who has not been able to go to a store for the last 2 days, but patient is noted to have a positive ethanol on admission. Patient unable to provide history given his mental status and was only able to say his name and confirm he drinks alcohol but does not do drugs. Patient was tachypneic, tachycardic, and tremulous, on presentation with jaundice. Admitted to ICU given altered mental status and concern for decompensation in the setting of alcohol withdrawal and influenza A, also found to have strep pneumonia bacteremia, on appropriate IV antibiotics.     Hospital Course:  1/17/23: Paracentesis performed w/ removal of 4.5L of bloody ascitic fluid w/ symptomatic improvement & hemodynamic stability thereafter.     Interval History:   Had another extensive conversation with patient for more than 40 minutes via  about goals of treatment. Explained to patient the extent of his disease. He wishes to passed away in [place].  Would like to stay full code.  His hemoglobin this morning was 6.4 platelet, count of 51, and patient was consistently had a INR above 1.8 we will recheck PT INR.  I will transfuse packed red blood cells, FFP, platelets.  Will evaluate with US for drainable pocket later today. He does complain of leg swelling today; otherwise denies any other complaints notably fever, chills, chest pain, SOB. Also denies any bloody or melenic stools.  Is urinating but dark.  Renal indices slightly improved, no longer having loose bowel movements.      Review of Systems:  ROS completed and  negative except as indicated above.     Objective:     Last 24 Hour Vital Signs:  BP  Min: 110/74  Max: 132/79  Temp  Av °F (36.7 °C)  Min: 97.4 °F (36.3 °C)  Max: 98.5 °F (36.9 °C)  Pulse  Av.8  Min: 100  Max: 108  Resp  Av  Min: 18  Max: 20  SpO2  Av.1 %  Min: 95 %  Max: 97 %  I/O last 3 completed shifts:  In: 240 [P.O.:240]  Out: 425 [Urine:425]    Physical Examination:  General: no acute distress  Eye: PERRLA, EOMI, scleral icterus noted  HENT: NC/AT, moist mucus membranes  Neck: full range of motion, no thyromegaly or lymphadenopathy  Respiratory: Clear and equal bilateral BS, satting well on RA  Cardiovascular:  Tachycardic   Gastrointestinal: soft, non-tender, mildly-distended with normal bowel sounds, without masses to palpation  Musculoskeletal: no obvious deformities, full range of motion of all extremities/spine without limitation or discomfort  Integumentary: Jaundice, spider angiomas present on upper chest and abdomen, no caput medusa. Petechiae noted to right lateral LQ, and small scattered bruising on arms and legs.   Extremities: radial and DP pulses 2+ bilaterally, 2+ BLE edema  Neurologic: Pt AAOx3, following commands and conversing appropriately. No focal neuro deficits appreciated,    Laboratory:  Most Recent Data:  CBC:   Lab Results   Component Value Date    WBC 8.65 2024    HGB 6.4 (L) 2024    HCT 17.8 (LL) 2024    PLT 51 (L) 2024    MCV 91.8 2024    RDW 18.9 (H) 2024     WBC Differential:   Recent Labs   Lab 24  0526 24  1729 24  0320 24  0411 24  0339   WBC 7.80 12.28* 9.86 11.45 8.65   HGB 6.5* 8.9* 9.6* 7.5* 6.4*   HCT 19.2* 25.8* 27.7* 21.4* 17.8*   PLT 61* 59* 64* 53* 51*   MCV 93.2 92.4 91.4 91.8 91.8       BMP:   Lab Results   Component Value Date     (L) 2024    K 4.4 2024    CO2 17 (L) 2024    BUN 48.2 (H) 2024    CREATININE 1.67 (H) 2024    CALCIUM 7.7 (L)  "01/24/2024    MG 2.60 01/24/2024    PHOS 4.5 01/24/2024     LFTs:   Lab Results   Component Value Date    ALBUMIN 3.1 (L) 01/24/2024    BILITOT 10.3 (H) 01/24/2024    AST 52 (H) 01/24/2024    ALKPHOS 194 (H) 01/24/2024    ALT 39 01/24/2024     Coags:   Lab Results   Component Value Date    INR 1.9 (H) 01/22/2024    PROTIME 21.5 (H) 01/22/2024    PTT 38.6 (H) 01/01/2024     FLP: No results found for: "CHOL", "HDL", "LDLCALC", "TRIG", "CHOLHDL"  DM:   Lab Results   Component Value Date    CREATININE 1.67 (H) 01/24/2024     Thyroid: No results found for: "TSH", "FREET4", "W2XYXKA", "A8EYEIG", "THYROIDAB"  Anemia:   Lab Results   Component Value Date    IRON 39 (L) 01/01/2024    TIBC 203 (L) 01/01/2024    FERRITIN 116.00 01/01/2024    OCHKZLYM23 >2,000 (H) 01/01/2024    FOLATE 18.3 01/01/2024     Cardiac:   Lab Results   Component Value Date    TROPONINI 0.029 01/01/2024    BNP 89.3 01/01/2024       Microbiology Data Reviewed: yes  Pertinent Findings:  1/1/24: bld cx +strep pneumonia pan sensitive x1 bottle  1/4/24: bld cx x2 negative    Other Results:  TTE 1/4/24: EF 62%, no evidence of vegetation      Radiology:  Imaging Results              CT Chest Abdomen Pelvis Without Contrast (XPD) (Final result)  Result time 01/02/24 09:44:01      Final result by Tika Mcknight MD (01/02/24 09:44:01)                   Impression:      1. Multilobar consolidative opacities within the lungs  2. Cirrhotic morphology of the liver with mass at the dome of the liver.  Evaluation is moderately limited without contrast and due to beam hardening artifact related to patient positioning.  Recommend liver protocol CT abdomen without and with contrast.  3. Ascites  4. Indeterminate right adrenal nodule.  Continued attention recommended on follow-up exams.  5. The preliminary and final reports are concordant.      Electronically signed by: Tika Mcknight  Date:    01/02/2024  Time:    09:44               Narrative:    " EXAMINATION:  CT CHEST ABDOMEN PELVIS WITHOUT CONTRAST(XPD)    CLINICAL HISTORY:  Suspect pneumonia, biliary obstruction;    TECHNIQUE:  Helical acquisition through the chest, abdomen and pelvis without  IV administration of contrast. Axial, sagittal and coronal reformats interpreted.    Automated tube current modulation, weight-based exposure dosing, and/or iterative reconstruction technique utilized to reach lowest reasonably achievable exposure rate.    DLP: 1042 mGy*cm    COMPARISON:  Chest radiograph 01/01/2024    FINDINGS:  BASE OF NECK: No significant abnormality.    HEART: Normal size. There are coronary artery calcifications.  Low-density blood pool with visualization of the interventricular septum compatible with anemia.    THORACIC VASCULATURE: Thoracic aorta is unremarkable.    SIRISHA/MEDIASTINUM: Small nonspecific mediastinal lymph nodes.  Evaluation of hilar lymphadenopathy is limited without contrast.    AIRWAYS: Patent.    LUNGS/PLEURA: Multilobar consolidative opacities within the lungs.    THORACIC SOFT TISSUES: Unremarkable.    LIVER: Limited evaluation the liver due to lack of intravenous contrast and placement of the patient's arms over the abdomen which causes beam hardening artifact.  Heterogeneous attenuation of the liver.  There appears to be a mass at the dome of the liver.  Caudate lobe hypertrophy and changes of cirrhosis.    BILIARY: The gallbladder does not appear to be overly distended.    PANCREAS: Nonspecific retroperitoneal edema.  Unable to assess for changes of pancreatitis given generalized fluid overload.    SPLEEN: Upper limits of normal in size.    ADRENALS: There is a 1.6 cm right adrenal nodule.  Unable to accurately measure internal attenuation due to beam hardening artifact.    KIDNEYS/URETERS: Hyperdense renal pyramids.  This can be related to volume status/dehydration or medullary calcinosis.    GI TRACT/MESENTERY: Evaluation of the bowel is limited without contrast. Bowel  is normal in caliber without evidence of obstruction.    PERITONEUM: Small volume ascites.No free air.    LYMPH NODES: No enlarged lymph nodes by size criteria.    ABDOMINOPELVIC VASCULATURE: Normal caliber abdominal aorta.  Large periumbilical vein.    BLADDER: Normal appearance given degree of distention.    REPRODUCTIVE ORGANS: Normal as visualized.    ABDOMINAL WALL: Small fat containing right inguinal hernia.  Mild body wall edema.    BONES: Anterolisthesis at the lumbosacral junction.                        Preliminary result by Santana Lerma MD (01/01/24 22:56:45)                   Impression:    1. No intrahepatic or extrahepatic biliary duct dilatation is seen.  2. Note of subtle increased attenuation in the medullary portions of the bilateral kidneys which may reflect medullary calcinosis. Correlate with clinical and laboratory findings as regards additional evaluation and follow-up.  3. There is multifocal patchy dense consolidation of both the lungs with predominant involvement of the left upper and right lower lobes. This is consistent with multilobar pneumonia. Correlate with clinical and laboratory findings as regards additional evaluation and follow-up to full resolution.  4. There is a 2.8 x 2 cm soft tissue density nodule in the right adrenal gland (series 2 image 98). This is of indeterminate etiology.  5. The margins of the liver appears somewhat nodular and irregular. This is suggestive of cirrhosis. Correlate with clinical and laboratory findings.  6. Details and other findings as discussed above.               Narrative:    START OF REPORT:  Technique: CT Scan of the chest abdomen and pelvis was performed without intravenous contrast with axial as well as sagittal and, coronal images.    Dosage Information: Automated Exposure Control was utilized 1041.63 mGy.cm.    Comparison: None.    Clinical History: Suspect pneumonia, biliary obstruction.    Findings:  Mediastinum: A few scattered  subcentimeter mediastinal lymph nodes are seen.  Heart: The heart size is within normal limits.  Aorta: Unremarkable appearing aorta.  Pulmonary Arteries: Unremarkable.  Lungs: There is multifocal patchy dense consolidation of both the lungs with predominant involvement of the left upper and right lower lobes.  Pleura: No effusions or pneumothorax are identified.  Abdomen:  Abdominal Wall: There is extensive stranding of the subcutaneous fat as well as the intraabdominal and intrapelvic fat consistent with anasarca edema. Correlate with clinical and laboratory findings.  Liver: The margins of the liver appears somewhat nodular and irregular. There are also prominent portosystemic collaterals consistent with portal hypertension.  Biliary System: No intrahepatic or extrahepatic biliary duct dilatation is seen.  Gallbladder: The gallbladder is not definitely identified on this noncontrast examination and may be contracted.  Pancreas: The pancreas appears unremarkable.  Spleen: The spleen appears unremarkable.  Adrenals: There is a 2.8 x 2 cm soft tissue density nodule in the right adrenal gland (series 2 image 98).  Kidneys: Note of subtle increased attenuation in the medullary portions of the bilateral kidneys which may reflect medullary calcinosis.  Aorta: The abdominal aorta appears unremarkable.  IVC: Unremarkable.  Bowel:  Esophagus: There is a small hiatal hernia.  Stomach: The stomach appears unremarkable.  Duodenum: Unremarkable appearing duodenum.  Small Bowel: The small bowel appears unremarkable.  Colon: Nondistended.  Appendix: The appendix is not identified but no inflammatory changes are seen in the right lower quadrant to suggest appendicitis.  Peritoneum: There is mild ascites. No intraperitoneal free gas is seen.    Pelvis:  Bladder: The bladder appears unremarkable.  Male:  Prostate gland: The prostate gland appears unremarkable.    Bony structures:  Dorsal Spine: There is mild to moderate spondylosis  of the visualized dorsal spine.  Bony Pelvis: The visualized bony structures of the pelvis appear unremarkable.                                         X-Ray Chest 1 View (Final result)  Result time 01/01/24 14:23:11   Procedure changed from X-Ray Chest PA And Lateral     Final result by Josef Sow MD (01/01/24 14:23:11)                   Impression:      Bilateral patchy airspace opacities greatest on the left.  Findings concerning for atypical infectious process.      Electronically signed by: Josef Sow  Date:    01/01/2024  Time:    14:23               Narrative:    EXAMINATION:  XR CHEST 1 VIEW    CLINICAL HISTORY:  cough;  Cough, unspecified    TECHNIQUE:  Single view of the chest    COMPARISON:  No prior imaging available for comparison.    FINDINGS:  Bilateral patchy airspace opacities greatest on the left.    The cardiomediastinal silhouette is within normal limits.    No acute osseous abnormality.                                       RADIOLOGY REPORT (Final result)  Result time 01/04/24 11:39:28      Final result by Unknown User (01/04/24 11:39:28)                                        Current Medications:     Infusions:         Scheduled:   albumin human 25%  50 g Intravenous BID    folic acid  1 mg Oral Daily    LIDOcaine HCL 10 mg/ml (1%)  1 mL Other Once    miconazole NITRATE 2 %   Topical (Top) BID    pantoprazole  40 mg Oral Daily    rifAXIMin  550 mg Oral BID    sodium chloride 0.9%  10 mL Intravenous Q6H    thiamine  100 mg Oral Daily        PRN:  0.9%  NaCl infusion (for blood administration), 0.9%  NaCl infusion (for blood administration), 0.9%  NaCl infusion (for blood administration), 0.9%  NaCl infusion (for blood administration), 0.9%  NaCl infusion (for blood administration), 0.9%  NaCl infusion (for blood administration), 0.9%  NaCl infusion (for blood administration), 0.9%  NaCl infusion (for blood administration), sodium chloride 0.9%, acetaminophen, acetaminophen,  albuterol-ipratropium, benzonatate, dextrose 10%, dextrose 10%, glucagon (human recombinant), glucose, glucose, HYDROcodone-acetaminophen, hydrOXYzine pamoate, iohexoL, lactulose, sodium chloride 0.9%, Flushing PICC/Midline Protocol **AND** sodium chloride 0.9% **AND** sodium chloride 0.9%    Antibiotics and Day Number of Therapy:  Vanc x3 days  Zosyn/Azithromycin x4 days  Rocephin 2g on D12/14     Lines and Day Number of Therapy:  PIV      Assessment & Plan:     Decompensated Cirrhosis  Hypervolemic Hyponotremia  Liver lesion likely HCC  Bilirubinemia       - suspected etiology ETOH. Pt reportedly had hx of heavy drinking per friend who dropped him off       - Positive hepatitis C antibody; VL negative, no active infection         -- MELD-Na: 28 -->25 --> 22--> 29 --> 28 (1/19)    -- Child Hoang: 13 --> 11 --> 11 --> 11 (1/19) class C  -- Maddrey: 204 on admission       - INR flat, 2 today       - GI consulted w/ recs to discontinue Lasix/Aldactone/Steroids at this time; appreciate assistance in care       - Continued improvement In encephalopathy; no longer having loose bowel movements can restarted lactulose.       - C/w Rifaxamin for hepatic encephalopathy       - FeNA 1%, Nina < 20; lower extremity edema; likely hypervolemic hyponotremia       - 4.5 L bloody ascitic fluid removed on 1/17 for therapeutic/diagnostic para, studies pending       - AFP 1143       - patient will need to be referred to Oncology upon discharge       -Patient agreeable to hospice.       Intrahepatic bleed likely 2/2 tumor       - Pt had slow intrahepatic bleed likely over the past several days as complication from HCC       - Labs in AM showed H&H had continued to drop slowly, lactic acid elevated       - CTA abd/pelvis showed intrahepatic bleeding with lesion in right lobe of liver suspicious for HCC, capsular rupture of liver and some bleeding and contrast noted with ascites       - Taken by Vasc surgery for gelfoam embolization which  was successful, appreciate assistance       - Pt transfused 4 units pRBC, 2 FFP, 1 platelet; no need to transfuse unless actively bleeding       - Trend CBC daily       - Will continue to monitor for symptoms       - C/w SCDs for DVT ppx in setting of recent thrombocytopenia requiring x1u platelets (1/18)       -   Factor VIII level elevated > 300   - hemoglobin level 01/21/2024 was 6.5.  Patient was administered 2 units of PRBCs. Rechecked hemoglobin. Currently it is 9.6.   -hemoglobin level 01/24/2024 6.4.  INRs consistently been above 1.8 and platelet count is 51  Plan is to administer 2 units of PRBCs       - patient will require a hospice consult. Albumin and diueretics likely a temporary solution. Not considering at this time. Will re evaluate at a later time.       Pneumonia 2/2 Influenza A-resolved       - Positive on 1/1/24; initially ICU with BIPAP for respiratory failure but stable on floor since 1/9/24       - Possible superimposed Strep PNA leading to bacteremia and pneumonia; completed IV abx regimen    Strep Pneumoniae Bacteremia-resolved       - Blood culture positive x1/2 Streptococcus; [employer] confirmation strep pneumonia bacteremia which is pan-sensitive, resp culture /Gram stain collection pending.       - Repeat blood cultures 1/4/24  negative; TTE did not have any findings consistent with endocarditis.        - s/p Zosyn and Zithromax x4 days; MRSA PCR negative, discontinued vancomycin after D3.        - Completed 2 week regimen Ceftriaxone 2g on 1/17/24    Skin Breakdown to L buttocks-improving  Pruritic, Erythematous Rash to groin       - Skin tear vs Stage II pressure ulcer       - Wound care consulted, appreciate recs       - Continue q2h turning and mepilex dsg        - Pt now with enteral nutrition, hopeful to aid in healing       - Rash appears fungal in nature, will start miconazole powder to groin BID    Malnutrition  Hyponatremia       - Soft mechanical diet ordered, pt working with  SLT       - Diet per dietary recs       - Likely 2/2 cirrhosis; FeNA 1% indicated prerenal. Renal indicies worsening will consider nephro consult.       - Na 123 this morning, continue to hold diuretics unless Na > 125.         - will bolus fluids as needed. Fluid restriction to 1 L       - Per GI recs, holding further administration of Lasix/Aldactone at this time in addition to fluid restriciton       - sodium bicarb 650mg  discontinued.  Two days ago patient was given albumin 25 g BID.  His renal indices had worsened.  Albumin increased to 50 g b.i.d.      CODE STATUS: Full  Access: PIV  Antibiotics: Ceftriaxone; completed 1/17/24  Diet: Soft mechanical, low sodium  DVT Prophylaxis: SCDs  GI Prophylaxis: Protonix  Fluids:  Normal saline at 75 cc/hour      Disposition: Decompensated cirrhosis with liver lesion likely HCC from ETOH cirrhosis, unstable for discharge at this time. GI consulted, appreciate assistance. Patient will require a hospice consult.  He will like to go back to [place] as soon as possible.  We will attempt to get patient back to his home country.      Ilana Mayfield M.D  [facility] PGY-1

## 2024-01-25 VITALS
OXYGEN SATURATION: 98 % | BODY MASS INDEX: 29.35 KG/M2 | DIASTOLIC BLOOD PRESSURE: 77 MMHG | RESPIRATION RATE: 18 BRPM | HEART RATE: 98 BPM | HEIGHT: 64 IN | WEIGHT: 171.94 LBS | SYSTOLIC BLOOD PRESSURE: 121 MMHG | TEMPERATURE: 98 F

## 2024-01-25 PROBLEM — R78.81 BACTEREMIA: Status: ACTIVE | Noted: 2024-01-25

## 2024-01-25 LAB
ABO + RH BLD: NORMAL
BLD PROD TYP BPU: NORMAL
BLOOD UNIT EXPIRATION DATE: NORMAL
BLOOD UNIT TYPE CODE: 5100
CROSSMATCH INTERPRETATION: NORMAL
DISPENSE STATUS: NORMAL
GROUP & RH: NORMAL
HCT VFR BLD AUTO: 19.8 % (ref 42–52)
HGB BLD-MCNC: 7 G/DL (ref 14–18)
HOLD SPECIMEN: NORMAL
INDIRECT COOMBS: NORMAL
POCT GLUCOSE: 131 MG/DL (ref 70–110)
POCT GLUCOSE: 168 MG/DL (ref 70–110)
SPECIMEN OUTDATE: NORMAL
UNIT NUMBER: NORMAL

## 2024-01-25 PROCEDURE — 86901 BLOOD TYPING SEROLOGIC RH(D): CPT

## 2024-01-25 PROCEDURE — P9016 RBC LEUKOCYTES REDUCED: HCPCS

## 2024-01-25 PROCEDURE — 36430 TRANSFUSION BLD/BLD COMPNT: CPT

## 2024-01-25 PROCEDURE — 99900035 HC TECH TIME PER 15 MIN (STAT)

## 2024-01-25 PROCEDURE — 86923 COMPATIBILITY TEST ELECTRIC: CPT

## 2024-01-25 PROCEDURE — 25000003 PHARM REV CODE 250: Performed by: STUDENT IN AN ORGANIZED HEALTH CARE EDUCATION/TRAINING PROGRAM

## 2024-01-25 PROCEDURE — A4216 STERILE WATER/SALINE, 10 ML: HCPCS

## 2024-01-25 PROCEDURE — P9047 ALBUMIN (HUMAN), 25%, 50ML: HCPCS | Mod: JZ,JG | Performed by: INTERNAL MEDICINE

## 2024-01-25 PROCEDURE — 63600175 PHARM REV CODE 636 W HCPCS: Mod: JZ,JG | Performed by: INTERNAL MEDICINE

## 2024-01-25 PROCEDURE — 97168 OT RE-EVAL EST PLAN CARE: CPT

## 2024-01-25 PROCEDURE — 25000003 PHARM REV CODE 250

## 2024-01-25 PROCEDURE — 85018 HEMOGLOBIN: CPT | Performed by: STUDENT IN AN ORGANIZED HEALTH CARE EDUCATION/TRAINING PROGRAM

## 2024-01-25 PROCEDURE — 0W9G3ZZ DRAINAGE OF PERITONEAL CAVITY, PERCUTANEOUS APPROACH: ICD-10-PCS | Performed by: SPECIALIST

## 2024-01-25 PROCEDURE — 49082 ABD PARACENTESIS: CPT

## 2024-01-25 PROCEDURE — 25000003 PHARM REV CODE 250: Performed by: INTERNAL MEDICINE

## 2024-01-25 RX ORDER — LIDOCAINE HYDROCHLORIDE AND EPINEPHRINE 10; 10 MG/ML; UG/ML
10 INJECTION, SOLUTION INFILTRATION; PERINEURAL ONCE
Status: DISCONTINUED | OUTPATIENT
Start: 2024-01-25 | End: 2024-01-25

## 2024-01-25 RX ORDER — HYDROCODONE BITARTRATE AND ACETAMINOPHEN 500; 5 MG/1; MG/1
TABLET ORAL
Status: DISCONTINUED | OUTPATIENT
Start: 2024-01-25 | End: 2024-01-25 | Stop reason: HOSPADM

## 2024-01-25 RX ADMIN — SODIUM CHLORIDE, PRESERVATIVE FREE 10 ML: 5 INJECTION INTRAVENOUS at 11:01

## 2024-01-25 RX ADMIN — SODIUM CHLORIDE, PRESERVATIVE FREE 10 ML: 5 INJECTION INTRAVENOUS at 12:01

## 2024-01-25 RX ADMIN — FOLIC ACID 1 MG: 1 TABLET ORAL at 08:01

## 2024-01-25 RX ADMIN — LIDOCAINE HYDROCHLORIDE 10 ML: 10; .005 INJECTION, SOLUTION EPIDURAL; INFILTRATION; INTRACAUDAL; PERINEURAL at 09:01

## 2024-01-25 RX ADMIN — LACTULOSE 20 G: 20 SOLUTION ORAL at 08:01

## 2024-01-25 RX ADMIN — ALBUMIN (HUMAN) 25 G: 5 SOLUTION INTRAVENOUS at 10:01

## 2024-01-25 RX ADMIN — MICONAZOLE NITRATE: 20 POWDER TOPICAL at 08:01

## 2024-01-25 RX ADMIN — PANTOPRAZOLE SODIUM 40 MG: 40 TABLET, DELAYED RELEASE ORAL at 08:01

## 2024-01-25 RX ADMIN — RIFAXIMIN 550 MG: 550 TABLET ORAL at 08:01

## 2024-01-25 RX ADMIN — Medication 100 MG: at 08:01

## 2024-01-25 RX ADMIN — SODIUM CHLORIDE, PRESERVATIVE FREE 10 ML: 5 INJECTION INTRAVENOUS at 05:01

## 2024-01-25 NOTE — PT/OT/SLP PROGRESS
Physical Therapy    Missed Treatment Session    Patient Name:  Franco Hein   MRN:  [id number]      Patient not seen at this time secondary to Other (Comment), Blood transfusion (Pt is leaving today to go home.).    Will follow-up as patient is appropriate/available/agreeable to participate and as therapists' schedule allows.

## 2024-01-25 NOTE — PROGRESS NOTES
I have reviewed and concur with the resident's history, physical, assessment, and plan.  I have discussed with him all issues related to the diagnosis, workup and treatment plan.Care provided as reasonable and necessary.therapeutic paracntesis.S/P 2 units PRBC given. Persistent anemia sec to HCC> comfort care    Jason Hanson MD  [facility]

## 2024-01-25 NOTE — PROGRESS NOTES
Gastroenterology/Hepatology Progress Note    Patient Name: Franco Hein  Age: 48 y.o.  : 1975  MRN: [id number]  Admission Date: 2024      Self, Aaareferral    SUBJECTIVE:     NAEON. Abdominal pain stable, well-controlled with Norco 5. Abdomen still distended. Currently on Rifaximin 550 mg BID, and had 2 Bowel movements over the past 24 hours, small, without blood. Continues to have edema up to lumbar region, posteriorly. Plan for therapeutic paracentesis this morning. Primary team continuing conversation with hospice. Patient denies lightheadedness, dizziness, chest pain, nausea, vomiting, hematemesis, hematochezia or melanotic stool.    Review of patient's allergies indicates:  No Known Allergies       INPATIENT MEDICATIONS    Scheduled Meds:   albumin human 25%  25 g Intravenous BID    folic acid  1 mg Oral Daily    lactulose  20 g Oral Daily    miconazole NITRATE 2 %   Topical (Top) BID    pantoprazole  40 mg Oral Daily    rifAXIMin  550 mg Oral BID    sodium chloride 0.9%  10 mL Intravenous Q6H    thiamine  100 mg Oral Daily     Continuous Infusions:  PRN Meds:  0.9%  NaCl infusion (for blood administration), 0.9%  NaCl infusion (for blood administration), 0.9%  NaCl infusion (for blood administration), 0.9%  NaCl infusion (for blood administration), 0.9%  NaCl infusion (for blood administration), 0.9%  NaCl infusion (for blood administration), 0.9%  NaCl infusion (for blood administration), 0.9%  NaCl infusion (for blood administration), 0.9%  NaCl infusion (for blood administration), 0.9%  NaCl infusion (for blood administration), 0.9%  NaCl infusion (for blood administration), sodium chloride 0.9%, acetaminophen, acetaminophen, albuterol-ipratropium, benzonatate, dextrose 10%, dextrose 10%, glucagon (human recombinant), glucose, glucose, HYDROcodone-acetaminophen, hydrOXYzine pamoate, iohexoL, sodium chloride 0.9%, Flushing PICC/Midline Protocol **AND** sodium chloride 0.9% **AND** sodium  chloride 0.9%          Review of Systems:       Review of Systems   Respiratory:  Positive for shortness of breath.    Cardiovascular:  Negative for chest pain.   Gastrointestinal:  Positive for abdominal distention and constipation. Negative for abdominal pain, anal bleeding, blood in stool, diarrhea, nausea, rectal pain, vomiting, reflux and fecal incontinence.        As stated above  Objective:       VITAL SIGNS: 24 HR MIN & MAX LAST    Temp  Min: 97.2 °F (36.2 °C)  Max: 98.2 °F (36.8 °C)  97.5 °F (36.4 °C)        BP  Min: 108/67  Max: 159/83  130/71     Pulse  Min: 100  Max: 115  108     Resp  Min: 17  Max: 28  (!) 22    SpO2  Min: 93 %  Max: 99 %  97 %        Physical Exam   General:  Well developed, well nourished, no acute respiratory distress, appears slightly short of breath  Head: Normocephalic, atraumatic  Eyes: PERRL, EOMI, anicteric sclera  Throat: No posterior pharyngeal erythema or exudate, no tonsillar exudate  Neck: supple, normal ROM, no thyromegaly   CVS:  RRR, S1 and S2 normal, no murmurs, no added heart sounds, rubs, gallops, 2+ peripheral pulses  Resp:  Lungs clear to auscultation bilaterally, no wheezes, rales, or rhonci  Abdomen:  Abdomen firm and distended, slightly tender to palpation, normoactive bowel sounds.  Spider angiomas on abdomen  MSK:  No muscle atrophy, cyanosis, 3+ pitting edema on posterior aspect, up to lumbar region, full range of motion  Skin:  3+ edema BLE, with 2+ edema up to lumbar region  Neuro:  Alert and oriented x3     LABS:    Recent Labs   Lab 01/21/24  0526 01/21/24  1729 01/22/24  0320 01/23/24  0411 01/24/24  0339 01/24/24  1710 01/25/24  0318   WBC 7.80 12.28* 9.86 11.45 8.65  --   --    HGB 6.5* 8.9* 9.6* 7.5* 6.4* 7.8* 7.0*   HCT 19.2* 25.8* 27.7* 21.4* 17.8* 22.0* 19.8*   PLT 61* 59* 64* 53* 51*  --   --    MCV 93.2 92.4 91.4 91.8 91.8  --   --          Recent Labs   Lab 01/19/24  0408 01/20/24  0338 01/21/24  0526 01/21/24  1729 01/22/24  0320 01/23/24  0411  "01/24/24  0339 01/24/24  1710 01/25/24 0318   HGB 9.0* 8.3* 6.5* 8.9* 9.6* 7.5* 6.4* 7.8* 7.0*   HCT 25.8* 24.1* 19.2* 25.8* 27.7* 21.4* 17.8* 22.0* 19.8*          Recent Labs   Lab 01/19/24 0408 01/19/24 1742 01/20/24 0338 01/21/24 0326 01/22/24 0321 01/23/24  0411 01/24/24 0339   *   < > 124* 122* 123* 123* 123*   K 3.9   < > 4.0 4.5 4.9 4.5 4.4   CO2 23   < > 21* 19* 17* 18* 17*   BUN 15.1   < > 12.5 24.8* 32.5* 43.5* 48.2*   CREATININE 0.70*   < > 0.69* 1.36* 1.46* 2.01* 1.67*   BILITOT 8.0*  --  7.6* 9.1* 10.3* 10.1* 10.3*   BILIDIR 4.9*  --   --   --   --   --   --    ALKPHOS 249*  --  226* 197* 259* 232* 194*   AST 95*  --  81* 75* 77* 59* 52*   ALT 78*  --  67* 62* 61* 48 39   LABPROT 6.0*  --  6.1* 5.8* 6.7 6.2* 6.4   ALBUMIN 1.9*  --  1.8* 1.6* 1.8* 2.1* 3.1*    < > = values in this interval not displayed.          Recent Labs   Lab 01/19/24 0408 01/20/24 0338 01/21/24 0326 01/22/24 0321 01/24/24  0549   INR 2.0* 2.0* 2.1* 1.9* 2.2*   PROTIME 21.9* 21.9* 23.4* 21.5* 24.2*          No results for input(s): "IRON", "FERRITIN" in the last 168 hours.         IMAGING:   US Abdomen Limited    Result Date: 1/17/2024  EXAMINATION: US ABDOMEN LIMITED CLINICAL HISTORY: Suspected HCC, cirrhosis; TECHNIQUE: Grayscale and color Doppler images of the abdomen are obtained. COMPARISON: CT angiogram of the abdomen and pelvis 01/16/2024. FINDINGS: The gallbladder is contracted.  The liver contour is nodular consistent with cirrhosis.  The heterogeneous mass lesion in the dome of the right lobe of the liver is again noted better visualized on yesterday's exam.  There is a moderate volume of ascites.  There is echogenic material posterior to the right lobe of the liver consistent with the blood products seen on CT.  The main portal vein is patent.  The CBD is not well visualized. The right kidney measures 11.0 by 5.6 x 5.8 cm.  There is no hydronephrosis or nephrolithiasis of the right kidney.     1. " Cirrhosis with a heterogeneous infiltrative mass in the dome of the right lobe of the liver better visualized on yesterday's exam. 2. Moderate volume ascites with echogenic material posterior to the right lobe of the liver consistent with a blood products seen on yesterday's exam. Electronically signed by: Keyon Beck MD Date:    01/17/2024 Time:    10:43    Cardiac catheterization    Result Date: 1/16/2024  Procedure performed in the Invasive Lab  - See Procedure Log link below for nursing documentation  - See OpNote on Surgeries Tab for physician findings  - See Imaging Tab for radiologist dictation    CTA Abdomen and Pelvis    Result Date: 1/16/2024  EXAMINATION: CTA ABDOMEN AND PELVIS CLINICAL HISTORY: Portal vein thrombosis;Mesenteric ischemia, acute; TECHNIQUE: Axial images of the abdomen and pelvis were obtained with and without  IV contrast administration.  Coronal and sagittal reconstructions were provided.  Three dimensional and MIP images were obtained and evaluated.  Total DLP was 5216.27 mGy-cm. Dose lowering technique and automated exposure control were utilized for this exam. COMPARISON: CT of the chest abdomen and pelvis 01/01/2024. FINDINGS: Vascular findings: The distal descending thoracic aorta is nonaneurysmal. The abdominal aorta is nonaneurysmal.  The celiac axis is widely patent.  There is a normal branching pattern of the celiac. The SMA is widely patent. The single right and 2 left renal arteries are widely patent. The ABHIJIT is widely patent.  There is no significant iliofemoral disease. The main portal vein is patent.  There is tumor thrombus within the left portal vein with nonocclusive thrombosis.  There is recanalization of the umbilical vein.  There is a prominent spleno renal shunt.  The splenic vein is patent.  The superior mesenteric vein is patent. Nonvascular findings: There is atelectasis in the right lung base.  The left lung base is clear. The liver contour is nodular consistent  with cirrhosis.  There is a lobulated solid and cystic mass lesion in the dome of the right lobe of the liver measuring 7.4 x 6.3 cm.  There is inferior extension and invasion into the left portal vein.  There is no biliary dilatation.  There is a large volume of ascites.  There is hyperdense material in the posterior right upper lobe with a focal area of contrast extravasation best visualized on series 8 image 27 and series 7, images 216-218. The gallbladder is contracted.  The spleen, pancreas, and left adrenal gland are normal.  There is a right adrenal nodule which is indeterminate on this exam.  The bilateral kidneys are normal.  The stomach and small bowel are decompressed.  There is no bowel wall thickening.  The colon is normal.  The urinary bladder is normal.  There is no pelvic or retroperitoneal lymphadenopathy.  There is no lytic or blastic osseous lesion.     1. Solid and cystic mass lesion in the superior aspect of the right hepatic lobe with invasion of the intrahepatic left portal vein and sequela of portal hypertension as above.  Additionally there is capsular rupture and active extravasation with a moderate amount of blood products in the posterior right upper quadrant.  Given the portal venous invasion and cirrhotic morphology of the liver this is favored to represent hepatocellular carcinoma although enhancement characteristics are not classic. 2. Indeterminate right adrenal nodule.  Metastatic disease cannot be excluded. 3. Minimal right lower lobe atelectasis. Red Alert: The critical information above was relayed directly by me by telephone to Dr. Rosenthal On 1/16/2024 at 11:52 . Electronically signed by: Keyon Beck MD Date:    01/16/2024 Time:    11:52    X-Ray Abdomen AP 1 View    Result Date: 1/16/2024  EXAMINATION: XR ABDOMEN AP 1 VIEW CLINICAL HISTORY: abdominal pain; TECHNIQUE: AP X-RAY OF THE ABDOMEN: CPT 65202 FINDINGS: Examination reveals residual feces throughout the colon the gas  pattern is nonspecific with no clear evidence of ileus or obstruction no abnormal masses or calcifications identified. Slightly more prominent loop of small bowel identified in the left hemiabdomen these might be related to a sentinel loop which my signal an inflammatory process near the area of the 89 make segment of bowel     Loop of small bowel that might represent a short segment in a 9 larry ileus in represent a sentinel loop indicating the presence of an inflammatory process clinical correlation is suggested Electronically signed by: Cameron Peralta Date:    01/16/2024 Time:    08:50    X-Ray Chest 1 View    Result Date: 1/6/2024  EXAMINATION: XR CHEST 1 VIEW CLINICAL HISTORY: Pneumonia; TECHNIQUE: Frontal view(s) of the chest. COMPARISON: Radiography 01/05/2024 FINDINGS: Enteric tube seen as far as the gastric body.  Lungs remain hypoinflated with stable to slightly improved bilateral consolidation.  No significant pleural fluid.  No pneumothorax.  Stable cardiac silhouette.     Bilateral consolidation stable to slightly improved. Electronically signed by: Gómez Carmona Date:    01/06/2024 Time:    09:35    X-Ray Chest 1 View    Result Date: 1/5/2024  EXAMINATION: XR CHEST 1 VIEW CPT 58276 CLINICAL HISTORY: pneumonia; COMPARISON: January 4, 2024 FINDINGS: Cardiomediastinal silhouette and pleuroparenchymal changes are essentially unchanged as compared with the previous exam There has been interval insertion of a nasogastric tube with the tip below the diaphragm     No significant change Interval insertion of nasogastric Electronically signed by: Cameron Peralta Date:    01/05/2024 Time:    08:16    Echo Saline Bubble? No    Result Date: 1/4/2024    Left Ventricle: The left ventricle is normal in size. Ventricular mass is normal. Normal wall thickness. Normal wall motion. There is normal systolic function. Biplane (2D) method of discs ejection fraction is 62%. There is normal diastolic function.   Right  Ventricle: Normal right ventricular cavity size. Systolic function could not be assesed due to poor visualization.   Left Atrium: Normal left atrial size.   Right Atrium: Normal right atrial size.   Aortic Valve: Not well visualized due to poor acoustic window. There is no stenosis. There is no significant regurgitation.   Mitral Valve: The mitral valve is structurally normal. There is normal leaflet mobility.   Tricuspid Valve: Not well visualized due to poor acoustic window. The tricuspid valve is structurally normal. There is no significant regurgitation.   Pulmonic Valve: Not well visualized due to poor acoustic window. There is no significant regurgitation.   Pulmonary Artery: Pulmonary artery pressure could not be estimated.   IVC/SVC: Intermediate venous pressure at 8 mmHg.   Pericardium: There is no pericardial effusion.     XR Gastric tube check, non-radiologist performed    Result Date: 1/4/2024  EXAMINATION: XR GASTRIC TUBE CHECK, NON-RADIOLOGIST PERFORMED CLINICAL HISTORY: NG tube placement verification; COMPARISON: 3 January 2024 FINDINGS: Frontal image of the abdomen. Enteric tube extends well into the stomach.  The tip overlies the expected area of the gastric antrum.     As above. Electronically signed by: Aamir Phillip Date:    01/04/2024 Time:    15:03    X-Ray Chest 1 View    Result Date: 1/4/2024  EXAMINATION: XR CHEST 1 VIEW CPT 56241 CLINICAL HISTORY: tachypnea; COMPARISON: January 3, 2024 FINDINGS: Examination reveals cardiomediastinal silhouette improved parenchymal changes to be essentially unchanged as compared with the previous exam     No significant change Electronically signed by: Cameron Peralta Date:    01/04/2024 Time:    08:22    X-Ray Abdomen AP 1 View    Result Date: 1/3/2024  EXAMINATION: XR ABDOMEN AP 1 VIEW CLINICAL HISTORY: distended abdomen; TECHNIQUE: Single-view of the abdomen COMPARISON: No prior. FINDINGS: No acute osseous abnormality.  Nonobstructive bowel gas pattern.      Nonobstructive bowel gas pattern. Electronically signed by: Josef Sow Date:    01/03/2024 Time:    09:46    X-Ray Chest 1 View    Result Date: 1/3/2024  EXAMINATION: XR CHEST 1 VIEW CLINICAL HISTORY: PNA; TECHNIQUE: Single view of the chest COMPARISON: 01/02/2024 FINDINGS: Left upper lobe opacification remains slightly improved in the interval.  The lungs are hypoexpanded.  The cardiomediastinal silhouette is unchanged.     No adverse interval change.  Improved aeration of the left upper lobe. Electronically signed by: Josef Sow Date:    01/03/2024 Time:    09:46    X-Ray Chest 1 View    Result Date: 1/2/2024  EXAMINATION: XR CHEST 1 VIEW CPT 88912 CLINICAL HISTORY: increased oxygen demand; COMPARISON: January 1, 2024 FINDINGS: Examination reveals cardiomediastinal silhouette to be unchanged as compared with the previous exam. Worsening confluent airspace opacities specially in the right perihilar region and right upper lobe with some confluent opacities also seen in the right upper lobe some of the changes might be related to poor inspiratory effort, however, the bulk of the changes are related to worsening infiltrative changes specially in the left     Worsening consolidative changes in the left lung as above Electronically signed by: Cameron Peralta Date:    01/02/2024 Time:    12:51    CT Chest Abdomen Pelvis Without Contrast (XPD)    Result Date: 1/2/2024  EXAMINATION: CT CHEST ABDOMEN PELVIS WITHOUT CONTRAST(XPD) CLINICAL HISTORY: Suspect pneumonia, biliary obstruction; TECHNIQUE: Helical acquisition through the chest, abdomen and pelvis without  IV administration of contrast. Axial, sagittal and coronal reformats interpreted. Automated tube current modulation, weight-based exposure dosing, and/or iterative reconstruction technique utilized to reach lowest reasonably achievable exposure rate. DLP: 1042 mGy*cm COMPARISON: Chest radiograph 01/01/2024 FINDINGS: BASE OF NECK: No significant  abnormality. HEART: Normal size. There are coronary artery calcifications.  Low-density blood pool with visualization of the interventricular septum compatible with anemia. THORACIC VASCULATURE: Thoracic aorta is unremarkable. SIRISHA/MEDIASTINUM: Small nonspecific mediastinal lymph nodes.  Evaluation of hilar lymphadenopathy is limited without contrast. AIRWAYS: Patent. LUNGS/PLEURA: Multilobar consolidative opacities within the lungs. THORACIC SOFT TISSUES: Unremarkable. LIVER: Limited evaluation the liver due to lack of intravenous contrast and placement of the patient's arms over the abdomen which causes beam hardening artifact.  Heterogeneous attenuation of the liver.  There appears to be a mass at the dome of the liver.  Caudate lobe hypertrophy and changes of cirrhosis. BILIARY: The gallbladder does not appear to be overly distended. PANCREAS: Nonspecific retroperitoneal edema.  Unable to assess for changes of pancreatitis given generalized fluid overload. SPLEEN: Upper limits of normal in size. ADRENALS: There is a 1.6 cm right adrenal nodule.  Unable to accurately measure internal attenuation due to beam hardening artifact. KIDNEYS/URETERS: Hyperdense renal pyramids.  This can be related to volume status/dehydration or medullary calcinosis. GI TRACT/MESENTERY: Evaluation of the bowel is limited without contrast. Bowel is normal in caliber without evidence of obstruction. PERITONEUM: Small volume ascites.No free air. LYMPH NODES: No enlarged lymph nodes by size criteria. ABDOMINOPELVIC VASCULATURE: Normal caliber abdominal aorta.  Large periumbilical vein. BLADDER: Normal appearance given degree of distention. REPRODUCTIVE ORGANS: Normal as visualized. ABDOMINAL WALL: Small fat containing right inguinal hernia.  Mild body wall edema. BONES: Anterolisthesis at the lumbosacral junction.     1. Multilobar consolidative opacities within the lungs 2. Cirrhotic morphology of the liver with mass at the dome of the  liver.  Evaluation is moderately limited without contrast and due to beam hardening artifact related to patient positioning.  Recommend liver protocol CT abdomen without and with contrast. 3. Ascites 4. Indeterminate right adrenal nodule.  Continued attention recommended on follow-up exams. 5. The preliminary and final reports are concordant. Electronically signed by: Tika Mcknight Date:    01/02/2024 Time:    09:44    X-Ray Chest 1 View    Result Date: 1/1/2024  EXAMINATION: XR CHEST 1 VIEW CLINICAL HISTORY: cough;  Cough, unspecified TECHNIQUE: Single view of the chest COMPARISON: No prior imaging available for comparison. FINDINGS: Bilateral patchy airspace opacities greatest on the left. The cardiomediastinal silhouette is within normal limits. No acute osseous abnormality.     Bilateral patchy airspace opacities greatest on the left.  Findings concerning for atypical infectious process. Electronically signed by: Josef Sow Date:    01/01/2024 Time:    14:23        Assessment / Plan:     Franco Hein is a 48 y.o. male with unknown past medical history presented to [facility] ED 01/01/2020 for the complaint of jaundice, weakness for 3 weeks, and altered mental status.  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        Alcoholic liver disease with ascites  Hepatic encephalopathy (resolved)  Alcohol abuse  [facility] discriminant function score: 72.8   [place] alcoholic hepatitis score: 9  [place] 17.1  Child Hoang class C score:  15   [place] score .82:  Non responder  CORBY  - continue rifaximin.  Will eventually need to be placed on  lactulose when stools as loose for 2-3 bowel movements per day.  - can restart diuretics as long as sodium levels remain greater than 125  -continue CIWA protocol   -continue thiamine and folic acid  -Renal indices stable 4/1.67  -start midodrine if MAP < 70     HCC  - AFP elevated at 1400  -advised alcohol cessation  -patient currently unlikely to be a  candidate for systemic chemotherapy, given decompensated status.  -goals of care discussion to be had by primary team     Hyponatremia (improving)  -monitor labs for improvement off diuretics  -can restart diuretics as long as sodium levels remained greater than 125  -Serum Sodium 123 today, stable  -Hyponatremia is 2/2 severe liver disease    Thrombocytopenia (improving), Normocytic anemia  - do not administer platelets unless patient is actively bleeding  - Platelets stable at 53  - H/H 6.4/17.8, will transfuse 2 units pRBCs     Strep pneumoniae bacteremia (resolved)  - being treated with  Rocephin 2g for 14 days.    Intrahepatic bleeding s/p Gelfoam embolization (resolved)  - Gelfoam embolization performed 01/16/2024    Attending Attestation to follow    Praneeth Arndt DO  [facility] Internal Medicine, PGY-II  [facility] Gastroenterology

## 2024-01-25 NOTE — PROCEDURES
"Franco Hein is a 48 y.o. male patient.    Temp: 97.5 °F (36.4 °C) (01/25/24 0833)  Pulse: 108 (01/25/24 1008)  Resp: (!) 22 (01/25/24 0833)  BP: 130/71 (01/25/24 1008)  SpO2: 97 % (01/25/24 1008)  Weight: 78 kg (171 lb 15.3 oz) (01/17/24 1115)  Height: 5' 4" (162.6 cm) (01/04/24 1000)       Paracentesis    Date/Time: 1/25/2024 10:16 AM  Location procedure was performed: [facility] MEDICINE    Performed by: Praneeth Arndt DO  Authorized by: Praneeth Arndt DO  Consent Done: Yes  Consent: Verbal consent obtained. Written consent obtained.  Consent given by: patient  Initial or subsequent exam: subsequent  Procedure purpose: therapeutic  Indications: abdominal discomfort secondary to ascites and respiratory distress secondary to ascites  Anesthesia: local infiltration    Anesthesia:  Local Anesthetic: lidocaine 1% with epinephrine  Anesthetic total: 10 mL    Patient sedated: no  Needle gauge: 18  Ultrasound guidance: yes  Puncture site: left lower quadrant  Fluid removed: 3800(ml)  Fluid appearance: bloody  Dressing: 4x4 sterile gauze  Complications: No  Specimens: No  Implants: No  Patient tolerance: Patient tolerated the procedure well with no immediate complications  Comments: Will be infused with Albumin 50g following paracentesis.          1/25/2024    "

## 2024-01-25 NOTE — PT/OT/SLP RE-EVAL
Occupational Therapy   Re-evaluation    Name: Franco Hein  MRN: [id number]  Admitting Diagnosis: Delirium tremens, alcoholic cirrhosis with bleeding intrahepatic mass s/p angiogram with embolization of R hepatic artery (9 days post-op)   Recent Surgery: Procedure(s) (LRB):  ANGIOGRAM-ABDOMINAL (N/A) 9 Days Post-Op    Recommendations:     Discharge Recommendations: Moderate Intensity Therapy  Discharge Equipment Recommendations: walker, rolling  Barriers to discharge:  None    Assessment:     Franco Hein is a 48 y.o. male with a medical diagnosis of Delirium tremens.  He presents with BLE and abdominal swelling, c/o pain at R lower abdomen consistent throughout session, and planning to DC this morning AMA in order to travel to [place] to see his family d/t poor prognosis for recovery.  Performance deficits affecting function are impaired endurance, impaired self care skills, impaired functional mobility, impaired balance, edema, impaired cognition.      Rehab Prognosis:  poor; patient would benefit from acute skilled OT services to address these deficits and reach maximum level of function.       Plan:     DC AMA with friends who will provide transportation per pt's wishes to see his family in [place].    Subjective     Chief Complaint: pt indicating he would like to shave before DC; along with mild c/o R lower abdominal pain, discomfort from edema (abdomen and BLE)  Patient/Family stated goals: try to see his family  Communicated with: nurse Cehn prior to session.  Pain/Comfort:  Pain Rating 1: 6/10  Location - Side 1: Right  Location - Orientation 1: lower  Location 1: abdomen  Pain Addressed 1: Reposition, Distraction, Cessation of Activity, Nurse notified  Pain Rating Post-Intervention 1: 6/10    Objective:     Communicated with: nurse Chen prior to session.  Patient found HOB elevated with: telemetry, peripheral IV upon OT entry to room.    General Precautions: Standard, fall  Orthopedic Precautions:  N/A  Braces: N/A  Respiratory Status: Room air    Occupational Performance:    Bed Mobility:    Patient completed Supine to Sit with stand by assistance  Patient completed Sit to Supine with minimum assistance to help manage BLE back into bed    Functional Mobility/Transfers:  Patient completed Sit <> Stand Transfer with minimum assistance  with  rolling walker   Patient completed Toilet Transfer Step Transfer technique with minimum assistance with  hand-held assist and bedside commode  Functional Mobility: CGA to ambulate short distances in room with RW    Activities of Daily Living:  Grooming: SBA for brushing his teeth and washing his face while sitting at sink/mirror.  Pt attempted shaving with electric razor but required  mod A to complete task thoroughly.  Lower Body Dressing: maximal assistance to don socks and to thread underwear; pt able to assist with pulling pants up to waist in standing  Toileting: maximal assistance for hygiene in standing at RW following bowel movement    Cognitive/Visual Perceptual:  Cognitive/Psychosocial Skills:     -       Oriented to: Person, Place, and Situation   -       Follows Commands/attention:Follows two-step commands  -       Safety awareness/insight to disability: intact   -       Mood/Affect/Coping skills/emotional control: Appropriate to situation, Cooperative, and Pleasant    Physical Exam:  Skin integrity: Visible skin intact  Edema:  Severe at abdomen and BLE  Upper Extremity Range of Motion:     -       Right Upper Extremity: WFL  -       Left Upper Extremity: WFL  Upper Extremity Strength:    -       Right Upper Extremity: WFL  -       Left Upper Extremity: WFL      Treatment & Education:  OT reviewed safety education during ADLs, mobility training during transfers and in-room mobility, and use of call bell for assist with transfers OOB or for any other needs due to fall risk.  Pt verbalized understanding of all education.      Patient left HOB elevated with all  lines intact, call button in reach, nurse notified, and MD  presentto perform thoracentesis.    GOALS:   Multidisciplinary Problems       Occupational Therapy Goals          Problem: Occupational Therapy    Goal Priority Disciplines Outcome Interventions   Occupational Therapy Goal     OT, PT/OT Unable to Meet, Plan Revised    Description: OT reevaluation 1/25, DC all goals d/t pt is leaving AMA    Patient will perform feeding with min A when able to tolerate PO diet. -met    Patient will perform grooming with SBA while standing at sink.-partially met (SBA for brushing teeth , washing face, but mod A for shaving)    Patient will perform toileting with min assist using BSC.- upgraded, not met    Patient will perform toilet transfer with max assist with use of RW.-met  Pt will complete standard toilet t/f without AD mod I.-new, partially met (pt able to transfer to BSC with min A)    Patient will perform upper body dressing with SBA while seated EOB.-upgraded   Pt will complete LE Dressing with AE PRN min A.-new, not met (max/total A to don socks)                         History:     History reviewed. No pertinent past medical history.      Past Surgical History:   Procedure Laterality Date    ANGIOGRAPHY OF ABDOMEN N/A 1/16/2024    Procedure: ANGIOGRAM-ABDOMINAL;  Surgeon: Uriah Kelly MD;  Location: [facility] CATH LAB;  Service: Peripheral Vascular;  Laterality: N/A;       Time Tracking:     OT Date of Treatment: 01/25/24  OT Start Time: 0900  OT Stop Time: 0930  OT Total Time (min): 30 min    Billable Minutes:Re-eval 30    1/25/2024

## 2024-01-25 NOTE — PT/OT/SLP DISCHARGE
POST DISCHARGE DOCUMENTATION - 2024 1:40 PM    Physical Therapy Discharge Note    Documenting Physical Therapist did not evaluate OR treat the patient.    Evaluating/treating Physical Therapist is not available to complete discharge summary.    Refer to prior Physical Therapy note dated 2024 for last known functional status of patient.      Name: Franco Hein  MRN: [id number]   Principal Problem: Delirium tremens     Recommendations: per last treatment session     Therapy Intensity Recommendations at Discharge: Moderate Intensity Therapy  Discharge Equipment Recommendations: walker, rolling     Assessment:     Patient left the hospital against medical advice.    Objective:     GOALS: 1 OUT OF 3 STG's noted to have been met (per 2024 note)    Multidisciplinary Problems       Physical Therapy Goals       Problem: Physical Therapy    Goal Priority Disciplines Outcome Goal Variances Interventions   Physical Therapy Goal     PT, PT/OT Ongoing, Progressing     Description: Goals to be met by: dc     Patient will increase functional independence with mobility by performin. Supine to sit with Modified [place] met 1/10/2024    2. Sit to stand transfer with Modified [place]  3. Gait  x 130 feet with Supervision using LRAD    Reviewed 2024           Plan:     Patient Discharged to: N/A - patient left against medical advice per chart.    2024

## 2024-01-27 NOTE — DISCHARGE SUMMARY
[facility] Medicine Wards   AMA Note     Resident Team: [facility] Medicine List 4  Attending Physician: No att. providers found  Resident: Ilana Mayfield      Summary:       Despite our efforts, Franco Hein has decided to leave AGAINST MEDICAL ADVICE.  he  has normal mental status and full decisional capacity. he understands his medical comorbidities including HCC, bleeding from liver capsule, hyponatremia, third spacing fluid are pending further evaluation. Extensive discussion regarding risks of leaving AMA were had, including but not limited hemorrhagic shock, permanent disability, death, ECT., he had the opportunity to ask questions about their medical condition and all questions were answered.  Again, explained the importance of remaining for further investigation and managment, but he continues to refuse.  The patient has been informed to return for care if worsening or at any time, and has been referred to their local medical physician for follow-up ASAP.      Ilana Mayfield M.D  [facility] PGY-1

## 2024-09-26 NOTE — PROGRESS NOTES
[facility] Plan of Care Note    Dx hyperparathyroidism    Shift Events procedure performed..... pain controlled w/PRN medications, pt up independently in room    Goals of Care: safety, pain management, progressive mobility    Neuro: AAOx4    Vital Signs: VSS    Respiratory: RA    Diet: NPO    Is patient tolerating current diet? N/a    GTTS: LR @ 125    Urine Output/Bowel Movement: up to toilet, adequate UO, no BM    Drains/Tubes/Tube Feeds (include total output/shift): n/a    Lines: 20 L wrist, 20 R FA    Accuchecks:n/a    Skin: ***    Fall Risk Score: 3     Activity level? independent    Any scheduled procedures? no    Any safety concerns? Unfamiliar environment    Other: n/a      Problem: Adult Inpatient Plan of Care  Goal: Plan of Care Review  Outcome: Progressing  Goal: Patient-Specific Goal (Individualized)  Outcome: Progressing  Goal: Absence of Hospital-Acquired Illness or Injury  Outcome: Progressing  Goal: Optimal Comfort and Wellbeing  Outcome: Progressing     Problem: Infection  Goal: Absence of Infection Signs and Symptoms  Outcome: Progressing     Problem: Wound  Goal: Optimal Coping  Outcome: Progressing  Goal: Optimal Functional Ability  Outcome: Progressing  Goal: Absence of Infection Signs and Symptoms  Outcome: Progressing     Problem: Pain Acute  Goal: Optimal Pain Control and Function  Outcome: Progressing     Problem: Fall Injury Risk  Goal: Absence of Fall and Fall-Related Injury  Outcome: Progressing      Spoke with Krystyna with [facility], [phone], she will meet with pt's friend Margarito on tomorrow to discuss hospice &  dc plan.

## 2025-07-30 NOTE — PROGRESS NOTES
[facility] - ICU  Pulmonary Critical Care Note    Patient Name: Franco Hein  MRN: [id number]  Admission Date: 1/1/2024  Hospital Length of Stay: 2 days  Code Status: Full Code  Attending Provider: MARIAH Nieto MD  Primary Care Provider: Caitie, Primary Doctor     Subjective:     HPI:   Franco Hein is  48 y.o. male with a PMH of ETOH misuse disorder who presented to [facility] ED on 1/1/2024 with complaint of jaundice, weakness for 3 weeks, and AMS. Patient was brought in by a friend who states patient is a daily drinker who has not been able to go to a store for the last 2 days, but patient is noted to have a positive ethanol on admission. Patient unable to provide history given his mental status and was only able to say his name and confirm he drinks alcohol but does not do drugs. Patient was tachypneic, tachycardic, and tremulous, on presentation with jaundice. Initial workup significant for lactic acidosis 7.5, AGMA with respiratory compensation, positive ethanol, influenza A positive, leukocytosis, and macrocytic anemia. Admitted to ICU given altered mental status and concern for decompensation in the setting of alcohol withdrawal and influenza A, also HCV Ab+.    Hospital Course/Significant events:  1/1/2024: Admitted to ICU, on 2L NC    24 Hour Interval History:  Patient doing well overnight, no acute events.  Continues on Ventimask at 40% FiO2.  H&H 9.9/31.1 up from 7/22.7 s/p 2 units packed red blood cell transfused yesterday; leukocytosis resolved overnight, platelets 92 but remained stable.  Lactic acid down trended yesterday from 3 to 1.5.  Hyponatremic 132 and hypophosphatemic 1.7, repletion with sodium phos 30 millimoles today.  Received Lasix 20 mg IV this morning, I&O cumulative positive 3.6 L (although but transfusion volume maybe more documented thin actually given); roughly 900 cc urine output overnight.      History reviewed. No pertinent past medical history.    No past  surgical history on file.    Social History     Socioeconomic History    Marital status: Single           No current outpatient medications    Current Inpatient Medications   azithromycin  500 mg Intravenous Q24H    folic acid (FOLVITE) IVPB  1 mg Intravenous Daily    methylPREDNISolone sodium succinate injection  40 mg Intravenous Daily    mupirocin   Nasal BID    pantoprazole  40 mg Intravenous Daily    piperacillin-tazobactam (Zosyn) IV (PEDS and ADULTS) (extended infusion is not appropriate)  4.5 g Intravenous Q8H    sodium phosphate 39.99 mmol in dextrose 5 % (D5W) 250 mL IVPB  39.99 mmol Intravenous Once    thiamine (B-1) 100 mg in dextrose 5 % (D5W) 100 mL IVPB  100 mg Intravenous Daily    vancomycin (VANCOCIN) IV (PEDS and ADULTS)  750 mg Intravenous Q8H       Current Intravenous Infusions   dextrose 5 % and 0.45 % NaCl Stopped (01/02/24 1930)         Review of Systems   Unable to perform ROS: Mental acuity          Objective:       Intake/Output Summary (Last 24 hours) at 1/3/2024 0807  Last data filed at 1/3/2024 0608  Gross per 24 hour   Intake 5460.06 ml   Output 1825 ml   Net 3635.06 ml           Vital Signs (Most Recent):  Temp: 97.7 °F (36.5 °C) (01/03/24 0342)  Pulse: 84 (01/03/24 0700)  Resp: (!) 28 (01/03/24 0700)  BP: 103/69 (01/03/24 0700)  SpO2: 98 % (01/03/24 0700)  Body mass index is 20.6 kg/m².  Weight: 54.4 kg (120 lb) Vital Signs (24h Range):  Temp:  [95 °F (35 °C)-97.9 °F (36.6 °C)] 97.7 °F (36.5 °C)  Pulse:  [75-99] 84  Resp:  [18-43] 28  SpO2:  [95 %-100 %] 98 %  BP: ()/(64-85) 103/69     Physical Exam  Constitutional:       Appearance: He is normal weight. He is ill-appearing. He is not toxic-appearing or diaphoretic.   HENT:      Head: Normocephalic and atraumatic.      Mouth/Throat:      Mouth: Mucous membranes are dry.   Eyes:      General: Scleral icterus present.         Right eye: No discharge.         Left eye: No discharge.      Extraocular Movements: Extraocular movements  intact.      Pupils: Pupils are equal, round, and reactive to light.   Cardiovascular:      Rate and Rhythm: Regular rhythm. Tachycardia present.      Pulses: Normal pulses.      Heart sounds: Normal heart sounds. No murmur heard.  Pulmonary:      Effort: No respiratory distress.      Breath sounds: Wheezing, rhonchi and rales present.      Comments: On BIPAP  Abdominal:      General: Bowel sounds are normal. There is no distension.      Palpations: Abdomen is soft.      Tenderness: There is no abdominal tenderness.   Genitourinary:     Comments: Hillman catheter in place  Dark yellow urine noted with adequate output  Musculoskeletal:      Right lower leg: No edema.      Left lower leg: No edema.   Skin:     General: Skin is warm.      Capillary Refill: Capillary refill takes 2 to 3 seconds.      Coloration: Skin is jaundiced.   Neurological:      Comments: Pt awake, responds to verbal stimuili  Incoherent speech, appears to be attempting to follow commands  Moving all extremities           Lines/Drains/Airways       Drain  Duration                  Urethral Catheter 01/02/24 0413 Non-latex 16 Fr. 1 day              Peripheral Intravenous Line  Duration                  Peripheral IV - Single Lumen 01/01/24 20 G Left Antecubital 2 days         Peripheral IV - Single Lumen 01/01/24 1310 20 G Right Antecubital 1 day         Peripheral IV - Single Lumen 01/01/24 1551 18 G Anterior;Distal;Right Forearm 1 day                    Significant Labs:    Lab Results   Component Value Date    WBC 10.99 01/03/2024    HGB 9.9 (L) 01/03/2024    HCT 31.1 (L) 01/03/2024    MCV 90.1 01/03/2024    PLT 92 (L) 01/03/2024           BMP  Lab Results   Component Value Date     (L) 01/03/2024    K 4.0 01/03/2024    CHLORIDE 104 01/03/2024    CO2 24 01/03/2024    BUN 9.2 01/03/2024    CREATININE 0.62 (L) 01/03/2024    CALCIUM 6.9 (LL) 01/03/2024    AGAP 5.0 01/03/2024         ABG  Recent Labs   Lab 01/03/24  0018   PH 7.450   PO2 67.0*    PCO2 39.0   HCO3 27.1*   POCBASEDEF 2.90*         Mechanical Ventilation Support:  Oxygen Concentration (%): 40 (01/03/24 0700)      Significant Imaging:  X-Ray Chest 1 View    Result Date: 1/1/2024  Bilateral patchy airspace opacities greatest on the left.  Findings concerning for atypical infectious process. Electronically signed by: Josef Sow Date:    01/01/2024 Time:    14:23          Assessment/Plan:     Assessment  Alcohol withdrawal, delirium tremens  Sepsis, SIRS 3/4, lower respiratory infection  AGMA-resolved  Lactic acidosis-improving  Influenza A  Atypical pneumonia  Possible new cirrhosis diagnosis  Alcohol abuse  Positive hepatitis C antibody  Hyperbilirubinemia  Elevated transaminases  Elevated INR  MELD-Na: 28  Child Hoang: 13, class C  Maddrey's: 204.7  Macrocytic anemia, thrombocytopenia      Plan  Admitted to ICU for close monitoring  I&O cumulative positive 3.6 L; 900 cc urine output this morning, given lung sounds and renal indices intact, continue diuresis with Lasix 20 mg IV this morning  CT Chest Abdomen Pelvis showed multilobar PNA, cirrhotic appearing liver  Blood cultures positive 1/2 Streptococcus; [employer] confirmation strep pneumonia bacteremia, resp culture /Gram stain collection pending  Continue Zosyn and Zithromax; MRSA PCR negative, discontinued vancomycin  Anion gap resolved, lactate accident down trended; discontinued  NPO for now given AMS; will attempt swallow study when appropriate  Will attempt oseltamivir when able to tolerate PO, peramivir not on formulary  HCV Ab+, VL pending  H&H 7/22.7 improvement to 9.9/31.1 s/p 2 units packed red blood cells yesterday  Folate 18.3; B12 > 2000; continue folate supplementation  Given Sandra 204, will start prednisone 40 mg daily once able to tolerate PO  CIWA protocol with lorazepam prn, thiamine IV, folic acid IV  Monitor for refeeding syndrome with BMP, Mg, Ph q4h; replete electrolytes as needed    DVT Prophylaxis: SCDs  GI  Prophylaxis: PPI     45 minutes of critical care was time spent personally by me on the following activities: development of treatment plan with patient or surrogate and bedside caregivers, discussions with consultants, evaluation of patient's response to treatment, examination of patient, ordering and performing treatments and interventions, ordering and review of laboratory studies, ordering and review of radiographic studies, pulse oximetry, re-evaluation of patient's condition.  This critical care time did not overlap with that of any other provider or involve time for any procedures.     Nirmal Licea MD  Internal Medicine - PGY-2    Pulmonary Critical Care Medicine  [facility] - ICU  DOS: 01/03/2024      31-Jul-2025 02:21

## (undated) DEVICE — Device

## (undated) DEVICE — PAD DEFIB RADIOTRANSPARENT

## (undated) DEVICE — INTRODUCER CATH 5F 11CM

## (undated) DEVICE — SYR 10CC LUER LOCK

## (undated) DEVICE — DEVICE PERCLOSE SUT CLSR 6FR

## (undated) DEVICE — CANNULA ADULT NASAL 7FT

## (undated) DEVICE — STOPCOCK

## (undated) DEVICE — WIRE GUIDE .035D 180CM

## (undated) DEVICE — SET ANGIO ACIST CVI ANGIOTOUCH

## (undated) DEVICE — INTRO KIT MICPUNC STIFF CANN

## (undated) DEVICE — SHEATH TOURGUIDE 6.5F 55CM

## (undated) DEVICE — GUIDEWIRE INQWIRE SE 3MM JTIP

## (undated) DEVICE — CATH ANGLED GLIDE CATH 5FR

## (undated) DEVICE — GUIDEWIRE STD .035X180CM ANG